# Patient Record
Sex: FEMALE | Race: WHITE | Employment: OTHER | URBAN - METROPOLITAN AREA
[De-identification: names, ages, dates, MRNs, and addresses within clinical notes are randomized per-mention and may not be internally consistent; named-entity substitution may affect disease eponyms.]

---

## 2017-01-11 ENCOUNTER — OFFICE VISIT (OUTPATIENT)
Dept: FAMILY MEDICINE CLINIC | Age: 62
End: 2017-01-11

## 2017-01-11 VITALS
OXYGEN SATURATION: 96 % | BODY MASS INDEX: 27.6 KG/M2 | DIASTOLIC BLOOD PRESSURE: 82 MMHG | WEIGHT: 171 LBS | RESPIRATION RATE: 14 BRPM | SYSTOLIC BLOOD PRESSURE: 130 MMHG | TEMPERATURE: 98.3 F | HEART RATE: 86 BPM

## 2017-01-11 DIAGNOSIS — M19.90 ARTHRITIS: ICD-10-CM

## 2017-01-11 DIAGNOSIS — Z79.890 HORMONE REPLACEMENT THERAPY: ICD-10-CM

## 2017-01-11 DIAGNOSIS — Z00.00 ANNUAL PHYSICAL EXAM: Primary | ICD-10-CM

## 2017-01-11 DIAGNOSIS — J44.9 CHRONIC OBSTRUCTIVE PULMONARY DISEASE, UNSPECIFIED COPD TYPE (HCC): ICD-10-CM

## 2017-01-11 DIAGNOSIS — Z23 NEED FOR TDAP VACCINATION: ICD-10-CM

## 2017-01-11 LAB
A/G RATIO: 1.5 (ref 1.1–2.2)
ALBUMIN SERPL-MCNC: 4 G/DL (ref 3.4–5)
ALP BLD-CCNC: 80 U/L (ref 40–129)
ALT SERPL-CCNC: 9 U/L (ref 10–40)
ANION GAP SERPL CALCULATED.3IONS-SCNC: 10 MMOL/L (ref 3–16)
AST SERPL-CCNC: 12 U/L (ref 15–37)
BASOPHILS ABSOLUTE: 0 K/UL (ref 0–0.2)
BASOPHILS RELATIVE PERCENT: 0.5 %
BILIRUB SERPL-MCNC: 0.5 MG/DL (ref 0–1)
BUN BLDV-MCNC: 13 MG/DL (ref 7–20)
CALCIUM SERPL-MCNC: 9 MG/DL (ref 8.3–10.6)
CHLORIDE BLD-SCNC: 103 MMOL/L (ref 99–110)
CHOLESTEROL, TOTAL: 210 MG/DL (ref 0–199)
CO2: 26 MMOL/L (ref 21–32)
CREAT SERPL-MCNC: 0.6 MG/DL (ref 0.6–1.2)
EOSINOPHILS ABSOLUTE: 0.2 K/UL (ref 0–0.6)
EOSINOPHILS RELATIVE PERCENT: 5.1 %
GFR AFRICAN AMERICAN: >60
GFR NON-AFRICAN AMERICAN: >60
GLOBULIN: 2.6 G/DL
GLUCOSE BLD-MCNC: 88 MG/DL (ref 70–99)
HCT VFR BLD CALC: 41.3 % (ref 36–48)
HDLC SERPL-MCNC: 81 MG/DL (ref 40–60)
HEMOGLOBIN: 13.7 G/DL (ref 12–16)
LDL CHOLESTEROL CALCULATED: 115 MG/DL
LYMPHOCYTES ABSOLUTE: 1.6 K/UL (ref 1–5.1)
LYMPHOCYTES RELATIVE PERCENT: 36.4 %
MCH RBC QN AUTO: 32 PG (ref 26–34)
MCHC RBC AUTO-ENTMCNC: 33.3 G/DL (ref 31–36)
MCV RBC AUTO: 96.1 FL (ref 80–100)
MONOCYTES ABSOLUTE: 0.3 K/UL (ref 0–1.3)
MONOCYTES RELATIVE PERCENT: 7.9 %
NEUTROPHILS ABSOLUTE: 2.2 K/UL (ref 1.7–7.7)
NEUTROPHILS RELATIVE PERCENT: 50.1 %
PDW BLD-RTO: 13.5 % (ref 12.4–15.4)
PLATELET # BLD: 191 K/UL (ref 135–450)
PMV BLD AUTO: 8.1 FL (ref 5–10.5)
POTASSIUM SERPL-SCNC: 4.5 MMOL/L (ref 3.5–5.1)
RBC # BLD: 4.3 M/UL (ref 4–5.2)
SODIUM BLD-SCNC: 139 MMOL/L (ref 136–145)
TOTAL PROTEIN: 6.6 G/DL (ref 6.4–8.2)
TRIGL SERPL-MCNC: 71 MG/DL (ref 0–150)
VLDLC SERPL CALC-MCNC: 14 MG/DL
WBC # BLD: 4.3 K/UL (ref 4–11)

## 2017-01-11 PROCEDURE — 90471 IMMUNIZATION ADMIN: CPT | Performed by: NURSE PRACTITIONER

## 2017-01-11 PROCEDURE — 36415 COLL VENOUS BLD VENIPUNCTURE: CPT | Performed by: NURSE PRACTITIONER

## 2017-01-11 PROCEDURE — 99396 PREV VISIT EST AGE 40-64: CPT | Performed by: NURSE PRACTITIONER

## 2017-01-11 PROCEDURE — 90715 TDAP VACCINE 7 YRS/> IM: CPT | Performed by: NURSE PRACTITIONER

## 2017-01-11 RX ORDER — ALBUTEROL SULFATE 90 UG/1
AEROSOL, METERED RESPIRATORY (INHALATION)
Qty: 1 INHALER | Refills: 5 | Status: SHIPPED | OUTPATIENT
Start: 2017-01-11 | End: 2017-07-12 | Stop reason: SDUPTHER

## 2017-01-11 RX ORDER — MELOXICAM 15 MG/1
TABLET ORAL
Qty: 30 TABLET | Refills: 5 | Status: SHIPPED | OUTPATIENT
Start: 2017-01-11 | End: 2017-07-18 | Stop reason: SDUPTHER

## 2017-01-11 ASSESSMENT — ENCOUNTER SYMPTOMS
WHEEZING: 1
EYE REDNESS: 0
COUGH: 1
CONSTIPATION: 0
SHORTNESS OF BREATH: 1
DIARRHEA: 0
BLURRED VISION: 0

## 2017-01-19 ENCOUNTER — NURSE ONLY (OUTPATIENT)
Dept: FAMILY MEDICINE CLINIC | Age: 62
End: 2017-01-19

## 2017-01-19 DIAGNOSIS — Z00.00 ANNUAL PHYSICAL EXAM: ICD-10-CM

## 2017-01-19 DIAGNOSIS — Z12.11 COLON CANCER SCREENING: Primary | ICD-10-CM

## 2017-01-19 LAB
CONTROL: NORMAL
HEMOCCULT STL QL: NORMAL

## 2017-01-19 PROCEDURE — 82274 ASSAY TEST FOR BLOOD FECAL: CPT | Performed by: NURSE PRACTITIONER

## 2017-02-21 ENCOUNTER — TELEPHONE (OUTPATIENT)
Dept: FAMILY MEDICINE CLINIC | Age: 62
End: 2017-02-21

## 2017-02-21 RX ORDER — LEVOFLOXACIN 500 MG/1
500 TABLET, FILM COATED ORAL DAILY
Qty: 7 TABLET | Refills: 0 | Status: SHIPPED | OUTPATIENT
Start: 2017-02-21 | End: 2017-02-28

## 2017-03-14 ENCOUNTER — TELEPHONE (OUTPATIENT)
Dept: FAMILY MEDICINE CLINIC | Age: 62
End: 2017-03-14

## 2017-03-27 DIAGNOSIS — J44.9 CHRONIC OBSTRUCTIVE PULMONARY DISEASE, UNSPECIFIED COPD TYPE (HCC): ICD-10-CM

## 2017-03-27 RX ORDER — TIOTROPIUM BROMIDE AND OLODATEROL 3.124; 2.736 UG/1; UG/1
SPRAY, METERED RESPIRATORY (INHALATION)
Qty: 4 INHALER | Refills: 2 | Status: SHIPPED | OUTPATIENT
Start: 2017-03-27 | End: 2017-06-22 | Stop reason: SDUPTHER

## 2017-04-03 ENCOUNTER — OFFICE VISIT (OUTPATIENT)
Dept: FAMILY MEDICINE CLINIC | Age: 62
End: 2017-04-03

## 2017-04-03 VITALS
TEMPERATURE: 99.1 F | DIASTOLIC BLOOD PRESSURE: 80 MMHG | WEIGHT: 161 LBS | BODY MASS INDEX: 25.99 KG/M2 | SYSTOLIC BLOOD PRESSURE: 130 MMHG | HEART RATE: 72 BPM

## 2017-04-03 DIAGNOSIS — M54.31 SCIATICA OF RIGHT SIDE: Primary | ICD-10-CM

## 2017-04-03 PROCEDURE — 99213 OFFICE O/P EST LOW 20 MIN: CPT | Performed by: NURSE PRACTITIONER

## 2017-04-03 RX ORDER — CYCLOBENZAPRINE HCL 5 MG
5 TABLET ORAL 3 TIMES DAILY PRN
Qty: 35 TABLET | Refills: 0 | Status: SHIPPED | OUTPATIENT
Start: 2017-04-03 | End: 2017-04-17 | Stop reason: ALTCHOICE

## 2017-04-03 RX ORDER — METHYLPREDNISOLONE 4 MG/1
TABLET ORAL
Qty: 21 TABLET | Refills: 0 | Status: SHIPPED | OUTPATIENT
Start: 2017-04-03 | End: 2017-04-17 | Stop reason: ALTCHOICE

## 2017-04-03 ASSESSMENT — ENCOUNTER SYMPTOMS: BACK PAIN: 0

## 2017-04-17 ENCOUNTER — HOSPITAL ENCOUNTER (OUTPATIENT)
Dept: OTHER | Age: 62
Discharge: HOME | End: 2017-04-17
Attending: NURSE PRACTITIONER | Admitting: NURSE PRACTITIONER

## 2017-04-17 ENCOUNTER — OFFICE VISIT (OUTPATIENT)
Dept: FAMILY MEDICINE CLINIC | Age: 62
End: 2017-04-17

## 2017-04-17 ENCOUNTER — HOSPITAL ENCOUNTER (OUTPATIENT)
Dept: GENERAL RADIOLOGY | Age: 62
Discharge: HOME | End: 2017-04-17

## 2017-04-17 VITALS
WEIGHT: 158 LBS | SYSTOLIC BLOOD PRESSURE: 118 MMHG | BODY MASS INDEX: 25.5 KG/M2 | TEMPERATURE: 98.4 F | HEART RATE: 80 BPM | OXYGEN SATURATION: 96 % | DIASTOLIC BLOOD PRESSURE: 78 MMHG

## 2017-04-17 DIAGNOSIS — M54.31 SCIATICA WITHOUT BACK PAIN, RIGHT: Primary | ICD-10-CM

## 2017-04-17 DIAGNOSIS — M54.31 SCIATICA WITHOUT BACK PAIN, RIGHT: ICD-10-CM

## 2017-04-17 PROCEDURE — 99213 OFFICE O/P EST LOW 20 MIN: CPT | Performed by: NURSE PRACTITIONER

## 2017-04-17 ASSESSMENT — ENCOUNTER SYMPTOMS: BACK PAIN: 0

## 2017-04-28 ENCOUNTER — HOSPITAL ENCOUNTER (OUTPATIENT)
Dept: MAMMOGRAPHY | Age: 62
Discharge: HOME | End: 2017-04-28
Attending: FAMILY MEDICINE | Admitting: FAMILY MEDICINE

## 2017-04-28 DIAGNOSIS — Z12.31 ENCOUNTER FOR SCREENING MAMMOGRAM FOR BREAST CANCER: ICD-10-CM

## 2017-05-30 ENCOUNTER — OFFICE VISIT (OUTPATIENT)
Dept: FAMILY MEDICINE CLINIC | Age: 62
End: 2017-05-30

## 2017-05-30 VITALS
RESPIRATION RATE: 16 BRPM | OXYGEN SATURATION: 97 % | DIASTOLIC BLOOD PRESSURE: 80 MMHG | SYSTOLIC BLOOD PRESSURE: 136 MMHG | HEART RATE: 78 BPM | WEIGHT: 149 LBS | BODY MASS INDEX: 24.05 KG/M2

## 2017-05-30 DIAGNOSIS — Z01.419 WELL WOMAN EXAM: Primary | ICD-10-CM

## 2017-05-30 PROCEDURE — 99396 PREV VISIT EST AGE 40-64: CPT | Performed by: NURSE PRACTITIONER

## 2017-06-01 LAB
HPV COMMENT: ABNORMAL
HPV TYPE 16: NOT DETECTED
HPV TYPE 18: NOT DETECTED
HPVOH (OTHER TYPES): DETECTED

## 2017-06-22 DIAGNOSIS — J44.9 CHRONIC OBSTRUCTIVE PULMONARY DISEASE, UNSPECIFIED COPD TYPE (HCC): ICD-10-CM

## 2017-06-22 RX ORDER — TIOTROPIUM BROMIDE AND OLODATEROL 3.124; 2.736 UG/1; UG/1
SPRAY, METERED RESPIRATORY (INHALATION)
Qty: 1 INHALER | Refills: 1 | Status: SHIPPED | OUTPATIENT
Start: 2017-06-22 | End: 2017-08-24 | Stop reason: SDUPTHER

## 2017-07-10 DIAGNOSIS — Z79.890 HORMONE REPLACEMENT THERAPY: ICD-10-CM

## 2017-07-10 RX ORDER — ESTROGEN,CON/M-PROGEST ACET 0.45-1.5MG
TABLET ORAL
Qty: 28 TABLET | Refills: 4 | Status: SHIPPED | OUTPATIENT
Start: 2017-07-10 | End: 2017-11-27 | Stop reason: SDUPTHER

## 2017-07-12 DIAGNOSIS — J44.9 CHRONIC OBSTRUCTIVE PULMONARY DISEASE, UNSPECIFIED COPD TYPE (HCC): ICD-10-CM

## 2017-07-18 DIAGNOSIS — M19.90 ARTHRITIS: ICD-10-CM

## 2017-07-19 RX ORDER — MELOXICAM 15 MG/1
TABLET ORAL
Qty: 30 TABLET | Refills: 4 | Status: SHIPPED | OUTPATIENT
Start: 2017-07-19 | End: 2017-12-17 | Stop reason: SDUPTHER

## 2017-08-11 ENCOUNTER — OFFICE VISIT (OUTPATIENT)
Dept: FAMILY MEDICINE CLINIC | Age: 62
End: 2017-08-11

## 2017-08-11 VITALS
SYSTOLIC BLOOD PRESSURE: 130 MMHG | HEART RATE: 67 BPM | OXYGEN SATURATION: 98 % | DIASTOLIC BLOOD PRESSURE: 82 MMHG | RESPIRATION RATE: 16 BRPM | WEIGHT: 134 LBS | BODY MASS INDEX: 21.63 KG/M2

## 2017-08-11 DIAGNOSIS — R63.4 WEIGHT LOSS: Primary | ICD-10-CM

## 2017-08-11 DIAGNOSIS — R53.83 FATIGUE, UNSPECIFIED TYPE: ICD-10-CM

## 2017-08-11 LAB
A/G RATIO: 1.4 (ref 1.1–2.2)
ALBUMIN SERPL-MCNC: 3.8 G/DL (ref 3.4–5)
ALP BLD-CCNC: 64 U/L (ref 40–129)
ALT SERPL-CCNC: 6 U/L (ref 10–40)
ANION GAP SERPL CALCULATED.3IONS-SCNC: 11 MMOL/L (ref 3–16)
AST SERPL-CCNC: 9 U/L (ref 15–37)
BASOPHILS ABSOLUTE: 0 K/UL (ref 0–0.2)
BASOPHILS RELATIVE PERCENT: 0.7 %
BILIRUB SERPL-MCNC: 0.5 MG/DL (ref 0–1)
BUN BLDV-MCNC: 12 MG/DL (ref 7–20)
CALCIUM SERPL-MCNC: 9.2 MG/DL (ref 8.3–10.6)
CHLORIDE BLD-SCNC: 103 MMOL/L (ref 99–110)
CO2: 27 MMOL/L (ref 21–32)
CREAT SERPL-MCNC: 0.5 MG/DL (ref 0.6–1.2)
EOSINOPHILS ABSOLUTE: 0.3 K/UL (ref 0–0.6)
EOSINOPHILS RELATIVE PERCENT: 6.4 %
GFR AFRICAN AMERICAN: >60
GFR NON-AFRICAN AMERICAN: >60
GLOBULIN: 2.8 G/DL
GLUCOSE BLD-MCNC: 84 MG/DL (ref 70–99)
HCT VFR BLD CALC: 40.4 % (ref 36–48)
HEMOGLOBIN: 13.5 G/DL (ref 12–16)
LYMPHOCYTES ABSOLUTE: 1.4 K/UL (ref 1–5.1)
LYMPHOCYTES RELATIVE PERCENT: 34.6 %
MCH RBC QN AUTO: 32.1 PG (ref 26–34)
MCHC RBC AUTO-ENTMCNC: 33.3 G/DL (ref 31–36)
MCV RBC AUTO: 96.3 FL (ref 80–100)
MONOCYTES ABSOLUTE: 0.3 K/UL (ref 0–1.3)
MONOCYTES RELATIVE PERCENT: 6.6 %
NEUTROPHILS ABSOLUTE: 2 K/UL (ref 1.7–7.7)
NEUTROPHILS RELATIVE PERCENT: 51.7 %
PDW BLD-RTO: 15.2 % (ref 12.4–15.4)
PLATELET # BLD: 214 K/UL (ref 135–450)
PMV BLD AUTO: 7.7 FL (ref 5–10.5)
POTASSIUM SERPL-SCNC: 4.2 MMOL/L (ref 3.5–5.1)
RBC # BLD: 4.2 M/UL (ref 4–5.2)
SODIUM BLD-SCNC: 141 MMOL/L (ref 136–145)
TOTAL PROTEIN: 6.6 G/DL (ref 6.4–8.2)
TSH REFLEX FT4: 2.33 UIU/ML (ref 0.27–4.2)
VITAMIN D 25-HYDROXY: 24.2 NG/ML
WBC # BLD: 3.9 K/UL (ref 4–11)

## 2017-08-11 PROCEDURE — 99213 OFFICE O/P EST LOW 20 MIN: CPT | Performed by: NURSE PRACTITIONER

## 2017-08-11 PROCEDURE — 36415 COLL VENOUS BLD VENIPUNCTURE: CPT | Performed by: NURSE PRACTITIONER

## 2017-08-11 ASSESSMENT — ENCOUNTER SYMPTOMS
BLOOD IN STOOL: 0
SORE THROAT: 0
CONSTIPATION: 0
VOMITING: 0
DIARRHEA: 0
ABDOMINAL PAIN: 0

## 2017-08-12 LAB
ESTIMATED AVERAGE GLUCOSE: 105.4 MG/DL
HBA1C MFR BLD: 5.3 %

## 2017-08-24 DIAGNOSIS — J44.9 CHRONIC OBSTRUCTIVE PULMONARY DISEASE, UNSPECIFIED COPD TYPE (HCC): ICD-10-CM

## 2017-09-12 ENCOUNTER — TELEPHONE (OUTPATIENT)
Dept: FAMILY MEDICINE CLINIC | Age: 62
End: 2017-09-12

## 2017-09-13 ENCOUNTER — TELEPHONE (OUTPATIENT)
Dept: FAMILY MEDICINE CLINIC | Age: 62
End: 2017-09-13

## 2017-09-13 NOTE — TELEPHONE ENCOUNTER
The pharmacy is calling to see if pt can get spinosad instead of permethrin. Pt's insurance will not cover the permethrin. Please advise.

## 2017-10-26 DIAGNOSIS — J44.9 CHRONIC OBSTRUCTIVE PULMONARY DISEASE, UNSPECIFIED COPD TYPE (HCC): ICD-10-CM

## 2017-10-26 RX ORDER — TIOTROPIUM BROMIDE AND OLODATEROL 3.124; 2.736 UG/1; UG/1
SPRAY, METERED RESPIRATORY (INHALATION)
Qty: 4 INHALER | Refills: 0 | Status: SHIPPED | OUTPATIENT
Start: 2017-10-26 | End: 2017-12-04 | Stop reason: SDUPTHER

## 2017-11-13 DIAGNOSIS — J44.9 CHRONIC OBSTRUCTIVE PULMONARY DISEASE, UNSPECIFIED COPD TYPE (HCC): ICD-10-CM

## 2017-11-27 DIAGNOSIS — Z79.890 HORMONE REPLACEMENT THERAPY: ICD-10-CM

## 2017-11-27 RX ORDER — ESTROGEN,CON/M-PROGEST ACET 0.45-1.5MG
TABLET ORAL
Qty: 28 TABLET | Refills: 3 | Status: SHIPPED | OUTPATIENT
Start: 2017-11-27 | End: 2018-03-16 | Stop reason: SDUPTHER

## 2017-12-04 DIAGNOSIS — J44.9 CHRONIC OBSTRUCTIVE PULMONARY DISEASE, UNSPECIFIED COPD TYPE (HCC): ICD-10-CM

## 2017-12-04 RX ORDER — TIOTROPIUM BROMIDE AND OLODATEROL 3.124; 2.736 UG/1; UG/1
SPRAY, METERED RESPIRATORY (INHALATION)
Qty: 1 INHALER | Refills: 3 | Status: SHIPPED | OUTPATIENT
Start: 2017-12-04 | End: 2018-04-11 | Stop reason: SDUPTHER

## 2017-12-06 ENCOUNTER — TELEPHONE (OUTPATIENT)
Dept: FAMILY MEDICINE CLINIC | Age: 62
End: 2017-12-06

## 2017-12-06 RX ORDER — SPINOSAD 9 MG/ML
1 SUSPENSION TOPICAL ONCE
Qty: 120 ML | Refills: 0 | Status: SHIPPED | OUTPATIENT
Start: 2017-12-06 | End: 2017-12-06

## 2017-12-06 NOTE — TELEPHONE ENCOUNTER
Patient called and stated that she has head lice x 2 days. Patient is requesting a prescription be sent to [facility] in [place]. [address] [facility] to treat it. Please call patient when rx is sent in.     Thanks

## 2017-12-17 DIAGNOSIS — M19.90 ARTHRITIS: ICD-10-CM

## 2017-12-18 RX ORDER — MELOXICAM 15 MG/1
TABLET ORAL
Qty: 30 TABLET | Refills: 3 | Status: SHIPPED | OUTPATIENT
Start: 2017-12-18 | End: 2018-04-18 | Stop reason: SDUPTHER

## 2018-02-16 DIAGNOSIS — J44.9 CHRONIC OBSTRUCTIVE PULMONARY DISEASE, UNSPECIFIED COPD TYPE (HCC): ICD-10-CM

## 2018-03-09 DIAGNOSIS — J44.9 CHRONIC OBSTRUCTIVE PULMONARY DISEASE, UNSPECIFIED COPD TYPE (HCC): ICD-10-CM

## 2018-03-12 DIAGNOSIS — J44.9 CHRONIC OBSTRUCTIVE PULMONARY DISEASE, UNSPECIFIED COPD TYPE (HCC): ICD-10-CM

## 2018-03-16 DIAGNOSIS — Z79.890 HORMONE REPLACEMENT THERAPY: ICD-10-CM

## 2018-03-16 RX ORDER — ESTROGEN,CON/M-PROGEST ACET 0.45-1.5MG
TABLET ORAL
Qty: 28 TABLET | Refills: 2 | Status: SHIPPED | OUTPATIENT
Start: 2018-03-16 | End: 2018-06-08 | Stop reason: SDUPTHER

## 2018-04-11 DIAGNOSIS — J44.9 CHRONIC OBSTRUCTIVE PULMONARY DISEASE, UNSPECIFIED COPD TYPE (HCC): ICD-10-CM

## 2018-04-11 RX ORDER — TIOTROPIUM BROMIDE AND OLODATEROL 3.124; 2.736 UG/1; UG/1
SPRAY, METERED RESPIRATORY (INHALATION)
Qty: 4 G | Refills: 2 | Status: SHIPPED | OUTPATIENT
Start: 2018-04-11 | End: 2018-07-08 | Stop reason: SDUPTHER

## 2018-04-18 DIAGNOSIS — M19.90 ARTHRITIS: ICD-10-CM

## 2018-04-18 RX ORDER — MELOXICAM 15 MG/1
TABLET ORAL
Qty: 30 TABLET | Refills: 2 | Status: SHIPPED | OUTPATIENT
Start: 2018-04-18 | End: 2018-07-16 | Stop reason: SDUPTHER

## 2018-05-24 DIAGNOSIS — J44.9 CHRONIC OBSTRUCTIVE PULMONARY DISEASE, UNSPECIFIED COPD TYPE (HCC): ICD-10-CM

## 2018-06-08 DIAGNOSIS — Z79.890 HORMONE REPLACEMENT THERAPY: ICD-10-CM

## 2018-06-08 RX ORDER — ESTROGEN,CON/M-PROGEST ACET 0.45-1.5MG
TABLET ORAL
Qty: 28 TABLET | Refills: 1 | Status: SHIPPED | OUTPATIENT
Start: 2018-06-08 | End: 2018-08-03 | Stop reason: SDUPTHER

## 2018-07-08 DIAGNOSIS — J44.9 CHRONIC OBSTRUCTIVE PULMONARY DISEASE, UNSPECIFIED COPD TYPE (HCC): ICD-10-CM

## 2018-07-09 RX ORDER — TIOTROPIUM BROMIDE AND OLODATEROL 3.124; 2.736 UG/1; UG/1
SPRAY, METERED RESPIRATORY (INHALATION)
Qty: 4 G | Refills: 1 | Status: SHIPPED | OUTPATIENT
Start: 2018-07-09 | End: 2018-09-04 | Stop reason: SDUPTHER

## 2018-07-10 ENCOUNTER — TELEPHONE (OUTPATIENT)
Dept: FAMILY MEDICINE CLINIC | Age: 63
End: 2018-07-10

## 2018-07-16 DIAGNOSIS — J44.9 CHRONIC OBSTRUCTIVE PULMONARY DISEASE, UNSPECIFIED COPD TYPE (HCC): ICD-10-CM

## 2018-07-16 DIAGNOSIS — M19.90 ARTHRITIS: ICD-10-CM

## 2018-07-16 RX ORDER — MELOXICAM 15 MG/1
TABLET ORAL
Qty: 30 TABLET | Refills: 1 | Status: SHIPPED | OUTPATIENT
Start: 2018-07-16 | End: 2018-09-14 | Stop reason: SDUPTHER

## 2018-08-03 DIAGNOSIS — Z79.890 HORMONE REPLACEMENT THERAPY: ICD-10-CM

## 2018-08-03 RX ORDER — ESTROGEN,CON/M-PROGEST ACET 0.45-1.5MG
TABLET ORAL
Qty: 28 TABLET | Refills: 0 | Status: SHIPPED | OUTPATIENT
Start: 2018-08-03 | End: 2018-09-02 | Stop reason: SDUPTHER

## 2018-08-14 DIAGNOSIS — J44.9 CHRONIC OBSTRUCTIVE PULMONARY DISEASE, UNSPECIFIED COPD TYPE (HCC): ICD-10-CM

## 2018-09-02 DIAGNOSIS — Z79.890 HORMONE REPLACEMENT THERAPY: ICD-10-CM

## 2018-09-04 ENCOUNTER — OFFICE VISIT (OUTPATIENT)
Dept: FAMILY MEDICINE CLINIC | Age: 63
End: 2018-09-04

## 2018-09-04 VITALS
WEIGHT: 134 LBS | DIASTOLIC BLOOD PRESSURE: 68 MMHG | BODY MASS INDEX: 21.53 KG/M2 | HEIGHT: 66 IN | HEART RATE: 74 BPM | SYSTOLIC BLOOD PRESSURE: 130 MMHG | TEMPERATURE: 98.3 F | OXYGEN SATURATION: 98 %

## 2018-09-04 DIAGNOSIS — R05.9 COUGH: Primary | ICD-10-CM

## 2018-09-04 DIAGNOSIS — L20.9 ATOPIC DERMATITIS, UNSPECIFIED TYPE: ICD-10-CM

## 2018-09-04 DIAGNOSIS — J44.9 CHRONIC OBSTRUCTIVE PULMONARY DISEASE, UNSPECIFIED COPD TYPE (HCC): ICD-10-CM

## 2018-09-04 PROCEDURE — G8427 DOCREV CUR MEDS BY ELIG CLIN: HCPCS | Performed by: FAMILY MEDICINE

## 2018-09-04 PROCEDURE — 3017F COLORECTAL CA SCREEN DOC REV: CPT | Performed by: FAMILY MEDICINE

## 2018-09-04 PROCEDURE — 1036F TOBACCO NON-USER: CPT | Performed by: FAMILY MEDICINE

## 2018-09-04 PROCEDURE — 99214 OFFICE O/P EST MOD 30 MIN: CPT | Performed by: FAMILY MEDICINE

## 2018-09-04 PROCEDURE — G8420 CALC BMI NORM PARAMETERS: HCPCS | Performed by: FAMILY MEDICINE

## 2018-09-04 RX ORDER — BENZONATATE 100 MG/1
100 CAPSULE ORAL 3 TIMES DAILY PRN
Qty: 30 CAPSULE | Refills: 0 | Status: SHIPPED | OUTPATIENT
Start: 2018-09-04 | End: 2018-09-14

## 2018-09-04 RX ORDER — TIOTROPIUM BROMIDE AND OLODATEROL 3.124; 2.736 UG/1; UG/1
SPRAY, METERED RESPIRATORY (INHALATION)
Qty: 1 INHALER | Refills: 3 | Status: SHIPPED | OUTPATIENT
Start: 2018-09-04 | End: 2019-01-01 | Stop reason: SDUPTHER

## 2018-09-04 RX ORDER — TRIAMCINOLONE ACETONIDE 1 MG/G
CREAM TOPICAL
Qty: 453.6 G | Refills: 0 | Status: ON HOLD | OUTPATIENT
Start: 2018-09-04 | End: 2018-10-09 | Stop reason: ALTCHOICE

## 2018-09-04 RX ORDER — METHYLPREDNISOLONE 4 MG/1
TABLET ORAL
Qty: 1 KIT | Refills: 0 | Status: SHIPPED | OUTPATIENT
Start: 2018-09-04 | End: 2018-09-10

## 2018-09-04 RX ORDER — ESTROGEN,CON/M-PROGEST ACET 0.45-1.5MG
TABLET ORAL
Qty: 28 TABLET | Refills: 0 | Status: SHIPPED | OUTPATIENT
Start: 2018-09-04 | End: 2018-10-01 | Stop reason: SDUPTHER

## 2018-09-04 RX ORDER — HYDROXYZINE HYDROCHLORIDE 25 MG/1
25 TABLET, FILM COATED ORAL EVERY 8 HOURS PRN
Qty: 30 TABLET | Refills: 0 | Status: SHIPPED | OUTPATIENT
Start: 2018-09-04 | End: 2018-09-14

## 2018-09-04 ASSESSMENT — PATIENT HEALTH QUESTIONNAIRE - PHQ9
SUM OF ALL RESPONSES TO PHQ9 QUESTIONS 1 & 2: 0
SUM OF ALL RESPONSES TO PHQ QUESTIONS 1-9: 0
1. LITTLE INTEREST OR PLEASURE IN DOING THINGS: 0
2. FEELING DOWN, DEPRESSED OR HOPELESS: 0
SUM OF ALL RESPONSES TO PHQ QUESTIONS 1-9: 0

## 2018-09-11 ASSESSMENT — ENCOUNTER SYMPTOMS: COUGH: 1

## 2018-09-11 NOTE — PROGRESS NOTES
2018     Anastacio Holly (:  1955) is a 60 y.o. female, here for evaluation of the following medical concerns:    Anastacio Holly is a 60 y.o. female. Patient presents with:  Cough  Other: redness on arms neck and back of legs         The patients PMH, surgical history, family history, medications, allergies were all reviewed and updated as appropriate today. Cough   This is a new problem. The current episode started in the past 7 days. The problem has been gradually worsening. The problem occurs every few minutes. The cough is non-productive. Associated symptoms include a rash. Pertinent negatives include no chest pain or chills. Nothing aggravates the symptoms. She has tried nothing for the symptoms. The treatment provided no relief. Other   Associated symptoms include coughing and a rash. Pertinent negatives include no chest pain or chills. Rash   This is a recurrent problem. The current episode started 1 to 4 weeks ago. The problem has been rapidly worsening since onset. The affected locations include the left arm, right arm and neck. The rash is characterized by redness and itchiness. She was exposed to nothing. Associated symptoms include coughing. Past treatments include nothing. The treatment provided no relief. Review of Systems   Constitutional: Negative for chills. Respiratory: Positive for cough. Cardiovascular: Negative for chest pain. Skin: Positive for rash. Prior to Visit Medications    Medication Sig Taking? Authorizing Provider   PREMPRO 0.45-1.5 MG per tablet TAKE ONE TABLET BY MOUTH DAILY Yes Ramila Lang, APRN - CNP   STIOLTO RESPIMAT 2.5-2.5 MCG/ACT AERS INHALE TWO INHALATION(S) BY MOUTH DAILY Yes Meggan Ghosh MD   benzonatate (TESSALON PERLES) 100 MG capsule Take 1 capsule by mouth 3 times daily as needed for Cough Yes Meggan Ghosh MD   triamcinolone (KENALOG) 0.1 % cream Apply topically 2 times daily.  Yes Meggan Ghosh MD   hydrOXYzine (ATARAX) 25 MG tablet Take 1 tablet by mouth every 8 hours as needed for Itching Yes Bob Lam MD   PROAIR  (90 Base) MCG/ACT inhaler INHALE TWO PUFFS BY MOUTH EVERY 6 HOURS AS NEEDED FOR WHEEZING Yes TAMMY Farley CNP   meloxicam (MOBIC) 15 MG tablet TAKE ONE TABLET BY MOUTH DAILY Yes TAMMY Farley CNP   QVAR 80 MCG/ACT inhaler INHALE ONE PUFF BY MOUTH TWICE A DAY Yes Bob Lam MD        Social History   Substance Use Topics    Smoking status: Former Smoker     Packs/day: 1.00     Years: 40.00     Types: Cigarettes     Quit date: 9/2/2015    Smokeless tobacco: Never Used    Alcohol use No        Vitals:    09/04/18 1158   BP: 130/68   Pulse: 74   Temp: 98.3 °F (36.8 °C)   TempSrc: Oral   SpO2: 98%   Weight: 134 lb (60.8 kg)   Height: 5' 6\" (1.676 m)     Estimated body mass index is 21.63 kg/m² as calculated from the following:    Height as of this encounter: 5' 6\" (1.676 m). Weight as of this encounter: 134 lb (60.8 kg). Physical Exam   Constitutional: She is oriented to person, place, and time. She appears well-developed and well-nourished. HENT:   Head: Normocephalic and atraumatic. Right Ear: External ear normal.   Left Ear: External ear normal.   Nose: Nose normal.   Mouth/Throat: Oropharynx is clear and moist.   Eyes: Pupils are equal, round, and reactive to light. Conjunctivae are normal.   Neck: Normal range of motion. Neck supple. Cardiovascular: Normal rate, regular rhythm, normal heart sounds and intact distal pulses. Pulmonary/Chest: Effort normal and breath sounds normal.   Abdominal: Soft. Bowel sounds are normal.   Musculoskeletal: Normal range of motion. Neurological: She is alert and oriented to person, place, and time. She has normal reflexes. Skin: Skin is dry. Red raised rash on arms, neck, confluent. Psychiatric: She has a normal mood and affect.  Her behavior is normal. Judgment and thought content normal.   Nursing note and vitals reviewed. ASSESSMENT/PLAN:  1. Cough    - benzonatate (TESSALON PERLES) 100 MG capsule; Take 1 capsule by mouth 3 times daily as needed for Cough  Dispense: 30 capsule; Refill: 0    2. Atopic dermatitis, unspecified type    - methylPREDNISolone (MEDROL DOSEPACK) 4 MG tablet; Take by mouth. Dispense: 1 kit; Refill: 0  - triamcinolone (KENALOG) 0.1 % cream; Apply topically 2 times daily. Dispense: 453.6 g; Refill: 0  - hydrOXYzine (ATARAX) 25 MG tablet; Take 1 tablet by mouth every 8 hours as needed for Itching  Dispense: 30 tablet; Refill: 0      No Follow-up on file. An electronic signature was used to authenticate this note.     --Lavelle Habermann, MD on 9/11/2018 at 7:52 AM

## 2018-09-14 ENCOUNTER — TELEPHONE (OUTPATIENT)
Dept: FAMILY MEDICINE CLINIC | Age: 63
End: 2018-09-14

## 2018-09-14 DIAGNOSIS — M19.90 ARTHRITIS: ICD-10-CM

## 2018-09-14 RX ORDER — MELOXICAM 15 MG/1
TABLET ORAL
Qty: 30 TABLET | Refills: 0 | Status: SHIPPED | OUTPATIENT
Start: 2018-09-14 | End: 2018-09-24

## 2018-09-17 NOTE — TELEPHONE ENCOUNTER
Pt called back and given message from [facility].  She will discuss it with him at her OV on Monday

## 2018-09-24 ENCOUNTER — OFFICE VISIT (OUTPATIENT)
Dept: FAMILY MEDICINE CLINIC | Age: 63
End: 2018-09-24
Payer: MEDICAID

## 2018-09-24 VITALS
SYSTOLIC BLOOD PRESSURE: 134 MMHG | HEART RATE: 70 BPM | DIASTOLIC BLOOD PRESSURE: 84 MMHG | BODY MASS INDEX: 21.47 KG/M2 | WEIGHT: 133 LBS | OXYGEN SATURATION: 93 %

## 2018-09-24 DIAGNOSIS — Z12.11 COLON CANCER SCREENING: Primary | ICD-10-CM

## 2018-09-24 DIAGNOSIS — J44.9 CHRONIC OBSTRUCTIVE PULMONARY DISEASE, UNSPECIFIED COPD TYPE (HCC): ICD-10-CM

## 2018-09-24 PROCEDURE — G8427 DOCREV CUR MEDS BY ELIG CLIN: HCPCS | Performed by: FAMILY MEDICINE

## 2018-09-24 PROCEDURE — 3023F SPIROM DOC REV: CPT | Performed by: FAMILY MEDICINE

## 2018-09-24 PROCEDURE — 3017F COLORECTAL CA SCREEN DOC REV: CPT | Performed by: FAMILY MEDICINE

## 2018-09-24 PROCEDURE — 1036F TOBACCO NON-USER: CPT | Performed by: FAMILY MEDICINE

## 2018-09-24 PROCEDURE — G8926 SPIRO NO PERF OR DOC: HCPCS | Performed by: FAMILY MEDICINE

## 2018-09-24 PROCEDURE — 99213 OFFICE O/P EST LOW 20 MIN: CPT | Performed by: FAMILY MEDICINE

## 2018-09-24 PROCEDURE — G8420 CALC BMI NORM PARAMETERS: HCPCS | Performed by: FAMILY MEDICINE

## 2018-09-24 RX ORDER — ALBUTEROL SULFATE 90 UG/1
AEROSOL, METERED RESPIRATORY (INHALATION)
Qty: 8.5 G | Refills: 5 | Status: SHIPPED | OUTPATIENT
Start: 2018-09-24 | End: 2019-04-16 | Stop reason: SDUPTHER

## 2018-09-26 ENCOUNTER — HOSPITAL ENCOUNTER (OUTPATIENT)
Dept: GENERAL RADIOLOGY | Age: 63
Discharge: HOME | End: 2018-09-26
Payer: MEDICAID

## 2018-09-26 DIAGNOSIS — J44.9 CHRONIC OBSTRUCTIVE PULMONARY DISEASE, UNSPECIFIED COPD TYPE (HCC): ICD-10-CM

## 2018-09-26 DIAGNOSIS — Z12.11 ENCOUNTER FOR SCREENING COLONOSCOPY: Primary | ICD-10-CM

## 2018-09-26 PROCEDURE — 71046 X-RAY EXAM CHEST 2 VIEWS: CPT

## 2018-09-26 RX ORDER — POLYETHYLENE GLYCOL 3350 17 G/17G
POWDER, FOR SOLUTION ORAL
Qty: 255 G | Refills: 0 | Status: ON HOLD | OUTPATIENT
Start: 2018-09-26 | End: 2018-10-09 | Stop reason: HOSPADM

## 2018-10-01 ENCOUNTER — OFFICE VISIT (OUTPATIENT)
Dept: PULMONOLOGY | Age: 63
End: 2018-10-01
Payer: MEDICAID

## 2018-10-01 VITALS
SYSTOLIC BLOOD PRESSURE: 113 MMHG | WEIGHT: 132 LBS | BODY MASS INDEX: 21.21 KG/M2 | HEART RATE: 71 BPM | DIASTOLIC BLOOD PRESSURE: 65 MMHG | TEMPERATURE: 98.9 F | RESPIRATION RATE: 16 BRPM | HEIGHT: 66 IN | OXYGEN SATURATION: 98 %

## 2018-10-01 DIAGNOSIS — Z12.2 ENCOUNTER FOR SCREENING FOR CANCER OF RESPIRATORY ORGANS: ICD-10-CM

## 2018-10-01 DIAGNOSIS — J44.9 CHRONIC OBSTRUCTIVE PULMONARY DISEASE, UNSPECIFIED COPD TYPE (HCC): ICD-10-CM

## 2018-10-01 DIAGNOSIS — Z87.891 PERSONAL HISTORY OF TOBACCO USE: ICD-10-CM

## 2018-10-01 DIAGNOSIS — M81.0 AGE-RELATED OSTEOPOROSIS WITHOUT CURRENT PATHOLOGICAL FRACTURE: ICD-10-CM

## 2018-10-01 DIAGNOSIS — Z79.890 HORMONE REPLACEMENT THERAPY: ICD-10-CM

## 2018-10-01 PROCEDURE — G8484 FLU IMMUNIZE NO ADMIN: HCPCS | Performed by: INTERNAL MEDICINE

## 2018-10-01 PROCEDURE — 3017F COLORECTAL CA SCREEN DOC REV: CPT | Performed by: INTERNAL MEDICINE

## 2018-10-01 PROCEDURE — G8420 CALC BMI NORM PARAMETERS: HCPCS | Performed by: INTERNAL MEDICINE

## 2018-10-01 PROCEDURE — G8926 SPIRO NO PERF OR DOC: HCPCS | Performed by: INTERNAL MEDICINE

## 2018-10-01 PROCEDURE — G8427 DOCREV CUR MEDS BY ELIG CLIN: HCPCS | Performed by: INTERNAL MEDICINE

## 2018-10-01 PROCEDURE — 99244 OFF/OP CNSLTJ NEW/EST MOD 40: CPT | Performed by: INTERNAL MEDICINE

## 2018-10-01 PROCEDURE — 90471 IMMUNIZATION ADMIN: CPT | Performed by: INTERNAL MEDICINE

## 2018-10-01 PROCEDURE — 3023F SPIROM DOC REV: CPT | Performed by: INTERNAL MEDICINE

## 2018-10-01 PROCEDURE — G0296 VISIT TO DETERM LDCT ELIG: HCPCS | Performed by: INTERNAL MEDICINE

## 2018-10-01 PROCEDURE — 90670 PCV13 VACCINE IM: CPT | Performed by: INTERNAL MEDICINE

## 2018-10-01 RX ORDER — ESTROGEN,CON/M-PROGEST ACET 0.45-1.5MG
TABLET ORAL
Qty: 28 TABLET | Refills: 0 | Status: SHIPPED | OUTPATIENT
Start: 2018-10-01 | End: 2018-11-07 | Stop reason: SDUPTHER

## 2018-10-01 ASSESSMENT — ENCOUNTER SYMPTOMS
COUGH: 1
WHEEZING: 1
SHORTNESS OF BREATH: 1

## 2018-10-01 ASSESSMENT — SLEEP AND FATIGUE QUESTIONNAIRES
HOW LIKELY ARE YOU TO NOD OFF OR FALL ASLEEP WHILE SITTING INACTIVE IN A PUBLIC PLACE: 0
NECK CIRCUMFERENCE (INCHES): 15.5
HOW LIKELY ARE YOU TO NOD OFF OR FALL ASLEEP IN A CAR, WHILE STOPPED FOR A FEW MINUTES IN TRAFFIC: 0
HOW LIKELY ARE YOU TO NOD OFF OR FALL ASLEEP WHILE SITTING QUIETLY AFTER LUNCH WITHOUT ALCOHOL: 0
ESS TOTAL SCORE: 6
HOW LIKELY ARE YOU TO NOD OFF OR FALL ASLEEP WHILE SITTING AND TALKING TO SOMEONE: 0
HOW LIKELY ARE YOU TO NOD OFF OR FALL ASLEEP WHEN YOU ARE A PASSENGER IN A CAR FOR AN HOUR WITHOUT A BREAK: 1
HOW LIKELY ARE YOU TO NOD OFF OR FALL ASLEEP WHILE WATCHING TV: 2
HOW LIKELY ARE YOU TO NOD OFF OR FALL ASLEEP WHILE SITTING AND READING: 0
HOW LIKELY ARE YOU TO NOD OFF OR FALL ASLEEP WHILE LYING DOWN TO REST IN THE AFTERNOON WHEN CIRCUMSTANCES PERMIT: 3

## 2018-10-01 NOTE — PROGRESS NOTES
female here for evaluation of COPD. Her PCP is Dr. Xenia Saleem. Polina Ivy is a pleasant 69-year-old female who is , lives by herself. Her daughter lives in [facility]. The patient says she has had asthma her \"whole life\". She started smoking in her teenage years, smoked at least 1 PPD for 40-50 years. She quit smoking in 2015, has not had any relapses. The patient was in her usual state of health, is fairly well controlled on Stiolto and when necessary albuterol. She had an exacerbation about 3 weeks ago, was seen at an urgent care 3 times. During these visits, she started out with a Z-Martinez, later received Levaquin. She received 2 courses of prednisone. In the last 4-5 days, she feels she is back to baseline. She denied any preceding fever, but had increased cough with difficulty expectorating, increased wheezing. Presently she feels she could walk without any dyspnea, \"get out of breath with climbing a lot of stairs. She does have a morning cough with phlegm that is whitish. She denies hemoptysis. She is still wheezing, using Pro-Air up to 3 times a day. She is also using Qvar twice a day, is rinsing her mouth after using it. She has lost her appetite recently. Over the last 1-1/2 years, she has been gradually losing weight, down from 175 pounds to 132 pounds. She says his weight loss is unintentional.  She denies any GE reflux, no other GI or  complaints. She denies allergic rhinitis. Her sleep is fair. She has had Pap smears and mammograms, none recently. She has a colonoscopy scheduled for next Tuesday. She had pneumonia several years ago, was treated as an outpatient. She was hospitalized about 2 years ago for bronchitis. From EMR it appears she was hospitalized from 2/1 through 2/3/16 for acute respiratory failure with hypoxia, COPD exacerbation. I reviewed her entire medical, surgical, personal and family history. Changes were made as needed.     CXR 9/26/18   COPD

## 2018-10-04 ASSESSMENT — ENCOUNTER SYMPTOMS: COUGH: 1

## 2018-10-08 ENCOUNTER — TELEPHONE (OUTPATIENT)
Dept: GASTROENTEROLOGY | Age: 63
End: 2018-10-08

## 2018-10-09 ENCOUNTER — HOSPITAL ENCOUNTER (OUTPATIENT)
Age: 63
Setting detail: OUTPATIENT SURGERY
Discharge: HOME | End: 2018-10-09
Attending: INTERNAL MEDICINE | Admitting: INTERNAL MEDICINE
Payer: MEDICAID

## 2018-10-09 VITALS
HEART RATE: 69 BPM | WEIGHT: 135 LBS | SYSTOLIC BLOOD PRESSURE: 129 MMHG | RESPIRATION RATE: 18 BRPM | BODY MASS INDEX: 21.69 KG/M2 | DIASTOLIC BLOOD PRESSURE: 78 MMHG | HEIGHT: 66 IN | TEMPERATURE: 97.6 F | OXYGEN SATURATION: 99 %

## 2018-10-09 PROBLEM — K63.5 POLYP OF ASCENDING COLON: Status: ACTIVE | Noted: 2018-10-09

## 2018-10-09 PROBLEM — Z12.11 COLON CANCER SCREENING: Status: ACTIVE | Noted: 2018-10-09

## 2018-10-09 PROCEDURE — 99153 MOD SED SAME PHYS/QHP EA: CPT | Performed by: INTERNAL MEDICINE

## 2018-10-09 PROCEDURE — 3609010300 HC COLONOSCOPY W/BIOPSY SINGLE/MULTIPLE: Performed by: INTERNAL MEDICINE

## 2018-10-09 PROCEDURE — 99152 MOD SED SAME PHYS/QHP 5/>YRS: CPT | Performed by: INTERNAL MEDICINE

## 2018-10-09 PROCEDURE — 2709999900 HC NON-CHARGEABLE SUPPLY: Performed by: INTERNAL MEDICINE

## 2018-10-09 PROCEDURE — 88305 TISSUE EXAM BY PATHOLOGIST: CPT

## 2018-10-09 PROCEDURE — 6360000002 HC RX W HCPCS: Performed by: INTERNAL MEDICINE

## 2018-10-09 PROCEDURE — 2580000003 HC RX 258: Performed by: INTERNAL MEDICINE

## 2018-10-09 PROCEDURE — C1773 RET DEV, INSERTABLE: HCPCS | Performed by: INTERNAL MEDICINE

## 2018-10-09 PROCEDURE — 7100000010 HC PHASE II RECOVERY - FIRST 15 MIN: Performed by: INTERNAL MEDICINE

## 2018-10-09 PROCEDURE — 45385 COLONOSCOPY W/LESION REMOVAL: CPT | Performed by: INTERNAL MEDICINE

## 2018-10-09 PROCEDURE — 7100000011 HC PHASE II RECOVERY - ADDTL 15 MIN: Performed by: INTERNAL MEDICINE

## 2018-10-09 PROCEDURE — 3609010700 HC COLONOSCOPY POLYPECTOMY REMOVAL SNARE/STOMA: Performed by: INTERNAL MEDICINE

## 2018-10-09 RX ORDER — SODIUM CHLORIDE 9 MG/ML
INJECTION, SOLUTION INTRAVENOUS CONTINUOUS
Status: DISCONTINUED | OUTPATIENT
Start: 2018-10-09 | End: 2018-10-09 | Stop reason: HOSPADM

## 2018-10-09 RX ORDER — FENTANYL CITRATE 50 UG/ML
INJECTION, SOLUTION INTRAMUSCULAR; INTRAVENOUS PRN
Status: DISCONTINUED | OUTPATIENT
Start: 2018-10-09 | End: 2018-10-09 | Stop reason: HOSPADM

## 2018-10-09 RX ORDER — MIDAZOLAM HYDROCHLORIDE 1 MG/ML
INJECTION INTRAMUSCULAR; INTRAVENOUS PRN
Status: DISCONTINUED | OUTPATIENT
Start: 2018-10-09 | End: 2018-10-09 | Stop reason: HOSPADM

## 2018-10-09 RX ADMIN — SODIUM CHLORIDE: 9 INJECTION, SOLUTION INTRAVENOUS at 09:41

## 2018-10-09 ASSESSMENT — PAIN - FUNCTIONAL ASSESSMENT: PAIN_FUNCTIONAL_ASSESSMENT: 0-10

## 2018-10-09 ASSESSMENT — PAIN SCALES - GENERAL
PAINLEVEL_OUTOF10: 0
PAINLEVEL_OUTOF10: 0

## 2018-10-09 NOTE — OP NOTE
[address] ,  [address] [facility]  Phone: [phone]   VSO:[phone]    Colonoscopy Procedure Note    Patient: Anne Blandon  : 1955    Procedure: Colonoscopy with --screening    Date:  10/9/2018     Endoscopist:  Yg Stone MD    Referring Physician:  Roger Gutierrez MD    Preoperative Diagnosis:  Colon cancer screening [Z12.11]    Postoperative Diagnosis:  See impression    Anesthesia: Anesthesia: Moderate Sedation  Sedation: Versed 5 mg IV, fentanyl 100 mcg IV  Start Time: 10:15  Stop Time: 10:49  Withdrawal time: 12 minutes  ASA Class: 2  Mallampati: II (soft palate, uvula, fauces visible)    Indications: This is a 60y.o. year old female who presents today with screening for colon cancer. Procedure Details  Informed consent was obtained for the procedure, including sedation. Risks of perforation, hemorrhage, adverse drug reaction and aspiration were discussed. The patient was placed in the left lateral decubitus position. Based on the pre-procedure assessment, including review of the patient's medical history, medications, allergies, and review of systems, she had been deemed to be an appropriate candidate for conscious sedation; she was therefore sedated with the medications listed below. The patient was monitored continuously with ECG tracing, pulse oximetry, blood pressure monitoring, and direct observations. rectal examination was performed. The colonoscope was inserted into the rectum and advanced under direct vision to the cecum, which was identified by the ileocecal valve and appendiceal orifice. The quality of the colonic preparation was fair. A careful inspection was made as the colonoscope was withdrawn, including a retroflexed view of the rectum; findings and interventions are described below. Appropriate photodocumentation was obtained. Patient tolerated the procedure well.     Findings: -Prep is fair throughout

## 2018-10-13 DIAGNOSIS — M19.90 ARTHRITIS: ICD-10-CM

## 2018-10-15 RX ORDER — MELOXICAM 15 MG/1
TABLET ORAL
Qty: 30 TABLET | Refills: 0 | Status: SHIPPED | OUTPATIENT
Start: 2018-10-15 | End: 2018-11-06 | Stop reason: SDUPTHER

## 2018-10-25 ENCOUNTER — TELEPHONE (OUTPATIENT)
Dept: CASE MANAGEMENT | Age: 63
End: 2018-10-25

## 2018-10-25 ENCOUNTER — HOSPITAL ENCOUNTER (OUTPATIENT)
Dept: CT IMAGING | Age: 63
Discharge: HOME | End: 2018-10-25
Payer: MEDICAID

## 2018-10-25 ENCOUNTER — HOSPITAL ENCOUNTER (OUTPATIENT)
Dept: WOMENS IMAGING | Age: 63
Discharge: HOME | End: 2018-10-25
Payer: MEDICAID

## 2018-10-25 ENCOUNTER — HOSPITAL ENCOUNTER (OUTPATIENT)
Dept: PULMONOLOGY | Age: 63
Discharge: HOME | End: 2018-10-25
Payer: MEDICAID

## 2018-10-25 DIAGNOSIS — Z87.891 PERSONAL HISTORY OF TOBACCO USE: ICD-10-CM

## 2018-10-25 DIAGNOSIS — J44.9 CHRONIC OBSTRUCTIVE PULMONARY DISEASE, UNSPECIFIED COPD TYPE (HCC): ICD-10-CM

## 2018-10-25 DIAGNOSIS — M81.0 AGE-RELATED OSTEOPOROSIS WITHOUT CURRENT PATHOLOGICAL FRACTURE: ICD-10-CM

## 2018-10-25 PROCEDURE — 6370000000 HC RX 637 (ALT 250 FOR IP): Performed by: INTERNAL MEDICINE

## 2018-10-25 PROCEDURE — 94618 PULMONARY STRESS TESTING: CPT

## 2018-10-25 PROCEDURE — 94729 DIFFUSING CAPACITY: CPT

## 2018-10-25 PROCEDURE — G0297 LDCT FOR LUNG CA SCREEN: HCPCS

## 2018-10-25 PROCEDURE — 77080 DXA BONE DENSITY AXIAL: CPT

## 2018-10-25 PROCEDURE — 94664 DEMO&/EVAL PT USE INHALER: CPT

## 2018-10-25 PROCEDURE — 94726 PLETHYSMOGRAPHY LUNG VOLUMES: CPT

## 2018-10-25 PROCEDURE — 94060 EVALUATION OF WHEEZING: CPT

## 2018-10-25 RX ORDER — ALBUTEROL SULFATE 90 UG/1
4 AEROSOL, METERED RESPIRATORY (INHALATION) ONCE
Status: COMPLETED | OUTPATIENT
Start: 2018-10-25 | End: 2018-10-25

## 2018-10-25 RX ADMIN — Medication 4 PUFF: at 10:20

## 2018-10-25 NOTE — TELEPHONE ENCOUNTER
Baseline lung screen on 10-25-18. [id number]. Recommended 3 month LDCT/ consider PET. Reviewed by ordering physician and ordering office contacts pt with results. Former smoker.      11-6-18 OV with pulmonologist

## 2018-11-02 ENCOUNTER — TELEPHONE (OUTPATIENT)
Dept: FAMILY MEDICINE CLINIC | Age: 63
End: 2018-11-02

## 2018-11-06 ENCOUNTER — OFFICE VISIT (OUTPATIENT)
Dept: PULMONOLOGY | Age: 63
End: 2018-11-06
Payer: MEDICAID

## 2018-11-06 VITALS
HEART RATE: 68 BPM | DIASTOLIC BLOOD PRESSURE: 76 MMHG | HEIGHT: 66 IN | RESPIRATION RATE: 16 BRPM | WEIGHT: 132 LBS | BODY MASS INDEX: 21.21 KG/M2 | SYSTOLIC BLOOD PRESSURE: 130 MMHG | OXYGEN SATURATION: 98 % | TEMPERATURE: 98.4 F

## 2018-11-06 DIAGNOSIS — R91.1 LUNG NODULE: Primary | ICD-10-CM

## 2018-11-06 DIAGNOSIS — J44.9 CHRONIC OBSTRUCTIVE PULMONARY DISEASE, UNSPECIFIED COPD TYPE (HCC): ICD-10-CM

## 2018-11-06 DIAGNOSIS — Z87.891 FORMER SMOKER: ICD-10-CM

## 2018-11-06 PROCEDURE — G8420 CALC BMI NORM PARAMETERS: HCPCS | Performed by: INTERNAL MEDICINE

## 2018-11-06 PROCEDURE — 90688 IIV4 VACCINE SPLT 0.5 ML IM: CPT | Performed by: INTERNAL MEDICINE

## 2018-11-06 PROCEDURE — 90471 IMMUNIZATION ADMIN: CPT | Performed by: INTERNAL MEDICINE

## 2018-11-06 PROCEDURE — G8427 DOCREV CUR MEDS BY ELIG CLIN: HCPCS | Performed by: INTERNAL MEDICINE

## 2018-11-06 PROCEDURE — 3023F SPIROM DOC REV: CPT | Performed by: INTERNAL MEDICINE

## 2018-11-06 PROCEDURE — 99213 OFFICE O/P EST LOW 20 MIN: CPT | Performed by: INTERNAL MEDICINE

## 2018-11-06 PROCEDURE — G8482 FLU IMMUNIZE ORDER/ADMIN: HCPCS | Performed by: INTERNAL MEDICINE

## 2018-11-06 PROCEDURE — 1036F TOBACCO NON-USER: CPT | Performed by: INTERNAL MEDICINE

## 2018-11-06 PROCEDURE — G8926 SPIRO NO PERF OR DOC: HCPCS | Performed by: INTERNAL MEDICINE

## 2018-11-06 PROCEDURE — 3017F COLORECTAL CA SCREEN DOC REV: CPT | Performed by: INTERNAL MEDICINE

## 2018-11-06 ASSESSMENT — ENCOUNTER SYMPTOMS
COUGH: 1
SHORTNESS OF BREATH: 1
WHEEZING: 1

## 2018-11-07 DIAGNOSIS — Z79.890 HORMONE REPLACEMENT THERAPY: ICD-10-CM

## 2018-11-08 PROBLEM — Z12.11 COLON CANCER SCREENING: Status: RESOLVED | Noted: 2018-10-09 | Resolved: 2018-11-08

## 2018-11-12 ENCOUNTER — TELEPHONE (OUTPATIENT)
Dept: CASE MANAGEMENT | Age: 63
End: 2018-11-12

## 2018-11-12 RX ORDER — BECLOMETHASONE DIPROPIONATE HFA 80 UG/1
AEROSOL, METERED RESPIRATORY (INHALATION)
Qty: 1 INHALER | Refills: 3 | Status: SHIPPED | OUTPATIENT
Start: 2018-11-12 | End: 2019-05-06 | Stop reason: ALTCHOICE

## 2018-11-13 ENCOUNTER — HOSPITAL ENCOUNTER (OUTPATIENT)
Dept: PET IMAGING | Age: 63
Discharge: HOME | End: 2018-11-13
Payer: MEDICAID

## 2018-11-13 ENCOUNTER — TELEPHONE (OUTPATIENT)
Dept: PULMONOLOGY | Age: 63
End: 2018-11-13

## 2018-11-13 ENCOUNTER — HOSPITAL ENCOUNTER (OUTPATIENT)
Age: 63
Discharge: HOME | End: 2018-11-13
Payer: MEDICAID

## 2018-11-13 VITALS — WEIGHT: 130 LBS | BODY MASS INDEX: 20.89 KG/M2 | HEIGHT: 66 IN

## 2018-11-13 DIAGNOSIS — R91.1 LUNG NODULE: ICD-10-CM

## 2018-11-13 DIAGNOSIS — J44.9 CHRONIC OBSTRUCTIVE PULMONARY DISEASE, UNSPECIFIED COPD TYPE (HCC): ICD-10-CM

## 2018-11-13 DIAGNOSIS — Z87.891 FORMER SMOKER: ICD-10-CM

## 2018-11-13 PROCEDURE — 78815 PET IMAGE W/CT SKULL-THIGH: CPT

## 2018-11-13 PROCEDURE — A9552 F18 FDG: HCPCS | Performed by: INTERNAL MEDICINE

## 2018-11-13 PROCEDURE — 3430000000 HC RX DIAGNOSTIC RADIOPHARMACEUTICAL: Performed by: INTERNAL MEDICINE

## 2018-11-13 RX ORDER — FLUDEOXYGLUCOSE F 18 200 MCI/ML
14.5 INJECTION, SOLUTION INTRAVENOUS
Status: COMPLETED | OUTPATIENT
Start: 2018-11-13 | End: 2018-11-13

## 2018-11-13 RX ADMIN — FLUDEOXYGLUCOSE F 18 14.5 MILLICURIE: 200 INJECTION, SOLUTION INTRAVENOUS at 08:08

## 2018-11-13 NOTE — TELEPHONE ENCOUNTER
Please let her know I had already reviewed the PET CT. Nothing urgent, can wait until she is seen back on 11/20.   Thanks

## 2018-11-13 NOTE — TELEPHONE ENCOUNTER
Kamilah Morrow called and stated her results of the PET/CT were released in [facility]. Kamilah Morrow has an appt 11/20/2018 for results. Please advise.

## 2018-11-20 ENCOUNTER — OFFICE VISIT (OUTPATIENT)
Dept: PULMONOLOGY | Age: 63
End: 2018-11-20
Payer: MEDICAID

## 2018-11-20 VITALS
SYSTOLIC BLOOD PRESSURE: 126 MMHG | HEART RATE: 83 BPM | OXYGEN SATURATION: 96 % | HEIGHT: 66 IN | RESPIRATION RATE: 18 BRPM | WEIGHT: 128 LBS | DIASTOLIC BLOOD PRESSURE: 76 MMHG | BODY MASS INDEX: 20.57 KG/M2 | TEMPERATURE: 97.9 F

## 2018-11-20 DIAGNOSIS — J44.9 CHRONIC OBSTRUCTIVE PULMONARY DISEASE, UNSPECIFIED COPD TYPE (HCC): ICD-10-CM

## 2018-11-20 DIAGNOSIS — R94.02 ABNORMAL POSITRON EMISSION TOMOGRAPHY (PET) SCAN OF HEAD: Primary | ICD-10-CM

## 2018-11-20 DIAGNOSIS — Z87.891 FORMER SMOKER: ICD-10-CM

## 2018-11-20 DIAGNOSIS — R91.1 LUNG NODULE: ICD-10-CM

## 2018-11-20 PROCEDURE — G8926 SPIRO NO PERF OR DOC: HCPCS | Performed by: INTERNAL MEDICINE

## 2018-11-20 PROCEDURE — 1036F TOBACCO NON-USER: CPT | Performed by: INTERNAL MEDICINE

## 2018-11-20 PROCEDURE — 99213 OFFICE O/P EST LOW 20 MIN: CPT | Performed by: INTERNAL MEDICINE

## 2018-11-20 PROCEDURE — 3023F SPIROM DOC REV: CPT | Performed by: INTERNAL MEDICINE

## 2018-11-20 PROCEDURE — G8420 CALC BMI NORM PARAMETERS: HCPCS | Performed by: INTERNAL MEDICINE

## 2018-11-20 PROCEDURE — 3017F COLORECTAL CA SCREEN DOC REV: CPT | Performed by: INTERNAL MEDICINE

## 2018-11-20 PROCEDURE — G8482 FLU IMMUNIZE ORDER/ADMIN: HCPCS | Performed by: INTERNAL MEDICINE

## 2018-11-20 PROCEDURE — G8427 DOCREV CUR MEDS BY ELIG CLIN: HCPCS | Performed by: INTERNAL MEDICINE

## 2018-11-20 ASSESSMENT — ENCOUNTER SYMPTOMS
SHORTNESS OF BREATH: 1
COUGH: 1
WHEEZING: 1

## 2018-11-20 NOTE — PATIENT INSTRUCTIONS
CT Scan Chest Test Scheduled on 5/17/2019 at 9:30 am,arriving at 9 am at [facility].  No Prep Needed. Bring medication list.     Please PRE-REGISTER at [phone] option 2, option 2,prior to your test date. Also, please remember to arrive 30 minutes prior to testing unless otherwise instructed.

## 2018-11-27 ENCOUNTER — OFFICE VISIT (OUTPATIENT)
Dept: ENT CLINIC | Age: 63
End: 2018-11-27
Payer: MEDICAID

## 2018-11-27 VITALS
BODY MASS INDEX: 20.4 KG/M2 | WEIGHT: 130 LBS | SYSTOLIC BLOOD PRESSURE: 135 MMHG | DIASTOLIC BLOOD PRESSURE: 60 MMHG | HEART RATE: 61 BPM | HEIGHT: 67 IN

## 2018-11-27 DIAGNOSIS — F17.218 CIGARETTE NICOTINE DEPENDENCE WITH OTHER NICOTINE-INDUCED DISORDER: ICD-10-CM

## 2018-11-27 DIAGNOSIS — J44.9 CHRONIC OBSTRUCTIVE PULMONARY DISEASE, UNSPECIFIED COPD TYPE (HCC): Primary | ICD-10-CM

## 2018-11-27 DIAGNOSIS — J35.9 LESION OF TONSIL: ICD-10-CM

## 2018-11-27 PROCEDURE — 3017F COLORECTAL CA SCREEN DOC REV: CPT | Performed by: OTOLARYNGOLOGY

## 2018-11-27 PROCEDURE — 99204 OFFICE O/P NEW MOD 45 MIN: CPT | Performed by: OTOLARYNGOLOGY

## 2018-11-27 PROCEDURE — 1036F TOBACCO NON-USER: CPT | Performed by: OTOLARYNGOLOGY

## 2018-11-27 PROCEDURE — G8427 DOCREV CUR MEDS BY ELIG CLIN: HCPCS | Performed by: OTOLARYNGOLOGY

## 2018-11-27 PROCEDURE — G8420 CALC BMI NORM PARAMETERS: HCPCS | Performed by: OTOLARYNGOLOGY

## 2018-11-27 PROCEDURE — G8926 SPIRO NO PERF OR DOC: HCPCS | Performed by: OTOLARYNGOLOGY

## 2018-11-27 PROCEDURE — 3023F SPIROM DOC REV: CPT | Performed by: OTOLARYNGOLOGY

## 2018-11-27 PROCEDURE — G8482 FLU IMMUNIZE ORDER/ADMIN: HCPCS | Performed by: OTOLARYNGOLOGY

## 2018-11-27 ASSESSMENT — ENCOUNTER SYMPTOMS
CONSTIPATION: 0
FACIAL SWELLING: 0
CHOKING: 0
SHORTNESS OF BREATH: 0
SINUS PAIN: 0
EYE DISCHARGE: 0
NAUSEA: 0
COUGH: 0
VOICE CHANGE: 0
SINUS PRESSURE: 0
EYE PAIN: 0
DIARRHEA: 0
CHEST TIGHTNESS: 0
COLOR CHANGE: 0
BACK PAIN: 0
WHEEZING: 0
SORE THROAT: 0
BLOOD IN STOOL: 0
STRIDOR: 0
RHINORRHEA: 0
APNEA: 0
VOMITING: 0
TROUBLE SWALLOWING: 0

## 2018-11-27 NOTE — LETTER
[address] [facility]  [address]  [address] [facility]  [address] [facility] [place] [address]  Phone: [phone]  Fax: [phone]    Kobe Hamilton MD        November 27, 2018       Patient: Wenceslao Kaur   MR Number: [id number]   YOB: 1955   Date of Visit: 11/27/2018       Dear Dr. Maribell Holly:    Thank you for the request for consultation for Wenceslao Kaur to me for the evaluation of PET uptake right tonsil. Below are the relevant portions of my assessment and plan of care. If you have questions, please do not hesitate to call me. I look forward to following Jong Phillips along with you.     Sincerely,        Evan Guerra MD    CC providers:  Soraida Carbajal MD  [address] [place] [id number]  [facility]

## 2018-12-18 DIAGNOSIS — Z79.890 HORMONE REPLACEMENT THERAPY: ICD-10-CM

## 2019-01-01 DIAGNOSIS — J44.9 CHRONIC OBSTRUCTIVE PULMONARY DISEASE, UNSPECIFIED COPD TYPE (HCC): ICD-10-CM

## 2019-01-02 DIAGNOSIS — Z79.890 HORMONE REPLACEMENT THERAPY: ICD-10-CM

## 2019-01-02 RX ORDER — TIOTROPIUM BROMIDE AND OLODATEROL 3.124; 2.736 UG/1; UG/1
SPRAY, METERED RESPIRATORY (INHALATION)
Qty: 4 G | Refills: 2 | Status: SHIPPED | OUTPATIENT
Start: 2019-01-02 | End: 2019-04-08 | Stop reason: SDUPTHER

## 2019-01-07 ENCOUNTER — HOSPITAL ENCOUNTER (OUTPATIENT)
Dept: WOMENS IMAGING | Age: 64
Discharge: HOME | End: 2019-01-07
Payer: MEDICAID

## 2019-01-07 DIAGNOSIS — Z12.31 ENCOUNTER FOR SCREENING MAMMOGRAM FOR MALIGNANT NEOPLASM OF BREAST: ICD-10-CM

## 2019-01-07 PROCEDURE — 77063 BREAST TOMOSYNTHESIS BI: CPT

## 2019-01-14 DIAGNOSIS — M19.90 ARTHRITIS: ICD-10-CM

## 2019-01-14 RX ORDER — MELOXICAM 15 MG/1
TABLET ORAL
Qty: 30 TABLET | Refills: 0 | Status: SHIPPED | OUTPATIENT
Start: 2019-01-14 | End: 2019-02-22 | Stop reason: SDUPTHER

## 2019-01-15 ENCOUNTER — TELEPHONE (OUTPATIENT)
Dept: FAMILY MEDICINE CLINIC | Age: 64
End: 2019-01-15

## 2019-01-15 RX ORDER — IPRATROPIUM BROMIDE AND ALBUTEROL SULFATE 2.5; .5 MG/3ML; MG/3ML
1 SOLUTION RESPIRATORY (INHALATION) EVERY 6 HOURS PRN
Qty: 360 ML | Refills: 3 | Status: SHIPPED | OUTPATIENT
Start: 2019-01-15 | End: 2019-09-10 | Stop reason: SDUPTHER

## 2019-02-07 ENCOUNTER — OFFICE VISIT (OUTPATIENT)
Dept: FAMILY MEDICINE CLINIC | Age: 64
End: 2019-02-07
Payer: MEDICAID

## 2019-02-07 VITALS
OXYGEN SATURATION: 99 % | DIASTOLIC BLOOD PRESSURE: 80 MMHG | HEART RATE: 70 BPM | WEIGHT: 125 LBS | SYSTOLIC BLOOD PRESSURE: 110 MMHG | BODY MASS INDEX: 19.46 KG/M2

## 2019-02-07 DIAGNOSIS — M54.31 SCIATICA WITHOUT BACK PAIN, RIGHT: Primary | ICD-10-CM

## 2019-02-07 PROCEDURE — G8482 FLU IMMUNIZE ORDER/ADMIN: HCPCS | Performed by: NURSE PRACTITIONER

## 2019-02-07 PROCEDURE — G8420 CALC BMI NORM PARAMETERS: HCPCS | Performed by: NURSE PRACTITIONER

## 2019-02-07 PROCEDURE — 3017F COLORECTAL CA SCREEN DOC REV: CPT | Performed by: NURSE PRACTITIONER

## 2019-02-07 PROCEDURE — G8428 CUR MEDS NOT DOCUMENT: HCPCS | Performed by: NURSE PRACTITIONER

## 2019-02-07 PROCEDURE — 1036F TOBACCO NON-USER: CPT | Performed by: NURSE PRACTITIONER

## 2019-02-07 PROCEDURE — 99214 OFFICE O/P EST MOD 30 MIN: CPT | Performed by: NURSE PRACTITIONER

## 2019-02-07 RX ORDER — BACLOFEN 10 MG/1
10 TABLET ORAL 3 TIMES DAILY
Qty: 45 TABLET | Refills: 0 | Status: SHIPPED | OUTPATIENT
Start: 2019-02-07 | End: 2019-02-22 | Stop reason: SDUPTHER

## 2019-02-07 RX ORDER — METHYLPREDNISOLONE 4 MG/1
TABLET ORAL
Qty: 1 KIT | Refills: 0 | Status: SHIPPED | OUTPATIENT
Start: 2019-02-07 | End: 2019-02-13

## 2019-02-07 ASSESSMENT — ENCOUNTER SYMPTOMS
EYES NEGATIVE: 1
CHEST TIGHTNESS: 0
BACK PAIN: 1
BOWEL INCONTINENCE: 0
SHORTNESS OF BREATH: 0

## 2019-02-08 DIAGNOSIS — Z79.890 HORMONE REPLACEMENT THERAPY: ICD-10-CM

## 2019-02-22 DIAGNOSIS — M19.90 ARTHRITIS: ICD-10-CM

## 2019-02-22 DIAGNOSIS — Z79.890 HORMONE REPLACEMENT THERAPY: ICD-10-CM

## 2019-02-22 DIAGNOSIS — M54.31 SCIATICA WITHOUT BACK PAIN, RIGHT: ICD-10-CM

## 2019-02-25 RX ORDER — BACLOFEN 10 MG/1
10 TABLET ORAL 3 TIMES DAILY
Qty: 45 TABLET | Refills: 0 | Status: SHIPPED | OUTPATIENT
Start: 2019-02-25 | End: 2019-03-13 | Stop reason: SDUPTHER

## 2019-02-25 RX ORDER — MELOXICAM 15 MG/1
TABLET ORAL
Qty: 30 TABLET | Refills: 0 | Status: SHIPPED | OUTPATIENT
Start: 2019-02-25 | End: 2019-04-16 | Stop reason: SDUPTHER

## 2019-03-08 DIAGNOSIS — Z79.890 HORMONE REPLACEMENT THERAPY: ICD-10-CM

## 2019-03-13 DIAGNOSIS — M54.31 SCIATICA WITHOUT BACK PAIN, RIGHT: ICD-10-CM

## 2019-03-13 RX ORDER — BACLOFEN 10 MG/1
10 TABLET ORAL 3 TIMES DAILY
Qty: 45 TABLET | Refills: 0 | Status: SHIPPED | OUTPATIENT
Start: 2019-03-13 | End: 2019-05-06 | Stop reason: ALTCHOICE

## 2019-04-08 DIAGNOSIS — J44.9 CHRONIC OBSTRUCTIVE PULMONARY DISEASE, UNSPECIFIED COPD TYPE (HCC): ICD-10-CM

## 2019-04-16 DIAGNOSIS — Z79.890 HORMONE REPLACEMENT THERAPY: ICD-10-CM

## 2019-04-16 DIAGNOSIS — J44.9 CHRONIC OBSTRUCTIVE PULMONARY DISEASE, UNSPECIFIED COPD TYPE (HCC): ICD-10-CM

## 2019-04-16 DIAGNOSIS — M19.90 ARTHRITIS: ICD-10-CM

## 2019-04-16 RX ORDER — MELOXICAM 15 MG/1
TABLET ORAL
Qty: 30 TABLET | Refills: 2 | Status: SHIPPED | OUTPATIENT
Start: 2019-04-16 | End: 2019-07-31 | Stop reason: SDUPTHER

## 2019-04-16 RX ORDER — ALBUTEROL SULFATE 90 UG/1
AEROSOL, METERED RESPIRATORY (INHALATION)
Qty: 8.5 G | Refills: 5 | Status: SHIPPED | OUTPATIENT
Start: 2019-04-16 | End: 2019-11-02 | Stop reason: SDUPTHER

## 2019-04-17 ENCOUNTER — TELEPHONE (OUTPATIENT)
Dept: FAMILY MEDICINE CLINIC | Age: 64
End: 2019-04-17

## 2019-04-17 DIAGNOSIS — J43.9 PULMONARY EMPHYSEMA, UNSPECIFIED EMPHYSEMA TYPE (HCC): Primary | ICD-10-CM

## 2019-04-17 RX ORDER — NEBULIZER ACCESSORIES
1 KIT MISCELLANEOUS DAILY
Qty: 1 KIT | Refills: 0 | Status: CANCELLED | OUTPATIENT
Start: 2019-04-17

## 2019-04-17 NOTE — TELEPHONE ENCOUNTER
I spoke to multiple companies seeing where this pt can get this tubing from and it was suggested that she contact the nebulizer supplier or her pulmonologist. I called the pt and let her know. She said she would call them.

## 2019-04-17 NOTE — TELEPHONE ENCOUNTER
Patient called and is requesting a prescription for the Nebulizer supplies (hoses and things to put the medication in). Patient states that she has a nebulizer. Patient is not sure if [employer] carries them.

## 2019-05-06 ENCOUNTER — OFFICE VISIT (OUTPATIENT)
Dept: FAMILY MEDICINE CLINIC | Age: 64
End: 2019-05-06
Payer: MEDICAID

## 2019-05-06 VITALS
HEART RATE: 79 BPM | OXYGEN SATURATION: 98 % | WEIGHT: 126 LBS | DIASTOLIC BLOOD PRESSURE: 62 MMHG | SYSTOLIC BLOOD PRESSURE: 138 MMHG | BODY MASS INDEX: 19.62 KG/M2

## 2019-05-06 DIAGNOSIS — Z00.00 HEALTHY ADULT ON ROUTINE PHYSICAL EXAMINATION: Primary | ICD-10-CM

## 2019-05-06 DIAGNOSIS — J44.9 CHRONIC OBSTRUCTIVE PULMONARY DISEASE, UNSPECIFIED COPD TYPE (HCC): ICD-10-CM

## 2019-05-06 PROCEDURE — 36415 COLL VENOUS BLD VENIPUNCTURE: CPT | Performed by: FAMILY MEDICINE

## 2019-05-06 PROCEDURE — 99396 PREV VISIT EST AGE 40-64: CPT | Performed by: FAMILY MEDICINE

## 2019-05-06 ASSESSMENT — PATIENT HEALTH QUESTIONNAIRE - PHQ9
SUM OF ALL RESPONSES TO PHQ QUESTIONS 1-9: 0
1. LITTLE INTEREST OR PLEASURE IN DOING THINGS: 0
SUM OF ALL RESPONSES TO PHQ9 QUESTIONS 1 & 2: 0
SUM OF ALL RESPONSES TO PHQ QUESTIONS 1-9: 0
2. FEELING DOWN, DEPRESSED OR HOPELESS: 0

## 2019-05-07 LAB
A/G RATIO: 1.5 (ref 1.1–2.2)
ALBUMIN SERPL-MCNC: 3.8 G/DL (ref 3.4–5)
ALP BLD-CCNC: 77 U/L (ref 40–129)
ALT SERPL-CCNC: 6 U/L (ref 10–40)
ANION GAP SERPL CALCULATED.3IONS-SCNC: 10 MMOL/L (ref 3–16)
AST SERPL-CCNC: 11 U/L (ref 15–37)
BILIRUB SERPL-MCNC: 0.4 MG/DL (ref 0–1)
BUN BLDV-MCNC: 10 MG/DL (ref 7–20)
CALCIUM SERPL-MCNC: 8.8 MG/DL (ref 8.3–10.6)
CHLORIDE BLD-SCNC: 104 MMOL/L (ref 99–110)
CHOLESTEROL, TOTAL: 170 MG/DL (ref 0–199)
CO2: 26 MMOL/L (ref 21–32)
CREAT SERPL-MCNC: <0.5 MG/DL (ref 0.6–1.2)
ESTIMATED AVERAGE GLUCOSE: 99.7 MG/DL
GFR AFRICAN AMERICAN: >60
GFR NON-AFRICAN AMERICAN: >60
GLOBULIN: 2.6 G/DL
GLUCOSE BLD-MCNC: 90 MG/DL (ref 70–99)
HBA1C MFR BLD: 5.1 %
HDLC SERPL-MCNC: 80 MG/DL (ref 40–60)
LDL CHOLESTEROL CALCULATED: 76 MG/DL
POTASSIUM SERPL-SCNC: 4.2 MMOL/L (ref 3.5–5.1)
SODIUM BLD-SCNC: 140 MMOL/L (ref 136–145)
TOTAL PROTEIN: 6.4 G/DL (ref 6.4–8.2)
TRIGL SERPL-MCNC: 68 MG/DL (ref 0–150)
VLDLC SERPL CALC-MCNC: 14 MG/DL

## 2019-05-08 LAB
HEPATITIS C VIRUS AB BY CIA INDEX: 0.03 IV
HEPATITIS C VIRUS AB BY CIA INTERPRETATION: NEGATIVE
MISCELLANEOUS LAB TEST ORDER: NORMAL

## 2019-05-13 ASSESSMENT — ENCOUNTER SYMPTOMS
DIARRHEA: 0
TROUBLE SWALLOWING: 0
CHEST TIGHTNESS: 0
BACK PAIN: 0
COLOR CHANGE: 0
SHORTNESS OF BREATH: 0
CONSTIPATION: 0
RHINORRHEA: 0
EYE PAIN: 0

## 2019-05-17 ENCOUNTER — OFFICE VISIT (OUTPATIENT)
Dept: PULMONOLOGY | Age: 64
End: 2019-05-17
Payer: MEDICAID

## 2019-05-17 ENCOUNTER — HOSPITAL ENCOUNTER (OUTPATIENT)
Dept: CT IMAGING | Age: 64
Discharge: HOME | End: 2019-05-17
Payer: MEDICAID

## 2019-05-17 VITALS
DIASTOLIC BLOOD PRESSURE: 69 MMHG | HEIGHT: 67 IN | BODY MASS INDEX: 19.78 KG/M2 | SYSTOLIC BLOOD PRESSURE: 115 MMHG | WEIGHT: 126 LBS | HEART RATE: 62 BPM | OXYGEN SATURATION: 98 % | RESPIRATION RATE: 16 BRPM

## 2019-05-17 DIAGNOSIS — Z87.891 FORMER SMOKER: ICD-10-CM

## 2019-05-17 DIAGNOSIS — R91.1 LUNG NODULE: ICD-10-CM

## 2019-05-17 DIAGNOSIS — R94.02 ABNORMAL POSITRON EMISSION TOMOGRAPHY (PET) SCAN OF HEAD: ICD-10-CM

## 2019-05-17 DIAGNOSIS — J44.9 CHRONIC OBSTRUCTIVE PULMONARY DISEASE, UNSPECIFIED COPD TYPE (HCC): ICD-10-CM

## 2019-05-17 DIAGNOSIS — R93.89 ABNORMAL CT OF THE CHEST: Primary | ICD-10-CM

## 2019-05-17 PROCEDURE — 3017F COLORECTAL CA SCREEN DOC REV: CPT | Performed by: INTERNAL MEDICINE

## 2019-05-17 PROCEDURE — 3023F SPIROM DOC REV: CPT | Performed by: INTERNAL MEDICINE

## 2019-05-17 PROCEDURE — G8926 SPIRO NO PERF OR DOC: HCPCS | Performed by: INTERNAL MEDICINE

## 2019-05-17 PROCEDURE — 1036F TOBACCO NON-USER: CPT | Performed by: INTERNAL MEDICINE

## 2019-05-17 PROCEDURE — G8420 CALC BMI NORM PARAMETERS: HCPCS | Performed by: INTERNAL MEDICINE

## 2019-05-17 PROCEDURE — 99214 OFFICE O/P EST MOD 30 MIN: CPT | Performed by: INTERNAL MEDICINE

## 2019-05-17 PROCEDURE — G8427 DOCREV CUR MEDS BY ELIG CLIN: HCPCS | Performed by: INTERNAL MEDICINE

## 2019-05-17 PROCEDURE — 71250 CT THORAX DX C-: CPT

## 2019-05-17 ASSESSMENT — ENCOUNTER SYMPTOMS
SHORTNESS OF BREATH: 1
WHEEZING: 1
COUGH: 1

## 2019-05-17 NOTE — PROGRESS NOTES
Pulmonary Outpatient Note   Barb Galindo MD       5/17/2019    1. Abnormal CT of the chest    2. Lung nodule    3. Chronic obstructive pulmonary disease, unspecified COPD type ([address]    4. Former smoker          ASSESSMENT/PLAN:  Abnormal CT of the chest, lung nodule. Lung nodule stable, suggested follow-up in one year. Dilated biliary system. Asymptomatic, will order CT of the abdomen and pelvis with IV contrast, as recommended by radiology. She has had difficulty keeping up her weight, LFT was normal recently. COPD, emphysema, possible proximal bronchiectasis. Continue with bronchodilator, increased exercise as tolerated. ? Prophylaxis. Up-to-date with Pneumovax, Prevnar, flu shot. RTC 1 year with CT chest, call earlier if symptomatic. Orders Placed This Encounter   Procedures    CT ABDOMEN PELVIS W IV CONTRAST Additional Contrast? None    CT CHEST LOW DOSE (LDCT)       Return in about 1 year (around 5/18/2020). Chief Complaint:Follow-up (6 month PN)       HPI:  Rosita Mayfield is a 58y.o. year old female here for follow-up for COPD, right upper lobe lung nodule. Her PCP is Dr. Maryuri Nuñez. Since her last visit, she has been doing fairly well. She has mild shortness of breath, began to cough today, when she had gone for her CT chest.  She said usually she does not have any cough. She denies chest pain. She has occasional mild wheezing. She denies nasal symptoms, GI or  complaints. Her appetite is fair. She is unable to gain weight. She has been on Stiolto and as needed albuterol. She underwent ENT evaluation by Dr. Homer Oppenheim for abnormal PET scan uptake, has a follow-up appointment with him.      CT chest 5/17/19  No change in irregular suspicious appearing nodule right upper lobe.  As   mentioned previously, this was not described on prior chest CT 2004.  Maximum   uptake on recent PET-CT was 1.6.       Underlying emphysema.  Additional tiny pulmonary nodules are unchanged in [facility]. The patient says she has had asthma her \"whole life\". She started smoking in her teenage years, smoked at least 1 PPD for 40-50 years. She quit smoking in 2015, has not had any relapses. The patient was in her usual state of health, is fairly well controlled on Stiolto and when necessary albuterol. She had an exacerbation about 3 weeks ago, was seen at an urgent care 3 times. During these visits, she started out with a Z-Martinez, later received Levaquin. She received 2 courses of prednisone. In the last 4-5 days, she feels she is back to baseline. She denied any preceding fever, but had increased cough with difficulty expectorating, increased wheezing. Presently she feels she could walk without any dyspnea, \"get out of breath with climbing a lot of stairs. She does have a morning cough with phlegm that is whitish. She denies hemoptysis. She is still wheezing, using Pro-Air up to 3 times a day. She is also using Qvar twice a day, is rinsing her mouth after using it. She has lost her appetite recently. Over the last 1-1/2 years, she has been gradually losing weight, down from 175 pounds to 132 pounds. She says his weight loss is unintentional.  She denies any GE reflux, no other GI or  complaints. She denies allergic rhinitis. Her sleep is fair. She has had Pap smears and mammograms, none recently. She has a colonoscopy scheduled for next Tuesday. She had pneumonia several years ago, was treated as an outpatient. She was hospitalized about 2 years ago for bronchitis. From EMR it appears she was hospitalized from 2/1 through 2/3/16 for acute respiratory failure with hypoxia, COPD exacerbation. I reviewed her entire medical, surgical, personal and family history. Changes were made as needed. CXR 9/26/18   COPD with no acute infiltrate    PFT 6/18/15  FVC 3.14 L, 88% predicted, FEV1 2.16 L, 79% predicted, with a ratio of 69%. There was no response to bronchodilator.   Lung volumes showed hyperinflation and air trapping. Diffusion capacity was 79% predicted. Labs  On 8/11/17 renal profile, hepatic profile, hemoglobin A1c, TSH were normal.  Vitamin D level was mildly low at 24.2. CBC was normal.    Past Medical History:   Diagnosis Date    Arthritis     Asthma     COPD (chronic obstructive pulmonary disease) ([address]     Hormone replacement therapy        Past Surgical History:   Procedure Laterality Date    BREAST BIOPSY      Right, was benign    COLONOSCOPY N/A 10/9/2018    COLONOSCOPY WITH BIOPSY performed by Radames Miller MD at [address] [place] [address]      Behind ear    TUBAL LIGATION         Social History     Tobacco Use    Smoking status: Former Smoker     Packs/day: 1.00     Years: 40.00     Pack years: 40.00     Types: Cigarettes     Last attempt to quit: 9/2/2015     Years since quitting: 3.7    Smokeless tobacco: Never Used   Substance Use Topics    Alcohol use: No     Alcohol/week: 0.0 oz       Family History   Problem Relation Age of Onset    Cancer Mother         stomach    Heart Disease Father     Colon Cancer Brother     Cancer Brother          Current Outpatient Medications:     estrogen, conjugated,-medroxyprogesterone (PREMPRO) 0.45-1.5 MG per tablet, TAKE ONE TABLET BY MOUTH DAILY, Disp: 28 tablet, Rfl: 3    meloxicam (MOBIC) 15 MG tablet, TAKE ONE TABLET BY MOUTH DAILY, Disp: 30 tablet, Rfl: 2    albuterol sulfate HFA (PROAIR HFA) 108 (90 Base) MCG/ACT inhaler, INHALE TWO PUFFS BY MOUTH EVERY 6 HOURS AS NEEDED FOR WHEEZING, Disp: 8.5 g, Rfl: 5    Tiotropium Bromide-Olodaterol (STIOLTO RESPIMAT) 2.5-2.5 MCG/ACT AERS, INHALE TWO INHALATION(S) BY MOUTH DAILY, Disp: 1 Inhaler, Rfl: 5    ipratropium-albuterol (DUONEB) 0.5-2.5 (3) MG/3ML SOLN nebulizer solution, Inhale 3 mLs into the lungs every 6 hours as needed for Shortness of Breath, Disp: 360 mL, Rfl: 3    Patient has no known allergies.     Vitals:    05/17/19 1023   BP: 115/69 Site: Left Upper Arm   Position: Sitting   Pulse: 62   Resp: 16   SpO2: 98%   Weight: 126 lb (57.2 kg)   Height: 5' 7\" (1.702 m)       Review of Systems   Constitutional: Positive for unexpected weight change. Respiratory: Positive for cough, shortness of breath and wheezing. All other systems reviewed and are negative. Physical Exam   Constitutional: She is oriented to person, place, and time. She appears well-developed and well-nourished. No distress. Pleasant, small built   HENT:   Head: Normocephalic. Nose: Nose normal.   Mouth/Throat: Oropharynx is clear and moist. No oropharyngeal exudate. Upper denture, lower jaw edentulous. Relatively large tongue   Eyes: Pupils are equal, round, and reactive to light. Conjunctivae are normal. Right eye exhibits no discharge. Left eye exhibits no discharge. No scleral icterus. Arcus senilis   Neck: No JVD present. Relatively short neck   Cardiovascular: Normal rate, regular rhythm and normal heart sounds. Exam reveals no gallop and no friction rub. No murmur heard. Pulmonary/Chest: Effort normal. No stridor. No respiratory distress. She has wheezes. She has no rales. Possible increase in AP diameter   Musculoskeletal: She exhibits no edema. Lymphadenopathy:     She has no cervical adenopathy. Neurological: She is alert and oriented to person, place, and time. Skin: Skin is warm and dry. No rash noted. She is not diaphoretic. No erythema. No pallor. Psychiatric: She has a normal mood and affect. Her behavior is normal.   Vitals reviewed.

## 2019-05-17 NOTE — PATIENT INSTRUCTIONS
CT Scan Chest Test Scheduled on 5/29/19 at 2:30 pm at [facility].  No Prep Needed. Nothing to eat or drink 4 hours prior , can take medications with small sip of water. CT Scan Chest Test Scheduled on 5-18-20 at 8:30 am  at [facility].  No Prep Needed. Please PRE-REGISTER at [phone] option 2, option 2,prior to your test date. Also, please remember to arrive 30 minutes prior to testing unless otherwise instructed.

## 2019-05-17 NOTE — LETTER
[address] [facility]  [address] [place] [address] Julianne Lainez [address]  Phone: [phone]  Fax: [phone]               5/17/19           Patient:  Satish Lemus         YOB: 1955                        Dear Rush Fraire MD              Satish Lemus was seen in a follow up visit today. Below are the relevant portions           of my assessment and plan of care. If you have questions, please do not hesitate            to call me. I look forward to following Satish Lemsu along with you.               Sincerely                Serafin Thompson MD

## 2019-05-28 ENCOUNTER — OFFICE VISIT (OUTPATIENT)
Dept: ENT CLINIC | Age: 64
End: 2019-05-28
Payer: MEDICAID

## 2019-05-28 VITALS
HEART RATE: 64 BPM | WEIGHT: 126 LBS | DIASTOLIC BLOOD PRESSURE: 72 MMHG | HEIGHT: 67 IN | BODY MASS INDEX: 19.78 KG/M2 | SYSTOLIC BLOOD PRESSURE: 130 MMHG

## 2019-05-28 DIAGNOSIS — J44.9 CHRONIC OBSTRUCTIVE PULMONARY DISEASE, UNSPECIFIED COPD TYPE (HCC): Primary | ICD-10-CM

## 2019-05-28 DIAGNOSIS — F17.210 CIGARETTE NICOTINE DEPENDENCE WITHOUT COMPLICATION: ICD-10-CM

## 2019-05-28 DIAGNOSIS — J35.9 LESION OF TONSIL: ICD-10-CM

## 2019-05-28 PROCEDURE — G8427 DOCREV CUR MEDS BY ELIG CLIN: HCPCS | Performed by: OTOLARYNGOLOGY

## 2019-05-28 PROCEDURE — 3017F COLORECTAL CA SCREEN DOC REV: CPT | Performed by: OTOLARYNGOLOGY

## 2019-05-28 PROCEDURE — 99213 OFFICE O/P EST LOW 20 MIN: CPT | Performed by: OTOLARYNGOLOGY

## 2019-05-28 PROCEDURE — 3023F SPIROM DOC REV: CPT | Performed by: OTOLARYNGOLOGY

## 2019-05-28 PROCEDURE — G8926 SPIRO NO PERF OR DOC: HCPCS | Performed by: OTOLARYNGOLOGY

## 2019-05-28 PROCEDURE — G8420 CALC BMI NORM PARAMETERS: HCPCS | Performed by: OTOLARYNGOLOGY

## 2019-05-28 PROCEDURE — 1036F TOBACCO NON-USER: CPT | Performed by: OTOLARYNGOLOGY

## 2019-05-28 ASSESSMENT — ENCOUNTER SYMPTOMS
BLOOD IN STOOL: 0
RHINORRHEA: 0
SHORTNESS OF BREATH: 0
SINUS PRESSURE: 0
SINUS PAIN: 0
COLOR CHANGE: 0
NAUSEA: 0
WHEEZING: 0
FACIAL SWELLING: 0
CHEST TIGHTNESS: 0
APNEA: 0
CONSTIPATION: 0
VOICE CHANGE: 0
EYE PAIN: 0
CHOKING: 0
EYE DISCHARGE: 0
DIARRHEA: 0
COUGH: 0
TROUBLE SWALLOWING: 0
BACK PAIN: 0
STRIDOR: 0
SORE THROAT: 0
VOMITING: 0

## 2019-05-28 NOTE — PROGRESS NOTES
Subjective:      Patient ID: Carlos Haskins is a 58 y.o. female. HPI  Chief Complaint   Patient presents with    Concerning tonsil on PET/CT. Here for 6 mnth surveillance exam .      History of Present Illness  Head/Neck    Comments: 57 yo female with a long history of smoking, since age 14. 1 pack a day. Has pulmonary nodules. COPD. Had PET/CT showing uptake in the right tonsil. No new changes. Pain: No  Location: tonsil  Onset: As above  Timing: no change  Quality: no pain  Otalgia: No  Odynophagia: No  Dysphagia: No  Dyspnea: No  Voice Changes: No  Lumps in neck: No  Hemoptysis: No  Fever: No  Chills: No  Sweats: No  Lethargy: No  Weight Loss: Yes. 50 lbs over the last year. Modifying Factors: smoker. 52 pack years.  Unknown HPV status  Associates Signs and Symptoms: none    Patient Active Problem List   Diagnosis    Asthma    Arthritis    Hormone replacement therapy    COPD (chronic obstructive pulmonary disease) (HCC)    Polyp of ascending colon     Past Surgical History:   Procedure Laterality Date    BREAST BIOPSY      Right, was benign    COLONOSCOPY N/A 10/9/2018    COLONOSCOPY WITH BIOPSY performed by Nayeli Watson MD at [address] CYST REMOVAL      Behind ear    TUBAL LIGATION       Family History   Problem Relation Age of Onset    Cancer Mother         stomach    Heart Disease Father     Colon Cancer Brother     Cancer Brother      Social History     Socioeconomic History    Marital status:      Spouse name: Not on file    Number of children: Not on file    Years of education: Not on file    Highest education level: Not on file   Occupational History    Not on file   Social Needs    Financial resource strain: Not on file    Food insecurity:     Worry: Not on file     Inability: Not on file    Transportation needs:     Medical: Not on file     Non-medical: Not on file   Tobacco Use    Smoking status: Former Smoker     Packs/day: 1.00     Years: 40.00     Pack years: 40.00     Types: Cigarettes     Last attempt to quit: 9/2/2015     Years since quitting: 3.7    Smokeless tobacco: Never Used   Substance and Sexual Activity    Alcohol use: No     Alcohol/week: 0.0 oz    Drug use: No    Sexual activity: Never   Lifestyle    Physical activity:     Days per week: Not on file     Minutes per session: Not on file    Stress: Not on file   Relationships    Social connections:     Talks on phone: Not on file     Gets together: Not on file     Attends Church service: Not on file     Active member of club or organization: Not on file     Attends meetings of clubs or organizations: Not on file     Relationship status: Not on file    Intimate partner violence:     Fear of current or ex partner: Not on file     Emotionally abused: Not on file     Physically abused: Not on file     Forced sexual activity: Not on file   Other Topics Concern    Not on file   Social History Narrative    Not on file         Review of Systems   Constitutional: Positive for unexpected weight change. Negative for activity change, appetite change, chills, fatigue and fever. HENT: Negative for congestion, dental problem, drooling, ear discharge, ear pain, facial swelling, hearing loss, mouth sores, nosebleeds, postnasal drip, rhinorrhea, sinus pressure, sinus pain, sneezing, sore throat, tinnitus, trouble swallowing and voice change. Eyes: Negative for pain, discharge and visual disturbance. Respiratory: Negative for apnea, cough, choking, chest tightness, shortness of breath, wheezing and stridor. Cardiovascular: Negative for palpitations. Gastrointestinal: Negative for blood in stool, constipation, diarrhea, nausea and vomiting. Endocrine: Negative for cold intolerance, heat intolerance, polydipsia, polyphagia and polyuria. Musculoskeletal: Negative for back pain, gait problem, neck pain and neck stiffness. Skin: Negative for color change, pallor, rash and wound. Allergic/Immunologic: Negative for environmental allergies, food allergies and immunocompromised state. Neurological: Negative for dizziness, facial asymmetry, speech difficulty, light-headedness, numbness and headaches. Hematological: Negative for adenopathy. Does not bruise/bleed easily. Psychiatric/Behavioral: Negative for agitation, confusion, self-injury and sleep disturbance. The patient is not nervous/anxious. Objective:   Physical Exam   Constitutional: She is oriented to person, place, and time. She appears well-developed and well-nourished. HENT:   Head: Normocephalic and atraumatic. Not macrocephalic and not microcephalic. Head is without raccoon's eyes, without Isabel's sign, without abrasion, without contusion, without laceration, without right periorbital erythema and without left periorbital erythema. Hair is normal.   Right Ear: Hearing, tympanic membrane and external ear normal. No drainage, swelling or tenderness. No mastoid tenderness. Tympanic membrane is not perforated, not retracted and not bulging. Tympanic membrane mobility is normal. No middle ear effusion. No decreased hearing is noted. Left Ear: Hearing, tympanic membrane and external ear normal. No drainage, swelling or tenderness. No mastoid tenderness. Tympanic membrane is not perforated, not retracted and not bulging. Tympanic membrane mobility is normal.  No middle ear effusion. No decreased hearing is noted. Nose: No mucosal edema, rhinorrhea, nose lacerations, sinus tenderness, nasal deformity, septal deviation or nasal septal hematoma. No epistaxis. No foreign bodies. Right sinus exhibits no maxillary sinus tenderness and no frontal sinus tenderness. Left sinus exhibits no maxillary sinus tenderness and no frontal sinus tenderness. Mouth/Throat: Uvula is midline. Mucous membranes are not pale, not dry and not cyanotic. She has dentures. No oral lesions. No trismus in the jaw. Normal dentition.  No dental abscesses, uvula swelling, lacerations or dental caries. No oropharyngeal exudate, posterior oropharyngeal edema, posterior oropharyngeal erythema or tonsillar abscesses. Eyes: Lids are normal. Lids are everted and swept, no foreign bodies found. Right eye exhibits no chemosis, no discharge and no exudate. Left eye exhibits no chemosis, no discharge and no exudate. Right eye exhibits normal extraocular motion and no nystagmus. Left eye exhibits normal extraocular motion and no nystagmus. Neck: Trachea normal. Neck supple. Normal carotid pulses present. No tracheal deviation present. No thyroid mass and no thyromegaly present. Cardiovascular: Normal rate and regular rhythm. Pulmonary/Chest: No stridor. No apnea, no tachypnea and no bradypnea. No respiratory distress. Musculoskeletal:        Right shoulder: She exhibits normal range of motion. Left shoulder: She exhibits normal range of motion. Lymphadenopathy:        Head (right side): No submental, no submandibular, no tonsillar, no preauricular, no posterior auricular and no occipital adenopathy present. Head (left side): No submental, no submandibular, no tonsillar, no preauricular, no posterior auricular and no occipital adenopathy present. Right cervical: No superficial cervical, no deep cervical and no posterior cervical adenopathy present. Left cervical: No superficial cervical, no deep cervical and no posterior cervical adenopathy present. Neurological: She is alert and oriented to person, place, and time. Skin: No bruising, no laceration and no lesion noted. No erythema. Psychiatric: She has a normal mood and affect. Her speech is normal and behavior is normal.       Assessment:      COPD. Tonsil lesion  Nicotine dependence. Plan:      No evidence of tonsil Ca. Follow up as needed.          Luis Olivas MD

## 2019-05-29 ENCOUNTER — HOSPITAL ENCOUNTER (OUTPATIENT)
Dept: CT IMAGING | Age: 64
Discharge: HOME | End: 2019-05-29
Payer: MEDICAID

## 2019-05-29 DIAGNOSIS — R93.89 ABNORMAL CT OF THE CHEST: ICD-10-CM

## 2019-05-29 PROCEDURE — 6360000004 HC RX CONTRAST MEDICATION: Performed by: INTERNAL MEDICINE

## 2019-05-29 PROCEDURE — 74177 CT ABD & PELVIS W/CONTRAST: CPT

## 2019-05-29 RX ADMIN — IOPAMIDOL 75 ML: 755 INJECTION, SOLUTION INTRAVENOUS at 14:26

## 2019-06-10 ENCOUNTER — TELEPHONE (OUTPATIENT)
Dept: PULMONOLOGY | Age: 64
End: 2019-06-10

## 2019-06-10 DIAGNOSIS — R93.5 ABNORMAL COMPUTED TOMOGRAPHY OF ABDOMEN AND PELVIS: Primary | ICD-10-CM

## 2019-06-10 NOTE — TELEPHONE ENCOUNTER
SW patient. Gave her referral to Dr. Halle Armstrong. Pt verbalized understanding and said she would call and schedule an appointment.

## 2019-07-24 ENCOUNTER — HOSPITAL ENCOUNTER (OUTPATIENT)
Dept: MRI IMAGING | Age: 64
Discharge: HOME | End: 2019-07-24
Payer: MEDICAID

## 2019-07-24 DIAGNOSIS — R93.2 ABNORMAL LIVER CT: ICD-10-CM

## 2019-07-24 PROCEDURE — 6360000004 HC RX CONTRAST MEDICATION: Performed by: INTERNAL MEDICINE

## 2019-07-24 PROCEDURE — A9579 GAD-BASE MR CONTRAST NOS,1ML: HCPCS | Performed by: INTERNAL MEDICINE

## 2019-07-24 PROCEDURE — 74183 MRI ABD W/O CNTR FLWD CNTR: CPT

## 2019-07-24 RX ADMIN — GADOTERIDOL 10 ML: 279.3 INJECTION, SOLUTION INTRAVENOUS at 10:34

## 2019-07-31 DIAGNOSIS — M19.90 ARTHRITIS: ICD-10-CM

## 2019-08-01 RX ORDER — MELOXICAM 15 MG/1
TABLET ORAL
Qty: 30 TABLET | Refills: 1 | Status: SHIPPED | OUTPATIENT
Start: 2019-08-01 | End: 2020-08-14

## 2019-08-06 ENCOUNTER — TELEPHONE (OUTPATIENT)
Dept: PULMONOLOGY | Age: 64
End: 2019-08-06

## 2019-08-06 DIAGNOSIS — J44.9 CHRONIC OBSTRUCTIVE PULMONARY DISEASE, UNSPECIFIED COPD TYPE (HCC): Primary | ICD-10-CM

## 2019-08-06 NOTE — TELEPHONE ENCOUNTER
Pt's nebulizer machine is broken and she wants to know if Dr. Suzie Ferrell will call in an order for a new one to [facility] on [address]  [phone] ph# and [phone]  Fax #

## 2019-08-27 ENCOUNTER — OFFICE VISIT (OUTPATIENT)
Dept: FAMILY MEDICINE CLINIC | Age: 64
End: 2019-08-27
Payer: MEDICAID

## 2019-08-27 VITALS
WEIGHT: 125 LBS | HEART RATE: 60 BPM | BODY MASS INDEX: 19.46 KG/M2 | SYSTOLIC BLOOD PRESSURE: 126 MMHG | OXYGEN SATURATION: 98 % | DIASTOLIC BLOOD PRESSURE: 68 MMHG

## 2019-08-27 DIAGNOSIS — F17.210 CIGARETTE NICOTINE DEPENDENCE WITHOUT COMPLICATION: Primary | ICD-10-CM

## 2019-08-27 PROCEDURE — 4004F PT TOBACCO SCREEN RCVD TLK: CPT | Performed by: FAMILY MEDICINE

## 2019-08-27 PROCEDURE — 3017F COLORECTAL CA SCREEN DOC REV: CPT | Performed by: FAMILY MEDICINE

## 2019-08-27 PROCEDURE — G8420 CALC BMI NORM PARAMETERS: HCPCS | Performed by: FAMILY MEDICINE

## 2019-08-27 PROCEDURE — 99214 OFFICE O/P EST MOD 30 MIN: CPT | Performed by: FAMILY MEDICINE

## 2019-08-27 PROCEDURE — G8427 DOCREV CUR MEDS BY ELIG CLIN: HCPCS | Performed by: FAMILY MEDICINE

## 2019-08-27 RX ORDER — VARENICLINE TARTRATE 25 MG
KIT ORAL
Qty: 1 BOX | Refills: 0 | Status: SHIPPED | OUTPATIENT
Start: 2019-08-27 | End: 2020-08-14

## 2019-08-27 RX ORDER — VARENICLINE TARTRATE 1 MG/1
1 TABLET, FILM COATED ORAL 2 TIMES DAILY
Qty: 60 TABLET | Refills: 3 | Status: SHIPPED | OUTPATIENT
Start: 2019-08-27 | End: 2020-08-14

## 2019-09-10 ENCOUNTER — TELEPHONE (OUTPATIENT)
Dept: FAMILY MEDICINE CLINIC | Age: 64
End: 2019-09-10

## 2019-09-10 RX ORDER — IPRATROPIUM BROMIDE AND ALBUTEROL SULFATE 2.5; .5 MG/3ML; MG/3ML
1 SOLUTION RESPIRATORY (INHALATION) EVERY 6 HOURS PRN
Qty: 360 ML | Refills: 5 | Status: SHIPPED | OUTPATIENT
Start: 2019-09-10 | End: 2020-05-28 | Stop reason: SDUPTHER

## 2019-09-10 NOTE — TELEPHONE ENCOUNTER
The pharmacy is requesting a refill of Arnuity Ellipta inhaler.  This is not on pt's med list. Please advise   Last seen 8/27/19

## 2019-10-02 ENCOUNTER — HOSPITAL ENCOUNTER (OUTPATIENT)
Dept: GENERAL RADIOLOGY | Age: 64
Discharge: HOME | End: 2019-10-02
Payer: MEDICAID

## 2019-10-02 ENCOUNTER — OFFICE VISIT (OUTPATIENT)
Dept: FAMILY MEDICINE CLINIC | Age: 64
End: 2019-10-02
Payer: MEDICAID

## 2019-10-02 VITALS
WEIGHT: 126.2 LBS | BODY MASS INDEX: 19.65 KG/M2 | OXYGEN SATURATION: 95 % | DIASTOLIC BLOOD PRESSURE: 62 MMHG | HEART RATE: 77 BPM | SYSTOLIC BLOOD PRESSURE: 118 MMHG | TEMPERATURE: 99 F

## 2019-10-02 DIAGNOSIS — R50.9 FEVER, UNSPECIFIED FEVER CAUSE: ICD-10-CM

## 2019-10-02 DIAGNOSIS — R53.83 OTHER FATIGUE: ICD-10-CM

## 2019-10-02 DIAGNOSIS — R52 GENERALIZED BODY ACHES: ICD-10-CM

## 2019-10-02 DIAGNOSIS — R52 GENERALIZED BODY ACHES: Primary | ICD-10-CM

## 2019-10-02 LAB
BASOPHILS ABSOLUTE: 0 K/UL (ref 0–0.2)
BASOPHILS RELATIVE PERCENT: 0.3 %
EOSINOPHILS ABSOLUTE: 0 K/UL (ref 0–0.6)
EOSINOPHILS RELATIVE PERCENT: 0.6 %
HCT VFR BLD CALC: 32.8 % (ref 36–48)
HEMOGLOBIN: 11.2 G/DL (ref 12–16)
INFLUENZA A ANTIBODY: NEGATIVE
INFLUENZA B ANTIBODY: NEGATIVE
LYMPHOCYTES ABSOLUTE: 1.2 K/UL (ref 1–5.1)
LYMPHOCYTES RELATIVE PERCENT: 19 %
MCH RBC QN AUTO: 32.7 PG (ref 26–34)
MCHC RBC AUTO-ENTMCNC: 34 G/DL (ref 31–36)
MCV RBC AUTO: 96.2 FL (ref 80–100)
MONOCYTES ABSOLUTE: 0.6 K/UL (ref 0–1.3)
MONOCYTES RELATIVE PERCENT: 9.4 %
NEUTROPHILS ABSOLUTE: 4.6 K/UL (ref 1.7–7.7)
NEUTROPHILS RELATIVE PERCENT: 70.7 %
PDW BLD-RTO: 13.9 % (ref 12.4–15.4)
PLATELET # BLD: 163 K/UL (ref 135–450)
PMV BLD AUTO: 8.4 FL (ref 5–10.5)
RBC # BLD: 3.41 M/UL (ref 4–5.2)
WBC # BLD: 6.4 K/UL (ref 4–11)

## 2019-10-02 PROCEDURE — 3017F COLORECTAL CA SCREEN DOC REV: CPT | Performed by: NURSE PRACTITIONER

## 2019-10-02 PROCEDURE — 36415 COLL VENOUS BLD VENIPUNCTURE: CPT | Performed by: NURSE PRACTITIONER

## 2019-10-02 PROCEDURE — 99214 OFFICE O/P EST MOD 30 MIN: CPT | Performed by: NURSE PRACTITIONER

## 2019-10-02 PROCEDURE — G8420 CALC BMI NORM PARAMETERS: HCPCS | Performed by: NURSE PRACTITIONER

## 2019-10-02 PROCEDURE — 87804 INFLUENZA ASSAY W/OPTIC: CPT | Performed by: NURSE PRACTITIONER

## 2019-10-02 PROCEDURE — G8484 FLU IMMUNIZE NO ADMIN: HCPCS | Performed by: NURSE PRACTITIONER

## 2019-10-02 PROCEDURE — 4004F PT TOBACCO SCREEN RCVD TLK: CPT | Performed by: NURSE PRACTITIONER

## 2019-10-02 PROCEDURE — 71046 X-RAY EXAM CHEST 2 VIEWS: CPT

## 2019-10-02 PROCEDURE — G8427 DOCREV CUR MEDS BY ELIG CLIN: HCPCS | Performed by: NURSE PRACTITIONER

## 2019-10-02 RX ORDER — DOXYCYCLINE HYCLATE 100 MG
100 TABLET ORAL 2 TIMES DAILY
Qty: 20 TABLET | Refills: 0 | Status: SHIPPED | OUTPATIENT
Start: 2019-10-02 | End: 2019-10-12

## 2019-10-02 ASSESSMENT — ENCOUNTER SYMPTOMS
COLOR CHANGE: 0
DIARRHEA: 0
SINUS PRESSURE: 0
NAUSEA: 0
VOMITING: 0
SHORTNESS OF BREATH: 0
EYE DISCHARGE: 0
COUGH: 0
CONSTIPATION: 0
ABDOMINAL PAIN: 0
WHEEZING: 1
CHEST TIGHTNESS: 0
BACK PAIN: 0
SORE THROAT: 0
TROUBLE SWALLOWING: 0
SINUS PAIN: 0

## 2019-10-10 DIAGNOSIS — J44.9 CHRONIC OBSTRUCTIVE PULMONARY DISEASE, UNSPECIFIED COPD TYPE (HCC): ICD-10-CM

## 2019-10-24 ENCOUNTER — TELEPHONE (OUTPATIENT)
Dept: FAMILY MEDICINE CLINIC | Age: 64
End: 2019-10-24

## 2019-12-18 ENCOUNTER — TELEPHONE (OUTPATIENT)
Dept: FAMILY MEDICINE CLINIC | Age: 64
End: 2019-12-18

## 2020-02-20 ENCOUNTER — TELEPHONE (OUTPATIENT)
Dept: PULMONOLOGY | Age: 65
End: 2020-02-20

## 2020-03-04 NOTE — TELEPHONE ENCOUNTER
Per BASIM Pelayo for Duoneb was DENIED by insurance. Pt informed. She will contact her insurance to see what they will cover in please of the Duoneb. She will call our office back once she gets that information.

## 2020-04-09 ENCOUNTER — TELEPHONE (OUTPATIENT)
Dept: FAMILY MEDICINE CLINIC | Age: 65
End: 2020-04-09

## 2020-04-09 RX ORDER — ALBUTEROL SULFATE 90 UG/1
2 AEROSOL, METERED RESPIRATORY (INHALATION) 4 TIMES DAILY PRN
Qty: 3 INHALER | Refills: 1 | Status: SHIPPED | OUTPATIENT
Start: 2020-04-09 | End: 2020-09-08

## 2020-05-05 ENCOUNTER — TELEPHONE (OUTPATIENT)
Dept: PULMONOLOGY | Age: 65
End: 2020-05-05

## 2020-05-05 NOTE — TELEPHONE ENCOUNTER
limited to the following:    Your Provider(s) may not able to provide medical treatment for your particular condition and you may be required to seek alternative healthcare or emergency care services.  The electronic systems or other security protocols or safeguards used in the Service could fail, causing a breach of privacy of your medical or other information.  Given regulatory requirements in certain jurisdictions, your Provider(s) diagnosis and/or treatment options, especially pertaining to certain prescriptions, may be limited. Acceptance   1. You understand that Services will be provided via Telehealth. This process involves the use of HIPAA compliant and secure, real-time audio-visual interfacing with a qualified and appropriately trained provider located at [facility]. 2. You understand that, under no circumstances, will this session be recorded. 3. You understand that the Provider(s) at [facility] and other clinical participants will be party to the information obtained during the Telehealth session in accordance with best medical practices. 4. You understand that the information obtained during the Telehealth session will be used to help determine the most appropriate treatment options. 5. You understand that You have the right to revoke this consent at any point in time. 6. You understand that Telehealth is voluntary, and that continued treatment is not dependent upon consent. 7. You understand that, in the event of non-consent to Telehealth services and/or technical difficulties, you will obtain services as typically provided in the absence of Telehealth technology. 8. You understand that this consent will be kept in Your medical record. 9. No potential benefits from the use of Telehealth or specific results can be guaranteed. Your condition may not be cured or improved and, in some cases, may get worse.    10. There are limitations in the provision of medical care and treatment via Telehealth and the Service and you may not be able to receive diagnosis and/or treatment through the Service for every condition for which you seek diagnosis and/or treatment. 11. There are potential risks to the use of Telehealth, including but not limited to the risks described in this Telehealth Consent. 12. Your Provider(s) have discussed the use of Telehealth and the Service with you, including the benefits and risks of such and you have provided oral consent to your Provider(s) for the use of Telehealth and the Service. 15. You understand that it is your duty to provide your Provider(s) truthful, accurate and complete information, including all relevant information regarding care that you may have received or may be receiving from other healthcare providers outside of the Service. 14. You understand that each of your Provider(s) may determine in his or sole discretion that your condition is not suitable for diagnosis and/or treatment using the Service, and that you may need to seek medical care and treatment a specialist or other healthcare provider, outside of the Service. 15. You understand that you are fully responsible for payment for all services provided by Provider(s) or through use of the Service and that you may not be able to use third-party insurance. 16. You represent that (a) you have read this Telehealth Consent carefully, (b) you understand the risks and benefits of the Service and the use of Telehealth in the medical care and treatment provided to you by Provider(s) using the Service, and (c) you have the legal capacity and authority to provide this consent for yourself and/or the minor for which you are consenting under applicable federal and state laws, including laws relating to the age of [de-identified] and/or parental/guardian consent.    17. You give your informed consent to the use of Telehealth by Provider(s) using the Service under the terms described in the Terms of Service and this Telehealth Consent. The patient was read the following statement and has consented to the visit as of 5/5/20. The patient has been scheduled for their first telehealth visit on 5/28/20 with Dr. Trent Nevarez.

## 2020-05-26 ENCOUNTER — TELEPHONE (OUTPATIENT)
Dept: PULMONOLOGY | Age: 65
End: 2020-05-26

## 2020-05-27 ENCOUNTER — HOSPITAL ENCOUNTER (OUTPATIENT)
Dept: CT IMAGING | Age: 65
Discharge: HOME | End: 2020-05-27
Payer: MEDICARE

## 2020-05-27 DIAGNOSIS — Z87.891 PERSONAL HISTORY OF TOBACCO USE, PRESENTING HAZARDS TO HEALTH: ICD-10-CM

## 2020-05-27 DIAGNOSIS — R91.1 COIN LESION: ICD-10-CM

## 2020-05-27 PROCEDURE — G0297 LDCT FOR LUNG CA SCREEN: HCPCS

## 2020-05-28 ENCOUNTER — VIRTUAL VISIT (OUTPATIENT)
Dept: PULMONOLOGY | Age: 65
End: 2020-05-28
Payer: MEDICARE

## 2020-05-28 PROCEDURE — 3023F SPIROM DOC REV: CPT | Performed by: INTERNAL MEDICINE

## 2020-05-28 PROCEDURE — 99213 OFFICE O/P EST LOW 20 MIN: CPT | Performed by: INTERNAL MEDICINE

## 2020-05-28 PROCEDURE — 1090F PRES/ABSN URINE INCON ASSESS: CPT | Performed by: INTERNAL MEDICINE

## 2020-05-28 PROCEDURE — 4040F PNEUMOC VAC/ADMIN/RCVD: CPT | Performed by: INTERNAL MEDICINE

## 2020-05-28 PROCEDURE — G8420 CALC BMI NORM PARAMETERS: HCPCS | Performed by: INTERNAL MEDICINE

## 2020-05-28 PROCEDURE — G8926 SPIRO NO PERF OR DOC: HCPCS | Performed by: INTERNAL MEDICINE

## 2020-05-28 PROCEDURE — G8399 PT W/DXA RESULTS DOCUMENT: HCPCS | Performed by: INTERNAL MEDICINE

## 2020-05-28 PROCEDURE — 1123F ACP DISCUSS/DSCN MKR DOCD: CPT | Performed by: INTERNAL MEDICINE

## 2020-05-28 PROCEDURE — G8427 DOCREV CUR MEDS BY ELIG CLIN: HCPCS | Performed by: INTERNAL MEDICINE

## 2020-05-28 PROCEDURE — 3017F COLORECTAL CA SCREEN DOC REV: CPT | Performed by: INTERNAL MEDICINE

## 2020-05-28 PROCEDURE — 4004F PT TOBACCO SCREEN RCVD TLK: CPT | Performed by: INTERNAL MEDICINE

## 2020-05-28 RX ORDER — IPRATROPIUM BROMIDE AND ALBUTEROL SULFATE 2.5; .5 MG/3ML; MG/3ML
1 SOLUTION RESPIRATORY (INHALATION) EVERY 6 HOURS PRN
Qty: 360 ML | Refills: 5 | Status: SHIPPED | OUTPATIENT
Start: 2020-05-28 | End: 2022-03-14 | Stop reason: SDUPTHER

## 2020-05-28 ASSESSMENT — ENCOUNTER SYMPTOMS
COUGH: 1
WHEEZING: 1
SHORTNESS OF BREATH: 1

## 2020-05-28 NOTE — PROGRESS NOTES
History   Problem Relation Age of Onset    Cancer Mother         stomach    Heart Disease Father     Colon Cancer Brother     Cancer Brother          Current Outpatient Medications:     ipratropium-albuterol (DUONEB) 0.5-2.5 (3) MG/3ML SOLN nebulizer solution, Inhale 3 mLs into the lungs every 6 hours as needed for Shortness of Breath, Disp: 360 mL, Rfl: 5    VENTOLIN  (90 Base) MCG/ACT inhaler, Inhale 2 puffs into the lungs 4 times daily as needed for Wheezing, Disp: 3 Inhaler, Rfl: 1    norethindrone-ethinyl estradiol (JINTELI) 1-5 MG-MCG TABS per tablet, Take 1 tablet by mouth daily for 28 days, Disp: 28 tablet, Rfl: 5    albuterol sulfate  (90 Base) MCG/ACT inhaler, INHALE TWO PUFFS BY MOUTH EVERY 6 HOURS AS NEEDED FOR WHEEZING (Patient not taking: Reported on 5/28/2020), Disp: 1 Inhaler, Rfl: 5    Tiotropium Bromide-Olodaterol (STIOLTO RESPIMAT) 2.5-2.5 MCG/ACT AERS, INHALE TWO INHALATION(S) BY MOUTH DAILY (Patient not taking: Reported on 5/28/2020), Disp: 1 Inhaler, Rfl: 4    fluticasone 100 MCG/ACT AEPB, Inhale 1 puff into the lungs daily (Patient not taking: Reported on 5/28/2020), Disp: 30 each, Rfl: 5    varenicline (CHANTIX STARTING MONTH GUILLERMO) 0.5 MG X 11 & 1 MG X 42 tablet, Take by mouth. (Patient not taking: Reported on 5/28/2020), Disp: 1 box, Rfl: 0    varenicline (CHANTIX CONTINUING MONTH PAK) 1 MG tablet, Take 1 tablet by mouth 2 times daily (Patient not taking: Reported on 5/28/2020), Disp: 60 tablet, Rfl: 3    meloxicam (MOBIC) 15 MG tablet, TAKE ONE TABLET BY MOUTH DAILY (Patient not taking: Reported on 5/28/2020), Disp: 30 tablet, Rfl: 1    Patient has no known allergies. There were no vitals filed for this visit. Review of Systems   Constitutional: Positive for unexpected weight change. Respiratory: Positive for cough, shortness of breath and wheezing. All other systems reviewed and are negative.     Physical exam:  Limited physical exam was performed as

## 2020-05-31 ENCOUNTER — TELEPHONE (OUTPATIENT)
Dept: PULMONOLOGY | Age: 65
End: 2020-05-31

## 2020-07-09 ENCOUNTER — TELEPHONE (OUTPATIENT)
Dept: PULMONOLOGY | Age: 65
End: 2020-07-09

## 2020-07-09 RX ORDER — PREDNISONE 20 MG/1
40 TABLET ORAL DAILY
Qty: 10 TABLET | Refills: 0 | Status: SHIPPED | OUTPATIENT
Start: 2020-07-09 | End: 2020-07-14

## 2020-07-09 NOTE — TELEPHONE ENCOUNTER
Patient is calling to see if Dr. Gogo Quinn can send her in prednisone to help reduce her SOB that she is experiencing. She says that her O2 levels are 98% even when SOB. Patient has a cough and it is sometimes productive.      Pharmacy - [facility] in [facility]     Phone # of pt - [phone]

## 2020-08-04 ENCOUNTER — TELEPHONE (OUTPATIENT)
Dept: PULMONOLOGY | Age: 65
End: 2020-08-04

## 2020-08-05 RX ORDER — PREDNISONE 20 MG/1
40 TABLET ORAL DAILY
Qty: 20 TABLET | Refills: 0 | Status: SHIPPED | OUTPATIENT
Start: 2020-08-05 | End: 2020-12-22

## 2020-08-14 ENCOUNTER — VIRTUAL VISIT (OUTPATIENT)
Dept: FAMILY MEDICINE CLINIC | Age: 65
End: 2020-08-14
Payer: MEDICARE

## 2020-08-14 ENCOUNTER — NURSE TRIAGE (OUTPATIENT)
Dept: OTHER | Facility: CLINIC | Age: 65
End: 2020-08-14

## 2020-08-14 PROCEDURE — G8399 PT W/DXA RESULTS DOCUMENT: HCPCS | Performed by: NURSE PRACTITIONER

## 2020-08-14 PROCEDURE — 4040F PNEUMOC VAC/ADMIN/RCVD: CPT | Performed by: NURSE PRACTITIONER

## 2020-08-14 PROCEDURE — 99213 OFFICE O/P EST LOW 20 MIN: CPT | Performed by: NURSE PRACTITIONER

## 2020-08-14 PROCEDURE — 3017F COLORECTAL CA SCREEN DOC REV: CPT | Performed by: NURSE PRACTITIONER

## 2020-08-14 PROCEDURE — G8427 DOCREV CUR MEDS BY ELIG CLIN: HCPCS | Performed by: NURSE PRACTITIONER

## 2020-08-14 PROCEDURE — 1090F PRES/ABSN URINE INCON ASSESS: CPT | Performed by: NURSE PRACTITIONER

## 2020-08-14 PROCEDURE — 1123F ACP DISCUSS/DSCN MKR DOCD: CPT | Performed by: NURSE PRACTITIONER

## 2020-08-14 ASSESSMENT — PATIENT HEALTH QUESTIONNAIRE - PHQ9
2. FEELING DOWN, DEPRESSED OR HOPELESS: 0
SUM OF ALL RESPONSES TO PHQ QUESTIONS 1-9: 0
SUM OF ALL RESPONSES TO PHQ9 QUESTIONS 1 & 2: 0
1. LITTLE INTEREST OR PLEASURE IN DOING THINGS: 0
SUM OF ALL RESPONSES TO PHQ QUESTIONS 1-9: 0

## 2020-08-14 NOTE — TELEPHONE ENCOUNTER
Cough, sore throat, sneezing since yesterday. Currently on steroids for breathing problems for a couple of months. She is tapering down now. Reason for Disposition   SEVERE sore throat pain    Answer Assessment - Initial Assessment Questions  1. ONSET: \"When did the throat start hurting? \" (Hours or days ago)       See above    2. SEVERITY: \"How bad is the sore throat? \" (Scale 1-10; mild, moderate or severe)    - MILD (1-3):  doesn't interfere with eating or normal activities    - MODERATE (4-7): interferes with eating some solids and normal activities    - SEVERE (8-10):  excruciating pain, interferes with most normal activities    - SEVERE DYSPHAGIA: can't swallow liquids, drooling      8/10 right now. Worse with swallowing    3. STREP EXPOSURE: \"Has there been any exposure to strep within the past week? \" If so, ask: \"What type of contact occurred? \"       Denies    4. VIRAL SYMPTOMS: Mirtha Garcia there any symptoms of a cold, such as a runny nose, cough, hoarse voice or red eyes? \"       Runny nose, sneezing    5. FEVER: \"Do you have a fever? \" If so, ask: \"What is your temperature, how was it measured, and when did it start? \"      Denies    6. PUS ON THE TONSILS: \"Is there pus on the tonsils in the back of your throat? \"      Unsure    7. OTHER SYMPTOMS: \"Do you have any other symptoms? \" (e.g., difficulty breathing, headache, rash)      See above    8. PREGNANCY: \"Is there any chance you are pregnant? \" \"When was your last menstrual period? \"      NA    Protocols used: SORE THROAT-ADULT-[place]    Patient called [facility]) to schedule appointment, with red flag complaint, transferred to RN access for triage. See above questions and answers. Caller talking full sentences without any distress on phone. Discussed disposition and patient agreeable. Discussed potential consequences for not following disposition recommendation.   Aware to call back with any concerns or persistent, worsening, or new symptoms develop. Warm transfer to [facility] scheduling for appointment. Please do not respond to the triage nurse through this encounter. Any subsequent communication should be directly with the patient.

## 2020-08-16 NOTE — PROGRESS NOTES
2020    TELEHEALTH EVALUATION -- Audio/Visual (During DQBSW-64 public health emergency)    HPI:    Dinesh Nichole (:  1955) has requested an audio/video evaluation for the following concern(s):sore throat    Ms Chantal Good is a 70 yo pt of Dr Jf Fisher who presents w a sore throat. She tells me since the steroids are about finished she has noticed she is breathing better and in general feel better. Afebrile. States she is not sure why she called. She can swallow without pain, yet she notices she has a sore throat. She is being followed by Dr Betty Ordonez for an abnormal CT of her lung, showing a nodule which apparently has grown in size since 2018 until now. Biopsy recommended  PMH: COPD,Asthma, Arthritis, Smoker, HRT    Review of Systems   Respiratory:        COPD, Asthma- Duoneb and albuterol INH's    smoker   Musculoskeletal:        Arthritis       Prior to Visit Medications    Medication Sig Taking? Authorizing Provider   ipratropium-albuterol (DUONEB) 0.5-2.5 (3) MG/3ML SOLN nebulizer solution Inhale 3 mLs into the lungs every 6 hours as needed for Shortness of Breath Yes Rodney Villaseñor MD   VENTOLIN  (90 Base) MCG/ACT inhaler Inhale 2 puffs into the lungs 4 times daily as needed for Wheezing Yes TAMMY Richey - CNP   albuterol sulfate  (90 Base) MCG/ACT inhaler INHALE TWO PUFFS BY MOUTH EVERY 6 HOURS AS NEEDED FOR WHEEZING Yes Latricia Puente MD       Social History     Tobacco Use    Smoking status: Current Every Day Smoker     Packs/day: 0.25     Years: 40.00     Pack years: 10.00     Types: Cigarettes     Start date: 1955    Smokeless tobacco: Never Used    Tobacco comment: smokes 2 cigs. a day   Substance Use Topics    Alcohol use: No     Alcohol/week: 0.0 standard drinks    Drug use:  No            PHYSICAL EXAMINATION:  [ INSTRUCTIONS:  \"[x]\" Indicates a positive item  \"[]\" Indicates a negative item  -- DELETE ALL ITEMS NOT EXAMINED]  Vital Signs: (As obtained by

## 2020-08-16 NOTE — PROGRESS NOTES
Subjective:      Chief Complaint   Patient presents with    Cough     breathing issues and cold symptoms going on for a few months    Pharyngitis     Nurse Triaged       Patient ID: Lauri Marie is a 71 y.o. female who presents for pharyngitis. Sates since she is almost finished w the prednisone taper dose she is beginning to feel better. States she doesn't fell ill, afebrile, non productive cough. Thinks she is getting better now. Her throat does not hurt to swallow or talk. Her breathing is better too. She is being followed by Dr Abdoulaye Doe for an abnormal CT of her lung, showing a nodule which apparently has grown in size since 2018 until now. Biopsy recommended. PMH:  COPD, Asthma, Arthritis, Smoker and HRT    HPI    Family History   Problem Relation Age of Onset    Cancer Mother         stomach    Heart Disease Father     Colon Cancer Brother     Cancer Brother        Social History     Socioeconomic History    Marital status:      Spouse name: Not on file    Number of children: Not on file    Years of education: Not on file    Highest education level: Not on file   Occupational History    Not on file   Social Needs    Financial resource strain: Not on file    Food insecurity     Worry: Not on file     Inability: Not on file    Transportation needs     Medical: Not on file     Non-medical: Not on file   Tobacco Use    Smoking status: Current Every Day Smoker     Packs/day: 0.25     Years: 40.00     Pack years: 10.00     Types: Cigarettes     Start date: 1955    Smokeless tobacco: Never Used    Tobacco comment: smokes 2 cigs.  a day   Substance and Sexual Activity    Alcohol use: No     Alcohol/week: 0.0 standard drinks    Drug use: No    Sexual activity: Never   Lifestyle    Physical activity     Days per week: Not on file     Minutes per session: Not on file    Stress: Not on file   Relationships    Social connections     Talks on phone: Not on file     Gets together: Not on

## 2020-08-21 ENCOUNTER — APPOINTMENT (OUTPATIENT)
Dept: GENERAL RADIOLOGY | Age: 65
End: 2020-08-21
Payer: MEDICARE

## 2020-08-21 ENCOUNTER — HOSPITAL ENCOUNTER (EMERGENCY)
Age: 65
Discharge: HOME | End: 2020-08-21
Payer: MEDICARE

## 2020-08-21 VITALS
HEART RATE: 86 BPM | BODY MASS INDEX: 19.62 KG/M2 | DIASTOLIC BLOOD PRESSURE: 77 MMHG | OXYGEN SATURATION: 94 % | SYSTOLIC BLOOD PRESSURE: 144 MMHG | WEIGHT: 126 LBS | TEMPERATURE: 98.4 F | RESPIRATION RATE: 24 BRPM

## 2020-08-21 LAB
A/G RATIO: 1.1 (ref 1.1–2.2)
ALBUMIN SERPL-MCNC: 3.6 G/DL (ref 3.4–5)
ALP BLD-CCNC: 89 U/L (ref 40–129)
ALT SERPL-CCNC: 10 U/L (ref 10–40)
ANION GAP SERPL CALCULATED.3IONS-SCNC: 9 MMOL/L (ref 3–16)
AST SERPL-CCNC: 13 U/L (ref 15–37)
BASOPHILS ABSOLUTE: 0 K/UL (ref 0–0.2)
BASOPHILS RELATIVE PERCENT: 0.5 %
BILIRUB SERPL-MCNC: 0.6 MG/DL (ref 0–1)
BUN BLDV-MCNC: 12 MG/DL (ref 7–20)
CALCIUM SERPL-MCNC: 9.5 MG/DL (ref 8.3–10.6)
CHLORIDE BLD-SCNC: 103 MMOL/L (ref 99–110)
CO2: 28 MMOL/L (ref 21–32)
CREAT SERPL-MCNC: 0.6 MG/DL (ref 0.6–1.2)
D DIMER: <200 NG/ML DDU (ref 0–229)
EKG ATRIAL RATE: 80 BPM
EKG DIAGNOSIS: NORMAL
EKG P AXIS: 82 DEGREES
EKG P-R INTERVAL: 120 MS
EKG Q-T INTERVAL: 336 MS
EKG QRS DURATION: 82 MS
EKG QTC CALCULATION (BAZETT): 387 MS
EKG R AXIS: 74 DEGREES
EKG T AXIS: -2 DEGREES
EKG VENTRICULAR RATE: 80 BPM
EOSINOPHILS ABSOLUTE: 0.3 K/UL (ref 0–0.6)
EOSINOPHILS RELATIVE PERCENT: 6.4 %
GFR AFRICAN AMERICAN: >60
GFR NON-AFRICAN AMERICAN: >60
GLOBULIN: 3.2 G/DL
GLUCOSE BLD-MCNC: 130 MG/DL (ref 70–99)
HCT VFR BLD CALC: 40.2 % (ref 36–48)
HEMOGLOBIN: 13.6 G/DL (ref 12–16)
LYMPHOCYTES ABSOLUTE: 1.3 K/UL (ref 1–5.1)
LYMPHOCYTES RELATIVE PERCENT: 26.2 %
MCH RBC QN AUTO: 32.4 PG (ref 26–34)
MCHC RBC AUTO-ENTMCNC: 33.8 G/DL (ref 31–36)
MCV RBC AUTO: 95.9 FL (ref 80–100)
MONOCYTES ABSOLUTE: 0.3 K/UL (ref 0–1.3)
MONOCYTES RELATIVE PERCENT: 6.7 %
NEUTROPHILS ABSOLUTE: 3.1 K/UL (ref 1.7–7.7)
NEUTROPHILS RELATIVE PERCENT: 60.2 %
PDW BLD-RTO: 13.8 % (ref 12.4–15.4)
PLATELET # BLD: 211 K/UL (ref 135–450)
PMV BLD AUTO: 6.9 FL (ref 5–10.5)
POTASSIUM SERPL-SCNC: 4.3 MMOL/L (ref 3.5–5.1)
RBC # BLD: 4.2 M/UL (ref 4–5.2)
SODIUM BLD-SCNC: 140 MMOL/L (ref 136–145)
TOTAL PROTEIN: 6.8 G/DL (ref 6.4–8.2)
TROPONIN: <0.01 NG/ML
WBC # BLD: 5.1 K/UL (ref 4–11)

## 2020-08-21 PROCEDURE — 71045 X-RAY EXAM CHEST 1 VIEW: CPT

## 2020-08-21 PROCEDURE — 6370000000 HC RX 637 (ALT 250 FOR IP): Performed by: NURSE PRACTITIONER

## 2020-08-21 PROCEDURE — 93005 ELECTROCARDIOGRAM TRACING: CPT | Performed by: NURSE PRACTITIONER

## 2020-08-21 PROCEDURE — 36415 COLL VENOUS BLD VENIPUNCTURE: CPT

## 2020-08-21 PROCEDURE — 85025 COMPLETE CBC W/AUTO DIFF WBC: CPT

## 2020-08-21 PROCEDURE — 84484 ASSAY OF TROPONIN QUANT: CPT

## 2020-08-21 PROCEDURE — 99285 EMERGENCY DEPT VISIT HI MDM: CPT

## 2020-08-21 PROCEDURE — 6360000002 HC RX W HCPCS: Performed by: NURSE PRACTITIONER

## 2020-08-21 PROCEDURE — 94640 AIRWAY INHALATION TREATMENT: CPT

## 2020-08-21 PROCEDURE — 96374 THER/PROPH/DIAG INJ IV PUSH: CPT

## 2020-08-21 PROCEDURE — 80053 COMPREHEN METABOLIC PANEL: CPT

## 2020-08-21 PROCEDURE — 85379 FIBRIN DEGRADATION QUANT: CPT

## 2020-08-21 RX ORDER — METHYLPREDNISOLONE SODIUM SUCCINATE 125 MG/2ML
125 INJECTION, POWDER, LYOPHILIZED, FOR SOLUTION INTRAMUSCULAR; INTRAVENOUS ONCE
Status: COMPLETED | OUTPATIENT
Start: 2020-08-21 | End: 2020-08-21

## 2020-08-21 RX ORDER — IPRATROPIUM BROMIDE AND ALBUTEROL SULFATE 2.5; .5 MG/3ML; MG/3ML
2 SOLUTION RESPIRATORY (INHALATION) ONCE
Status: COMPLETED | OUTPATIENT
Start: 2020-08-21 | End: 2020-08-21

## 2020-08-21 RX ADMIN — IPRATROPIUM BROMIDE AND ALBUTEROL SULFATE 2 AMPULE: .5; 3 SOLUTION RESPIRATORY (INHALATION) at 13:10

## 2020-08-21 RX ADMIN — METHYLPREDNISOLONE SODIUM SUCCINATE 125 MG: 125 INJECTION, POWDER, FOR SOLUTION INTRAMUSCULAR; INTRAVENOUS at 12:54

## 2020-08-21 NOTE — ED NOTES
Chief Complaint   Patient presents with     Botox     [facility] RETURN BOTOX 200 U CERVICAL DYSTONIA TOPICAL LIDOCAINE CREAM & ETHYL-CHLORIDE SPRAY      Margaret Humphrey CMA     Patient ambulated with SpO2 by this RN. Patient tolerate well, SpO2 >93%. HR <90. PA aware.       Benjamin Bonilla RN  08/21/20 [id number]

## 2020-08-21 NOTE — ED NOTES
Patient discharged to home with all belongings. All discharge and follow up information discussed. Patient is alert, oriented x4, no signs of acute distress. Ambulated to ED lobby with a steady gait.       Virgil Nuñez RN  08/21/20 [id number]

## 2020-08-21 NOTE — ED PROVIDER NOTES
[address] [facility] Emergency Department    CHIEF COMPLAINT  Shortness of Breath (STATES WORSENING COUGH AND SOB OVER PAST MONTH, GIVEN STEROIDS AND BREATHING TX WITH NO RELIEF, HX COPD ) and Cough      SHARED SERVICE VISIT  Evaluated by TIM. My supervising physician was available for consultation. HISTORY OF PRESENT ILLNESS  Katina Wilkins is a 71 y.o. female with a hx of COPD and HRT who presents to the ED complaining of a 1-2 month hx of increased sob. Denies calf/leg pain, chest pain, nausea, vomiting, dizziness, fever, chills, or sweats. She has been using her home inhalers and nebulizers as well as \"a couple of rounds of steroids\"  Prescribed by her PCP at home without improvement. No other complaints, modifying factors or associated symptoms. Nursing notes reviewed.    Past Medical History:   Diagnosis Date    Arthritis     Asthma     COPD (chronic obstructive pulmonary disease) ([facility] [address]     Hormone replacement therapy      Past Surgical History:   Procedure Laterality Date    BREAST BIOPSY      Right, was benign    COLONOSCOPY N/A 10/9/2018    COLONOSCOPY WITH BIOPSY performed by Mohinder Jean MD at [address] [place] [address]      Behind ear    TUBAL LIGATION       Family History   Problem Relation Age of Onset    Cancer Mother         stomach    Heart Disease Father     Colon Cancer Brother     Cancer Brother      Social History     Socioeconomic History    Marital status:      Spouse name: Not on file    Number of children: Not on file    Years of education: Not on file    Highest education level: Not on file   Occupational History    Not on file   Social Needs    Financial resource strain: Not on file    Food insecurity     Worry: Not on file     Inability: Not on file    Transportation needs     Medical: Not on file     Non-medical: Not on file   Tobacco Use    Smoking status: Current Every Day Smoker     Packs/day: 0.25     Years: 40.00     Pack years: 10.00     Types: Cigarettes     Start date: 1954    Smokeless tobacco: Never Used    Tobacco comment: smokes 2 cigs. a day   Substance and Sexual Activity    Alcohol use: No     Alcohol/week: 0.0 standard drinks    Drug use: No    Sexual activity: Never   Lifestyle    Physical activity     Days per week: Not on file     Minutes per session: Not on file    Stress: Not on file   Relationships    Social connections     Talks on phone: Not on file     Gets together: Not on file     Attends Yazdanism service: Not on file     Active member of club or organization: Not on file     Attends meetings of clubs or organizations: Not on file     Relationship status: Not on file    Intimate partner violence     Fear of current or ex partner: Not on file     Emotionally abused: Not on file     Physically abused: Not on file     Forced sexual activity: Not on file   Other Topics Concern    Not on file   Social History Narrative    Not on file     No current facility-administered medications for this encounter. Current Outpatient Medications   Medication Sig Dispense Refill    ipratropium-albuterol (DUONEB) 0.5-2.5 (3) MG/3ML SOLN nebulizer solution Inhale 3 mLs into the lungs every 6 hours as needed for Shortness of Breath 360 mL 5    VENTOLIN  (90 Base) MCG/ACT inhaler Inhale 2 puffs into the lungs 4 times daily as needed for Wheezing 3 Inhaler 1    albuterol sulfate  (90 Base) MCG/ACT inhaler INHALE TWO PUFFS BY MOUTH EVERY 6 HOURS AS NEEDED FOR WHEEZING 1 Inhaler 5     No Known Allergies    REVIEW OF SYSTEMS  10 systems reviewed, pertinent positives per HPI otherwise noted to be negative    PHYSICAL EXAM  BP (!) 144/77   Pulse 86   Temp 98.4 °F (36.9 °C) (Oral)   Resp 24   Wt 126 lb (57.2 kg)   LMP  (LMP Unknown)   SpO2 94%   BMI 19.62 kg/m²   GENERAL APPEARANCE: Awake and alert. Cooperative. No apparent distress. HEAD: Normocephalic. Atraumatic. EYES: PERRL. EOM's grossly intact. ENT: Mucous membranes are moist.   NECK: Supple. HEART: RRR. No murmurs. LUNGS: Respirations unlabored. CTAB. Good air exchange. Speaking comfortably in full sentences. + Diffuse rhonchi >in bases. ABDOMEN: Soft. Non-distended. Non-tender. No guarding or rebound. No masses. No organomegaly. EXTREMITIES: No peripheral edema. Moves all extremities equally. All extremities neurovascularly intact. Negative Yvonne's signs bilaterally. SKIN: Warm and dry. No acute rashes. NEUROLOGICAL: Alert and oriented. CN's 2-12 intact. No gross facial drooping. Strength 5/5, sensation intact. PSYCHIATRIC: Normal mood and affect. RADIOLOGY  No results found. ED COURSE  Patient did not require analgesia while in the emergency department. Triage vitals stable/O2 sat 94% on room air which is slightly lower than her baseline per patient. Medications prescribed:  DuoNeb x2 and Solu-Medrol. Labs ordered:  D-dimer: <200  CBC: Negative for leukocytosis or anemia; unremarkable  CMP: Glucose 130, AST 13; otherwise unremarkable  Troponin: <0.01      Imaging ordered:  CXR: No acute cardiopulmonary disease. COPD right upper lobe nodule not appreciably changed. Reevaluation:  Patient was feeling much better with improved airflow after medication administration. A discussion was had with Mrs. Pieadd Kwok regarding COPD exacerbation and the need for smoking cessation. Risk management discussed and shared decision making had with patient and/or surrogate. All questions were answered. Patient will follow up with her PCP and her pulmonologist for further evaluation/treatment. All questions answered. Patient will return to ED for new/worsening symptoms. Patient was not sent home with prescriptions. CRITICAL CARE TIME  0 Minutes of critical care time spent not including separately billable procedures. [facility]  Presents emergency department for symptoms concerning for COPD exacerbation. Considered pulmonary embolism, bronchiectasis, and pneumonia but less likely is on history and physical.    Irmpession:   COPD exacerbation    DISPOSITION  Patient was discharged to home in good condition.          Lesley Cruz, TAMMY - SHERYL  08/24/20 1022

## 2020-08-21 NOTE — ED NOTES
Peripheral IV removed without complication. Bleeding controlled and and bandage applied. Patient tolerated well.       Savita William RN  08/21/20 2394

## 2020-08-21 NOTE — ED PROVIDER NOTES
I was available for consultation on the patient if deemed necessary by advanced practice provider. I was not involved in the care of this patient nor had any participation in the medical decision making process. I was the attending on duty when the patient came to the ER was seen independently by the TIM. The patient was discharged from the ER without my knowledge. I was available for consultation but was not requested to evaluate the patient, nor asked supervised the case. I did not see the patient and I am signing this chart administratively after the fact. EKG  The Ekg interpreted by me shows  normal sinus rhythm with a rate of 80  Axis is   Normal  QTc is  normal  Intervals and Durations are unremarkable. ST Segments: normal  Delta waves, Brugada Syndrome, and Short NM are not present. Prior EKG to compare with was available.  No significant changes compared to prior EKG from February 1, 2016        Dez Chavez MD  08/21/20 2593

## 2020-08-22 ENCOUNTER — CARE COORDINATION (OUTPATIENT)
Dept: CARE COORDINATION | Age: 65
End: 2020-08-22

## 2020-08-22 NOTE — CARE COORDINATION
Patient contacted regarding Kathy Ospina. Discussed COVID-19 related testing which was not done at this time. Test results were not done. Patient informed of results, if available? N/A    Care Transition Nurse/ Ambulatory Care Manager contacted the patient by telephone to perform post discharge assessment. Verified name and  with patient as identifiers. Provided introduction to self, and explanation of the CTN/ACM role, and reason for call due to risk factors for infection and/or exposure to COVID-19. Symptoms reviewed with patient who verbalized the following symptoms: cough, shortness of breath, no new symptoms and no worsening symptoms. Due to no new or worsening symptoms encounter was not routed to provider for escalation. Discussed follow-up appointments. If no appointment was previously scheduled, appointment scheduling offered: Yes  [facility] follow up appointment(s):   Future Appointments   Date Time Provider Lilibeth Martinez   2020 10:30 AM [facility] CT VCT [facility] CT Bek[place]a [place]   2020 10:15 AM Norm Cohen MD AND PULM [facility]     [facility] follow up appointment(s):      Advance Care Planning:   Does patient have an Advance Directive:  not on file; education provided. Patient has following risk factors of: COPD and asthma. CTN/ACM reviewed discharge instructions, medical action plan and red flags such as increased shortness of breath, increasing fever and signs of decompensation with patient who verbalized understanding. Discussed exposure protocols and quarantine with CDC Guidelines What to do if you are sick with coronavirus disease .  Patient was given an opportunity for questions and concerns. The patient agrees to contact the [facility] exposure line [phone], [facility] department [place] [facility]: ([phone]) and PCP office for questions related to their healthcare. CTN/ACM provided contact information for future needs.     Reviewed and educated patient on any new and changed medications related to discharge diagnosis     Patient/family/caregiver given information for GetWell Loop and agrees to enroll yes  Patient's preferred e-mail: [email or web address]   Patient's preferred phone number: [phone]  Based on Loop alert triggers, patient will be contacted by nurse care manager for worsening symptoms. Pt will be further monitored by COVID Loop Team based on severity of symptoms and risk factors. Advised ED is SOB became severe. Strongly encouraged f/u with PCP and pulmonary.   Pt is agreeable to LOOP monitoring

## 2020-09-01 ENCOUNTER — HOSPITAL ENCOUNTER (OUTPATIENT)
Dept: CT IMAGING | Age: 65
Discharge: HOME | End: 2020-09-01
Payer: MEDICARE

## 2020-09-01 PROCEDURE — 71250 CT THORAX DX C-: CPT

## 2020-09-11 ENCOUNTER — VIRTUAL VISIT (OUTPATIENT)
Dept: PULMONOLOGY | Age: 65
End: 2020-09-11
Payer: MEDICARE

## 2020-09-11 ENCOUNTER — TELEPHONE (OUTPATIENT)
Dept: PULMONOLOGY | Age: 65
End: 2020-09-11

## 2020-09-11 PROCEDURE — 99213 OFFICE O/P EST LOW 20 MIN: CPT | Performed by: INTERNAL MEDICINE

## 2020-09-11 ASSESSMENT — ENCOUNTER SYMPTOMS
COUGH: 1
SHORTNESS OF BREATH: 1

## 2020-09-11 NOTE — TELEPHONE ENCOUNTER
MA Communication:   The following orders are received by verbal communication from Nick Rosenberg MD    Orders include:  Needle Biopsy (waiting on approval)       VV to discuss results scheduled (see phone note)

## 2020-09-11 NOTE — TELEPHONE ENCOUNTER
Cindy Adair from [facility] stated Dr. Roshan Salomon wants a PET scan prior to needle biopsy. Please advise.

## 2020-09-11 NOTE — PROGRESS NOTES
MA Communication:   The following orders are received by verbal communication from Dina Moreira MD    Orders include:  Needle Biopsy (waiting on approval)       VV to discuss results scheduled (see phone note)

## 2020-09-11 NOTE — PROGRESS NOTES
Complaint:Pulmonary Nodule (3 mo fu w/CT)       HPI:  Chris Edgar is a 71y.o. year old female here for follow-up for COPD, right upper lobe lung nodule. Her PCP is Dr. Ed Huang. Agus Kaur was last seen on 5/28/20, returns after completing a CT chest.  The patient continues to smoke a few cigarettes in a day. She feels her dyspnea is better over the last 2 months and she cut back on her smoking. She has mild dyspnea with exertion, none at rest.  She denies much wheezing. She lives with her male partner. She is using DuoNeb 3 times a day and albuterol rescue inhaler 2-3 times a day. Her appetite is fair. She denies GI or  complaints. There is no change in her medical or surgical history. The rest of her ROS was negative. Her medications and allergies were reviewed. CT chest 9/1/2020  1.2 cm spiculated right upper lobe pulmonary nodule is not substantially    changed, with an appearance concerning for lung malignancy given the    underlying emphysema.  PET/CT suggested for further characterization.         New heterogeneous opacity within the right middle lobe and lingula as well as    micronodular infiltrate within the left upper lobe and left lower lobe. Findings are suggestive of pneumonitis and bronchiolitis.  Enlarging    precarinal lymph node may be reactive or metastatic. Previous notes reviewed and edited as necessary. The patient is staying in her home due to the COVID-19 situation. The patient has not been leaving the home due to the COVID-19 situation. She is smoking about 2 cigarettes a day. She does have phlegm which is clear or yellow, has a chronic cough. She is using Atrovent and albuterol nebulizer 3 times a day, Ventolin twice a day if she wheezes. Her appetite is fair, she denies GI or  complaints. Her appetite is fair, she has gained about 2 pounds. She denies lower extremity edema. She denies chest pain, orthopnea or PND.   There has been no other change in her medical or surgical history, medications were reviewed. Last labs on 10/2/2019 showed H&H of 11.2 and 32.8. Anemia was normochromic and normocytic. Renal profile on 5/6/2019 was essentially normal.    CT chest 5/27/20  Interval increase in size of spiculated right upper lobe nodule, suspicious   for an adenocarcinoma spectrum lesion.  The increased size may lend to   improved sensitivity of PET-CT as this was not particularly hypermetabolic on   prior exam from 2018.       Mild centrilobular emphysema.       Airway inflammation, presumably smoking-related. Previous notes reviewed and edited as necessary. Since her last visit, she has been doing fairly well. She has mild shortness of breath, began to cough today, when she had gone for her CT chest.  She said usually she does not have any cough. She denies chest pain. She has occasional mild wheezing. She denies nasal symptoms, GI or  complaints. Her appetite is fair. She is unable to gain weight. She has been on Stiolto and as needed albuterol. She underwent ENT evaluation by Dr. Clinton Torrez for abnormal PET scan uptake, has a follow-up appointment with him. CT chest 5/17/19  No change in irregular suspicious appearing nodule right upper lobe.  As   mentioned previously, this was not described on prior chest CT 2004.  Maximum   uptake on recent PET-CT was 1.6.       Underlying emphysema.  Additional tiny pulmonary nodules are unchanged       Intra and extrahepatic biliary ductal dilatation, incompletely evaluated. Recommend evaluation with abdominal CT with IV contrast       Previous notes reviewed and edited as necessary. Olivier Amato completed a PET-CT, presents today to discuss results. She is accompanied by her cousin. She has no change in her symptoms, after her flu shot did have upper abdominal pain. The patient stopped Qvar, has not seen any change in her symptoms.   There is no other change in her medication history, medical or surgical history. PET-CT 11/13/18  Right upper lobe pulmonary nodule is not significantly hypermetabolic,   however, it has irregular margins, which are suspicious.  Recommend continued   imaging-clinical follow-up.       Asymmetric hypermetabolism seen in the right peritonsillar region.  Recommend   direct visualization to rule out oropharyngeal mass.       Cholelithiasis.  There prominence of the extrahepatic common duct and   intrahepatic bile ducts. Usmaya Gram is increased FDG uptake seen in the region   the gallbladder.  Recommend CT abdomen with IV contrast for further   characterization, given the biliary ductal dilatation and suspected   mesenteric nodularity     Previous notes reviewed and edited as necessary. The patient has lost about 50 pounds over the last 1-1/2 years, she says this was unintentional.  Dyspnea remains about the same. We obtained a head CT as recommended, due to an abnormal CT chest.      PFT   FVC 3.0 L, 89% predicted, FEV1 1.95 L, 73% predicted, with a ratio of 63%. There was a response to bronchodilator by FVC. Lung volumes showed hyperinflation and air trapping, diffusion capacity 84% predicted. CT chest 10/25  Underlying emphysema, with a dominant pulmonary nodule on the right.  This   nodule was not described previously. Previous notes reviewed and edited as necessary. Toyin Mendoza is a pleasant 70-year-old female who is , lives by herself. Her daughter lives in [facility]. The patient says she has had asthma her \"whole life\". She started smoking in her teenage years, smoked at least 1 PPD for 40-50 years. She quit smoking in 2015, has not had any relapses. The patient was in her usual state of health, is fairly well controlled on Stiolto and when necessary albuterol. She had an exacerbation about 3 weeks ago, was seen at an urgent care 3 times. During these visits, she started out with a Z-Martinez, later received Levaquin. She received 2 courses of prednisone.   In the last 4-5 days, she feels she is back to baseline. She denied any preceding fever, but had increased cough with difficulty expectorating, increased wheezing. Presently she feels she could walk without any dyspnea, \"get out of breath with climbing a lot of stairs. She does have a morning cough with phlegm that is whitish. She denies hemoptysis. She is still wheezing, using Pro-Air up to 3 times a day. She is also using Qvar twice a day, is rinsing her mouth after using it. She has lost her appetite recently. Over the last 1-1/2 years, she has been gradually losing weight, down from 175 pounds to 132 pounds. She says his weight loss is unintentional.  She denies any GE reflux, no other GI or  complaints. She denies allergic rhinitis. Her sleep is fair. She has had Pap smears and mammograms, none recently. She has a colonoscopy scheduled for next Tuesday. She had pneumonia several years ago, was treated as an outpatient. She was hospitalized about 2 years ago for bronchitis. From EMR it appears she was hospitalized from 2/1 through 2/3/16 for acute respiratory failure with hypoxia, COPD exacerbation. I reviewed her entire medical, surgical, personal and family history. Changes were made as needed. CXR 9/26/18   COPD with no acute infiltrate    PFT 6/18/15  FVC 3.14 L, 88% predicted, FEV1 2.16 L, 79% predicted, with a ratio of 69%. There was no response to bronchodilator. Lung volumes showed hyperinflation and air trapping. Diffusion capacity was 79% predicted. Labs  On 8/11/17 renal profile, hepatic profile, hemoglobin A1c, TSH were normal.  Vitamin D level was mildly low at 24.2.   CBC was normal.    Past Medical History:   Diagnosis Date    Arthritis     Asthma     COPD (chronic obstructive pulmonary disease) ([facility] [address]     Hormone replacement therapy        Past Surgical History:   Procedure Laterality Date    BREAST BIOPSY      Right, was benign    COLONOSCOPY N/A 10/9/2018 COLONOSCOPY WITH BIOPSY performed by Gumaro Chamberlain MD at [address] [facility] ear    TUBAL LIGATION         Social History     Tobacco Use    Smoking status: Current Every Day Smoker     Packs/day: 1.00     Years: 53.00     Pack years: 53.00     Types: Cigarettes     Start date: 1955    Smokeless tobacco: Never Used    Tobacco comment: 9/11/20 - smokes 3 cigs daily   Substance Use Topics    Alcohol use: No     Alcohol/week: 0.0 standard drinks       Family History   Problem Relation Age of Onset    Cancer Mother         stomach    Heart Disease Father     Colon Cancer Brother     Cancer Brother          Current Outpatient Medications:     ipratropium-albuterol (DUONEB) 0.5-2.5 (3) MG/3ML SOLN nebulizer solution, Inhale 3 mLs into the lungs every 6 hours as needed for Shortness of Breath, Disp: 360 mL, Rfl: 5    albuterol sulfate  (90 Base) MCG/ACT inhaler, INHALE TWO PUFFS BY MOUTH EVERY 6 HOURS AS NEEDED FOR WHEEZING, Disp: 1 Inhaler, Rfl: 5    Patient has no known allergies. There were no vitals filed for this visit. Review of Systems   Respiratory: Positive for cough and shortness of breath. All other systems reviewed and are negative. Physical exam:  Physical exam was not performed as this was telephonic virtual visit. The patient was awake, alert, communicative. She did not have any cough or shortness of breath during the interview.

## 2020-09-18 ENCOUNTER — HOSPITAL ENCOUNTER (OUTPATIENT)
Dept: PET IMAGING | Age: 65
Discharge: HOME | End: 2020-09-18
Payer: MEDICARE

## 2020-09-18 ENCOUNTER — TELEPHONE (OUTPATIENT)
Dept: PULMONOLOGY | Age: 65
End: 2020-09-18

## 2020-09-18 VITALS — WEIGHT: 130 LBS | HEIGHT: 64 IN | BODY MASS INDEX: 22.2 KG/M2

## 2020-09-18 PROCEDURE — 3430000000 HC RX DIAGNOSTIC RADIOPHARMACEUTICAL: Performed by: INTERNAL MEDICINE

## 2020-09-18 PROCEDURE — 78815 PET IMAGE W/CT SKULL-THIGH: CPT

## 2020-09-18 PROCEDURE — A9552 F18 FDG: HCPCS | Performed by: INTERNAL MEDICINE

## 2020-09-18 RX ORDER — FLUDEOXYGLUCOSE F 18 200 MCI/ML
13.46 INJECTION, SOLUTION INTRAVENOUS
Status: COMPLETED | OUTPATIENT
Start: 2020-09-18 | End: 2020-09-18

## 2020-09-18 RX ADMIN — FLUDEOXYGLUCOSE F 18 13.46 MILLICURIE: 200 INJECTION, SOLUTION INTRAVENOUS at 09:53

## 2020-09-21 NOTE — TELEPHONE ENCOUNTER
Need to schedule CTWO for 3 mo with fuVV. Attempted to reach pt. But phone just rang and rang then said to enter remote acces code? Will try again later.  Left message

## 2020-12-04 ENCOUNTER — TELEPHONE (OUTPATIENT)
Dept: CASE MANAGEMENT | Age: 65
End: 2020-12-04

## 2020-12-04 NOTE — TELEPHONE ENCOUNTER
Patient due for follow up chest CT. Reminder letter mailed. Central Scheduling phone number provided.     Diandra Baxter [address] [facility]  [phone]

## 2020-12-22 RX ORDER — PREDNISONE 20 MG/1
TABLET ORAL
Qty: 20 TABLET | Refills: 0 | Status: SHIPPED
Start: 2020-12-22 | End: 2020-12-29 | Stop reason: CLARIF

## 2020-12-22 NOTE — TELEPHONE ENCOUNTER
Last seen: 9/11/20    Next apt: 12/29/20 for CT and f/u with Dr. Nika Granados    Last filled: 8/5/20 Katherine Stafford of [facility] with no refills)

## 2020-12-28 ASSESSMENT — ENCOUNTER SYMPTOMS
COUGH: 1
SHORTNESS OF BREATH: 1

## 2020-12-29 ENCOUNTER — HOSPITAL ENCOUNTER (OUTPATIENT)
Dept: CT IMAGING | Age: 65
Discharge: HOME | End: 2020-12-29
Payer: MEDICARE

## 2020-12-29 ENCOUNTER — VIRTUAL VISIT (OUTPATIENT)
Dept: PULMONOLOGY | Age: 65
End: 2020-12-29
Payer: MEDICARE

## 2020-12-29 DIAGNOSIS — R59.0 MEDIASTINAL ADENOPATHY: ICD-10-CM

## 2020-12-29 DIAGNOSIS — J44.9 CHRONIC OBSTRUCTIVE PULMONARY DISEASE, UNSPECIFIED COPD TYPE (HCC): ICD-10-CM

## 2020-12-29 DIAGNOSIS — R91.1 LUNG NODULE: Primary | ICD-10-CM

## 2020-12-29 PROCEDURE — 71250 CT THORAX DX C-: CPT

## 2020-12-29 PROCEDURE — 99442 PR PHYS/QHP TELEPHONE EVALUATION 11-20 MIN: CPT | Performed by: INTERNAL MEDICINE

## 2020-12-29 NOTE — PROGRESS NOTES
Pulmonary Outpatient Note   Daniele Todd MD       12/29/2020    1. Lung nodule    2. Mediastinal adenopathy    3. Chronic obstructive pulmonary disease, unspecified COPD type ([facility] [address]          ASSESSMENT/PLAN:  Abnormal CT of the chest, lung nodule. Lung nodule highly suspicious for neoplastic disorder, results of her repeat CT chest were discussed with her. Due to low level of activity on PET/CT, IR recommended follow-up CT. There has been no change, but I believe that a neoplasm is not ruled out. Recommend follow-up CT chest in about 6 months   COPD, emphysema, possible proximal bronchiectasis. Continue with bronchodilator, increase exercise as tolerated. Prophylaxis. Up-to-date with Pneumovax, Prevnar, does not take flu shots    RTC 6 months with CT chest, call earlier if symptoms. Rebecca Colon is a 76 y.o. female evaluated via telephone on 12/29/2020. Consent:  She and/or health care decision maker is aware that that she may receive a bill for this telephone service, depending on her insurance coverage, and has provided verbal consent to proceed: Yes      Documentation:  I communicated with the patient and/or health care decision maker about lung nodule, COPD. Details of this discussion including any medical advice provided: As per note      I affirm this is a Patient Initiated Episode with a Patient who has not had a related appointment within my department in the past 7 days or scheduled within the next 24 hours. Patient identification was verified at the start of the visit: Yes    Total Time: minutes: 11-20 minutes    Note: not billable if this call serves to triage the patient into an appointment for the relevant concern      Daniele Todd       Chief Complaint:Results (3 mo fu w/CT)       HPI:  Rebecca Colon is a 76y.o. year old female here for follow-up for COPD, right upper lobe lung nodule. Her PCP is Dr. Regla Fox.     Giuseppe Yuli presents for 3-month follow-up by telephonic visit, was last seen on 9/11/2020 by virtual visit. Based on her CT, CT-guided biopsy was recommended. Images were reviewed by IR, they suggested PET/CT, which showed low level of activity. Follow-up CT chest was recommended, the patient presents today for follow-up after having completed a CT chest on 12/29/2020. The patient has had no change in her symptoms. She has a good appetite, no weight loss. She does have occasional cough, not much wheezing. She uses her nebulizer 3-4 times a day. She denies hemoptysis. She denies GI or  complaints. The rest of her ROS was negative. She lives with her boyfriend. Her medications and allergies were reviewed. CT chest 12/29/20  1.2 cm right upper lobe nodule is unchanged.  Despite stability and low level   activity on recent PET scan the appearance remains somewhat suspicious. Continued close interval follow-up is suggested           PET/CT 9/18/2020   1 cm right upper lobe pulmonary nodule demonstrates low level activity, a   degree favoring infectious/inflammatory nodule or noncalcified granuloma,   less likely though not excluded malignancy.  Mild activity within mediastinal   lymph nodes, likely reflective of the same process.  Chest CT follow-up in 6   months time is recommended for surveillance.       No findings of FDG avid metastatic disease within the abdomen or pelvis.       Persistent marked intrahepatic and extrahepatic ductal dilatation. Previous notes reviewed and edited as necessary. Sue Richter was last seen on 5/28/20, returns after completing a CT chest.  The patient continues to smoke a few cigarettes in a day. She feels her dyspnea is better over the last 2 months and she cut back on her smoking. She has mild dyspnea with exertion, none at rest.  She denies much wheezing. She lives with her male partner. She is using DuoNeb 3 times a day and albuterol rescue inhaler 2-3 times a day. Her appetite is fair.   She denies GI or  she has been doing fairly well. She has mild shortness of breath, began to cough today, when she had gone for her CT chest.  She said usually she does not have any cough. She denies chest pain. She has occasional mild wheezing. She denies nasal symptoms, GI or  complaints. Her appetite is fair. She is unable to gain weight. She has been on Stiolto and as needed albuterol. She underwent ENT evaluation by Dr. Jake Teague for abnormal PET scan uptake, has a follow-up appointment with him. CT chest 5/17/19  No change in irregular suspicious appearing nodule right upper lobe.  As   mentioned previously, this was not described on prior chest CT 2004.  Maximum   uptake on recent PET-CT was 1.6.       Underlying emphysema.  Additional tiny pulmonary nodules are unchanged       Intra and extrahepatic biliary ductal dilatation, incompletely evaluated. Recommend evaluation with abdominal CT with IV contrast       Previous notes reviewed and edited as necessary. Steven Patton completed a PET-CT, presents today to discuss results. She is accompanied by her cousin. She has no change in her symptoms, after her flu shot did have upper abdominal pain. The patient stopped Qvar, has not seen any change in her symptoms. There is no other change in her medication history, medical or surgical history. PET-CT 11/13/18  Right upper lobe pulmonary nodule is not significantly hypermetabolic,   however, it has irregular margins, which are suspicious.  Recommend continued   imaging-clinical follow-up.       Asymmetric hypermetabolism seen in the right peritonsillar region.  Recommend   direct visualization to rule out oropharyngeal mass.       Cholelithiasis.  There prominence of the extrahepatic common duct and   intrahepatic bile ducts. Silvia Burrs is increased FDG uptake seen in the region   the gallbladder.  Recommend CT abdomen with IV contrast for further   characterization, given the biliary ductal dilatation and suspected mesenteric nodularity     Previous notes reviewed and edited as necessary. The patient has lost about 50 pounds over the last 1-1/2 years, she says this was unintentional.  Dyspnea remains about the same. We obtained a head CT as recommended, due to an abnormal CT chest.      PFT   FVC 3.0 L, 89% predicted, FEV1 1.95 L, 73% predicted, with a ratio of 63%. There was a response to bronchodilator by FVC. Lung volumes showed hyperinflation and air trapping, diffusion capacity 84% predicted. CT chest 10/25  Underlying emphysema, with a dominant pulmonary nodule on the right.  This   nodule was not described previously. Previous notes reviewed and edited as necessary. Ruiz Chaudhary is a pleasant 21-year-old female who is , lives by herself. Her daughter lives in [facility]. The patient says she has had asthma her \"whole life\". She started smoking in her teenage years, smoked at least 1 PPD for 40-50 years. She quit smoking in 2015, has not had any relapses. The patient was in her usual state of health, is fairly well controlled on Stiolto and when necessary albuterol. She had an exacerbation about 3 weeks ago, was seen at an urgent care 3 times. During these visits, she started out with a Z-Martinez, later received Levaquin. She received 2 courses of prednisone. In the last 4-5 days, she feels she is back to baseline. She denied any preceding fever, but had increased cough with difficulty expectorating, increased wheezing. Presently she feels she could walk without any dyspnea, \"get out of breath with climbing a lot of stairs. She does have a morning cough with phlegm that is whitish. She denies hemoptysis. She is still wheezing, using Pro-Air up to 3 times a day. She is also using Qvar twice a day, is rinsing her mouth after using it. She has lost her appetite recently. Over the last 1-1/2 years, she has been gradually losing weight, down from 175 pounds to 132 pounds.   She says his weight loss is unintentional.  She denies any GE reflux, no other GI or  complaints. She denies allergic rhinitis. Her sleep is fair. She has had Pap smears and mammograms, none recently. She has a colonoscopy scheduled for next Tuesday. She had pneumonia several years ago, was treated as an outpatient. She was hospitalized about 2 years ago for bronchitis. From EMR it appears she was hospitalized from 2/1 through 2/3/16 for acute respiratory failure with hypoxia, COPD exacerbation. I reviewed her entire medical, surgical, personal and family history. Changes were made as needed. CXR 9/26/18   COPD with no acute infiltrate    PFT 6/18/15  FVC 3.14 L, 88% predicted, FEV1 2.16 L, 79% predicted, with a ratio of 69%. There was no response to bronchodilator. Lung volumes showed hyperinflation and air trapping. Diffusion capacity was 79% predicted. Labs  On 8/11/17 renal profile, hepatic profile, hemoglobin A1c, TSH were normal.  Vitamin D level was mildly low at 24.2.   CBC was normal.    Past Medical History:   Diagnosis Date    Arthritis     Asthma     COPD (chronic obstructive pulmonary disease) ([address]     Hormone replacement therapy        Past Surgical History:   Procedure Laterality Date    BREAST BIOPSY      Right, was benign    COLONOSCOPY N/A 10/9/2018    COLONOSCOPY WITH BIOPSY performed by Amna Felix MD at [address] [place] [address]      Behind ear    TUBAL LIGATION         Social History     Tobacco Use    Smoking status: Current Every Day Smoker     Packs/day: 1.00     Years: 53.00     Pack years: 53.00     Types: Cigarettes     Start date: 1955    Smokeless tobacco: Never Used    Tobacco comment: 12/29/20 - smokes 5 cigs daily   Substance Use Topics    Alcohol use: No     Alcohol/week: 0.0 standard drinks       Family History   Problem Relation Age of Onset    Cancer Mother         stomach    Heart Disease Father     Colon Cancer Brother     Cancer Brother          Current Outpatient Medications:     VENTOLIN  (90 Base) MCG/ACT inhaler, INHALE TWO PUFFS BY MOUTH FOUR TIMES A DAY AS NEEDED FOR WHEEZING, Disp: 18 g, Rfl: 5    ipratropium-albuterol (DUONEB) 0.5-2.5 (3) MG/3ML SOLN nebulizer solution, Inhale 3 mLs into the lungs every 6 hours as needed for Shortness of Breath, Disp: 360 mL, Rfl: 5    Patient has no known allergies. There were no vitals filed for this visit. Review of Systems   Respiratory: Positive for cough and shortness of breath. All other systems reviewed and are negative. Physical exam:  Physical exam was not performed as this was telephonic virtual visit. The patient was awake, alert, communicative. She did not have any cough or shortness of breath during the interview.

## 2020-12-29 NOTE — PROGRESS NOTES
MA Communication:   The following orders are received by verbal communication from Baldomero Rosenberg MD    Orders include:  CT w/o Contrast scheduled 6/29/21       6 mo fu scheduled 7/6/21

## 2020-12-29 NOTE — PATIENT INSTRUCTIONS
Remember to bring a list of pulmonary medications and any CPAP or BiPAP machines to your next appointment with the office. Please keep all of your future appointments scheduled by [address], [facility] office. Out of respect for other patients and providers, you may be asked to reschedule your appointment if you arrive later than your scheduled appointment time. Appointments cancelled less than 24hrs in advance will be considered a no show. Patients with three missed appointments within 1 year or four missed appointments within 2 years can be dismissed from the practice. You may receive a survey regarding the care you received during your visit. Your input is valuable to us. We encourage you to complete and return your survey. We hope you will choose us in the future for your healthcare needs. Pt instructed of all future appointment dates & times, including radiology, labs, procedures & referrals. If procedures were scheduled preparation instructions provided. Instructions on future appointments with [facility] were given.

## 2021-04-06 ENCOUNTER — OFFICE VISIT (OUTPATIENT)
Dept: FAMILY MEDICINE CLINIC | Age: 66
End: 2021-04-06
Payer: MEDICARE

## 2021-04-06 VITALS
BODY MASS INDEX: 23.46 KG/M2 | HEIGHT: 64 IN | WEIGHT: 137.4 LBS | HEART RATE: 75 BPM | SYSTOLIC BLOOD PRESSURE: 138 MMHG | TEMPERATURE: 97.5 F | DIASTOLIC BLOOD PRESSURE: 82 MMHG | OXYGEN SATURATION: 97 %

## 2021-04-06 DIAGNOSIS — J44.9 CHRONIC OBSTRUCTIVE PULMONARY DISEASE, UNSPECIFIED COPD TYPE (HCC): ICD-10-CM

## 2021-04-06 DIAGNOSIS — Z12.31 ENCOUNTER FOR SCREENING MAMMOGRAM FOR MALIGNANT NEOPLASM OF BREAST: ICD-10-CM

## 2021-04-06 DIAGNOSIS — H25.9 AGE-RELATED CATARACT OF BOTH EYES, UNSPECIFIED AGE-RELATED CATARACT TYPE: ICD-10-CM

## 2021-04-06 DIAGNOSIS — R53.83 FATIGUE, UNSPECIFIED TYPE: ICD-10-CM

## 2021-04-06 DIAGNOSIS — Z00.00 PHYSICAL EXAM, ROUTINE: ICD-10-CM

## 2021-04-06 DIAGNOSIS — M19.90 ARTHRITIS: ICD-10-CM

## 2021-04-06 DIAGNOSIS — Z13.220 SCREENING FOR HYPERCHOLESTEROLEMIA: ICD-10-CM

## 2021-04-06 DIAGNOSIS — Z01.818 PREOP EXAMINATION: Primary | ICD-10-CM

## 2021-04-06 PROBLEM — F17.200 TOBACCO DEPENDENCE: Status: ACTIVE | Noted: 2019-08-08

## 2021-04-06 PROBLEM — K63.5 POLYP OF ASCENDING COLON: Status: RESOLVED | Noted: 2018-10-09 | Resolved: 2021-04-06

## 2021-04-06 LAB
A/G RATIO: 1.3 (ref 1.1–2.2)
ALBUMIN SERPL-MCNC: 3.9 G/DL (ref 3.4–5)
ALP BLD-CCNC: 100 U/L (ref 40–129)
ALT SERPL-CCNC: 12 U/L (ref 10–40)
ANION GAP SERPL CALCULATED.3IONS-SCNC: 10 MMOL/L (ref 3–16)
AST SERPL-CCNC: 17 U/L (ref 15–37)
BASOPHILS ABSOLUTE: 0 K/UL (ref 0–0.2)
BASOPHILS RELATIVE PERCENT: 0.6 %
BILIRUB SERPL-MCNC: 0.7 MG/DL (ref 0–1)
BUN BLDV-MCNC: 14 MG/DL (ref 7–20)
CALCIUM SERPL-MCNC: 9.1 MG/DL (ref 8.3–10.6)
CHLORIDE BLD-SCNC: 102 MMOL/L (ref 99–110)
CHOLESTEROL, TOTAL: 187 MG/DL (ref 0–199)
CO2: 26 MMOL/L (ref 21–32)
CREAT SERPL-MCNC: <0.5 MG/DL (ref 0.6–1.2)
EOSINOPHILS ABSOLUTE: 0.3 K/UL (ref 0–0.6)
EOSINOPHILS RELATIVE PERCENT: 7.1 %
GFR AFRICAN AMERICAN: >60
GFR NON-AFRICAN AMERICAN: >60
GLOBULIN: 3 G/DL
GLUCOSE BLD-MCNC: 91 MG/DL (ref 70–99)
HCT VFR BLD CALC: 39 % (ref 36–48)
HDLC SERPL-MCNC: 92 MG/DL (ref 40–60)
HEMOGLOBIN: 13.4 G/DL (ref 12–16)
LDL CHOLESTEROL CALCULATED: 82 MG/DL
LYMPHOCYTES ABSOLUTE: 1.2 K/UL (ref 1–5.1)
LYMPHOCYTES RELATIVE PERCENT: 27.3 %
MCH RBC QN AUTO: 33.1 PG (ref 26–34)
MCHC RBC AUTO-ENTMCNC: 34.2 G/DL (ref 31–36)
MCV RBC AUTO: 96.7 FL (ref 80–100)
MONOCYTES ABSOLUTE: 0.3 K/UL (ref 0–1.3)
MONOCYTES RELATIVE PERCENT: 6.1 %
NEUTROPHILS ABSOLUTE: 2.5 K/UL (ref 1.7–7.7)
NEUTROPHILS RELATIVE PERCENT: 58.9 %
PDW BLD-RTO: 13.5 % (ref 12.4–15.4)
PLATELET # BLD: 218 K/UL (ref 135–450)
PMV BLD AUTO: 8 FL (ref 5–10.5)
POTASSIUM SERPL-SCNC: 4.5 MMOL/L (ref 3.5–5.1)
RBC # BLD: 4.03 M/UL (ref 4–5.2)
SODIUM BLD-SCNC: 138 MMOL/L (ref 136–145)
TOTAL PROTEIN: 6.9 G/DL (ref 6.4–8.2)
TRIGL SERPL-MCNC: 66 MG/DL (ref 0–150)
TSH REFLEX FT4: 2.47 UIU/ML (ref 0.27–4.2)
VLDLC SERPL CALC-MCNC: 13 MG/DL
WBC # BLD: 4.2 K/UL (ref 4–11)

## 2021-04-06 PROCEDURE — 1090F PRES/ABSN URINE INCON ASSESS: CPT | Performed by: REGISTERED NURSE

## 2021-04-06 PROCEDURE — 3023F SPIROM DOC REV: CPT | Performed by: REGISTERED NURSE

## 2021-04-06 PROCEDURE — 36415 COLL VENOUS BLD VENIPUNCTURE: CPT | Performed by: REGISTERED NURSE

## 2021-04-06 PROCEDURE — G8427 DOCREV CUR MEDS BY ELIG CLIN: HCPCS | Performed by: REGISTERED NURSE

## 2021-04-06 PROCEDURE — 99213 OFFICE O/P EST LOW 20 MIN: CPT | Performed by: REGISTERED NURSE

## 2021-04-06 PROCEDURE — 99397 PER PM REEVAL EST PAT 65+ YR: CPT | Performed by: REGISTERED NURSE

## 2021-04-06 PROCEDURE — G8420 CALC BMI NORM PARAMETERS: HCPCS | Performed by: REGISTERED NURSE

## 2021-04-06 PROCEDURE — G8926 SPIRO NO PERF OR DOC: HCPCS | Performed by: REGISTERED NURSE

## 2021-04-06 RX ORDER — MELOXICAM 15 MG/1
15 TABLET ORAL DAILY PRN
Qty: 30 TABLET | Refills: 0 | Status: SHIPPED | OUTPATIENT
Start: 2021-04-06 | End: 2021-06-03

## 2021-04-06 RX ORDER — TIOTROPIUM BROMIDE AND OLODATEROL 3.124; 2.736 UG/1; UG/1
SPRAY, METERED RESPIRATORY (INHALATION)
Qty: 1 INHALER | Refills: 4 | Status: SHIPPED | OUTPATIENT
Start: 2021-04-06 | End: 2021-08-04

## 2021-04-06 ASSESSMENT — ENCOUNTER SYMPTOMS
TROUBLE SWALLOWING: 0
SHORTNESS OF BREATH: 1
WHEEZING: 0
CHEST TIGHTNESS: 0
DIFFICULTY BREATHING: 0
COUGH: 1
SORE THROAT: 0

## 2021-04-06 ASSESSMENT — LIFESTYLE VARIABLES
HOW OFTEN DURING THE LAST YEAR HAVE YOU HAD A FEELING OF GUILT OR REMORSE AFTER DRINKING: 0
HOW OFTEN DURING THE LAST YEAR HAVE YOU BEEN UNABLE TO REMEMBER WHAT HAPPENED THE NIGHT BEFORE BECAUSE YOU HAD BEEN DRINKING: 0
HOW OFTEN DO YOU HAVE SIX OR MORE DRINKS ON ONE OCCASION: 0
HOW OFTEN DURING THE LAST YEAR HAVE YOU FOUND THAT YOU WERE NOT ABLE TO STOP DRINKING ONCE YOU HAD STARTED: 0
HOW OFTEN DURING THE LAST YEAR HAVE YOU NEEDED AN ALCOHOLIC DRINK FIRST THING IN THE MORNING TO GET YOURSELF GOING AFTER A NIGHT OF HEAVY DRINKING: 0
AUDIT-C TOTAL SCORE: 3
AUDIT TOTAL SCORE: 3

## 2021-04-06 ASSESSMENT — PATIENT HEALTH QUESTIONNAIRE - PHQ9: SUM OF ALL RESPONSES TO PHQ QUESTIONS 1-9: 0

## 2021-04-06 ASSESSMENT — COPD QUESTIONNAIRES: COPD: 1

## 2021-04-06 NOTE — PROGRESS NOTES
Chief Complaint:   Zak Tsai is a 76 y.o. female who presents for complete physical exam.      ASSESSMENT:   Well Female exam, See encounter diagnoses    1. Physical exam, routine  Patient is here for physical/annual wellness visit but also requires a preop for cataract surgery on 4/15/2021.    - Comprehensive Metabolic Panel    2. Preop examination  See additional note for preop examination. 3. Chronic obstructive pulmonary disease, unspecified COPD type ([facility] [address]  Patient has not had her Stiolto Respimat inhaler for approximately 1 year. She reports that she has had worsening shortness of breath. She uses her nebulizer 3-4 times a day. She states that she does feel that she was greatly improved when she was on the [facility] [employer] [facility] but says that her insurance did not cover it anymore. Discussed with patient that if she has any trouble getting this prescription filled that she should call the office.  - Tiotropium Bromide-Olodaterol (STIOLTO RESPIMAT) 2.5-2.5 MCG/ACT AERS; INHALE TWO INHALATION(S) BY MOUTH DAILY  Dispense: 1 Inhaler; Refill: 4  - DME Order for Nebulizer as OP; Future    4. Screening for hypercholesterolemia    - Lipid Panel    5. Fatigue, unspecified type  She reports chronic fatigue. Lab work as ordered. - Comprehensive Metabolic Panel  - CBC Auto Differential  - TSH WITH REFLEX TO FT4    6. Age-related cataract of both eyes, unspecified age-related cataract type  Surgery for 4/15/21    7. Arthritis  Ongoing issues with arthritis in bilateral hands. Patient also thinks that she has some arthritis in her back. She will return after she has her cataract surgery completed for a visit to discuss this further.  - meloxicam (MOBIC) 15 MG tablet; Take 1 tablet by mouth daily as needed for Pain  Dispense: 30 tablet; Refill: 0    8. Encounter for screening mammogram for malignant neoplasm of breast    - YESI DIGITAL SCREEN W OR WO CAD BILATERAL;  Future      Plan:   See orders and medications filed with this encounter. quit smoking and follow a low fat, low cholesterol diet   Encouraged healthy diet, regular exercise and multivitamin daily. Labs checked per orders. Optho visit q 1-2 years. Watch for skin mole changes. Colonoscopy if over age 48 or if family history was discussed. Pap encouraged yearly, mammo encouraged yearly. Calcium 8108-6674 mg a day with vitamin D along with weight bearing exercises to prevent osteoporosis. Return for Medicare Annual Wellness Visit in 1 year. HPI:      COPD  She complains of cough and shortness of breath. There is no chest tightness, difficulty breathing or wheezing. This is a chronic problem. The current episode started more than 1 year ago. The problem occurs daily. The problem has been gradually worsening (Patient has not been able to get her daily Stiolto inhaler for approximately 1 year. ). The cough is non-productive and dry. Pertinent negatives include no appetite change, chest pain, fever, nasal congestion, sore throat or trouble swallowing. Her symptoms are alleviated by ipratropium and beta-agonist. She reports moderate improvement on treatment. Risk factors for lung disease include smoking/tobacco exposure. Her past medical history is significant for asthma and COPD.        Physical Exam   CONSTITUTIONAL:  awake, alert, cooperative, no apparent distress, and appears stated age     Eyes:  Lids and lashes normal, pupils equal, round and reactive to light, extra ocular muscles intact, sclera clear, conjunctiva normal     Head/ENT:  Normocephalic, without obvious abnormality, atramatic, sinuses nontender on palpation, external ears without lesions, oral pharynx with moist mucus membranes, tonsils without erythema or exudates, gums normal and good dentition.     Neck:  Supple, symmetrical, trachea midline, no adenopathy, thyroid symmetric, not enlarged and no tenderness, skin normal, No carotid bruit     Heart:  Normal apical impulse, regular rate and rhythm, normal S1 and S2, no S3 or S4, and no murmur noted     Lungs:  No increased work of breathing, good air exchange, clear to auscultation bilaterally, no crackles or wheezing     Abdomen:  No scars, normal bowel sounds, soft, non-distended, non-tender, no masses palpated, no hepatosplenomegally     Extremities:  No clubbing, cyanosis, or edema     NEUROLOGIC:  Awake, alert, oriented to name, place and time. Cranial nerves II-XII are grossly intact. Motor is 5 out of 5 bilaterally. ROS  CONSTITUTIONAL: Positive for chronic fatigue. Negative for fevers, chills and sweats   EYES: positive for bilateral Cataracts. HEENT: Positive for hearing loss. Negative for tinnitus, earaches, nasal congestion and sore throat   RESPIRATORY: positive for Chronic dyspnea. Negative for wheezing. CARDIOVASCULAR: negative for chest pain, palpitations, edema   GASTROINTESTINAL: negative for nausea, vomiting, diarrhea, constipation and abdominal pain   GENITOURINARY: negative for frequency and dysuria   INTEGUMENT/BREAST: negative for rash, skin lesion(s), dryness and skin color change   HEMATOLOGIC/LYMPHATIC: negative for easy bruising, bleeding and lymphadenopathy   ALLERGIC/IMMUNOLOGIC: negative for recurrent infections and urticaria   ENDOCRINE: negative for heat intolerance, cold intolerance, tremor and weight changes   MUSCULOSKELETAL: Positive for arthritis.  Negative for joint swelling and muscle weakness   NEUROLOGICAL: negative for headaches, dizziness, memory problems and speech problems    Vitals:    04/06/21 1020 04/06/21 1059   BP: (!) 146/90 138/82   Pulse: 75    Temp: 97.5 °F (36.4 °C)    SpO2: 97%    Weight: 137 lb 6.4 oz (62.3 kg)    Height: 5' 4\" (1.626 m)           Patient Active Problem List   Diagnosis    Asthma    Arthritis    Hormone replacement therapy    COPD (chronic obstructive pulmonary disease) ([facility] [address]    Tobacco dependence     Past Medical History:   Diagnosis Date    Arthritis     Asthma     COPD

## 2021-04-06 NOTE — PATIENT INSTRUCTIONS
Personalized Preventive Plan for Viridiana Zafar - 4/6/2021  Medicare offers a range of preventive health benefits. Some of the tests and screenings are paid in full while other may be subject to a deductible, co-insurance, and/or copay. Some of these benefits include a comprehensive review of your medical history including lifestyle, illnesses that may run in your family, and various assessments and screenings as appropriate. After reviewing your medical record and screening and assessments performed today your provider may have ordered immunizations, labs, imaging, and/or referrals for you. A list of these orders (if applicable) as well as your Preventive Care list are included within your After Visit Summary for your review. Other Preventive Recommendations:    · A preventive eye exam performed by an eye specialist is recommended every 1-2 years to screen for glaucoma; cataracts, macular degeneration, and other eye disorders. · A preventive dental visit is recommended every 6 months. · Try to get at least 150 minutes of exercise per week or 10,000 steps per day on a pedometer . · Order or download the FREE \"Exercise & Physical Activity: Your Everyday Guide\" from [employer]. Call 2-244.424.8933 or search [employer] online. · You need 0294-9501 mg of calcium and 8506-9111 IU of vitamin D per day. It is possible to meet your calcium requirement with diet alone, but a vitamin D supplement is usually necessary to meet this goal.  · When exposed to the sun, use a sunscreen that protects against both UVA and UVB radiation with an SPF of 30 or greater. Reapply every 2 to 3 hours or after sweating, drying off with a towel, or swimming. · Always wear a seat belt when traveling in a car. Always wear a helmet when riding a bicycle or motorcycle.

## 2021-04-06 NOTE — PROGRESS NOTES
or S4, and no murmur noted    Lungs:  No increased work of breathing, good air exchange, clear to auscultation bilaterally, no crackles or wheezing    Abdomen:  No scars, normal bowel sounds, soft, non-distended, non-tender, no masses palpated, no hepatosplenomegally    Extremities:  No clubbing, cyanosis, or edema    NEUROLOGIC:  Awake, alert, oriented to name, place and time. Cranial nerves II-XII are grossly intact. Motor is 5 out of 5 bilaterally. CBC with Differential:    Lab Results   Component Value Date    WBC 5.1 08/21/2020    RBC 4.20 08/21/2020    HGB 13.6 08/21/2020    HCT 40.2 08/21/2020     08/21/2020    MCV 95.9 08/21/2020    MCH 32.4 08/21/2020    MCHC 33.8 08/21/2020    RDW 13.8 08/21/2020    LYMPHOPCT 26.2 08/21/2020    MONOPCT 6.7 08/21/2020    BASOPCT 0.5 08/21/2020    MONOSABS 0.3 08/21/2020    LYMPHSABS 1.3 08/21/2020    EOSABS 0.3 08/21/2020    BASOSABS 0.0 08/21/2020     CMP:    Lab Results   Component Value Date     08/21/2020    K 4.3 08/21/2020     08/21/2020    CO2 28 08/21/2020    BUN 12 08/21/2020    CREATININE 0.6 08/21/2020    GFRAA >60 08/21/2020    AGRATIO 1.1 08/21/2020    LABGLOM >60 08/21/2020    GLUCOSE 130 08/21/2020    PROT 6.8 08/21/2020    LABALBU 3.6 08/21/2020    CALCIUM 9.5 08/21/2020    BILITOT 0.6 08/21/2020    ALKPHOS 89 08/21/2020    AST 13 08/21/2020    ALT 10 08/21/2020       ASSESSMENT AND PLAN:    1. Patient is a 76 y.o. female with above specified procedure planned on 4/15/21 with Dr. José Miguel Staples at [facility]. They will not require cardiology evaluation prior to procedure. 2. Stop ASA/NSAIDs medications 7-10 days prior to procedure. 3.Patient is cleared for surgery  4. Preop has been faxed to Dr. José Miguel Staples office    [facility], [address]  [phone]

## 2021-04-07 ENCOUNTER — TELEPHONE (OUTPATIENT)
Dept: ADMINISTRATIVE | Age: 66
End: 2021-04-07

## 2021-04-07 NOTE — TELEPHONE ENCOUNTER
Submitted PA for [employer] 2.5-2.5MCG/ACT aerosol, Key: [id number]. Spoke to LiyahMaricruz [place] Arun. At [facility] and he states the doctor changed his medication to a formulary medicine Brenden Moody. No ref # for the call. Patient already received medication. Thank you.

## 2021-04-08 ENCOUNTER — TELEPHONE (OUTPATIENT)
Dept: FAMILY MEDICINE CLINIC | Age: 66
End: 2021-04-08

## 2021-04-08 DIAGNOSIS — J44.9 CHRONIC OBSTRUCTIVE PULMONARY DISEASE, UNSPECIFIED COPD TYPE (HCC): Primary | ICD-10-CM

## 2021-04-08 RX ORDER — UMECLIDINIUM BROMIDE AND VILANTEROL TRIFENATATE 62.5; 25 UG/1; UG/1
1 POWDER RESPIRATORY (INHALATION) DAILY
Qty: 60 EACH | Refills: 2 | Status: SHIPPED | OUTPATIENT
Start: 2021-04-08 | End: 2021-07-15

## 2021-04-08 NOTE — TELEPHONE ENCOUNTER
Insurance will not cover stolito inhaler. Paper work states they will cover [employer] or [employer]. Please send one of those in for pt.     Paperwork scanned into media

## 2021-04-08 NOTE — TELEPHONE ENCOUNTER
-Pt requesting change in her inhaler because Pharmacy states insurance will not cover.    -Please Change from Tiotropium Bromide-Olodaterol (STIOLTO RESPIMAT) 2.5-2.5 MCG/ACT AERS to another inhaler.

## 2021-04-12 DIAGNOSIS — J44.9 CHRONIC OBSTRUCTIVE PULMONARY DISEASE, UNSPECIFIED COPD TYPE (HCC): ICD-10-CM

## 2021-04-13 ENCOUNTER — TELEPHONE (OUTPATIENT)
Dept: FAMILY MEDICINE CLINIC | Age: 66
End: 2021-04-13

## 2021-04-13 NOTE — TELEPHONE ENCOUNTER
Maxim Causey is requesting to us refax H-P with providers signature ,put attention octaviano fax # [phone]

## 2021-06-03 DIAGNOSIS — M19.90 ARTHRITIS: ICD-10-CM

## 2021-06-03 RX ORDER — MELOXICAM 15 MG/1
TABLET ORAL
Qty: 30 TABLET | Refills: 0 | Status: SHIPPED | OUTPATIENT
Start: 2021-06-03 | End: 2021-07-08

## 2021-07-08 DIAGNOSIS — M19.90 ARTHRITIS: ICD-10-CM

## 2021-07-08 RX ORDER — MELOXICAM 15 MG/1
TABLET ORAL
Qty: 30 TABLET | Refills: 0 | Status: SHIPPED | OUTPATIENT
Start: 2021-07-08 | End: 2021-08-13

## 2021-08-04 ENCOUNTER — HOSPITAL ENCOUNTER (OUTPATIENT)
Dept: CT IMAGING | Age: 66
Discharge: HOME | End: 2021-08-04
Payer: MEDICARE

## 2021-08-04 ENCOUNTER — OFFICE VISIT (OUTPATIENT)
Dept: PULMONOLOGY | Age: 66
End: 2021-08-04
Payer: MEDICARE

## 2021-08-04 VITALS
HEIGHT: 64 IN | HEART RATE: 64 BPM | BODY MASS INDEX: 22.61 KG/M2 | OXYGEN SATURATION: 96 % | TEMPERATURE: 98.6 F | DIASTOLIC BLOOD PRESSURE: 82 MMHG | SYSTOLIC BLOOD PRESSURE: 136 MMHG | WEIGHT: 132.4 LBS | RESPIRATION RATE: 16 BRPM

## 2021-08-04 DIAGNOSIS — J44.9 CHRONIC OBSTRUCTIVE PULMONARY DISEASE, UNSPECIFIED COPD TYPE (HCC): Primary | ICD-10-CM

## 2021-08-04 DIAGNOSIS — F17.200 SMOKER: ICD-10-CM

## 2021-08-04 DIAGNOSIS — R91.1 LUNG NODULE: ICD-10-CM

## 2021-08-04 PROCEDURE — 71250 CT THORAX DX C-: CPT

## 2021-08-04 PROCEDURE — 99213 OFFICE O/P EST LOW 20 MIN: CPT | Performed by: INTERNAL MEDICINE

## 2021-08-04 ASSESSMENT — ENCOUNTER SYMPTOMS
SHORTNESS OF BREATH: 1
COUGH: 1

## 2021-08-04 NOTE — PROGRESS NOTES
MA Communication:   The following orders are received by verbal communication from Shayy Corral MD    Orders include:  FU lungs 6 mo       Nebulizer

## 2021-08-04 NOTE — PATIENT INSTRUCTIONS
Remember to bring a list of pulmonary medications and any CPAP or BiPAP machines to your next appointment with the office. Please keep all of your future appointments scheduled by [address], [facility] office. Out of respect for other patients and providers, you may be asked to reschedule your appointment if you arrive later than your scheduled appointment time. Appointments cancelled less than 24hrs in advance will be considered a no show. Patients with three missed appointments within 1 year or four missed appointments within 2 years can be dismissed from the practice. Please be aware that our physicians are required to work in the Intensive Care Unit at [facility].  Your appointment may need to be rescheduled if they are designated to work during your appointment time. You may receive a survey regarding the care you received during your visit. Your input is valuable to us. We encourage you to complete and return your survey. We hope you will choose us in the future for your healthcare needs. Pt instructed of all future appointment dates & times, including radiology, labs, procedures & referrals. If procedures were scheduled preparation instructions provided. Instructions on future appointments with [facility] were given.

## 2021-08-04 NOTE — PROGRESS NOTES
reviewed. CT chest 12/29/20  1.2 cm right upper lobe nodule is unchanged.  Despite stability and low level   activity on recent PET scan the appearance remains somewhat suspicious. Continued close interval follow-up is suggested           PET/CT 9/18/2020   1 cm right upper lobe pulmonary nodule demonstrates low level activity, a   degree favoring infectious/inflammatory nodule or noncalcified granuloma,   less likely though not excluded malignancy.  Mild activity within mediastinal   lymph nodes, likely reflective of the same process.  Chest CT follow-up in 6   months time is recommended for surveillance.       No findings of FDG avid metastatic disease within the abdomen or pelvis.       Persistent marked intrahepatic and extrahepatic ductal dilatation. Previous notes reviewed and edited as necessary. Bee Clayton was last seen on 5/28/20, returns after completing a CT chest.  The patient continues to smoke a few cigarettes in a day. She feels her dyspnea is better over the last 2 months and she cut back on her smoking. She has mild dyspnea with exertion, none at rest.  She denies much wheezing. She lives with her male partner. She is using DuoNeb 3 times a day and albuterol rescue inhaler 2-3 times a day. Her appetite is fair. She denies GI or  complaints. There is no change in her medical or surgical history. The rest of her ROS was negative. Her medications and allergies were reviewed. CT chest 9/1/2020  1.2 cm spiculated right upper lobe pulmonary nodule is not substantially    changed, with an appearance concerning for lung malignancy given the    underlying emphysema.  PET/CT suggested for further characterization.         New heterogeneous opacity within the right middle lobe and lingula as well as    micronodular infiltrate within the left upper lobe and left lower lobe.     Findings are suggestive of pneumonitis and bronchiolitis.  Enlarging    precarinal lymph node may be reactive or metastatic. Previous notes reviewed and edited as necessary. The patient is staying in her home due to the COVID-19 situation. The patient has not been leaving the home due to the COVID-19 situation. She is smoking about 2 cigarettes a day. She does have phlegm which is clear or yellow, has a chronic cough. She is using Atrovent and albuterol nebulizer 3 times a day, Ventolin twice a day if she wheezes. Her appetite is fair, she denies GI or  complaints. Her appetite is fair, she has gained about 2 pounds. She denies lower extremity edema. She denies chest pain, orthopnea or PND. There has been no other change in her medical or surgical history, medications were reviewed. Last labs on 10/2/2019 showed H&H of 11.2 and 32.8. Anemia was normochromic and normocytic. Renal profile on 5/6/2019 was essentially normal.    CT chest 5/27/20  Interval increase in size of spiculated right upper lobe nodule, suspicious   for an adenocarcinoma spectrum lesion.  The increased size may lend to   improved sensitivity of PET-CT as this was not particularly hypermetabolic on   prior exam from 2018.       Mild centrilobular emphysema.       Airway inflammation, presumably smoking-related. Previous notes reviewed and edited as necessary. Since her last visit, she has been doing fairly well. She has mild shortness of breath, began to cough today, when she had gone for her CT chest.  She said usually she does not have any cough. She denies chest pain. She has occasional mild wheezing. She denies nasal symptoms, GI or  complaints. Her appetite is fair. She is unable to gain weight. She has been on Stiolto and as needed albuterol. She underwent ENT evaluation by Dr. Warden Low for abnormal PET scan uptake, has a follow-up appointment with him. CT chest 5/17/19  No change in irregular suspicious appearing nodule right upper lobe.  As   mentioned previously, this was not described on prior chest CT 2004.  Maximum   uptake on recent PET-CT was 1.6.       Underlying emphysema.  Additional tiny pulmonary nodules are unchanged       Intra and extrahepatic biliary ductal dilatation, incompletely evaluated. Recommend evaluation with abdominal CT with IV contrast       Previous notes reviewed and edited as necessary. Leticia Albright completed a PET-CT, presents today to discuss results. She is accompanied by her cousin. She has no change in her symptoms, after her flu shot did have upper abdominal pain. The patient stopped Qvar, has not seen any change in her symptoms. There is no other change in her medication history, medical or surgical history. PET-CT 11/13/18  Right upper lobe pulmonary nodule is not significantly hypermetabolic,   however, it has irregular margins, which are suspicious.  Recommend continued   imaging-clinical follow-up.       Asymmetric hypermetabolism seen in the right peritonsillar region.  Recommend   direct visualization to rule out oropharyngeal mass.       Cholelithiasis.  There prominence of the extrahepatic common duct and   intrahepatic bile ducts. Trini Angle is increased FDG uptake seen in the region   the gallbladder.  Recommend CT abdomen with IV contrast for further   characterization, given the biliary ductal dilatation and suspected   mesenteric nodularity     Previous notes reviewed and edited as necessary. The patient has lost about 50 pounds over the last 1-1/2 years, she says this was unintentional.  Dyspnea remains about the same. We obtained a head CT as recommended, due to an abnormal CT chest.      PFT   FVC 3.0 L, 89% predicted, FEV1 1.95 L, 73% predicted, with a ratio of 63%. There was a response to bronchodilator by FVC. Lung volumes showed hyperinflation and air trapping, diffusion capacity 84% predicted. CT chest 10/25  Underlying emphysema, with a dominant pulmonary nodule on the right.  This   nodule was not described previously.      Previous notes reviewed and edited as necessary. Yosi Wheat is a pleasant 59-year-old female who is , lives by herself. Her daughter lives in [facility]. The patient says she has had asthma her \"whole life\". She started smoking in her teenage years, smoked at least 1 PPD for 40-50 years. She quit smoking in 2015, has not had any relapses. The patient was in her usual state of health, is fairly well controlled on Stiolto and when necessary albuterol. She had an exacerbation about 3 weeks ago, was seen at an urgent care 3 times. During these visits, she started out with a Z-Martinez, later received Levaquin. She received 2 courses of prednisone. In the last 4-5 days, she feels she is back to baseline. She denied any preceding fever, but had increased cough with difficulty expectorating, increased wheezing. Presently she feels she could walk without any dyspnea, \"get out of breath with climbing a lot of stairs. She does have a morning cough with phlegm that is whitish. She denies hemoptysis. She is still wheezing, using Pro-Air up to 3 times a day. She is also using Qvar twice a day, is rinsing her mouth after using it. She has lost her appetite recently. Over the last 1-1/2 years, she has been gradually losing weight, down from 175 pounds to 132 pounds. She says his weight loss is unintentional.  She denies any GE reflux, no other GI or  complaints. She denies allergic rhinitis. Her sleep is fair. She has had Pap smears and mammograms, none recently. She has a colonoscopy scheduled for next Tuesday. She had pneumonia several years ago, was treated as an outpatient. She was hospitalized about 2 years ago for bronchitis. From EMR it appears she was hospitalized from 2/1 through 2/3/16 for acute respiratory failure with hypoxia, COPD exacerbation. I reviewed her entire medical, surgical, personal and family history. Changes were made as needed.     CXR 9/26/18   COPD with no acute infiltrate    PFT 6/18/15  FVC 3.14 L, 88% predicted, FEV1 2.16 L, 79% predicted, with a ratio of 69%. There was no response to bronchodilator. Lung volumes showed hyperinflation and air trapping. Diffusion capacity was 79% predicted. Labs  On 8/11/17 renal profile, hepatic profile, hemoglobin A1c, TSH were normal.  Vitamin D level was mildly low at 24.2. CBC was normal.    Past Medical History:   Diagnosis Date    Arthritis     Asthma     COPD (chronic obstructive pulmonary disease) ([address]     Hormone replacement therapy        Past Surgical History:   Procedure Laterality Date    BREAST BIOPSY      Right, was benign    COLONOSCOPY N/A 10/9/2018    COLONOSCOPY WITH BIOPSY performed by Shikha Mari MD at [address] [place] [address]      Behind ear    TUBAL LIGATION         Social History     Tobacco Use    Smoking status: Current Every Day Smoker     Packs/day: 1.00     Years: 53.00     Pack years: 53.00     Types: Cigarettes     Start date: 1955    Smokeless tobacco: Never Used    Tobacco comment: 12/29/20 - smokes 5 cigs daily   Substance Use Topics    Alcohol use: No     Alcohol/week: 0.0 standard drinks       Family History   Problem Relation Age of Onset    Cancer Mother         stomach    Heart Disease Father     Colon Cancer Brother     Cancer Brother          Current Outpatient Medications:     ANORO ELLIPTA 62.5-25 MCG/INH AEPB inhaler, INHALE ONE DOSE BY MOUTH DAILY, Disp: 60 each, Rfl: 1    meloxicam (MOBIC) 15 MG tablet, TAKE ONE TABLET BY MOUTH DAILY AS NEEDED FOR PAIN, Disp: 30 tablet, Rfl: 0    VENTOLIN  (90 Base) MCG/ACT inhaler, INHALE TWO PUFFS BY MOUTH FOUR TIMES A DAY AS NEEDED FOR WHEEZING, Disp: 18 g, Rfl: 4    ipratropium-albuterol (DUONEB) 0.5-2.5 (3) MG/3ML SOLN nebulizer solution, Inhale 3 mLs into the lungs every 6 hours as needed for Shortness of Breath, Disp: 360 mL, Rfl: 5    Patient has no known allergies.     Vitals:    08/04/21 1020   BP: 136/82   Site: Right

## 2021-08-05 DIAGNOSIS — J44.9 CHRONIC OBSTRUCTIVE PULMONARY DISEASE, UNSPECIFIED COPD TYPE (HCC): ICD-10-CM

## 2021-08-06 ENCOUNTER — TELEPHONE (OUTPATIENT)
Dept: FAMILY MEDICINE CLINIC | Age: 66
End: 2021-08-06

## 2021-08-06 DIAGNOSIS — J44.9 CHRONIC OBSTRUCTIVE PULMONARY DISEASE, UNSPECIFIED COPD TYPE (HCC): ICD-10-CM

## 2021-08-13 DIAGNOSIS — M19.90 ARTHRITIS: ICD-10-CM

## 2021-08-13 RX ORDER — MELOXICAM 15 MG/1
TABLET ORAL
Qty: 30 TABLET | Refills: 0 | Status: SHIPPED | OUTPATIENT
Start: 2021-08-13 | End: 2021-12-07

## 2021-09-11 DIAGNOSIS — J44.9 CHRONIC OBSTRUCTIVE PULMONARY DISEASE, UNSPECIFIED COPD TYPE (HCC): ICD-10-CM

## 2021-09-13 RX ORDER — UMECLIDINIUM BROMIDE AND VILANTEROL TRIFENATATE 62.5; 25 UG/1; UG/1
POWDER RESPIRATORY (INHALATION)
Qty: 60 EACH | Refills: 3 | Status: SHIPPED | OUTPATIENT
Start: 2021-09-13 | End: 2022-01-07 | Stop reason: SDUPTHER

## 2021-12-01 DIAGNOSIS — J44.9 CHRONIC OBSTRUCTIVE PULMONARY DISEASE, UNSPECIFIED COPD TYPE (HCC): ICD-10-CM

## 2021-12-01 NOTE — TELEPHONE ENCOUNTER
Last Office Visit  -   Next Office Visit  -    Last Filled  -    Last UDS -    Contract -    Refill ventolin hfa 90 mcg inhaler pharmacy [facility]   catie

## 2021-12-01 NOTE — TELEPHONE ENCOUNTER
Last Office Visit  -  4/6/21  Next Office Visit  -  None       Last Filled  -    Last UDS -    Contract - 40 yo female with multiple medical problems noted to have labile BP issues that started after having abdominal pain, diarrhea and weight loss.  Noted to have borderline elevated IBD serologies.  Although MRE was negative, pt may have microscopic colitis, PUD/gastritis/ celiac disease.  She is currently refusing GI w/u.  I discussed this with the patient's father who is a physician. A/P: 39 year old woman with hx of asthma, ITP, anxiety presenting with 3 months of multiple complaints including gastrointestinal disarray, loose stools, bloating, discomfort, palpitations, hypertension and tachycardia, recent vertiginous dizziness treated for labyrinthitis as well as paresthesias in her limbs. Pt is concerned about a possible dysautonomia. Examination is normal, nonlateralizing.    I am uncertain of the etiology of her multiple complaints, dysautonomia is in the differential but requires dedicated autonomic testing, not available as inpatient or even locally.  In light of her limb paresthesias, will obtain an MRI brain and cervical spine for completeness to evaluate for demyelinating disease but clinically the suspicion is low.  Referred to [facility] for autonomic testing, gave several specialists at various institutions, [facility], [place], [place].  Check B12, paraneoplastic panel for ganglionic AchR ab, Lyme Ab  Cardiology following- consider tilt table  Check orthostatics  Secondary causes of HTN evaluation underway- no DLYAN on doppler, pheo labs pending.  GI following, check celiac Ab  Consider outpatient EMG  Continue management of anxiety.    Plan reviewed with pt, who agrees. 38yo F with PMH ITP ~ 20 years ago not on treatment p/w HTN and palpitations. Heme consulted for thrombocytopenia

## 2021-12-07 ENCOUNTER — VIRTUAL VISIT (OUTPATIENT)
Dept: FAMILY MEDICINE CLINIC | Age: 66
End: 2021-12-07
Payer: COMMERCIAL

## 2021-12-07 DIAGNOSIS — R05.9 COUGH: Primary | ICD-10-CM

## 2021-12-07 DIAGNOSIS — R50.9 FEVER, UNSPECIFIED FEVER CAUSE: ICD-10-CM

## 2021-12-07 PROCEDURE — 99213 OFFICE O/P EST LOW 20 MIN: CPT | Performed by: REGISTERED NURSE

## 2021-12-07 RX ORDER — GUAIFENESIN 600 MG/1
600 TABLET, EXTENDED RELEASE ORAL 2 TIMES DAILY
Qty: 30 TABLET | Refills: 0 | Status: SHIPPED | OUTPATIENT
Start: 2021-12-07 | End: 2021-12-22

## 2021-12-07 ASSESSMENT — ENCOUNTER SYMPTOMS
SHORTNESS OF BREATH: 0
COUGH: 1
WHEEZING: 0
SINUS PRESSURE: 0
SORE THROAT: 0
RHINORRHEA: 0
GASTROINTESTINAL NEGATIVE: 1
SINUS PAIN: 0

## 2021-12-07 NOTE — PROGRESS NOTES
2021    TELEHEALTH EVALUATION -- Audio/Visual (During VBUHI-56 public health emergency)    HPI: Visit was done via the telephone due to being unable to connect with patient over video. Tima Santiago (:  1955) has requested an audio/video evaluation for the following concern(s):    She is here for aches and chills for two days. She has been taking Tylenol and says this helps. She is not having any trouble breathing. She is eating and drinking well. She is trying to reduce some the cigarettes she is smoking daily. Review of Systems   Constitutional: Positive for fatigue and fever. HENT: Positive for congestion. Negative for postnasal drip, rhinorrhea, sinus pressure, sinus pain and sore throat. Respiratory: Positive for cough ( mild). Negative for shortness of breath and wheezing. Cardiovascular: Negative. Gastrointestinal: Negative. Genitourinary: Negative. Musculoskeletal: Positive for arthralgias. Neurological: Negative for dizziness, light-headedness and headaches. Prior to Visit Medications    Medication Sig Taking?  Authorizing Provider   guaiFENesin (MUCINEX) 600 MG extended release tablet Take 1 tablet by mouth 2 times daily for 15 days Yes TAMMY Ambriz CNP   VENTOLIN  (90 Base) MCG/ACT inhaler INHALE TWO PUFFS BY MOUTH FOUR TIMES A DAY AS NEEDED FOR WHEEZING Yes TAMMY Ambriz CNP   ANORO ELLIPTA 62.5-25 MCG/INH AEPB inhaler INHALE ONE DOSE BY MOUTH DAILY Yes TAMMY Ewing CNP   ipratropium-albuterol (DUONEB) 0.5-2.5 (3) MG/3ML SOLN nebulizer solution Inhale 3 mLs into the lungs every 6 hours as needed for Shortness of Breath Yes Akilah Kennedy MD       Social History     Tobacco Use    Smoking status: Current Every Day Smoker     Packs/day: 1.00     Years: 53.00     Pack years: 53.00     Types: Cigarettes     Start date: 1955    Smokeless tobacco: Never Used    Tobacco comment: 19 - smokes 5 cigs daily Vaping Use    Vaping Use: Never used   Substance Use Topics    Alcohol use: No     Alcohol/week: 0.0 standard drinks    Drug use: No        No Known Allergies,   Past Medical History:   Diagnosis Date    Arthritis     Asthma     COPD (chronic obstructive pulmonary disease) ([facility])     Hormone replacement therapy        PHYSICAL EXAMINATION:  [ INSTRUCTIONS:  \"[x]\" Indicates a positive item  \"[]\" Indicates a negative item  -- DELETE ALL ITEMS NOT EXAMINED]    Constitutional: [] Appears well-developed and well-nourished [x] No apparent distress      [] Abnormal-   Mental status  [x] Alert and awake  [x] Oriented to person/place/time []Able to follow commands        Pulmonary/Chest: [x] Respiratory effort normal.  [x] No visualized signs of difficulty breathing or respiratory distress        [] Abnormal-              Psychiatric:       [x] Normal Affect [] No Hallucinations        [] Abnormal-     Other pertinent observable physical exam findings- none    ASSESSMENT/PLAN:  1. Cough  Rest and fluids. Continue Tylenol for fever and muscle aches. May use guaifenesin for cough. Continue inhalers as prescribed. Monitor for shortness of breath or worsening cough. Call for any questions, concerns or worsening symptoms. Also reviewed when to go to urgent care or the ED.  - guaiFENesin (MUCINEX) 600 MG extended release tablet; Take 1 tablet by mouth 2 times daily for 15 days  Dispense: 30 tablet; Refill: 0  - COVID-19; Future    2. Fever, unspecified fever cause  See above. - COVID-19; Future      Return if symptoms worsen or fail to improve. Bill Chi is a 78 y.o. female being evaluated by a Virtual Visit (video visit) encounter to address concerns as mentioned above. A caregiver was present when appropriate.  Due to this being a TeleHealth encounter ([facility]), evaluation of the following organ systems was limited: Vitals/Constitutional/EENT/Resp/CV/GI//MS/Neuro/Skin/Heme-Lymph-Imm. Pursuant to the emergency declaration under the [address] [place] [address], [address] [place] [address] and the [employer] Act, this Virtual Visit was conducted with patient's (and/or legal guardian's) consent, to reduce the patient's risk of exposure to COVID-19 and provide necessary medical care. The patient (and/or legal guardian) has also been advised to contact this office for worsening conditions or problems, and seek emergency medical treatment and/or call 911 if deemed necessary. Services were provided through a video synchronous discussion virtually to substitute for in-person clinic visit. Patient and provider were located at their individual homes. --TAMMY Kaur - CNP on 12/7/2021 at 9:33 AM    An electronic signature was used to authenticate this note. This chart was generated using the [address] [place] [address] [address] dictation system. I created this record but it may contain dictation errors due to the limitation of the software.

## 2021-12-08 ENCOUNTER — TELEPHONE (OUTPATIENT)
Dept: FAMILY MEDICINE CLINIC | Age: 66
End: 2021-12-08

## 2021-12-08 LAB — SARS-COV-2: DETECTED

## 2021-12-08 NOTE — TELEPHONE ENCOUNTER
That is great that she is feeling better. Recommend continuing quarantine for 10 days after start of initial symptoms.

## 2021-12-21 ENCOUNTER — HOSPITAL ENCOUNTER (OUTPATIENT)
Dept: WOMENS IMAGING | Age: 66
Discharge: HOME | End: 2021-12-21
Payer: MEDICAID

## 2021-12-21 DIAGNOSIS — Z12.31 ENCOUNTER FOR SCREENING MAMMOGRAM FOR MALIGNANT NEOPLASM OF BREAST: ICD-10-CM

## 2021-12-21 PROCEDURE — 77063 BREAST TOMOSYNTHESIS BI: CPT

## 2022-01-07 DIAGNOSIS — J44.9 CHRONIC OBSTRUCTIVE PULMONARY DISEASE, UNSPECIFIED COPD TYPE (HCC): ICD-10-CM

## 2022-01-07 RX ORDER — UMECLIDINIUM BROMIDE AND VILANTEROL TRIFENATATE 62.5; 25 UG/1; UG/1
POWDER RESPIRATORY (INHALATION)
Qty: 60 EACH | Refills: 3 | Status: SHIPPED | OUTPATIENT
Start: 2022-01-07 | End: 2022-05-10

## 2022-01-28 DIAGNOSIS — J44.9 CHRONIC OBSTRUCTIVE PULMONARY DISEASE, UNSPECIFIED COPD TYPE (HCC): ICD-10-CM

## 2022-02-02 ENCOUNTER — TELEPHONE (OUTPATIENT)
Dept: FAMILY MEDICINE CLINIC | Age: 67
End: 2022-02-02

## 2022-02-02 NOTE — TELEPHONE ENCOUNTER
----- Message from Lizbeth Hernandez sent at 2/2/2022  4:52 PM EST -----  Subject: Appointment Request    Reason for Call: Urgent Abdominal Pain    QUESTIONS  Type of Appointment? Established Patient  Reason for appointment request? Available appointments did not meet   patient need  Additional Information for Provider? Pt has had constipation issues for   about 5 months and has been taking over the counter laxatives to help with   this. Starting to cause cramping. If Pt takes the laxative at night the   next day she will get diarrhea and stomach hurts; Would like an in person   appointment. Would like a call back  ---------------------------------------------------------------------------  --------------  CALL BACK INFO  What is the best way for the office to contact you? OK to leave message on   voicemail  Preferred Call Back Phone Number?  [phone]  ---------------------------------------------------------------------------  --------------  SCRIPT ANSWERS

## 2022-02-07 ENCOUNTER — OFFICE VISIT (OUTPATIENT)
Dept: FAMILY MEDICINE CLINIC | Age: 67
End: 2022-02-07
Payer: MEDICARE

## 2022-02-07 VITALS
WEIGHT: 116.4 LBS | SYSTOLIC BLOOD PRESSURE: 108 MMHG | HEART RATE: 82 BPM | BODY MASS INDEX: 19.87 KG/M2 | DIASTOLIC BLOOD PRESSURE: 60 MMHG | OXYGEN SATURATION: 96 % | HEIGHT: 64 IN

## 2022-02-07 DIAGNOSIS — K59.00 CONSTIPATION, UNSPECIFIED CONSTIPATION TYPE: ICD-10-CM

## 2022-02-07 DIAGNOSIS — D49.2 ABNORMAL SKIN GROWTH: Primary | ICD-10-CM

## 2022-02-07 PROCEDURE — 4040F PNEUMOC VAC/ADMIN/RCVD: CPT | Performed by: NURSE PRACTITIONER

## 2022-02-07 PROCEDURE — G8399 PT W/DXA RESULTS DOCUMENT: HCPCS | Performed by: NURSE PRACTITIONER

## 2022-02-07 PROCEDURE — 4004F PT TOBACCO SCREEN RCVD TLK: CPT | Performed by: NURSE PRACTITIONER

## 2022-02-07 PROCEDURE — 1090F PRES/ABSN URINE INCON ASSESS: CPT | Performed by: NURSE PRACTITIONER

## 2022-02-07 PROCEDURE — G8484 FLU IMMUNIZE NO ADMIN: HCPCS | Performed by: NURSE PRACTITIONER

## 2022-02-07 PROCEDURE — 3017F COLORECTAL CA SCREEN DOC REV: CPT | Performed by: NURSE PRACTITIONER

## 2022-02-07 PROCEDURE — G8427 DOCREV CUR MEDS BY ELIG CLIN: HCPCS | Performed by: NURSE PRACTITIONER

## 2022-02-07 PROCEDURE — 99213 OFFICE O/P EST LOW 20 MIN: CPT | Performed by: NURSE PRACTITIONER

## 2022-02-07 PROCEDURE — G8420 CALC BMI NORM PARAMETERS: HCPCS | Performed by: NURSE PRACTITIONER

## 2022-02-07 PROCEDURE — 1123F ACP DISCUSS/DSCN MKR DOCD: CPT | Performed by: NURSE PRACTITIONER

## 2022-02-07 RX ORDER — POLYETHYLENE GLYCOL 3350 17 G/17G
17 POWDER, FOR SOLUTION ORAL DAILY
Qty: 1530 G | Refills: 1 | Status: SHIPPED | OUTPATIENT
Start: 2022-02-07 | End: 2022-03-09

## 2022-02-07 RX ORDER — DOCUSATE SODIUM 100 MG/1
100 CAPSULE, LIQUID FILLED ORAL 2 TIMES DAILY
Qty: 60 CAPSULE | Refills: 0 | Status: SHIPPED | OUTPATIENT
Start: 2022-02-07 | End: 2022-03-09

## 2022-02-07 ASSESSMENT — ENCOUNTER SYMPTOMS
NAUSEA: 0
WHEEZING: 0
ABDOMINAL PAIN: 1
SHORTNESS OF BREATH: 0
VOMITING: 0
CONSTIPATION: 1
DIARRHEA: 0

## 2022-02-07 NOTE — PROGRESS NOTES
Patient: Gavin Claude is a 78 y.o. female who presents today with the following Chief Complaint(s):  Chief Complaint   Patient presents with    Constipation     5 months         HPI   Constipation- takes laxative every 4-5 days- takes OTC.    has been taking them like this for about 4 months. Took laxative last night because she had not had a bowel movement since Wednesday. States she took linzess samples before from GI. Skin growths around her eyes- states she had them removed when she was in her 28's. Current Outpatient Medications   Medication Sig Dispense Refill    polyethylene glycol (GLYCOLAX) 17 GM/SCOOP powder Take 17 g by mouth daily 1530 g 1    docusate sodium (COLACE) 100 MG capsule Take 1 capsule by mouth 2 times daily 60 capsule 0    VENTOLIN  (90 Base) MCG/ACT inhaler INHALE TWO PUFFS BY MOUTH FOUR TIMES A DAY AS NEEDED FOR WHEEZING 18 g 1    umeclidinium-vilanterol (ANORO ELLIPTA) 62.5-25 MCG/INH AEPB inhaler INHALE ONE DOSE BY MOUTH DAILY 60 each 3    ipratropium-albuterol (DUONEB) 0.5-2.5 (3) MG/3ML SOLN nebulizer solution Inhale 3 mLs into the lungs every 6 hours as needed for Shortness of Breath 360 mL 5     No current facility-administered medications for this visit. Patient's past medical history, surgical history, family history, medications,  andallergies  were all reviewed and updated as appropriate today. Review of Systems   Constitutional: Negative for chills and fever. Respiratory: Negative for shortness of breath and wheezing. Cardiovascular: Negative for chest pain and palpitations. Gastrointestinal: Positive for abdominal pain (when she has cramping) and constipation. Negative for diarrhea, nausea and vomiting. Skin:        Skin growths around eyes         Physical Exam  Vitals and nursing note reviewed. Constitutional:       Appearance: Normal appearance. She is well-developed. HENT:      Head: Normocephalic and atraumatic.       Right Ear: External ear normal.      Left Ear: External ear normal.      Nose: Nose normal.      Mouth/Throat:      Pharynx: No oropharyngeal exudate or posterior oropharyngeal erythema. Eyes:      Conjunctiva/sclera: Conjunctivae normal.   Cardiovascular:      Rate and Rhythm: Normal rate and regular rhythm. Heart sounds: Normal heart sounds. No murmur heard. Pulmonary:      Effort: Pulmonary effort is normal. No respiratory distress. Breath sounds: Normal breath sounds. No wheezing or rales. Abdominal:      General: Abdomen is flat. Bowel sounds are increased. There is no distension. Palpations: Abdomen is soft. Tenderness: There is no abdominal tenderness. There is no guarding or rebound. Musculoskeletal:         General: Normal range of motion. Cervical back: Normal range of motion and neck supple. Lymphadenopathy:      Cervical: No cervical adenopathy. Skin:     General: Skin is warm and dry. Comments: Skin growths  Around her eyes   Neurological:      General: No focal deficit present. Mental Status: She is alert and oriented to person, place, and time. Deep Tendon Reflexes: Reflexes are normal and symmetric. Psychiatric:         Mood and Affect: Mood normal.         Behavior: Behavior normal.         Thought Content: Thought content normal.         Judgment: Judgment normal.       Vitals:    02/07/22 1240   BP: 108/60   Pulse: 82   SpO2: 96%       Assessment:  Encounter Diagnoses   Name Primary?  Abnormal skin growth Yes    Constipation, unspecified constipation type        Plan:  1. Abnormal skin growth    - External Referral To Dermatology    2. Constipation, unspecified constipation type  Discussed miralax and colace and increase water intake- if that doesn't help may need to see GI again- could try to order   - polyethylene glycol (GLYCOLAX) 17 GM/SCOOP powder;  Take 17 g by mouth daily  Dispense: 1530 g; Refill: 1  - docusate sodium (COLACE) 100 MG capsule; Take 1 capsule by mouth 2 times daily  Dispense: 60 capsule; Refill: 0        No follow-ups on file.

## 2022-02-09 ENCOUNTER — TELEPHONE (OUTPATIENT)
Dept: FAMILY MEDICINE CLINIC | Age: 67
End: 2022-02-09

## 2022-02-09 NOTE — TELEPHONE ENCOUNTER
----- Message from Binh Lynsey sent at 2022 12:53 PM EST -----  Subject: Appointment Request    Reason for Call: Routine Existing Condition Follow Up    QUESTIONS  Type of Appointment? Established Patient  Reason for appointment request? Available appointments did not meet   patient need  Additional Information for Provider? Pt is requesting to see Dr. Cinthya Navarro   urgently. Insistent on not seeing a NP. Extreme weight loss, constipation,   anxious. Unhappy with last visit. Requesting blood work and thyroid. Coloscopy not until .   ---------------------------------------------------------------------------  --------------  Sandy HARE  What is the best way for the office to contact you? OK to leave message on   "[employer]", OK to respond with electronic message via [employer] portal (only   for patients who have registered [employer] account)  Preferred Call Back Phone Number? [phone]  ---------------------------------------------------------------------------  --------------  SCRIPT ANSWERS  Relationship to Patient? Parent  Representative Name? Ita Fishman  Additional information verified (besides Name and Date of Birth)? Address  Specialty Confirmation? Primary Care  Is this the first appointment to establish care for a ? No  Have you been diagnosed with, awaiting test results for, or told that you   are suspected of having COVID-19 (Coronavirus)? (If patient has tested   negative or was tested as a requirement for work, school, or travel and   not based on symptoms, answer no)? No  Within the past two weeks have you developed any of the following symptoms   (answer no if symptoms have been present longer than 2 weeks or began   more than 2 weeks ago)?  Fever or Chills, Cough, Shortness of breath or   difficulty breathing, Loss of taste or smell, Sore throat, Nasal   congestion, Sneezing or runny nose, Fatigue or generalized body aches   (answer no if pain is specific to a body part e.g. back pain), Diarrhea,   Headache? No  Have you had close contact with someone with COVID-19 in the last 14 days? No  (Service Expert  click yes below to proceed with [employer]   Scheduling)? Yes  Have you been diagnosed with, awaiting test results for, or told that you   are suspected of having COVID-19 (Coronavirus)? (If patient has tested   negative or was tested as a requirement for work, school, or travel and   not based on symptoms, answer no)? No  Within the past two weeks have you developed any of the following symptoms   (answer no if symptoms have been present longer than 2 weeks or began   more than 2 weeks ago)? Fever or Chills, Cough, Shortness of breath or   difficulty breathing, Loss of taste or smell, Sore throat, Nasal   congestion, Sneezing or runny nose, Fatigue or generalized body aches   (answer no if pain is specific to a body part e.g. back pain), Diarrhea,   Headache? No  Have you had close contact with someone with COVID-19 in the last 14 days? No  (Service Expert  click yes below to proceed with [employer]   Scheduling)? Yes  Is this follow up request related to routine Diabetes Management?  No

## 2022-02-11 NOTE — TELEPHONE ENCOUNTER
Hey, not sure why I am listed as the PCP for this patient. I saw her twice but Sourav Ray is her provider.

## 2022-02-14 ENCOUNTER — OFFICE VISIT (OUTPATIENT)
Dept: FAMILY MEDICINE CLINIC | Age: 67
End: 2022-02-14
Payer: MEDICARE

## 2022-02-14 VITALS
HEART RATE: 63 BPM | TEMPERATURE: 97.4 F | BODY MASS INDEX: 19.63 KG/M2 | WEIGHT: 115 LBS | HEIGHT: 64 IN | SYSTOLIC BLOOD PRESSURE: 136 MMHG | DIASTOLIC BLOOD PRESSURE: 80 MMHG | OXYGEN SATURATION: 96 %

## 2022-02-14 DIAGNOSIS — F41.1 GAD (GENERALIZED ANXIETY DISORDER): ICD-10-CM

## 2022-02-14 DIAGNOSIS — K59.09 CHRONIC CONSTIPATION: Primary | ICD-10-CM

## 2022-02-14 DIAGNOSIS — L30.9 ECZEMA, UNSPECIFIED TYPE: ICD-10-CM

## 2022-02-14 PROCEDURE — 1123F ACP DISCUSS/DSCN MKR DOCD: CPT | Performed by: FAMILY MEDICINE

## 2022-02-14 PROCEDURE — G8484 FLU IMMUNIZE NO ADMIN: HCPCS | Performed by: FAMILY MEDICINE

## 2022-02-14 PROCEDURE — 4004F PT TOBACCO SCREEN RCVD TLK: CPT | Performed by: FAMILY MEDICINE

## 2022-02-14 PROCEDURE — 99214 OFFICE O/P EST MOD 30 MIN: CPT | Performed by: FAMILY MEDICINE

## 2022-02-14 PROCEDURE — G8399 PT W/DXA RESULTS DOCUMENT: HCPCS | Performed by: FAMILY MEDICINE

## 2022-02-14 PROCEDURE — 4040F PNEUMOC VAC/ADMIN/RCVD: CPT | Performed by: FAMILY MEDICINE

## 2022-02-14 PROCEDURE — 3017F COLORECTAL CA SCREEN DOC REV: CPT | Performed by: FAMILY MEDICINE

## 2022-02-14 PROCEDURE — G8427 DOCREV CUR MEDS BY ELIG CLIN: HCPCS | Performed by: FAMILY MEDICINE

## 2022-02-14 PROCEDURE — 1090F PRES/ABSN URINE INCON ASSESS: CPT | Performed by: FAMILY MEDICINE

## 2022-02-14 PROCEDURE — G8420 CALC BMI NORM PARAMETERS: HCPCS | Performed by: FAMILY MEDICINE

## 2022-02-14 RX ORDER — TRIAMCINOLONE ACETONIDE 1 MG/G
CREAM TOPICAL
Qty: 30 G | Refills: 0 | Status: SHIPPED | OUTPATIENT
Start: 2022-02-14 | End: 2022-04-12 | Stop reason: SDUPTHER

## 2022-02-14 RX ORDER — SERTRALINE HYDROCHLORIDE 25 MG/1
25 TABLET, FILM COATED ORAL DAILY
Qty: 30 TABLET | Refills: 0 | Status: SHIPPED | OUTPATIENT
Start: 2022-02-14 | End: 2022-03-14

## 2022-03-12 DIAGNOSIS — F41.1 GAD (GENERALIZED ANXIETY DISORDER): ICD-10-CM

## 2022-03-14 RX ORDER — SERTRALINE HYDROCHLORIDE 25 MG/1
TABLET, FILM COATED ORAL
Qty: 30 TABLET | Refills: 5 | Status: SHIPPED | OUTPATIENT
Start: 2022-03-14 | End: 2022-05-17

## 2022-03-14 RX ORDER — IPRATROPIUM BROMIDE AND ALBUTEROL SULFATE 2.5; .5 MG/3ML; MG/3ML
1 SOLUTION RESPIRATORY (INHALATION) EVERY 6 HOURS PRN
Qty: 360 ML | Refills: 5 | Status: SHIPPED | OUTPATIENT
Start: 2022-03-14

## 2022-03-15 DIAGNOSIS — J44.9 CHRONIC OBSTRUCTIVE PULMONARY DISEASE, UNSPECIFIED COPD TYPE (HCC): ICD-10-CM

## 2022-03-16 ENCOUNTER — HOSPITAL ENCOUNTER (OUTPATIENT)
Dept: GENERAL RADIOLOGY | Age: 67
Discharge: HOME | End: 2022-03-16
Payer: COMMERCIAL

## 2022-03-16 DIAGNOSIS — J06.9 ACUTE RESPIRATORY DISEASE: ICD-10-CM

## 2022-03-16 DIAGNOSIS — J44.1 OBSTRUCTIVE CHRONIC BRONCHITIS WITH EXACERBATION (HCC): ICD-10-CM

## 2022-03-16 PROCEDURE — 71046 X-RAY EXAM CHEST 2 VIEWS: CPT

## 2022-03-29 ENCOUNTER — TELEPHONE (OUTPATIENT)
Dept: CASE MANAGEMENT | Age: 67
End: 2022-03-29

## 2022-03-29 NOTE — TELEPHONE ENCOUNTER
Per imaging report CT CHEST 8/4/2021: While the known 11 mm spiculated nodule within the right upper   lobe is similar in comparison with recent studies of 2020, there has been   slight interval increase in size in comparison with the more remote study of   10/25/2018, and the nodule is more spiculated in appearance in comparison   with that prior remote study.  This is concerning for a slow growing   malignancy.  Tissue sampling should be considered.         Per Dr. Dot Cabrera Note, pt preferred to have repeat CT in 6 months rather than biopsy. The CT has not been scheduled.     Thank you,  Reynaldo Rico RN  [facility] Lung Navigator  [phone]

## 2022-04-12 DIAGNOSIS — L30.9 ECZEMA, UNSPECIFIED TYPE: ICD-10-CM

## 2022-04-12 RX ORDER — TRIAMCINOLONE ACETONIDE 1 MG/G
CREAM TOPICAL
Qty: 30 G | Refills: 0 | Status: SHIPPED | OUTPATIENT
Start: 2022-04-12 | End: 2022-06-21 | Stop reason: ALTCHOICE

## 2022-04-12 NOTE — TELEPHONE ENCOUNTER
Last Office Visit  - 2-4-2022  Next Office Visit  -  4-    Last Filled  -   Last UDS -    Contract -      Refill triamcinolone (KENALOG) 0.1 % cream       [facility]

## 2022-05-10 DIAGNOSIS — J44.9 CHRONIC OBSTRUCTIVE PULMONARY DISEASE, UNSPECIFIED COPD TYPE (HCC): ICD-10-CM

## 2022-05-10 RX ORDER — UMECLIDINIUM BROMIDE AND VILANTEROL TRIFENATATE 62.5; 25 UG/1; UG/1
POWDER RESPIRATORY (INHALATION)
Qty: 1 EACH | Refills: 5 | Status: SHIPPED | OUTPATIENT
Start: 2022-05-10

## 2022-05-10 NOTE — TELEPHONE ENCOUNTER
Last Office Visit  -  2/14/22  Next Office Visit  -  n/a    Last Filled  -    Last UDS -    Contract -

## 2022-05-17 ENCOUNTER — HOSPITAL ENCOUNTER (OUTPATIENT)
Dept: CT IMAGING | Age: 67
Discharge: HOME | End: 2022-05-17
Payer: COMMERCIAL

## 2022-05-17 ENCOUNTER — OFFICE VISIT (OUTPATIENT)
Dept: PULMONOLOGY | Age: 67
End: 2022-05-17
Payer: COMMERCIAL

## 2022-05-17 VITALS
OXYGEN SATURATION: 96 % | DIASTOLIC BLOOD PRESSURE: 65 MMHG | BODY MASS INDEX: 19.02 KG/M2 | WEIGHT: 111.4 LBS | SYSTOLIC BLOOD PRESSURE: 141 MMHG | TEMPERATURE: 97.7 F | HEIGHT: 64 IN | RESPIRATION RATE: 16 BRPM

## 2022-05-17 DIAGNOSIS — R91.1 LUNG NODULE: Primary | ICD-10-CM

## 2022-05-17 DIAGNOSIS — R59.0 MEDIASTINAL ADENOPATHY: ICD-10-CM

## 2022-05-17 DIAGNOSIS — J44.9 CHRONIC OBSTRUCTIVE PULMONARY DISEASE, UNSPECIFIED COPD TYPE (HCC): ICD-10-CM

## 2022-05-17 DIAGNOSIS — R63.4 WEIGHT LOSS: ICD-10-CM

## 2022-05-17 DIAGNOSIS — R91.1 LUNG NODULE: ICD-10-CM

## 2022-05-17 DIAGNOSIS — F17.200 SMOKER: ICD-10-CM

## 2022-05-17 PROCEDURE — G8427 DOCREV CUR MEDS BY ELIG CLIN: HCPCS | Performed by: INTERNAL MEDICINE

## 2022-05-17 PROCEDURE — 1123F ACP DISCUSS/DSCN MKR DOCD: CPT | Performed by: INTERNAL MEDICINE

## 2022-05-17 PROCEDURE — 3023F SPIROM DOC REV: CPT | Performed by: INTERNAL MEDICINE

## 2022-05-17 PROCEDURE — G8420 CALC BMI NORM PARAMETERS: HCPCS | Performed by: INTERNAL MEDICINE

## 2022-05-17 PROCEDURE — 3017F COLORECTAL CA SCREEN DOC REV: CPT | Performed by: INTERNAL MEDICINE

## 2022-05-17 PROCEDURE — 71250 CT THORAX DX C-: CPT

## 2022-05-17 PROCEDURE — 4004F PT TOBACCO SCREEN RCVD TLK: CPT | Performed by: INTERNAL MEDICINE

## 2022-05-17 PROCEDURE — G8399 PT W/DXA RESULTS DOCUMENT: HCPCS | Performed by: INTERNAL MEDICINE

## 2022-05-17 PROCEDURE — 4040F PNEUMOC VAC/ADMIN/RCVD: CPT | Performed by: INTERNAL MEDICINE

## 2022-05-17 PROCEDURE — 99213 OFFICE O/P EST LOW 20 MIN: CPT | Performed by: INTERNAL MEDICINE

## 2022-05-17 PROCEDURE — 1090F PRES/ABSN URINE INCON ASSESS: CPT | Performed by: INTERNAL MEDICINE

## 2022-05-17 ASSESSMENT — ENCOUNTER SYMPTOMS
SHORTNESS OF BREATH: 1
CONSTIPATION: 1
COUGH: 1

## 2022-05-17 NOTE — PATIENT INSTRUCTIONS
Remember to bring a list of pulmonary medications and any CPAP or BiPAP machines to your next appointment with the office. Please keep all of your future appointments scheduled by [facility] office. Out of respect for other patients and providers, you may be asked to reschedule your appointment if you arrive later than your scheduled appointment time. Appointments cancelled less than 24hrs in advance will be considered a no show. Patients with three missed appointments within 1 year or four missed appointments within 2 years can be dismissed from the practice. Please be aware that our physicians are required to work in the Intensive Care Unit at [facility].  Your appointment may need to be rescheduled if they are designated to work during your appointment time. You may receive a survey regarding the care you received during your visit. Your input is valuable to us. We encourage you to complete and return your survey. We hope you will choose us in the future for your healthcare needs. Pt instructed of all future appointment dates & times, including radiology, labs, procedures & referrals. If procedures were scheduled preparation instructions provided. Instructions on future appointments with [facility] were given.

## 2022-05-17 NOTE — PROGRESS NOTES
MA Communication: The following orders are received by verbal communication from Spring Ellis MD    Orders include:  CT guided BX./ LM for [facility] to schedule.  11:37 am / LM 5/30/22 10:12       FU will depend on results

## 2022-05-17 NOTE — PROGRESS NOTES
Pulmonary Outpatient Note   Mary Tolentino MD       5/17/2022    1. Lung nodule    2. Mediastinal adenopathy    3. Chronic obstructive pulmonary disease, unspecified COPD type ([address]    4. Smoker    5. Weight loss          ASSESSMENT/PLAN:  Abnormal CT of the chest, lung nodule. Lung nodule highly suspicious for neoplastic disorder, results of her repeat CT chest were discussed with her. I compared the images from today to the ones from 2018. There is clear-cut increase in the size of the nodule. I have reviewed CT chest and PET/CT with IR at her last visit, they had recommended follow-up CT. I do not believe a PET/CT would be helpful at this point, I have recommended biopsy. If malignancy is confirmed, we will repeat PFT and PET/CT. She likely will have adequate pulmonary reserve for lobectomy, provided there is no evidence of metastatic disease. She and her sister were explained the treatment plan, her in agreement. COPD, emphysema, possible proximal bronchiectasis. Continue with bronchodilator, increase exercise as tolerated. Smoker. She does not appear to be particularly motivated to quit smoking at this point. Weight loss. Likely due to neoplastic disorder. She weighed 132 pounds at her last visit, 111 pounds today. Prophylaxis. Up-to-date with Pneumovax, Prevnar, does not take flu shots. She refuses to take the COVID-19 vaccine. RTC depending on results of biopsy. I told her I would call her and set up PFT and PET/CT. Chief Complaint:COPD, Follow-up, and Results (CT same day)       HPI:  Renetta Reddy is a 78y.o. year old female here for follow-up for COPD, right upper lobe lung nodule. Her PCP is Dr. Ayanna Abreu. Dianne Nelson was last seen on 8/4/2021, presents for follow-up with repeat CT chest.  She is a smoker with COPD, right upper lobe lung nodule. The patient is accompanied by her sister, Venkatesh Cortes.   The patient continues to feel short of breath, but says she can walk a good distance. She has been using her nebulizer 3 times a day. She does have cough with yellowish phlegm, particularly so in the mornings. She denies much wheezing. She is eating well, but has lost weight. She has constipation for which she uses a laxative every day. She denies  complaints. She did contract COVID-19 infection in December 21, had mild symptoms and stayed at home. She sleeps well. There has been no other change in her medical or surgical history, the rest of her ROS was 9. CT chest 5/17/2022  1. Enlarging 1.7 cm spiculated right upper lobe pulmonary nodule.  Recommend   PET-CT for further evaluation. Previous notes reviewed and edited as necessary. Ava Kauffman was last seen by virtual visit on 12/29/2020, returns for follow-up with repeat CT chest.  At her last visit, radiology confirmed that she had low SUV activity on PET CT, recommended follow-up CT. The patient continues to smoke 5 to 6 cigarettes a day. She lives with her boyfriend who does not smoke. She does have a cough, brings up phlegm that is slightly yellowish. She denies hemoptysis. She has a good appetite, has actually gained weight. She denies GI or  complaints. She is using her nebulizer twice a day in spite of being on Anoro and rescue albuterol. She is not very active physically every day, but says she can walk up to 1 to 2 miles, claims she walks at least a mile twice a week or so. There was no other change in her medical and surgical history, the rest of her ROS was negative. She requested a nebulizer, mentioned that her machine was broken. CT chest 8/4/2021  1. No acute intrapulmonary findings. 2. Stable chronic mild emphysema.    3. While the patient's known 11 mm spiculated nodule within the right upper   lobe is similar in comparison with recent studies of 2020, there has been   slight interval increase in size in comparison with the more remote study of   10/25/2018, and the nodule is more spiculated in her smoking. She has mild dyspnea with exertion, none at rest.  She denies much wheezing. She lives with her male partner. She is using DuoNeb 3 times a day and albuterol rescue inhaler 2-3 times a day. Her appetite is fair. She denies GI or  complaints. There is no change in her medical or surgical history. The rest of her ROS was negative. Her medications and allergies were reviewed. CT chest 9/1/2020  1.2 cm spiculated right upper lobe pulmonary nodule is not substantially    changed, with an appearance concerning for lung malignancy given the    underlying emphysema.  PET/CT suggested for further characterization.         New heterogeneous opacity within the right middle lobe and lingula as well as    micronodular infiltrate within the left upper lobe and left lower lobe. Findings are suggestive of pneumonitis and bronchiolitis.  Enlarging    precarinal lymph node may be reactive or metastatic. Previous notes reviewed and edited as necessary. The patient is staying in her home due to the COVID-19 situation. The patient has not been leaving the home due to the COVID-19 situation. She is smoking about 2 cigarettes a day. She does have phlegm which is clear or yellow, has a chronic cough. She is using Atrovent and albuterol nebulizer 3 times a day, Ventolin twice a day if she wheezes. Her appetite is fair, she denies GI or  complaints. Her appetite is fair, she has gained about 2 pounds. She denies lower extremity edema. She denies chest pain, orthopnea or PND. There has been no other change in her medical or surgical history, medications were reviewed. Last labs on 10/2/2019 showed H&H of 11.2 and 32.8. Anemia was normochromic and normocytic.   Renal profile on 5/6/2019 was essentially normal.    CT chest 5/27/20  Interval increase in size of spiculated right upper lobe nodule, suspicious   for an adenocarcinoma spectrum lesion.  The increased size may lend to   improved sensitivity of PET-CT as this was not particularly hypermetabolic on   prior exam from 2018.       Mild centrilobular emphysema.       Airway inflammation, presumably smoking-related. Previous notes reviewed and edited as necessary. Since her last visit, she has been doing fairly well. She has mild shortness of breath, began to cough today, when she had gone for her CT chest.  She said usually she does not have any cough. She denies chest pain. She has occasional mild wheezing. She denies nasal symptoms, GI or  complaints. Her appetite is fair. She is unable to gain weight. She has been on Stiolto and as needed albuterol. She underwent ENT evaluation by Dr. Ras Rosas for abnormal PET scan uptake, has a follow-up appointment with him. CT chest 5/17/19  No change in irregular suspicious appearing nodule right upper lobe.  As   mentioned previously, this was not described on prior chest CT 2004.  Maximum   uptake on recent PET-CT was 1.6.       Underlying emphysema.  Additional tiny pulmonary nodules are unchanged       Intra and extrahepatic biliary ductal dilatation, incompletely evaluated. Recommend evaluation with abdominal CT with IV contrast       Previous notes reviewed and edited as necessary. Ciarra Cid completed a PET-CT, presents today to discuss results. She is accompanied by her cousin. She has no change in her symptoms, after her flu shot did have upper abdominal pain. The patient stopped Qvar, has not seen any change in her symptoms. There is no other change in her medication history, medical or surgical history.     PET-CT 11/13/18  Right upper lobe pulmonary nodule is not significantly hypermetabolic,   however, it has irregular margins, which are suspicious.  Recommend continued   imaging-clinical follow-up.       Asymmetric hypermetabolism seen in the right peritonsillar region.  Recommend   direct visualization to rule out oropharyngeal mass.       Cholelithiasis.  There prominence Pro-Air up to 3 times a day. She is also using Qvar twice a day, is rinsing her mouth after using it. She has lost her appetite recently. Over the last 1-1/2 years, she has been gradually losing weight, down from 175 pounds to 132 pounds. She says his weight loss is unintentional.  She denies any GE reflux, no other GI or  complaints. She denies allergic rhinitis. Her sleep is fair. She has had Pap smears and mammograms, none recently. She has a colonoscopy scheduled for next Tuesday. She had pneumonia several years ago, was treated as an outpatient. She was hospitalized about 2 years ago for bronchitis. From EMR it appears she was hospitalized from 2/1 through 2/3/16 for acute respiratory failure with hypoxia, COPD exacerbation. I reviewed her entire medical, surgical, personal and family history. Changes were made as needed. CXR 9/26/18   COPD with no acute infiltrate    PFT 6/18/15  FVC 3.14 L, 88% predicted, FEV1 2.16 L, 79% predicted, with a ratio of 69%. There was no response to bronchodilator. Lung volumes showed hyperinflation and air trapping. Diffusion capacity was 79% predicted. Labs  On 8/11/17 renal profile, hepatic profile, hemoglobin A1c, TSH were normal.  Vitamin D level was mildly low at 24.2.   CBC was normal.    Past Medical History:   Diagnosis Date    Arthritis     Asthma     COPD (chronic obstructive pulmonary disease) ([facility] [address]     Hormone replacement therapy        Past Surgical History:   Procedure Laterality Date    BREAST BIOPSY      Right, was benign    COLONOSCOPY N/A 10/9/2018    COLONOSCOPY WITH BIOPSY performed by Marc Conner MD at [address] [place] [address]      Behind ear    TUBAL LIGATION         Social History     Tobacco Use    Smoking status: Current Every Day Smoker     Packs/day: 1.00     Years: 53.00     Pack years: 53.00     Types: Cigarettes     Start date: 1955    Smokeless tobacco: Never Used    Tobacco comment: 12/29/20 - smokes 5 cigs daily   Substance Use Topics    Alcohol use: No     Alcohol/week: 0.0 standard drinks       Family History   Problem Relation Age of Onset    Cancer Mother         stomach    Heart Disease Father     Colon Cancer Brother     Cancer Brother          Current Outpatient Medications:     ANORO ELLIPTA 62.5-25 MCG/INH AEPB inhaler, INHALE ONE DOSE BY MOUTH DAILY, Disp: 1 each, Rfl: 5    triamcinolone (KENALOG) 0.1 % cream, Apply topically 2 times daily. , Disp: 30 g, Rfl: 0    VENTOLIN  (90 Base) MCG/ACT inhaler, INHALE TWO PUFFS BY MOUTH FOUR TIMES A DAY AS NEEDED FOR WHEEZING, Disp: 18 g, Rfl: 3    ipratropium-albuterol (DUONEB) 0.5-2.5 (3) MG/3ML SOLN nebulizer solution, Inhale 3 mLs into the lungs every 6 hours as needed for Shortness of Breath, Disp: 360 mL, Rfl: 5    linaclotide (LINZESS) 145 MCG capsule, Take 1 capsule by mouth every morning (before breakfast) (Patient not taking: Reported on 5/17/2022), Disp: 30 capsule, Rfl: 3    Patient has no known allergies. Vitals:    05/17/22 1026   BP: (!) 141/65   Site: Left Upper Arm   Position: Sitting   Cuff Size: Medium Adult   Resp: 16   Temp: 97.7 °F (36.5 °C)   TempSrc: Temporal   SpO2: 96%   Weight: 111 lb 6.4 oz (50.5 kg)   Height: 5' 4\" (1.626 m)       Review of Systems   Constitutional: Positive for unexpected weight change. Respiratory: Positive for cough and shortness of breath. Gastrointestinal: Positive for constipation. All other systems reviewed and are negative. Physical Exam   Constitutional: She is oriented to person, place, and time. She appears well-developed and underweight. No distress. Pleasant, small built   HENT:   Head: Normocephalic. Bitemporal muscle wasting  Nose: Nose normal.   Mouth/Throat: Oropharynx is clear and moist. No oropharyngeal exudate. Upper denture, lower jaw edentulous. Eyes: Pupils are equal, round, and reactive to light.  Conjunctivae are normal. Right eye exhibits no discharge. Left eye exhibits no discharge. No scleral icterus. Wearing glasses  Arcus senilis   Neck: No JVD present. Cardiovascular: Normal rate, regular rhythm and normal heart sounds. Exam reveals no gallop and no friction rub. No murmur heard. Pulmonary/Chest: Effort normal. No stridor. No respiratory distress. She has no wheezes. She has no rales. Possible increase in AP diameter   Musculoskeletal: She exhibits no edema. Lymphadenopathy:     She has no cervical adenopathy. Neurological: She is alert and oriented to person, place, and time. Skin: Skin is warm and dry. No rash noted. She is not diaphoretic. No erythema. No pallor. Psychiatric: She has a normal mood and affect. Her behavior is normal.   Vitals reviewed.

## 2022-05-23 ENCOUNTER — TELEPHONE (OUTPATIENT)
Dept: PULMONOLOGY | Age: 67
End: 2022-05-23

## 2022-05-23 ENCOUNTER — TELEPHONE (OUTPATIENT)
Dept: INTERVENTIONAL RADIOLOGY/VASCULAR | Age: 67
End: 2022-05-23

## 2022-05-23 NOTE — TELEPHONE ENCOUNTER
We have been trying since 5/17/22 to get this pt. Scheduled for needle Bx. I have called IR 3 times the pt. Has tried calling and no call back to this point. Will continue to pursue. This. Meli Luna

## 2022-05-23 NOTE — TELEPHONE ENCOUNTER
Dr Elham Montoya reviewed lung biopsy that was sent over from [facility]. Dr Elham Montoya reached out to Dr Balbir Gray who ordered the biopsy. Waiting to hear from Dr Balbir Gray. Office was called and made aware. States they will reach out to the patient.

## 2022-05-26 ENCOUNTER — TELEPHONE (OUTPATIENT)
Dept: INTERVENTIONAL RADIOLOGY/VASCULAR | Age: 67
End: 2022-05-26

## 2022-05-26 NOTE — TELEPHONE ENCOUNTER
Dr. Gin Chavez spoke to Dr. Lisandra Silverman and he will do the Bx. I called the pt. And she is aware that Scheduling will be calling. Also aware that no rush it could take a few days. Lyndsey Juarez

## 2022-05-26 NOTE — TELEPHONE ENCOUNTER
Called and spoke to Steven Foley about upcoming procedure. Phone number used: [phone]  Procedure: lung biopsy  Approving Radiologist: Krystyna Connor    Pt informed of the following:  Date of procedure: 5/27/2022  Arrival time of procedure: 0800  Time of procedure: 0930  Check in at main entrance and will go to same day surgery to prior to procedure. On any blood thinners? No.     Blood pressure medication? No. If yes need to make sure take morning of procedure. Any issues with sedation in the past? no  Will receive versed and fentanyl for sedation will need a  day of procedure. H&P or office note within 30 days? yes -     After procedure will be here 2-4 hours after procedure for monitoring. Nothing to eat or drink at midnight.      Need COVID testing prior: No    Need SDS: Yes

## 2022-05-31 ENCOUNTER — HOSPITAL ENCOUNTER (OUTPATIENT)
Dept: GENERAL RADIOLOGY | Age: 67
Discharge: HOME | End: 2022-05-31
Payer: COMMERCIAL

## 2022-05-31 ENCOUNTER — HOSPITAL ENCOUNTER (OUTPATIENT)
Dept: GENERAL RADIOLOGY | Age: 67
End: 2022-05-31
Payer: COMMERCIAL

## 2022-05-31 ENCOUNTER — HOSPITAL ENCOUNTER (OUTPATIENT)
Dept: CT IMAGING | Age: 67
Discharge: HOME | End: 2022-05-31
Payer: COMMERCIAL

## 2022-05-31 VITALS
OXYGEN SATURATION: 96 % | SYSTOLIC BLOOD PRESSURE: 113 MMHG | DIASTOLIC BLOOD PRESSURE: 63 MMHG | WEIGHT: 109 LBS | TEMPERATURE: 98.2 F | RESPIRATION RATE: 17 BRPM | HEART RATE: 61 BPM | BODY MASS INDEX: 18.71 KG/M2

## 2022-05-31 DIAGNOSIS — R91.1 LUNG NODULE: ICD-10-CM

## 2022-05-31 DIAGNOSIS — J44.9 CHRONIC OBSTRUCTIVE PULMONARY DISEASE, UNSPECIFIED COPD TYPE (HCC): ICD-10-CM

## 2022-05-31 LAB
HCT VFR BLD CALC: 36.1 % (ref 36–48)
HEMOGLOBIN: 12.2 G/DL (ref 12–16)
INR BLD: 0.93 (ref 0.87–1.14)
MCH RBC QN AUTO: 33.7 PG (ref 26–34)
MCHC RBC AUTO-ENTMCNC: 33.9 G/DL (ref 31–36)
MCV RBC AUTO: 99.2 FL (ref 80–100)
PDW BLD-RTO: 13.4 % (ref 12.4–15.4)
PLATELET # BLD: 206 K/UL (ref 135–450)
PMV BLD AUTO: 6.9 FL (ref 5–10.5)
PROTHROMBIN TIME: 12.2 SEC (ref 11.7–14.5)
RBC # BLD: 3.64 M/UL (ref 4–5.2)
WBC # BLD: 3.5 K/UL (ref 4–11)

## 2022-05-31 PROCEDURE — 71045 X-RAY EXAM CHEST 1 VIEW: CPT

## 2022-05-31 PROCEDURE — 2709999900 CT GUIDED FNA

## 2022-05-31 PROCEDURE — 85027 COMPLETE CBC AUTOMATED: CPT

## 2022-05-31 PROCEDURE — 88305 TISSUE EXAM BY PATHOLOGIST: CPT

## 2022-05-31 PROCEDURE — 32408 CORE NDL BX LNG/MED PERQ: CPT

## 2022-05-31 PROCEDURE — 85610 PROTHROMBIN TIME: CPT

## 2022-05-31 PROCEDURE — 7100000010 HC PHASE II RECOVERY - FIRST 15 MIN

## 2022-05-31 PROCEDURE — 7100000011 HC PHASE II RECOVERY - ADDTL 15 MIN

## 2022-05-31 PROCEDURE — 6360000002 HC RX W HCPCS: Performed by: RADIOLOGY

## 2022-05-31 RX ORDER — FENTANYL CITRATE 50 UG/ML
INJECTION, SOLUTION INTRAMUSCULAR; INTRAVENOUS
Status: COMPLETED | OUTPATIENT
Start: 2022-05-31 | End: 2022-05-31

## 2022-05-31 RX ORDER — MIDAZOLAM HYDROCHLORIDE 1 MG/ML
INJECTION INTRAMUSCULAR; INTRAVENOUS
Status: COMPLETED | OUTPATIENT
Start: 2022-05-31 | End: 2022-05-31

## 2022-05-31 RX ADMIN — FENTANYL CITRATE 25 MCG: 50 INJECTION INTRAMUSCULAR; INTRAVENOUS at 10:15

## 2022-05-31 RX ADMIN — MIDAZOLAM HYDROCHLORIDE 0.5 MG: 2 INJECTION, SOLUTION INTRAMUSCULAR; INTRAVENOUS at 10:16

## 2022-05-31 RX ADMIN — FENTANYL CITRATE 25 MCG: 50 INJECTION INTRAMUSCULAR; INTRAVENOUS at 10:20

## 2022-05-31 RX ADMIN — MIDAZOLAM HYDROCHLORIDE 0.5 MG: 2 INJECTION, SOLUTION INTRAMUSCULAR; INTRAVENOUS at 10:20

## 2022-05-31 ASSESSMENT — PAIN - FUNCTIONAL ASSESSMENT: PAIN_FUNCTIONAL_ASSESSMENT: NONE - DENIES PAIN

## 2022-05-31 NOTE — PROGRESS NOTES
D: pt back to SDS from interventional radiology procedure. VSS, aware of instructions of not coughing after procedure. Family at bedside.

## 2022-05-31 NOTE — OR NURSING
Image guided lung nodule biopsy biopsy completed by Dr. Haleigh Edge. Pt tolerated procedure without any signs or symptoms of distress. Vital signs stable. Report given  to  RN. Pt transported back to hospitals in stable condition via stretcher. Total Biopsy: 2  Received: Versed: 1 mg       Fentanyl: 50 mcg  Bandage to right side that is clean dry and intact. Vital signs x03iyat for 2 hour. q30min after the 2 hours. Chest xray now, 1 hour and 3 hour. Encourage no coughing and clearing of throat.       Vital Signs  Vitals:    05/31/22 1035   BP: 122/71   Pulse: 66   Resp: 19   Temp:    SpO2: 99%    (vital signs in table format)    Post Chelsy  2 - Able to move 4 extremities voluntarily on command  2 - BP+/- 20mmHg of normal  2 - Fully awake  2 - Able to maintain oxygen saturation >92% on room air  2 - Able to breathe deeply and cough freely

## 2022-06-01 DIAGNOSIS — J44.9 CHRONIC OBSTRUCTIVE PULMONARY DISEASE, UNSPECIFIED COPD TYPE (HCC): ICD-10-CM

## 2022-06-01 DIAGNOSIS — R59.0 MEDIASTINAL ADENOPATHY: ICD-10-CM

## 2022-06-01 DIAGNOSIS — C34.11 MALIGNANT NEOPLASM OF UPPER LOBE OF RIGHT LUNG (HCC): Primary | ICD-10-CM

## 2022-06-06 ENCOUNTER — TELEPHONE (OUTPATIENT)
Dept: PULMONOLOGY | Age: 67
End: 2022-06-06

## 2022-06-06 NOTE — TELEPHONE ENCOUNTER
Spoke to the patient, informed her that based on the imaging and the results of the biopsy, this was highly likely to be a cancer. I strongly urged her to proceed with PFT, PET/CT and surgical consultation. She is having minor hemoptysis which is decreasing and getting darker in color. I informed her this is not unexpected. She does not have increased shortness of breath. She expressed understanding, says she has already set up her appointments.

## 2022-06-09 ENCOUNTER — HOSPITAL ENCOUNTER (OUTPATIENT)
Dept: PULMONOLOGY | Age: 67
Discharge: HOME | End: 2022-06-09
Payer: COMMERCIAL

## 2022-06-09 VITALS — OXYGEN SATURATION: 97 %

## 2022-06-09 DIAGNOSIS — R59.0 MEDIASTINAL ADENOPATHY: ICD-10-CM

## 2022-06-09 DIAGNOSIS — C34.11 MALIGNANT NEOPLASM OF UPPER LOBE OF RIGHT LUNG (HCC): ICD-10-CM

## 2022-06-09 DIAGNOSIS — J44.9 CHRONIC OBSTRUCTIVE PULMONARY DISEASE, UNSPECIFIED COPD TYPE (HCC): ICD-10-CM

## 2022-06-09 LAB
DLCO %PRED: 58 %
DLCO PRED: NORMAL
DLCO/VA %PRED: NORMAL
DLCO/VA PRED: NORMAL
DLCO/VA: NORMAL
DLCO: NORMAL
EXPIRATORY TIME-POST: NORMAL
EXPIRATORY TIME: NORMAL
FEF 25-75% %CHNG: NORMAL
FEF 25-75% %PRED-POST: NORMAL
FEF 25-75% %PRED-PRE: NORMAL
FEF 25-75% PRED: NORMAL
FEF 25-75%-POST: NORMAL
FEF 25-75%-PRE: NORMAL
FEV1 %PRED-POST: 72 %
FEV1 %PRED-PRE: 60 %
FEV1 PRED: NORMAL
FEV1-POST: NORMAL
FEV1-PRE: NORMAL
FEV1/FVC %PRED-POST: NORMAL
FEV1/FVC %PRED-PRE: NORMAL
FEV1/FVC PRED: NORMAL
FEV1/FVC-POST: 55 %
FEV1/FVC-PRE: 53 %
FVC %PRED-POST: 99 L
FVC %PRED-PRE: 87 %
FVC PRED: NORMAL
FVC-POST: NORMAL
FVC-PRE: NORMAL
GAW %PRED: NORMAL
GAW PRED: NORMAL
GAW: NORMAL
IC %PRED: NORMAL
IC PRED: NORMAL
IC: NORMAL
MEP: NORMAL
MIP: NORMAL
MVV %PRED-PRE: NORMAL
MVV PRED: NORMAL
MVV-PRE: NORMAL
PEF %PRED-POST: NORMAL
PEF %PRED-PRE: NORMAL
PEF PRED: NORMAL
PEF%CHNG: NORMAL
PEF-POST: NORMAL
PEF-PRE: NORMAL
RAW %PRED: NORMAL
RAW PRED: NORMAL
RAW: NORMAL
RV %PRED: NORMAL
RV PRED: NORMAL
RV: NORMAL
SVC %PRED: NORMAL
SVC PRED: NORMAL
SVC: NORMAL
TLC %PRED: 125 %
TLC PRED: NORMAL
TLC: NORMAL
VA %PRED: NORMAL
VA PRED: NORMAL
VA: NORMAL
VTG %PRED: NORMAL
VTG PRED: NORMAL
VTG: NORMAL

## 2022-06-09 PROCEDURE — 94726 PLETHYSMOGRAPHY LUNG VOLUMES: CPT

## 2022-06-09 PROCEDURE — 6370000000 HC RX 637 (ALT 250 FOR IP): Performed by: INTERNAL MEDICINE

## 2022-06-09 PROCEDURE — 94729 DIFFUSING CAPACITY: CPT

## 2022-06-09 PROCEDURE — 94060 EVALUATION OF WHEEZING: CPT

## 2022-06-09 PROCEDURE — 94760 N-INVAS EAR/PLS OXIMETRY 1: CPT

## 2022-06-09 RX ORDER — ALBUTEROL SULFATE 90 UG/1
4 AEROSOL, METERED RESPIRATORY (INHALATION) ONCE
Status: COMPLETED | OUTPATIENT
Start: 2022-06-09 | End: 2022-06-09

## 2022-06-09 RX ADMIN — Medication 4 PUFF: at 07:28

## 2022-06-09 ASSESSMENT — PULMONARY FUNCTION TESTS
FEV1/FVC_PRE: 53
FEV1_PERCENT_PREDICTED_POST: 72
FVC_PERCENT_PREDICTED_PRE: 87
FEV1_PERCENT_PREDICTED_PRE: 60
FEV1/FVC_POST: 55
FVC_PERCENT_PREDICTED_POST: 99

## 2022-06-10 NOTE — PROCEDURES
[address] [address]                               PULMONARY FUNCTION    PATIENT NAME: Marion Alfredo                      :        1955  MED REC NO:   [id number]                          ROOM:  ACCOUNT NO:   [de-identified]                           ADMIT DATE: 2022  PROVIDER:     Clementine Jenkins MD    DATE OF PROCEDURE:  2022    PFT    Spirometry showed FVC 2.93 L, 87% predicted, FEV1 1.54 L, 60% predicted,  with the FEV1/FVC ratio of 53%. There was a postbronchodilator increase  in FEV1 to 1.82 L and FVC to 3.31 L. Lung volumes showed hyperinflation  and air trapping, diffusion capacity was 58% predicted. IMPRESSION:  PFT shows a moderate obstructive defect with good  bronchodilator response. PFT is compatible with COPD. When compared  to the prior study from 10/25/2018, there is mild reduction in  postbronchodilator FEV1. Lung volumes are similar, diffusion capacity  has declined. Clinical correlation is recommended.         Santiago Alarcon MD    D: 2022 17:02:31       T: 2022 20:22:49     [email or web address]  Job#: [id number]     Doc#: [id number]    CC:  Farshad Bhandari MD

## 2022-06-13 DIAGNOSIS — J44.9 CHRONIC OBSTRUCTIVE PULMONARY DISEASE, UNSPECIFIED COPD TYPE (HCC): ICD-10-CM

## 2022-06-17 ENCOUNTER — HOSPITAL ENCOUNTER (OUTPATIENT)
Dept: PET IMAGING | Age: 67
Discharge: HOME | End: 2022-06-17
Payer: COMMERCIAL

## 2022-06-17 DIAGNOSIS — C34.11 MALIGNANT NEOPLASM OF UPPER LOBE OF RIGHT LUNG (HCC): ICD-10-CM

## 2022-06-17 PROCEDURE — 78815 PET IMAGE W/CT SKULL-THIGH: CPT

## 2022-06-17 PROCEDURE — A9552 F18 FDG: HCPCS | Performed by: INTERNAL MEDICINE

## 2022-06-17 PROCEDURE — 3430000000 HC RX DIAGNOSTIC RADIOPHARMACEUTICAL: Performed by: INTERNAL MEDICINE

## 2022-06-17 RX ORDER — FLUDEOXYGLUCOSE F 18 200 MCI/ML
16.49 INJECTION, SOLUTION INTRAVENOUS
Status: COMPLETED | OUTPATIENT
Start: 2022-06-17 | End: 2022-06-17

## 2022-06-17 RX ADMIN — FLUDEOXYGLUCOSE F 18 16.49 MILLICURIE: 200 INJECTION, SOLUTION INTRAVENOUS at 07:45

## 2022-06-20 ENCOUNTER — TELEPHONE (OUTPATIENT)
Dept: PULMONOLOGY | Age: 67
End: 2022-06-20

## 2022-06-20 NOTE — TELEPHONE ENCOUNTER
Patient call asking for her results for PET/PFT/CT ,Pt stated having her appt for her surgery consultation tomorrow 6[facility]21[facility]22[facility]     Please advise

## 2022-06-21 ENCOUNTER — TELEPHONE (OUTPATIENT)
Dept: CARDIOTHORACIC SURGERY | Age: 67
End: 2022-06-21

## 2022-06-21 ENCOUNTER — OFFICE VISIT (OUTPATIENT)
Dept: CARDIOTHORACIC SURGERY | Age: 67
End: 2022-06-21
Payer: COMMERCIAL

## 2022-06-21 VITALS
HEIGHT: 64 IN | WEIGHT: 114.6 LBS | BODY MASS INDEX: 19.56 KG/M2 | DIASTOLIC BLOOD PRESSURE: 76 MMHG | OXYGEN SATURATION: 97 % | TEMPERATURE: 97.9 F | SYSTOLIC BLOOD PRESSURE: 140 MMHG | HEART RATE: 54 BPM

## 2022-06-21 DIAGNOSIS — Z01.818 PRE-OP TESTING: Primary | ICD-10-CM

## 2022-06-21 DIAGNOSIS — R91.1 PULMONARY NODULE: ICD-10-CM

## 2022-06-21 PROCEDURE — 1090F PRES/ABSN URINE INCON ASSESS: CPT | Performed by: THORACIC SURGERY (CARDIOTHORACIC VASCULAR SURGERY)

## 2022-06-21 PROCEDURE — 1123F ACP DISCUSS/DSCN MKR DOCD: CPT | Performed by: THORACIC SURGERY (CARDIOTHORACIC VASCULAR SURGERY)

## 2022-06-21 PROCEDURE — 3017F COLORECTAL CA SCREEN DOC REV: CPT | Performed by: THORACIC SURGERY (CARDIOTHORACIC VASCULAR SURGERY)

## 2022-06-21 PROCEDURE — G8399 PT W/DXA RESULTS DOCUMENT: HCPCS | Performed by: THORACIC SURGERY (CARDIOTHORACIC VASCULAR SURGERY)

## 2022-06-21 PROCEDURE — 4004F PT TOBACCO SCREEN RCVD TLK: CPT | Performed by: THORACIC SURGERY (CARDIOTHORACIC VASCULAR SURGERY)

## 2022-06-21 PROCEDURE — 99204 OFFICE O/P NEW MOD 45 MIN: CPT | Performed by: THORACIC SURGERY (CARDIOTHORACIC VASCULAR SURGERY)

## 2022-06-21 PROCEDURE — G8427 DOCREV CUR MEDS BY ELIG CLIN: HCPCS | Performed by: THORACIC SURGERY (CARDIOTHORACIC VASCULAR SURGERY)

## 2022-06-21 PROCEDURE — G8420 CALC BMI NORM PARAMETERS: HCPCS | Performed by: THORACIC SURGERY (CARDIOTHORACIC VASCULAR SURGERY)

## 2022-06-21 RX ORDER — AMIODARONE HYDROCHLORIDE 200 MG/1
TABLET ORAL
Qty: 8 TABLET | Refills: 0 | Status: ON HOLD
Start: 2022-06-21 | End: 2022-07-19 | Stop reason: HOSPADM

## 2022-06-21 NOTE — TELEPHONE ENCOUNTER
Informed patient of cardiac clearance appt.    06/27/22 @ 0830 w/ Dr. Sean Pierce @ [facility]. Patient agreeable w/ plan.

## 2022-06-21 NOTE — TELEPHONE ENCOUNTER
Please let the patient know she has adequate lung function to undergo surgery.   The PET scan shows questionable involvement of the lymph nodes, but all of these findings will be discussed with her by Dr. Milli Byers

## 2022-06-21 NOTE — PROGRESS NOTES
URINE PREG DOS __________  (__) SED RATE  ___________  (__) BLOOD SUGAR DOS __________  (__) C-REACTIVE PROTEIN ___________    (__) VITAMIN D HYDROXY ___________  (__) BLOOD THINNERS __________    (__) ACE/ ARBS: _____________________     (__) BETABLOCKERS __________________

## 2022-06-21 NOTE — PROGRESS NOTES
Referred by Dr. Desiree Rosas    Review of Systems:  Constitutional:  No night sweats, headaches, weight loss. + fatigue  Eyes:  No glaucoma + h/o bilat cataract repair  ENMT:  No nosebleeds, deviated septum. Cardiac:  No arrhythmias. Vascular:  No claudication, varicosities. GI:  No PUD, heartburn. :  No kidney stones, frequent UTIs  Musculoskeletal:  No gout. + arthritis hands, legs  Respiratory:  + COPD, asthma, current every day smoker, SOBOE, uses inhalers  Integumentary:  No itching, rash + dermatitis  Neurological:  No stroke, TIAs, seizures. Psychiatric:  No depression, anxiety. Endocrine: No diabetes, thyroid issues. Hematologic:  No bleeding + easy bruising. Immunologic:  No steroid therapies.  + lung mass

## 2022-06-22 NOTE — PROGRESS NOTES
[address] [facility]   Cardiovascular Evaluation    PATIENT: Miri Cho  DATE: 2022  MRN: [id number]  CSN: [id number]  : 1955      Primary Care Doctor: Bert Regalado MD  Reason for evaluation:   Pre-operative cardiac risk assessment    Subjective:   History of present illness on initial date of evaluation:  Miri Cho is a 78 y.o. female with a history of COPD, tobacco use, and recently diagnosed right upper lobe pulmonary nodule who presents for pre-operative cardiac risk assessment prior to a planned right upper lobectomy and mediastinal lymphadenectomy on 22. The patient was diagnosed with an enlarging spiculated right upper lobe pulmonary nodule on a chest CT from 22. She subsequently underwent a core needle biopsy on 22 with pathology concerning for a well differentiated adenocarcinoma. Recently, the patient reports that she has generally been feeling well. She has chronic mild shortness of breath which she attributes to her COPD and says has been stable and non-limiting. She denies any chest pain, lightheadedness, dizziness, palpitations, lower extremity edema, orthopnea, or paroxysmal nocturnal dyspnea. She had lost >20 lbs, but now believes she is starting to slowly gain some weight back. She is able to go up at least 2 flights of steps without stopping and says that she was previously able to walk 3 miles. She smoked 1 pack of cigarettes per day for 50 years, but has now cut back to 2 cigarettes per day. She denies any heavy alcohol use or recreational drug use. Family history is notable for possible CHF in her father and stomach cancer in her mother.       Patient Active Problem List   Diagnosis    Asthma    Arthritis    Hormone replacement therapy    COPD (chronic obstructive pulmonary disease) ([facility] [address]    Tobacco dependence       Past Medical History:   has a past medical history of Arthritis, Asthma, COPD (chronic obstructive pulmonary disease) ([facility] [address] and Hormone replacement therapy. Surgical History:   has a past surgical history that includes Tubal ligation; cyst removal; Breast biopsy; Colonoscopy (N/A, 10/9/2018); and CT NEEDLE BIOPSY LUNG PERCUTANEOUS (5/31/2022). Social History:   reports that she has been smoking cigarettes. She started smoking about 67 years ago. She has a 53.00 pack-year smoking history. She has never used smokeless tobacco. She reports current alcohol use. She reports that she does not use drugs. Family History:  Father - Smoker, possible CHF  Mother -  Smoker, stomach cancer    Home Medications:  Reviewed and are listed in nursing record. and/or listed below  Current Outpatient Medications   Medication Sig Dispense Refill    amiodarone (CORDARONE) 200 MG tablet 2 tabs twice daily. Take 1st dose AM 07/12, last dose PM 07/13. 8 tablet 0    VENTOLIN  (90 Base) MCG/ACT inhaler INHALE TWO PUFFS BY MOUTH FOUR TIMES A DAY AS NEEDED FOR WHEEZING 18 g 3    ANORO ELLIPTA 62.5-25 MCG/INH AEPB inhaler INHALE ONE DOSE BY MOUTH DAILY 1 each 5    ipratropium-albuterol (DUONEB) 0.5-2.5 (3) MG/3ML SOLN nebulizer solution Inhale 3 mLs into the lungs every 6 hours as needed for Shortness of Breath 360 mL 5     No current facility-administered medications for this visit. Allergies:  Patient has no known allergies. Review of Systems:   Review of Systems   Constitutional: Positive for unexpected weight change. Negative for chills and fever. HENT: Negative for congestion, rhinorrhea and sore throat. Eyes: Negative for photophobia, pain and visual disturbance. Respiratory: Positive for shortness of breath. Negative for cough. Cardiovascular: Negative for chest pain, palpitations and leg swelling. Gastrointestinal: Negative for abdominal pain, blood in stool, diarrhea, nausea and vomiting. Endocrine: Negative for cold intolerance and heat intolerance.    Genitourinary: Negative for difficulty urinating, dysuria and hematuria. Musculoskeletal: Negative for arthralgias, joint swelling and myalgias. Skin: Negative for rash and wound. Allergic/Immunologic: Negative for environmental allergies and food allergies. Neurological: Negative for dizziness, syncope and light-headedness. Hematological: Negative for adenopathy. Does not bruise/bleed easily. Psychiatric/Behavioral: Negative for dysphoric mood. The patient is not nervous/anxious. Objective:   PHYSICAL EXAM:    Vitals:    06/27/22 0821   BP: 138/64   Pulse: 58   SpO2: 97%    Weight: 116 lb (52.6 kg)     Wt Readings from Last 3 Encounters:   06/27/22 116 lb (52.6 kg)   06/21/22 114 lb 9.6 oz (52 kg)   05/31/22 109 lb (49.4 kg)     General: Thin adult female in no acute distress. Pleasant and interactive on exam.  HEENT: Normocephalic, atraumatic, non-icteric, hearing intact, nares normal, mucous membranes moist.  Neck: Supple, trachea midline. No adenopathy. No thyromegaly. No carotid bruits. No JVD. Heart: Regular rate and rhythm. Normal S1 and S2. No murmurs, gallops or rubs. Lungs: Normal respiratory effort. Breath sounds mildly diminished bilaterally. No wheezes, rales, or rhonchi. Abdomen: Soft, non-tender. Normoactive bowel sounds. No masses or organomegaly. Skin: No rashes, wounds, or lesions. Pulses: 2+ and symmetric. Extremities: No clubbing, cyanosis, or edema. Musculoskeletal: Spontaneously moves all four extremities. Psych: Normal mood and affect. Neuro: Alert and oriented to person, place, and time. No focal deficits noted.       LABS   CBC:      Lab Results   Component Value Date    WBC 3.5 05/31/2022    RBC 3.64 05/31/2022    HGB 12.2 05/31/2022    HCT 36.1 05/31/2022    MCV 99.2 05/31/2022    RDW 13.4 05/31/2022     05/31/2022     CMP:  Lab Results   Component Value Date     04/06/2021    K 4.5 04/06/2021     04/06/2021    CO2 26 04/06/2021    BUN 14 04/06/2021    CREATININE <0.5 04/06/2021    GFRAA >60 04/06/2021    AGRATIO 1.3 04/06/2021    LABGLOM >60 04/06/2021    GLUCOSE 91 04/06/2021    PROT 6.9 04/06/2021    CALCIUM 9.1 04/06/2021    BILITOT 0.7 04/06/2021    ALKPHOS 100 04/06/2021    AST 17 04/06/2021    ALT 12 04/06/2021     PT/INR:   No results found for: PTINR  Liver:  No components found for: CHLPL  Lab Results   Component Value Date    ALT 12 04/06/2021    AST 17 04/06/2021    ALKPHOS 100 04/06/2021    BILITOT 0.7 04/06/2021     Lab Results   Component Value Date    LABA1C 5.1 05/06/2019     Lipids:         Lab Results   Component Value Date    TRIG 66 04/06/2021    TRIG 68 05/06/2019    TRIG 71 01/11/2017            Lab Results   Component Value Date    HDL 92 (H) 04/06/2021    HDL 80 (H) 05/06/2019    HDL 81 (H) 01/11/2017            Lab Results   Component Value Date    LDLCALC 82 04/06/2021    LDLCALC 76 05/06/2019    LDLCALC 115 (H) 01/11/2017            Lab Results   Component Value Date    LABVLDL 13 04/06/2021    LABVLDL 14 05/06/2019    LABVLDL 14 01/11/2017         CARDIAC DATA   LAST EKG 6/27/22:  Sinus bradycardia, non-specific T wave abnormality    ECHO: N/A       STRESS TEST: N/A    CARDIAC CATH: N/A    VASCULAR/OTHER IMAGING:  Non-contrast chest CT 5/17/22:  1. Enlarging 1.7 cm spiculated right upper lobe pulmonary nodule.  Recommend   PET-CT for further evaluation. PET CT 6/17/22:  Irregular spiculated right upper lobe pulmonary nodule has increased in size   slowly over time but is not significantly hypermetabolic.  Slow growth and   appearance is suspicious for neoplastic etiology.  There is slight low-grade   uptake seen in right hilar and precarinal lymph node.  No hypermetabolic   pulmonary nodules noted on the left       Mild biliary ductal dilatation is seen. [place] is a questionable stone seen   in the common duct. [place] correlation with serum bilirubin       No focal FDG uptake seen in abdomen or pelvis       Assessment:     1.  Pre-operative cardiac risk assessment  2. Right upper lobe pulmonary nodule s/p core needle biopsy with pathology concerning for well differentiated adenocarcinoma  3. COPD  4. Tobacco use      Plan:      1. RCRI is 0, patient denies angina, and is able to perform >4 METs. EKG shows sinus bradycardia with non-specific T wave abnormality. Overall, she is considered low risk for an adverse perioperative cardiovascular event in the setting of an intermediate risk surgical procedure. 2. No additional cardiac evaluation is currently indicated prior to proceeding with her planned right upper lobectomy and mediastinal lymphadenectomy. 3. Can continue current medications as prescribed. 4. Thoroughly reviewed risks of continued tobacco use and smoking cessation was strongly encouraged. She has cut back to smoking 2 cigarettes per day and indicated that she plans to quit by the time of her surgery. 5. Encourage heart healthy diet and regular physical exercise for at least 30 minutes a minimum of 3-5 times per week. 6. She is welcome to follow-up with me in the future on an as needed basis. Scribe's attestation: This note was scribed in the presence of Jasvir Wright DO by Audrey Palacio RN      It is a pleasure to assist in the care of Eros Petty. Please call with any questions. The scribes documentation has been prepared under my direction and personally reviewed by me in its entirety. I confirm that the note above accurately reflects all work, treatment, procedures, and medical decision making performed by me. I, Dr. Jennifer Sunshine, personally performed the services described in this documentation as scribed by Audrey Palacio RN in my presence, and it is both accurate and complete to the best of our ability.        Jennifer Sunshine, [address] [facility]  [phone] [facility]  [phone] [address] [place] [address]

## 2022-06-27 ENCOUNTER — OFFICE VISIT (OUTPATIENT)
Dept: CARDIOLOGY CLINIC | Age: 67
End: 2022-06-27
Payer: COMMERCIAL

## 2022-06-27 VITALS
SYSTOLIC BLOOD PRESSURE: 138 MMHG | HEART RATE: 58 BPM | BODY MASS INDEX: 19.81 KG/M2 | OXYGEN SATURATION: 97 % | HEIGHT: 64 IN | WEIGHT: 116 LBS | DIASTOLIC BLOOD PRESSURE: 64 MMHG

## 2022-06-27 DIAGNOSIS — J44.9 CHRONIC OBSTRUCTIVE PULMONARY DISEASE, UNSPECIFIED COPD TYPE (HCC): ICD-10-CM

## 2022-06-27 DIAGNOSIS — R91.1 PULMONARY NODULE: ICD-10-CM

## 2022-06-27 DIAGNOSIS — Z01.818 PRE-OP EXAMINATION: Primary | ICD-10-CM

## 2022-06-27 DIAGNOSIS — F17.200 TOBACCO DEPENDENCE: ICD-10-CM

## 2022-06-27 PROCEDURE — 3023F SPIROM DOC REV: CPT | Performed by: INTERNAL MEDICINE

## 2022-06-27 PROCEDURE — G8420 CALC BMI NORM PARAMETERS: HCPCS | Performed by: INTERNAL MEDICINE

## 2022-06-27 PROCEDURE — G8427 DOCREV CUR MEDS BY ELIG CLIN: HCPCS | Performed by: INTERNAL MEDICINE

## 2022-06-27 PROCEDURE — 1090F PRES/ABSN URINE INCON ASSESS: CPT | Performed by: INTERNAL MEDICINE

## 2022-06-27 PROCEDURE — 99204 OFFICE O/P NEW MOD 45 MIN: CPT | Performed by: INTERNAL MEDICINE

## 2022-06-27 PROCEDURE — 93000 ELECTROCARDIOGRAM COMPLETE: CPT | Performed by: INTERNAL MEDICINE

## 2022-06-27 ASSESSMENT — ENCOUNTER SYMPTOMS
ABDOMINAL PAIN: 0
SHORTNESS OF BREATH: 1
VOMITING: 0
BLOOD IN STOOL: 0
DIARRHEA: 0
NAUSEA: 0
RHINORRHEA: 0
SORE THROAT: 0
EYE PAIN: 0
COUGH: 0
PHOTOPHOBIA: 0

## 2022-06-27 NOTE — LETTER
[address] [facility] - [address]  Phone: [phone]  Fax: [phone]    Saurav Thompson [birth year]     June 27, 2022      To whom it may concern,     Patient is considered low risk for an adverse perioperative cardiovascular event in the setting of an intermediate risk surgical procedure. No additional cardiac testing is required before upcoming procedure. Please call our office if you have questions.        Sincerely,        Faviola Brannon Wright, DO

## 2022-06-27 NOTE — LETTER
Dante De La O  1955      [address] [facility]   Cardiovascular Evaluation    PATIENT: Dante De La O  DATE: 2022  MRN: [id number]  [facility]: [id number]  : 1955      Primary Care Doctor: Deanne Mendez MD  Reason for evaluation:   Pre-operative cardiac risk assessment    Subjective:   History of present illness on initial date of evaluation:  Dante De La O is a 78 y.o. female with a history of COPD, tobacco use, and recently diagnosed right upper lobe pulmonary nodule who presents for pre-operative cardiac risk assessment prior to a planned right upper lobectomy and mediastinal lymphadenectomy on 22. The patient was diagnosed with an enlarging spiculated right upper lobe pulmonary nodule on a chest CT from 22. She subsequently underwent a core needle biopsy on 22 with pathology concerning for a well differentiated adenocarcinoma. Recently, the patient reports that she has generally been feeling well. She has chronic mild shortness of breath which she attributes to her COPD and says has been stable and non-limiting. She denies any chest pain, lightheadedness, dizziness, palpitations, lower extremity edema, orthopnea, or paroxysmal nocturnal dyspnea. She had lost >20 lbs, but now believes she is starting to slowly gain some weight back. She is able to go up at least 2 flights of steps without stopping and says that she was previously able to walk 3 miles. She smoked 1 pack of cigarettes per day for 50 years, but has now cut back to 2 cigarettes per day. She denies any heavy alcohol use or recreational drug use. Family history is notable for possible CHF in her father and stomach cancer in her mother.       Patient Active Problem List   Diagnosis    Asthma    Arthritis    Hormone replacement therapy    COPD (chronic obstructive pulmonary disease) ([facility] [address]    Tobacco dependence       Past Medical History:   has a past medical history of Arthritis, Asthma, COPD (chronic obstructive pulmonary disease) ([facility] [address] and Hormone replacement therapy. Surgical History:   has a past surgical history that includes Tubal ligation; cyst removal; Breast biopsy; Colonoscopy (N/A, 10/9/2018); and CT NEEDLE BIOPSY LUNG PERCUTANEOUS (5/31/2022). Social History:   reports that she has been smoking cigarettes. She started smoking about 67 years ago. She has a 53.00 pack-year smoking history. She has never used smokeless tobacco. She reports current alcohol use. She reports that she does not use drugs. Family History:  Father - Smoker, possible CHF  Mother -  Smoker, stomach cancer    Home Medications:  Reviewed and are listed in nursing record. and/or listed below  Current Outpatient Medications   Medication Sig Dispense Refill    amiodarone (CORDARONE) 200 MG tablet 2 tabs twice daily. Take 1st dose AM 07/12, last dose PM 07/13. 8 tablet 0    VENTOLIN  (90 Base) MCG/ACT inhaler INHALE TWO PUFFS BY MOUTH FOUR TIMES A DAY AS NEEDED FOR WHEEZING 18 g 3    ANORO ELLIPTA 62.5-25 MCG/INH AEPB inhaler INHALE ONE DOSE BY MOUTH DAILY 1 each 5    ipratropium-albuterol (DUONEB) 0.5-2.5 (3) MG/3ML SOLN nebulizer solution Inhale 3 mLs into the lungs every 6 hours as needed for Shortness of Breath 360 mL 5     No current facility-administered medications for this visit. Allergies:  Patient has no known allergies. Review of Systems:   Review of Systems   Constitutional: Positive for unexpected weight change. Negative for chills and fever. HENT: Negative for congestion, rhinorrhea and sore throat. Eyes: Negative for photophobia, pain and visual disturbance. Respiratory: Positive for shortness of breath. Negative for cough. Cardiovascular: Negative for chest pain, palpitations and leg swelling. Gastrointestinal: Negative for abdominal pain, blood in stool, diarrhea, nausea and vomiting. Endocrine: Negative for cold intolerance and heat intolerance.    Genitourinary: Negative for difficulty urinating, dysuria and hematuria. Musculoskeletal: Negative for arthralgias, joint swelling and myalgias. Skin: Negative for rash and wound. Allergic/Immunologic: Negative for environmental allergies and food allergies. Neurological: Negative for dizziness, syncope and light-headedness. Hematological: Negative for adenopathy. Does not bruise/bleed easily. Psychiatric/Behavioral: Negative for dysphoric mood. The patient is not nervous/anxious. Objective:   PHYSICAL EXAM:    Vitals:    06/27/22 0821   BP: 138/64   Pulse: 58   SpO2: 97%    Weight: 116 lb (52.6 kg)     Wt Readings from Last 3 Encounters:   06/27/22 116 lb (52.6 kg)   06/21/22 114 lb 9.6 oz (52 kg)   05/31/22 109 lb (49.4 kg)     General: Thin adult female in no acute distress. Pleasant and interactive on exam.  HEENT: Normocephalic, atraumatic, non-icteric, hearing intact, nares normal, mucous membranes moist.  Neck: Supple, trachea midline. No adenopathy. No thyromegaly. No carotid bruits. No JVD. Heart: Regular rate and rhythm. Normal S1 and S2. No murmurs, gallops or rubs. Lungs: Normal respiratory effort. Breath sounds mildly diminished bilaterally. No wheezes, rales, or rhonchi. Abdomen: Soft, non-tender. Normoactive bowel sounds. No masses or organomegaly. Skin: No rashes, wounds, or lesions. Pulses: 2+ and symmetric. Extremities: No clubbing, cyanosis, or edema. Musculoskeletal: Spontaneously moves all four extremities. Psych: Normal mood and affect. Neuro: Alert and oriented to person, place, and time. No focal deficits noted.       LABS   CBC:      Lab Results   Component Value Date    WBC 3.5 05/31/2022    RBC 3.64 05/31/2022    HGB 12.2 05/31/2022    HCT 36.1 05/31/2022    MCV 99.2 05/31/2022    RDW 13.4 05/31/2022     05/31/2022     CMP:  Lab Results   Component Value Date     04/06/2021    K 4.5 04/06/2021     04/06/2021    CO2 26 04/06/2021    BUN 14 04/06/2021 CREATININE <0.5 04/06/2021    GFRAA >60 04/06/2021    AGRATIO 1.3 04/06/2021    LABGLOM >60 04/06/2021    GLUCOSE 91 04/06/2021    PROT 6.9 04/06/2021    CALCIUM 9.1 04/06/2021    BILITOT 0.7 04/06/2021    ALKPHOS 100 04/06/2021    AST 17 04/06/2021    ALT 12 04/06/2021     PT/INR:   No results found for: PTINR  Liver:  No components found for: CHLPL  Lab Results   Component Value Date    ALT 12 04/06/2021    AST 17 04/06/2021    ALKPHOS 100 04/06/2021    BILITOT 0.7 04/06/2021     Lab Results   Component Value Date    LABA1C 5.1 05/06/2019     Lipids:         Lab Results   Component Value Date    TRIG 66 04/06/2021    TRIG 68 05/06/2019    TRIG 71 01/11/2017            Lab Results   Component Value Date    HDL 92 (H) 04/06/2021    HDL 80 (H) 05/06/2019    HDL 81 (H) 01/11/2017            Lab Results   Component Value Date    LDLCALC 82 04/06/2021    LDLCALC 76 05/06/2019    LDLCALC 115 (H) 01/11/2017            Lab Results   Component Value Date    LABVLDL 13 04/06/2021    LABVLDL 14 05/06/2019    LABVLDL 14 01/11/2017         CARDIAC DATA   LAST EKG 6/27/22:  Sinus bradycardia, non-specific T wave abnormality    ECHO: N/A       STRESS TEST: N/A    CARDIAC CATH: N/A    VASCULAR/OTHER IMAGING:  Non-contrast chest CT 5/17/22:  1. Enlarging 1.7 cm spiculated right upper lobe pulmonary nodule.  Recommend   PET-CT for further evaluation. PET CT 6/17/22:  Irregular spiculated right upper lobe pulmonary nodule has increased in size   slowly over time but is not significantly hypermetabolic.  Slow growth and   appearance is suspicious for neoplastic etiology.  There is slight low-grade   uptake seen in right hilar and precarinal lymph node.  No hypermetabolic   pulmonary nodules noted on the left       Mild biliary ductal dilatation is seen. Suann P[place] is a questionable stone seen   in the common duct. [place] correlation with serum bilirubin       No focal FDG uptake seen in abdomen or pelvis       Assessment:     1. Pre-operative cardiac risk assessment  2. Right upper lobe pulmonary nodule s/p core needle biopsy with pathology concerning for well differentiated adenocarcinoma  3. COPD  4. Tobacco use      Plan:      1. RCRI is 0, patient denies angina, and is able to perform >4 METs. EKG shows sinus bradycardia with non-specific T wave abnormality. Overall, she is considered low risk for an adverse perioperative cardiovascular event in the setting of an intermediate risk surgical procedure. 2. No additional cardiac evaluation is currently indicated prior to proceeding with her planned right upper lobectomy and mediastinal lymphadenectomy. 3. Can continue current medications as prescribed. 4. Thoroughly reviewed risks of continued tobacco use and smoking cessation was strongly encouraged. She has cut back to smoking 2 cigarettes per day and indicated that she plans to quit by the time of her surgery. 5. Encourage heart healthy diet and regular physical exercise for at least 30 minutes a minimum of 3-5 times per week. 6. She is welcome to follow-up with me in the future on an as needed basis. Scribe's attestation: This note was scribed in the presence of Jasvir Wright DO by Almaz Medrano RN      It is a pleasure to assist in the care of Lyle Jackson. Please call with any questions. The scribes documentation has been prepared under my direction and personally reviewed by me in its entirety. I confirm that the note above accurately reflects all work, treatment, procedures, and medical decision making performed by me. I, Dr. Carmela Early, personally performed the services described in this documentation as scribed by Almaz Medrano RN in my presence, and it is both accurate and complete to the best of our ability.        Carmela Early, [address] [facility]  [phone] [facility]  [phone] [facility]

## 2022-07-05 ENCOUNTER — TELEPHONE (OUTPATIENT)
Dept: CARDIOTHORACIC SURGERY | Age: 67
End: 2022-07-05

## 2022-07-07 NOTE — PROGRESS NOTES
Preoperative Screening for Elective Surgery/Invasive Procedures While COVID-19 present in the community     Have you had any of the following symptoms? o Fever, chills  o Cough  o Shortness of breath  o Muscle aches/pain  o Diarrhea  o Abdominal pain, nausea, vomiting  o Loss or decrease in taste and / or smell   Risk of Exposure  o Have you recently been hospitalized for COVID-19 or flu-like illness, if so when?  o Recently diagnosed with COVID-19, if so when?  o Recently tested for COVID-19, if so when?  o Have you been in close contact with a person or family member who currently has or recently had COVID-19? If yes, when and in what context?  o Do you live with anybody who in the last 14 days has had fever, chills, shortness of breath, muscle aches, flu-like illness?  o Do you have any close contacts or family members who are currently in the hospital for COVID-19 or flu-like illness? If yes, assess recent close contact with this person. Indicate if the patient has a positive screen by answering yes to one or more of the above questions. Patients who test positive or screen positive prior to surgery or on the day of surgery should be evaluated in conjunction with the surgeon/proceduralist/anesthesiologist to determine the urgency of the procedure.      Pt states she is symptom free

## 2022-07-07 NOTE — PROGRESS NOTES
Obstructive Sleep Apnea (TRIPP) Screening     Patient:  Kely Glover    YOB: 1955      Medical Record #:  [id number]                     Date:  7/7/2022     1. Are you a loud and/or regular snorer? [x]  Yes       [] No    2. Have you been observed to gasp or stop breathing during sleep? []  Yes       [x] No    3. Do you feel tired or groggy upon awakening or do you awaken with a headache?           []  Yes       [x] No    4. Are you often tired or fatigued during the wake time hours? [x]  Yes       [] No    5. Do you fall asleep sitting, reading, watching TV or driving? []  Yes       [x] No    6. Do you often have problems with memory or concentration? []  Yes       [x] No    If patient's TRIPP score if greater than or equal to 3, they are considered high risk for TRIPP. An anesthesia provider will evaluate the patient and develop a plan of care the day of surgery. Note:  If the patient's BMI is more than 35 kg m¯² , has neck circumference > 40 cm, and/or high blood pressure the risk is greater (© American [employer]nea Association, 2006).

## 2022-07-07 NOTE — PROGRESS NOTES
1. Do not eat or drink anything after 12 midnight prior to surgery. This includes no water, chewing gum mints, or ice chips. You may brush your teeth and gargle the day of surgery but DO NOT SWALLOW THE WATER. 2. Please see your family doctor/pediatrician for a history and physical and/or concerning medications. Bring any test results/reports from your physician's office. If you are under the care of a heart doctor or specialist please be aware that you may be asked to see him or her for clearance. 3. You may be asked to stop blood thinners such as Coumadin, Plavix, Fragmin, and Lovenox or Anti-inflammatories such as Aspirin, Ibuprofen, Advil, and Naproxen prior to your surgery. Please check with your doctor before stopping these or any other medications. 4. Do not smoke, and do not drink any alcoholic beverages 24 hours prior to surgery. 5. You MUST make arrangements for a responsible adult to take you home after your surgery. For your safety, you will not be allowed to leave alone or drive yourself home. Your surgery will be cancelled if you do not have a ride home. Also for your safety, it is strongly suggested someone stay with you the first 24 hrs after your surgery. 6. A parent/legal guardian must accompany a child scheduled for surgery and plan to stay at the hospital until the child is discharged. Please do not bring other children with you. 7. For your comfort,please wear simple, loose fitting clothing to the hospital.  Please do not bring valuables (money, credit cards, checkbooks, etc.) Do not wear any makeup (including no eye makeup) or nail polish on your fingers or toes. 8. For your safety, please DO NOT wear any jewelry or piercings on day of surgery. All body piercing jewelry must be removed. 9. If you have dentures, they will be removed before going to the OR; for your convenience we will provide you with a container.   If you wear contact lenses or glasses, they will be removed, they will be removed, please bring a case for them. 10. If appicable,Please see your family doctor/pediatrician for a history & physical and/or concerning medications. Bring any test results/reports from your physician's office. 11. Remember to bring Blood Bank bracelet to the hospital on the day of surgery. 12. If you have a Living Will and Durable Power of  for Healthcare, please bring in a copy. 15. Notify your Surgeon if you develop any illness between now and surgery  time, cough, cold, fever, sore throat, nausea, vomiting, etc.  Please notify your surgeon if you experience dizziness, shortness of breath or blurred vision between now & the time of your surgery   14. DO NOT shave your operative site 96 hours prior to surgery. For face & neck surgery, men may use an electric razor 48 hours prior to surgery. 15. Shower the night before surgery with ___Antibacterial soap ___Hibiclens   16. To provide excellent care visitors will be limited to one in the room at any given time. 17.  Please bring picture ID and insurance card. 18.  Visit our web site for additional information:  [facility][email or web address].

## 2022-07-08 ENCOUNTER — HOSPITAL ENCOUNTER (OUTPATIENT)
Dept: GENERAL RADIOLOGY | Age: 67
Discharge: HOME | End: 2022-07-08
Payer: COMMERCIAL

## 2022-07-08 ENCOUNTER — HOSPITAL ENCOUNTER (OUTPATIENT)
Dept: PREADMISSION TESTING | Age: 67
Discharge: HOME | End: 2022-07-08
Payer: COMMERCIAL

## 2022-07-08 DIAGNOSIS — Z01.818 PREOP EXAMINATION: ICD-10-CM

## 2022-07-08 LAB
ABO/RH: NORMAL
ANION GAP SERPL CALCULATED.3IONS-SCNC: 13 MMOL/L (ref 3–16)
ANTIBODY SCREEN: NORMAL
BASOPHILS ABSOLUTE: 0 K/UL (ref 0–0.2)
BASOPHILS RELATIVE PERCENT: 0.9 %
BILIRUBIN URINE: NEGATIVE
BLOOD, URINE: ABNORMAL
BUN BLDV-MCNC: 12 MG/DL (ref 7–20)
CALCIUM SERPL-MCNC: 9.1 MG/DL (ref 8.3–10.6)
CHLORIDE BLD-SCNC: 107 MMOL/L (ref 99–110)
CLARITY: CLEAR
CO2: 23 MMOL/L (ref 21–32)
COLOR: YELLOW
CREAT SERPL-MCNC: <0.5 MG/DL (ref 0.6–1.2)
EOSINOPHILS ABSOLUTE: 0.3 K/UL (ref 0–0.6)
EOSINOPHILS RELATIVE PERCENT: 8.2 %
GFR AFRICAN AMERICAN: >60
GFR NON-AFRICAN AMERICAN: >60
GLUCOSE BLD-MCNC: 82 MG/DL (ref 70–99)
GLUCOSE URINE: NEGATIVE MG/DL
HCT VFR BLD CALC: 38 % (ref 36–48)
HEMOGLOBIN: 12.8 G/DL (ref 12–16)
KETONES, URINE: NEGATIVE MG/DL
LEUKOCYTE ESTERASE, URINE: NEGATIVE
LYMPHOCYTES ABSOLUTE: 1.1 K/UL (ref 1–5.1)
LYMPHOCYTES RELATIVE PERCENT: 26.8 %
MCH RBC QN AUTO: 33.7 PG (ref 26–34)
MCHC RBC AUTO-ENTMCNC: 33.7 G/DL (ref 31–36)
MCV RBC AUTO: 99.9 FL (ref 80–100)
MICROSCOPIC EXAMINATION: YES
MONOCYTES ABSOLUTE: 0.3 K/UL (ref 0–1.3)
MONOCYTES RELATIVE PERCENT: 7.7 %
NEUTROPHILS ABSOLUTE: 2.3 K/UL (ref 1.7–7.7)
NEUTROPHILS RELATIVE PERCENT: 56.4 %
NITRITE, URINE: NEGATIVE
PDW BLD-RTO: 13.7 % (ref 12.4–15.4)
PH UA: 6 (ref 5–8)
PLATELET # BLD: 216 K/UL (ref 135–450)
PMV BLD AUTO: 7.6 FL (ref 5–10.5)
POTASSIUM SERPL-SCNC: 4 MMOL/L (ref 3.5–5.1)
PROTEIN UA: NEGATIVE MG/DL
RBC # BLD: 3.81 M/UL (ref 4–5.2)
RBC UA: NORMAL /HPF (ref 0–4)
SODIUM BLD-SCNC: 143 MMOL/L (ref 136–145)
SPECIFIC GRAVITY UA: <=1.005 (ref 1–1.03)
URINE REFLEX TO CULTURE: ABNORMAL
URINE TYPE: ABNORMAL
UROBILINOGEN, URINE: 0.2 E.U./DL
WBC # BLD: 4 K/UL (ref 4–11)
WBC UA: NORMAL /HPF (ref 0–5)

## 2022-07-08 PROCEDURE — 36415 COLL VENOUS BLD VENIPUNCTURE: CPT

## 2022-07-08 PROCEDURE — 71046 X-RAY EXAM CHEST 2 VIEWS: CPT

## 2022-07-08 PROCEDURE — 80048 BASIC METABOLIC PNL TOTAL CA: CPT

## 2022-07-08 PROCEDURE — 86900 BLOOD TYPING SEROLOGIC ABO: CPT

## 2022-07-08 PROCEDURE — 86901 BLOOD TYPING SEROLOGIC RH(D): CPT

## 2022-07-08 PROCEDURE — 85025 COMPLETE CBC W/AUTO DIFF WBC: CPT

## 2022-07-08 PROCEDURE — 86850 RBC ANTIBODY SCREEN: CPT

## 2022-07-08 PROCEDURE — 81001 URINALYSIS AUTO W/SCOPE: CPT

## 2022-07-14 ENCOUNTER — HOSPITAL ENCOUNTER (INPATIENT)
Age: 67
LOS: 5 days | Discharge: HOME | DRG: 163 | End: 2022-07-19
Attending: THORACIC SURGERY (CARDIOTHORACIC VASCULAR SURGERY) | Admitting: THORACIC SURGERY (CARDIOTHORACIC VASCULAR SURGERY)
Payer: MEDICARE

## 2022-07-14 ENCOUNTER — ANESTHESIA (OUTPATIENT)
Dept: OPERATING ROOM | Age: 67
DRG: 163 | End: 2022-07-14
Payer: MEDICARE

## 2022-07-14 ENCOUNTER — APPOINTMENT (OUTPATIENT)
Dept: GENERAL RADIOLOGY | Age: 67
DRG: 163 | End: 2022-07-14
Attending: THORACIC SURGERY (CARDIOTHORACIC VASCULAR SURGERY)
Payer: MEDICARE

## 2022-07-14 ENCOUNTER — ANESTHESIA EVENT (OUTPATIENT)
Dept: OPERATING ROOM | Age: 67
DRG: 163 | End: 2022-07-14
Payer: MEDICARE

## 2022-07-14 DIAGNOSIS — G89.18 ACUTE POST-OPERATIVE PAIN: Primary | ICD-10-CM

## 2022-07-14 DIAGNOSIS — R91.1 LUNG NODULE: ICD-10-CM

## 2022-07-14 LAB
ABO/RH: NORMAL
ANTIBODY SCREEN: NORMAL
HCT VFR BLD CALC: 40.7 % (ref 36–48)
HEMOGLOBIN: 13.4 G/DL (ref 12–16)
MCH RBC QN AUTO: 32.9 PG (ref 26–34)
MCHC RBC AUTO-ENTMCNC: 32.9 G/DL (ref 31–36)
MCV RBC AUTO: 99.9 FL (ref 80–100)
PDW BLD-RTO: 13.5 % (ref 12.4–15.4)
PLATELET # BLD: 199 K/UL (ref 135–450)
PMV BLD AUTO: 7 FL (ref 5–10.5)
RBC # BLD: 4.08 M/UL (ref 4–5.2)
WBC # BLD: 7.9 K/UL (ref 4–11)

## 2022-07-14 PROCEDURE — 2709999900 HC NON-CHARGEABLE SUPPLY: Performed by: THORACIC SURGERY (CARDIOTHORACIC VASCULAR SURGERY)

## 2022-07-14 PROCEDURE — 3600000018 HC SURGERY OHS ADDTL 15MIN: Performed by: THORACIC SURGERY (CARDIOTHORACIC VASCULAR SURGERY)

## 2022-07-14 PROCEDURE — 36415 COLL VENOUS BLD VENIPUNCTURE: CPT

## 2022-07-14 PROCEDURE — 2500000003 HC RX 250 WO HCPCS: Performed by: NURSE ANESTHETIST, CERTIFIED REGISTERED

## 2022-07-14 PROCEDURE — 2700000000 HC OXYGEN THERAPY PER DAY

## 2022-07-14 PROCEDURE — 32674 THORACOSCOPY LYMPH NODE EXC: CPT | Performed by: THORACIC SURGERY (CARDIOTHORACIC VASCULAR SURGERY)

## 2022-07-14 PROCEDURE — 86900 BLOOD TYPING SEROLOGIC ABO: CPT

## 2022-07-14 PROCEDURE — 71045 X-RAY EXAM CHEST 1 VIEW: CPT

## 2022-07-14 PROCEDURE — 2720000010 HC SURG SUPPLY STERILE: Performed by: THORACIC SURGERY (CARDIOTHORACIC VASCULAR SURGERY)

## 2022-07-14 PROCEDURE — 2500000003 HC RX 250 WO HCPCS: Performed by: THORACIC SURGERY (CARDIOTHORACIC VASCULAR SURGERY)

## 2022-07-14 PROCEDURE — 6370000000 HC RX 637 (ALT 250 FOR IP): Performed by: THORACIC SURGERY (CARDIOTHORACIC VASCULAR SURGERY)

## 2022-07-14 PROCEDURE — 32663 THORACOSCOPY W/LOBECTOMY: CPT | Performed by: THORACIC SURGERY (CARDIOTHORACIC VASCULAR SURGERY)

## 2022-07-14 PROCEDURE — 85027 COMPLETE CBC AUTOMATED: CPT

## 2022-07-14 PROCEDURE — 86850 RBC ANTIBODY SCREEN: CPT

## 2022-07-14 PROCEDURE — 88305 TISSUE EXAM BY PATHOLOGIST: CPT

## 2022-07-14 PROCEDURE — 6360000002 HC RX W HCPCS: Performed by: THORACIC SURGERY (CARDIOTHORACIC VASCULAR SURGERY)

## 2022-07-14 PROCEDURE — 6360000002 HC RX W HCPCS: Performed by: NURSE ANESTHETIST, CERTIFIED REGISTERED

## 2022-07-14 PROCEDURE — 3700000001 HC ADD 15 MINUTES (ANESTHESIA): Performed by: THORACIC SURGERY (CARDIOTHORACIC VASCULAR SURGERY)

## 2022-07-14 PROCEDURE — 88309 TISSUE EXAM BY PATHOLOGIST: CPT

## 2022-07-14 PROCEDURE — 86901 BLOOD TYPING SEROLOGIC RH(D): CPT

## 2022-07-14 PROCEDURE — 2580000003 HC RX 258: Performed by: THORACIC SURGERY (CARDIOTHORACIC VASCULAR SURGERY)

## 2022-07-14 PROCEDURE — 0BTC4ZZ RESECTION OF RIGHT UPPER LUNG LOBE, PERCUTANEOUS ENDOSCOPIC APPROACH: ICD-10-PCS | Performed by: THORACIC SURGERY (CARDIOTHORACIC VASCULAR SURGERY)

## 2022-07-14 PROCEDURE — 3600000008 HC SURGERY OHS BASE: Performed by: THORACIC SURGERY (CARDIOTHORACIC VASCULAR SURGERY)

## 2022-07-14 PROCEDURE — 88312 SPECIAL STAINS GROUP 1: CPT

## 2022-07-14 PROCEDURE — 6360000002 HC RX W HCPCS

## 2022-07-14 PROCEDURE — 2580000003 HC RX 258: Performed by: NURSE ANESTHETIST, CERTIFIED REGISTERED

## 2022-07-14 PROCEDURE — 2000000000 HC ICU R&B

## 2022-07-14 PROCEDURE — 94669 MECHANICAL CHEST WALL OSCILL: CPT

## 2022-07-14 PROCEDURE — C1729 CATH, DRAINAGE: HCPCS | Performed by: THORACIC SURGERY (CARDIOTHORACIC VASCULAR SURGERY)

## 2022-07-14 PROCEDURE — 07T74ZZ RESECTION OF THORAX LYMPHATIC, PERCUTANEOUS ENDOSCOPIC APPROACH: ICD-10-PCS | Performed by: THORACIC SURGERY (CARDIOTHORACIC VASCULAR SURGERY)

## 2022-07-14 PROCEDURE — 94761 N-INVAS EAR/PLS OXIMETRY MLT: CPT

## 2022-07-14 PROCEDURE — 94640 AIRWAY INHALATION TREATMENT: CPT

## 2022-07-14 PROCEDURE — 3E0T3BZ INTRODUCTION OF ANESTHETIC AGENT INTO PERIPHERAL NERVES AND PLEXI, PERCUTANEOUS APPROACH: ICD-10-PCS | Performed by: THORACIC SURGERY (CARDIOTHORACIC VASCULAR SURGERY)

## 2022-07-14 PROCEDURE — 3700000000 HC ANESTHESIA ATTENDED CARE: Performed by: THORACIC SURGERY (CARDIOTHORACIC VASCULAR SURGERY)

## 2022-07-14 RX ORDER — ONDANSETRON 2 MG/ML
4 INJECTION INTRAMUSCULAR; INTRAVENOUS
Status: CANCELLED | OUTPATIENT
Start: 2022-07-14

## 2022-07-14 RX ORDER — SODIUM CHLORIDE 9 MG/ML
INJECTION, SOLUTION INTRAVENOUS PRN
Status: CANCELLED | OUTPATIENT
Start: 2022-07-14

## 2022-07-14 RX ORDER — AMIODARONE HYDROCHLORIDE 200 MG/1
200 TABLET ORAL DAILY
Status: DISCONTINUED | OUTPATIENT
Start: 2022-07-14 | End: 2022-07-19 | Stop reason: HOSPADM

## 2022-07-14 RX ORDER — SODIUM CHLORIDE 0.9 % (FLUSH) 0.9 %
5-40 SYRINGE (ML) INJECTION PRN
Status: DISCONTINUED | OUTPATIENT
Start: 2022-07-14 | End: 2022-07-19 | Stop reason: HOSPADM

## 2022-07-14 RX ORDER — SODIUM CHLORIDE 0.9 % (FLUSH) 0.9 %
5-40 SYRINGE (ML) INJECTION EVERY 12 HOURS SCHEDULED
Status: CANCELLED | OUTPATIENT
Start: 2022-07-14

## 2022-07-14 RX ORDER — DEXMEDETOMIDINE HYDROCHLORIDE 4 UG/ML
0.2 INJECTION, SOLUTION INTRAVENOUS CONTINUOUS
Status: DISCONTINUED | OUTPATIENT
Start: 2022-07-14 | End: 2022-07-18

## 2022-07-14 RX ORDER — MORPHINE SULFATE 4 MG/ML
4 INJECTION, SOLUTION INTRAMUSCULAR; INTRAVENOUS
Status: DISCONTINUED | OUTPATIENT
Start: 2022-07-14 | End: 2022-07-19 | Stop reason: HOSPADM

## 2022-07-14 RX ORDER — ROCURONIUM BROMIDE 10 MG/ML
INJECTION, SOLUTION INTRAVENOUS PRN
Status: DISCONTINUED | OUTPATIENT
Start: 2022-07-14 | End: 2022-07-14 | Stop reason: SDUPTHER

## 2022-07-14 RX ORDER — DIPHENHYDRAMINE HYDROCHLORIDE 50 MG/ML
12.5 INJECTION INTRAMUSCULAR; INTRAVENOUS
Status: CANCELLED | OUTPATIENT
Start: 2022-07-14 | End: 2022-07-14

## 2022-07-14 RX ORDER — ALBUTEROL SULFATE 90 UG/1
1 AEROSOL, METERED RESPIRATORY (INHALATION) 4 TIMES DAILY PRN
Status: DISCONTINUED | OUTPATIENT
Start: 2022-07-14 | End: 2022-07-19 | Stop reason: HOSPADM

## 2022-07-14 RX ORDER — LABETALOL HYDROCHLORIDE 5 MG/ML
5 INJECTION, SOLUTION INTRAVENOUS EVERY 10 MIN PRN
Status: CANCELLED | OUTPATIENT
Start: 2022-07-14

## 2022-07-14 RX ORDER — SODIUM CHLORIDE, SODIUM LACTATE, POTASSIUM CHLORIDE, CALCIUM CHLORIDE 600; 310; 30; 20 MG/100ML; MG/100ML; MG/100ML; MG/100ML
INJECTION, SOLUTION INTRAVENOUS CONTINUOUS
Status: DISCONTINUED | OUTPATIENT
Start: 2022-07-14 | End: 2022-07-14

## 2022-07-14 RX ORDER — FAMOTIDINE 10 MG/ML
20 INJECTION, SOLUTION INTRAVENOUS 2 TIMES DAILY
Status: DISCONTINUED | OUTPATIENT
Start: 2022-07-14 | End: 2022-07-19 | Stop reason: HOSPADM

## 2022-07-14 RX ORDER — FAMOTIDINE 20 MG/1
20 TABLET, FILM COATED ORAL 2 TIMES DAILY
Status: DISCONTINUED | OUTPATIENT
Start: 2022-07-14 | End: 2022-07-19 | Stop reason: HOSPADM

## 2022-07-14 RX ORDER — KETOROLAC TROMETHAMINE 30 MG/ML
INJECTION, SOLUTION INTRAMUSCULAR; INTRAVENOUS
Status: COMPLETED
Start: 2022-07-14 | End: 2022-07-14

## 2022-07-14 RX ORDER — ONDANSETRON 2 MG/ML
INJECTION INTRAMUSCULAR; INTRAVENOUS PRN
Status: DISCONTINUED | OUTPATIENT
Start: 2022-07-14 | End: 2022-07-14 | Stop reason: SDUPTHER

## 2022-07-14 RX ORDER — OXYCODONE HYDROCHLORIDE 5 MG/1
10 TABLET ORAL PRN
Status: CANCELLED | OUTPATIENT
Start: 2022-07-14 | End: 2022-07-14

## 2022-07-14 RX ORDER — SODIUM CHLORIDE 0.9 % (FLUSH) 0.9 %
5-40 SYRINGE (ML) INJECTION EVERY 12 HOURS SCHEDULED
Status: DISCONTINUED | OUTPATIENT
Start: 2022-07-14 | End: 2022-07-19 | Stop reason: HOSPADM

## 2022-07-14 RX ORDER — SODIUM CHLORIDE, SODIUM LACTATE, POTASSIUM CHLORIDE, CALCIUM CHLORIDE 600; 310; 30; 20 MG/100ML; MG/100ML; MG/100ML; MG/100ML
INJECTION, SOLUTION INTRAVENOUS CONTINUOUS PRN
Status: DISCONTINUED | OUTPATIENT
Start: 2022-07-14 | End: 2022-07-14 | Stop reason: SDUPTHER

## 2022-07-14 RX ORDER — SODIUM CHLORIDE 9 MG/ML
INJECTION, SOLUTION INTRAVENOUS PRN
Status: DISCONTINUED | OUTPATIENT
Start: 2022-07-14 | End: 2022-07-19 | Stop reason: HOSPADM

## 2022-07-14 RX ORDER — LIDOCAINE HYDROCHLORIDE 10 MG/ML
2 INJECTION, SOLUTION INFILTRATION; PERINEURAL
Status: DISCONTINUED | OUTPATIENT
Start: 2022-07-14 | End: 2022-07-14

## 2022-07-14 RX ORDER — OXYCODONE HYDROCHLORIDE 5 MG/1
5 TABLET ORAL EVERY 4 HOURS PRN
Status: DISCONTINUED | OUTPATIENT
Start: 2022-07-14 | End: 2022-07-19 | Stop reason: HOSPADM

## 2022-07-14 RX ORDER — MORPHINE SULFATE 2 MG/ML
2 INJECTION, SOLUTION INTRAMUSCULAR; INTRAVENOUS
Status: DISCONTINUED | OUTPATIENT
Start: 2022-07-14 | End: 2022-07-19 | Stop reason: HOSPADM

## 2022-07-14 RX ORDER — ALBUTEROL SULFATE 2.5 MG/3ML
2.5 SOLUTION RESPIRATORY (INHALATION)
Status: DISCONTINUED | OUTPATIENT
Start: 2022-07-14 | End: 2022-07-14

## 2022-07-14 RX ORDER — ONDANSETRON 2 MG/ML
4 INJECTION INTRAMUSCULAR; INTRAVENOUS EVERY 6 HOURS PRN
Status: DISCONTINUED | OUTPATIENT
Start: 2022-07-14 | End: 2022-07-19 | Stop reason: HOSPADM

## 2022-07-14 RX ORDER — LIDOCAINE HYDROCHLORIDE 20 MG/ML
INJECTION, SOLUTION INFILTRATION; PERINEURAL PRN
Status: DISCONTINUED | OUTPATIENT
Start: 2022-07-14 | End: 2022-07-14 | Stop reason: SDUPTHER

## 2022-07-14 RX ORDER — KETOROLAC TROMETHAMINE 30 MG/ML
15 INJECTION, SOLUTION INTRAMUSCULAR; INTRAVENOUS ONCE
Status: COMPLETED | OUTPATIENT
Start: 2022-07-14 | End: 2022-07-14

## 2022-07-14 RX ORDER — SODIUM CHLORIDE 0.9 % (FLUSH) 0.9 %
5-40 SYRINGE (ML) INJECTION PRN
Status: CANCELLED | OUTPATIENT
Start: 2022-07-14

## 2022-07-14 RX ORDER — FENTANYL CITRATE 50 UG/ML
INJECTION, SOLUTION INTRAMUSCULAR; INTRAVENOUS PRN
Status: DISCONTINUED | OUTPATIENT
Start: 2022-07-14 | End: 2022-07-14 | Stop reason: SDUPTHER

## 2022-07-14 RX ORDER — IPRATROPIUM BROMIDE AND ALBUTEROL SULFATE 2.5; .5 MG/3ML; MG/3ML
3 SOLUTION RESPIRATORY (INHALATION) EVERY 6 HOURS PRN
Status: DISCONTINUED | OUTPATIENT
Start: 2022-07-14 | End: 2022-07-17

## 2022-07-14 RX ORDER — MEPERIDINE HYDROCHLORIDE 50 MG/ML
12.5 INJECTION INTRAMUSCULAR; INTRAVENOUS; SUBCUTANEOUS EVERY 5 MIN PRN
Status: CANCELLED | OUTPATIENT
Start: 2022-07-14

## 2022-07-14 RX ORDER — DEXTROSE, SODIUM CHLORIDE, AND POTASSIUM CHLORIDE 5; .45; .15 G/100ML; G/100ML; G/100ML
INJECTION INTRAVENOUS CONTINUOUS
Status: DISCONTINUED | OUTPATIENT
Start: 2022-07-14 | End: 2022-07-15

## 2022-07-14 RX ORDER — PROPOFOL 10 MG/ML
INJECTION, EMULSION INTRAVENOUS PRN
Status: DISCONTINUED | OUTPATIENT
Start: 2022-07-14 | End: 2022-07-14 | Stop reason: SDUPTHER

## 2022-07-14 RX ORDER — MIDAZOLAM HYDROCHLORIDE 1 MG/ML
INJECTION INTRAMUSCULAR; INTRAVENOUS PRN
Status: DISCONTINUED | OUTPATIENT
Start: 2022-07-14 | End: 2022-07-14 | Stop reason: SDUPTHER

## 2022-07-14 RX ORDER — OXYCODONE HYDROCHLORIDE 5 MG/1
5 TABLET ORAL PRN
Status: CANCELLED | OUTPATIENT
Start: 2022-07-14 | End: 2022-07-14

## 2022-07-14 RX ADMIN — PROPOFOL 50 MG: 10 INJECTION, EMULSION INTRAVENOUS at 10:35

## 2022-07-14 RX ADMIN — KETOROLAC TROMETHAMINE 15 MG: 30 INJECTION, SOLUTION INTRAMUSCULAR at 14:03

## 2022-07-14 RX ADMIN — ROCURONIUM BROMIDE 10 MG: 10 SOLUTION INTRAVENOUS at 11:41

## 2022-07-14 RX ADMIN — ROCURONIUM BROMIDE 20 MG: 10 SOLUTION INTRAVENOUS at 10:47

## 2022-07-14 RX ADMIN — KETOROLAC TROMETHAMINE 15 MG: 30 INJECTION, SOLUTION INTRAMUSCULAR; INTRAVENOUS at 14:03

## 2022-07-14 RX ADMIN — Medication 0.2 MCG/KG/HR: at 12:58

## 2022-07-14 RX ADMIN — ROCURONIUM BROMIDE 20 MG: 10 SOLUTION INTRAVENOUS at 11:13

## 2022-07-14 RX ADMIN — FENTANYL CITRATE 50 MCG: 50 INJECTION INTRAMUSCULAR; INTRAVENOUS at 10:34

## 2022-07-14 RX ADMIN — ONDANSETRON 4 MG: 2 INJECTION INTRAMUSCULAR; INTRAVENOUS at 11:05

## 2022-07-14 RX ADMIN — CEFAZOLIN 2000 MG: 10 INJECTION, POWDER, FOR SOLUTION INTRAVENOUS at 10:29

## 2022-07-14 RX ADMIN — SODIUM CHLORIDE, SODIUM LACTATE, POTASSIUM CHLORIDE, AND CALCIUM CHLORIDE: .6; .31; .03; .02 INJECTION, SOLUTION INTRAVENOUS at 10:30

## 2022-07-14 RX ADMIN — PROPOFOL 25 MG: 10 INJECTION, EMULSION INTRAVENOUS at 12:15

## 2022-07-14 RX ADMIN — PROPOFOL 25 MG: 10 INJECTION, EMULSION INTRAVENOUS at 12:12

## 2022-07-14 RX ADMIN — PROPOFOL 25 MG: 10 INJECTION, EMULSION INTRAVENOUS at 12:03

## 2022-07-14 RX ADMIN — POTASSIUM CHLORIDE, DEXTROSE MONOHYDRATE AND SODIUM CHLORIDE: 150; 5; 450 INJECTION, SOLUTION INTRAVENOUS at 22:07

## 2022-07-14 RX ADMIN — PROPOFOL 25 MG: 10 INJECTION, EMULSION INTRAVENOUS at 12:00

## 2022-07-14 RX ADMIN — MIDAZOLAM HYDROCHLORIDE 1 MG: 2 INJECTION, SOLUTION INTRAMUSCULAR; INTRAVENOUS at 10:32

## 2022-07-14 RX ADMIN — Medication 10 ML: at 21:16

## 2022-07-14 RX ADMIN — MUPIROCIN: 20 OINTMENT TOPICAL at 21:16

## 2022-07-14 RX ADMIN — AMIODARONE HYDROCHLORIDE 200 MG: 200 TABLET ORAL at 17:37

## 2022-07-14 RX ADMIN — SUGAMMADEX 50 MG: 100 INJECTION, SOLUTION INTRAVENOUS at 12:06

## 2022-07-14 RX ADMIN — MORPHINE SULFATE 2 MG: 2 INJECTION, SOLUTION INTRAMUSCULAR; INTRAVENOUS at 12:40

## 2022-07-14 RX ADMIN — PROPOFOL 25 MG: 10 INJECTION, EMULSION INTRAVENOUS at 12:09

## 2022-07-14 RX ADMIN — FAMOTIDINE 20 MG: 20 TABLET ORAL at 21:15

## 2022-07-14 RX ADMIN — SUGAMMADEX 50 MG: 100 INJECTION, SOLUTION INTRAVENOUS at 12:18

## 2022-07-14 RX ADMIN — FENTANYL CITRATE 25 MCG: 50 INJECTION INTRAMUSCULAR; INTRAVENOUS at 12:14

## 2022-07-14 RX ADMIN — MORPHINE SULFATE 2 MG: 2 INJECTION, SOLUTION INTRAMUSCULAR; INTRAVENOUS at 15:25

## 2022-07-14 RX ADMIN — SUGAMMADEX 50 MG: 100 INJECTION, SOLUTION INTRAVENOUS at 12:11

## 2022-07-14 RX ADMIN — LIDOCAINE HYDROCHLORIDE 60 MG: 20 INJECTION, SOLUTION INFILTRATION; PERINEURAL at 10:34

## 2022-07-14 RX ADMIN — PROPOFOL 25 MG: 10 INJECTION, EMULSION INTRAVENOUS at 12:06

## 2022-07-14 RX ADMIN — OXYCODONE HYDROCHLORIDE 5 MG: 5 TABLET ORAL at 21:16

## 2022-07-14 RX ADMIN — FENTANYL CITRATE 25 MCG: 50 INJECTION INTRAMUSCULAR; INTRAVENOUS at 12:17

## 2022-07-14 RX ADMIN — ROCURONIUM BROMIDE 30 MG: 10 SOLUTION INTRAVENOUS at 10:35

## 2022-07-14 RX ADMIN — POTASSIUM CHLORIDE, DEXTROSE MONOHYDRATE AND SODIUM CHLORIDE: 150; 5; 450 INJECTION, SOLUTION INTRAVENOUS at 12:44

## 2022-07-14 ASSESSMENT — PAIN SCALES - GENERAL
PAINLEVEL_OUTOF10: 4
PAINLEVEL_OUTOF10: 8
PAINLEVEL_OUTOF10: 5

## 2022-07-14 ASSESSMENT — LIFESTYLE VARIABLES: SMOKING_STATUS: 1

## 2022-07-14 ASSESSMENT — PAIN DESCRIPTION - LOCATION
LOCATION: SHOULDER
LOCATION: SHOULDER

## 2022-07-14 ASSESSMENT — PAIN DESCRIPTION - ORIENTATION
ORIENTATION: RIGHT
ORIENTATION: RIGHT

## 2022-07-14 ASSESSMENT — PAIN DESCRIPTION - DESCRIPTORS: DESCRIPTORS: ACHING

## 2022-07-14 NOTE — BRIEF OP NOTE
Brief Postoperative Note      Patient: Astrid Gregorio  YOB: 1955  MRN: [id number]    Date of Procedure: 7/14/2022    Pre-Op Diagnosis: RIGHT UPPER LOBE LUNG NODULE    Post-Op Diagnosis: Same       Procedure(s):  RIGHT VIDEO ASSISTED THORACOSCOPIC SURGERY, RIGHT UPPER LOBE LOBECTOMY, MEDIASTINAL LYMPHADENECTOMY AND INTERCOSTAL NERVE BLOCK    Surgeon(s):  Ksenia Pedro MD    Assistant:  Surgical Assistant: Dean Clayton    Anesthesia: General    Estimated Blood Loss (mL): 871    Complications: None    Specimens:   ID Type Source Tests Collected by Time Destination   A : 10 RIGHT LYMPH NODE  Tissue Lymph Node SURGICAL PATHOLOGY Ksenia Pedro MD 7/14/2022 1123    B : 4 RIGHT LYMPH NODE  Tissue Lymph Node SURGICAL PATHOLOGY Ksenia Pedro MD 7/14/2022 1135    C : 8 AND 9 RIGHT LYMPH NODE  Tissue Lymph Node SURGICAL PATHOLOGY Ksenia Pedro MD 7/14/2022 1138    D : 7 RIGHT LYMPH NODE  Tissue Lymph Node SURGICAL PATHOLOGY Ksenia Pedro MD 7/14/2022 1139    E : RIGHT UPPER LOBE  Tissue Lung SURGICAL PATHOLOGY Ksenia Pedro MD 7/14/2022 1146        Implants:  * No implants in log *      Drains:   Chest Tube Right Pleural 1 (Active)       Urinary Catheter Lewis-Temperature (Active)       Findings:     Electronically signed by Ksenia Pedro MD on 7/14/2022 at 12:05 PM

## 2022-07-14 NOTE — PROGRESS NOTES
Shift: [id number]    Procedure: RIGHT VIDEO ASSISTED THORACOSCOPIC SURGERY, RIGHT UPPER LOBE LOBECTOMY, MEDIASTINAL LYMPHADENECTOMY AND INTERCOSTAL NERVE BLOCK     Nursing assessment at handoff  stable    Rhythm Sinus Les Rhythm 59    Urinary Output >30ml/hr yes  Removed duff at 1615    Crepitus no     Changes in O2 requirements Oxygen Therapy  SpO2: 100 %  Pulse Oximetry Type: Continuous  O2 Device: Nasal cannula  O2 Flow Rate (L/min): 2 L/min  Most recent vitals /63   Pulse 59   Temp (!) 96.7 °F (35.9 °C) Comment: mistral air r/t pt c/o being cold  Resp 24   Ht 5' 4\" (1.626 m)   Wt 116 lb (52.6 kg)   LMP  (LMP Unknown)   SpO2 100%   BMI 19.91 kg/m²     2L NC O2  Admission weight Weight: 116 lb (52.6 kg) Today's weight   Wt Readings from Last 1 Encounters:   07/07/22 116 lb (52.6 kg)          Lines/Drains  LDA Insertion Date Date Changed (if needed) Discontinued Date   IV 7/14/22 L FA  7/14/22 R wrist     Central Line n/a     Duff 7/14/22 7/14/22 1615   Chest Tube 7/14/22         Interventions   Problem(Brief) Date Time Intervention Physician contacted                                          Drip rates at handoff:    dextrose 5% and 0.45% NaCl with KCl 20 mEq 100 mL/hr at 07/14/22 1244    sodium chloride      dexmedetomidine 0.2 mcg/kg/hr (07/14/22 1258)       Hospital Course:  POD# DOS  -out at 1230  -pain- blocks, toradol x 1, morphine  -+air leak RP CT  -duff removed void x 1 in BR  -    Lab Data:  CBC:   Recent Labs     07/14/22  1301   WBC 7.9   HGB 13.4   HCT 40.7   MCV 99.9        BMP:  No results for input(s): NA, K, CO2, BUN, CREATININE, CA in the last 72 hours. Invalid input(s):  MAGNESIUM  LIVR: No results for input(s): AST, ALT in the last 72 hours. PT/INR: No results for input(s): PROT, INR in the last 72 hours. APTT: No results for input(s): APTT in the last 72 hours.      Electronically signed by Minda Farooq RN on 7/14/2022 at 7:29 PM

## 2022-07-14 NOTE — CARE COORDINATION
Case Management/Follow up:    Chart reviewed for length of stay. Hospital day # 0  Unit: C2     Diagnosis and current status as per MD progress: From [facility]:  RIGHT VIDEO ASSISTED THORACOSCOPIC SURGERY, RIGHT UPPER LOBE LOBECTOMY, MEDIASTINAL LYMPHADENECTOMY AND INTERCOSTAL NERVE BLOCK    Following: CTS  Anticipated d/c date:TBD  Expected plan for discharge: TBD, PT/OT eval to start on 07/15  Potential barriers:Post-op recovery,Chest tube and duff    CM will continue to support for DCP.  Greta Wallace RN

## 2022-07-14 NOTE — PROGRESS NOTES
Arrived to [facility] consent verified, NPO since 2330, belongings on cart for admission, purse with daughter Leo Oconnor.

## 2022-07-14 NOTE — PROGRESS NOTES
Admit pt from CV[facility] s/p R VATS RULobectomy MS lymphadenectomy and intercostal nerve block. Report from anesthesia, and CVOR RN. Pt awake moaning and restless in bed with pain. ICU monitoring commenced. See flowsheets.  Lazaro Pimentel, RN

## 2022-07-14 NOTE — ANESTHESIA PRE PROCEDURE
Department of Anesthesiology  Preprocedure Note       Name:  Tomy Rosas   Age:  78 y.o.  :  1955                                          MRN:  [id number]         Date:  2022      Surgeon: Suly Raya):  Sonia Sierra MD    Procedure: Procedure(s):  RIGHT VIDEO ASSISTED THORACOSCOPIC SURGERY WITH WEDGE EXCISION OF RIGHT UPPER LOBE LUNG NODULE WITH BIOPSY, RIGHT UPPER LOBE LOBECTOMY, MEDIASTINAL LYMPHADENECTOMY AND INTERCOSTAL NERVE BLOCK    Medications prior to admission:   Prior to Admission medications    Medication Sig Start Date End Date Taking? Authorizing Provider   amiodarone (CORDARONE) 200 MG tablet 2 tabs twice daily.  Take 1st dose AM , last dose PM . 22   Jessica Pimentel MD   VENTOLIN  (90 Base) MCG/ACT inhaler INHALE TWO PUFFS BY MOUTH FOUR TIMES A DAY AS NEEDED FOR WHEEZING 22   Keesha Saxena MD   ANORO ELLIPTA 62.5-25 MCG/INH AEPB inhaler INHALE ONE DOSE BY MOUTH DAILY 5/10/22   Keesha Saxena MD   ipratropium-albuterol (DUONEB) 0.5-2.5 (3) MG/3ML SOLN nebulizer solution Inhale 3 mLs into the lungs every 6 hours as needed for Shortness of Breath 3/14/22   Karla Claire MD       Current medications:    Current Facility-Administered Medications   Medication Dose Route Frequency Provider Last Rate Last Admin    ceFAZolin (ANCEF) 2000 mg in dextrose 5 % 100 mL IVPB  2,000 mg IntraVENous On Call to [address] [place] [address] MD Sera        lidocaine 1 % injection 2 mL  2 mL IntraDERmal Once PRN Colby Campoverde MD        lactated ringers infusion   IntraVENous Continuous Colby Campoverde MD           Allergies:  No Known Allergies    Problem List:    Patient Active Problem List   Diagnosis Code    Asthma J45.909    Arthritis M19.90    Hormone replacement therapy Z79.890    COPD (chronic obstructive pulmonary disease) ([facility] [address] J44.9    Tobacco dependence F17.200       Past Medical History:        Diagnosis Date    Arthritis     Asthma     [facility] obstructive [facility]) [facility] [address]     Hormone replacement therapy        Past Surgical History:        Procedure Laterality Date    BREAST BIOPSY      Right, was benign    COLONOSCOPY N/A 10/9/2018    COLONOSCOPY WITH BIOPSY performed by Ramon Lang MD at [address] NEEDLE BIOPSY LUNG PERCUTANEOUS  5/31/2022    CT NEEDLE BIOPSY LUNG PERCUTANEOUS 5/31/2022 [facility] CT SCAN    CYST REMOVAL      Behind ear    TUBAL LIGATION         Social History:    Social History     Tobacco Use    Smoking status: Former Smoker     Packs/day: 1.00     Years: 53.00     Pack years: 53.00     Types: Cigarettes     Start date: 1955    Smokeless tobacco: Never Used    Tobacco comment: quit 6/29/22   Substance Use Topics    Alcohol use: Yes     Alcohol/week: 0.0 standard drinks     Comment: occ                                Counseling given: Not Answered  Comment: quit 6/29/22      Vital Signs (Current):   Vitals:    07/07/22 1213   Weight: 116 lb (52.6 kg)   Height: 5' 4\" (1.626 m)                                              BP Readings from Last 3 Encounters:   06/27/22 138/64   06/21/22 (!) 140/76   05/31/22 113/63       NPO Status:                                                                                 BMI:   Wt Readings from Last 3 Encounters:   07/07/22 116 lb (52.6 kg)   06/27/22 116 lb (52.6 kg)   06/21/22 114 lb 9.6 oz (52 kg)     Body mass index is 19.91 kg/m².     CBC:   Lab Results   Component Value Date/Time    WBC 4.0 07/08/2022 09:27 AM    RBC 3.81 07/08/2022 09:27 AM    HGB 12.8 07/08/2022 09:27 AM    HCT 38.0 07/08/2022 09:27 AM    MCV 99.9 07/08/2022 09:27 AM    RDW 13.7 07/08/2022 09:27 AM     07/08/2022 09:27 AM       CMP:   Lab Results   Component Value Date/Time     07/08/2022 09:27 AM    K 4.0 07/08/2022 09:27 AM     07/08/2022 09:27 AM    CO2 23 07/08/2022 09:27 AM    BUN 12 07/08/2022 09:27 AM    CREATININE <0.5 07/08/2022 09:27 AM GFRAA >60 07/08/2022 09:27 AM    AGRATIO 1.3 04/06/2021 11:11 AM    LABGLOM >60 07/08/2022 09:27 AM    GLUCOSE 82 07/08/2022 09:27 AM    PROT 6.9 04/06/2021 11:11 AM    CALCIUM 9.1 07/08/2022 09:27 AM    BILITOT 0.7 04/06/2021 11:11 AM    ALKPHOS 100 04/06/2021 11:11 AM    AST 17 04/06/2021 11:11 AM    ALT 12 04/06/2021 11:11 AM       POC Tests: No results for input(s): POCGLU, POCNA, POCK, POCCL, POCBUN, POCHEMO, POCHCT in the last 72 hours. Coags:   Lab Results   Component Value Date/Time    PROTIME 12.2 05/31/2022 09:00 AM    INR 0.93 05/31/2022 09:00 AM       HCG (If Applicable): No results found for: PREGTESTUR, PREGSERUM, HCG, HCGQUANT     ABGs: No results found for: PHART, PO2ART, YNS4UNR, PUO4AIM, BEART, F3KMUZRK     Type & Screen (If Applicable):  No results found for: LABABO, LABRH    Drug/Infectious Status (If Applicable):  No results found for: HIV, HEPCAB    COVID-19 Screening (If Applicable):   Lab Results   Component Value Date/Time    COVID19 Detected 12/07/2021 10:43 AM           Anesthesia Evaluation  Patient summary reviewed and Nursing notes reviewed  Airway: Mallampati: II  TM distance: >3 FB   Neck ROM: full  Mouth opening: > = 3 FB   Dental: normal exam         Pulmonary:normal exam  breath sounds clear to auscultation  (+) COPD:  asthma: current smoker                           Cardiovascular:Negative CV ROS            Rhythm: regular  Rate: normal                    Neuro/Psych:   Negative Neuro/Psych ROS              GI/Hepatic/Renal: Neg GI/Hepatic/Renal ROS            Endo/Other: Negative Endo/Other ROS                    Abdominal:             Vascular: negative vascular ROS. Other Findings:           Anesthesia Plan      general     ASA 3       Induction: intravenous. MIPS: Postoperative opioids intended and Prophylactic antiemetics administered. Anesthetic plan and risks discussed with patient. Plan discussed with CRNA.                     Anuj Dalton MD 7/14/2022

## 2022-07-14 NOTE — H&P
Consultation H&P           Date of Admission:  7/14/2022  Date of Consultation:  6/21/2022     PCP:  Yevgeniy Barfield MD    Referred             Chief Complaint: Lung nodule     History of Present Illness: We are asked to see this patient in consultation by Dr. Madelyn Aviles regarding lung nodule. Richard Smith is a 78 y.o. female current smoker with COPD, who has a central right upper lobe lung nodule since 2018. slowly growing CT scan PET scan is mildly positive fortunately her pathology showed atypical cell sent for surgical evaluation. Past Medical History:  Past Medical History        Past Medical History:   Diagnosis Date    Arthritis      Asthma      COPD (chronic obstructive pulmonary disease) ([facility] [address]      Hormone replacement therapy              Past Surgical History:  Past Surgical History         Past Surgical History:   Procedure Laterality Date    BREAST BIOPSY         Right, was benign    COLONOSCOPY N/A 10/9/2018     COLONOSCOPY WITH BIOPSY performed by Lilia Kaur MD at [address] [facility] [address] NEEDLE BIOPSY LUNG PERCUTANEOUS   5/31/2022     CT NEEDLE BIOPSY LUNG PERCUTANEOUS 5/31/2022 [facility] CT SCAN    CYST REMOVAL         Behind ear    TUBAL LIGATION                Home Medications:   Home Medications           Prior to Admission medications    Medication Sig Start Date End Date Taking?  Authorizing Provider   VENTOLIN  (90 Base) MCG/ACT inhaler INHALE TWO PUFFS BY MOUTH FOUR TIMES A DAY AS NEEDED FOR WHEEZING 6/13/22   Yes Yevgeniy Barfield MD   ANORO ELLIPTA 62.5-25 MCG/INH AEPB inhaler INHALE ONE DOSE BY MOUTH DAILY 5/10/22   Yes Yevgeniy Barfield MD   ipratropium-albuterol (DUONEB) 0.5-2.5 (3) MG/3ML SOLN nebulizer solution Inhale 3 mLs into the lungs every 6 hours as needed for Shortness of Breath 3/14/22   Yes Lidia Alfonso MD            Facility Administered Medications:      Allergies:  No Known Allergies      Social History:    Working:   Caffeine:   Lifestyle:    Social History               Socioeconomic History    Marital status:        Spouse name: Not on file    Number of children: Not on file    Years of education: Not on file    Highest education level: Not on file   Occupational History    Not on file   Tobacco Use    Smoking status: Current Every Day Smoker       Packs/day: 1.00       Years: 53.00       Pack years: 53.00       Types: Cigarettes       Start date: 1955    Smokeless tobacco: Never Used    Tobacco comment: 06/21/22 - smokes ~ 6 cigs daily   Vaping Use    Vaping Use: Never used   Substance and Sexual Activity    Alcohol use: No       Alcohol/week: 0.0 standard drinks    Drug use: No    Sexual activity: Never   Other Topics Concern    Not on file   Social History Narrative    Not on file      Social Determinants of Health          Financial Resource Strain:     Difficulty of Paying Living Expenses: Not on file   Food Insecurity:     Worried About Running Out of Food in the Last Year: Not on file    Fam of Food in the Last Year: Not on file   Transportation Needs:     Lack of Transportation (Medical): Not on file    Lack of Transportation (Non-Medical):  Not on file   Physical Activity:     Days of Exercise per Week: Not on file    Minutes of Exercise per Session: Not on file   Stress:     Feeling of Stress : Not on file   Social Connections:     Frequency of Communication with Friends and Family: Not on file    Frequency of Social Gatherings with Friends and Family: Not on file    Attends Cheondoism Services: Not on file    Active Member of Clubs or Organizations: Not on file    Attends Club or Organization Meetings: Not on file    Marital Status: Not on file   Intimate Partner Violence:     Fear of Current or Ex-Partner: Not on file    Emotionally Abused: Not on file    Physically Abused: Not on file    Sexually Abused: Not on file Housing Stability:     Unable to Pay for Housing in the Last Year: Not on file    Number of Places Lived in the Last Year: Not on file    Unstable Housing in the Last Year: Not on file            Family History:  Heart Disease:   Stroke:   Cancer:   Diabetes:   Hypertension:   Aneurysm/PVD:      Family History             Problem Relation Age of Onset    Cancer Mother           stomach    Heart Disease Father      Colon Cancer Brother      Cancer Brother              Review of Systems:  Constitutional:  No night sweats, headaches, weight loss. Eyes:  No glaucoma, cataracts. ENMT:  No nosebleeds, deviated septum. Cardiac:  No arrhythmias, previous MI. Vascular:  No claudication, varicosities. GI:  No PUD, heartburn. :  No kidney stones, frequent UTIs  Musculoskeletal:  No arthritis, gout. Respiratory:  No SOB, emphysema, asthma. Integumentary:  No dermatitis, itching, rash. Neurological:  No stroke, TIAs, seizures. Psychiatric:  No depression, anxiety. Endocrine: No diabetes, thyroid issues. Hematologic:  No bleeding, easy bruising. Immunologic:  No known cancer, steroid therapies.        Physical Examination:    BP (!) 140/76 (Site: Left Upper Arm, Position: Sitting, Cuff Size: Medium Adult)   Pulse 54   Temp 97.9 °F (36.6 °C) (Temporal)   Ht 5' 4\" (1.626 m)   Wt 114 lb 9.6 oz (52 kg)   LMP  (LMP Unknown)   SpO2 97%   BMI 19.67 kg/m²      BP RUE:          BP LUE:   Admission Weight: 114 lb 9.6 oz (52 kg)   Hand dominance:     General appearance: NAD, well nourished  Eyes: anicteric, PERRLA  ENMT: no scars or lesions, no nasal deformity, normal dentition, no cyanosis of oral mucosa  Neck: no masses, no thyroid enlargement, no JVD. Respiratory: effort is unlabored, symmetric, no crackles, wheezes or rubs. No palpable/percussable abnormalities. Cardiovascular: regular, no murmur. PMI normal, no thrill. No carotid bruits. No edema or varicosities.  Abdominal aorta cannot be appreciated given body habitus. GI: abdomen soft, nondistended, no organomegaly. No masses. Lymphatic: no cervical/supraclavicular adenopathy  Musculoskeletal: strength and tone normal. Full ROM. No scoliosis. Extremities: warm and pink. No clubbing or petechiae. Skin: no dermatitis or ulceration. No nodularity or induration. Neuro: CN grossly intact. Sensation and motor function grossly intact. Psychiatric: oriented, appropriate mood/affect.        MEDICAL DECISION MAKING/TESTING  Studies personally reviewed.        Echo:      CXR:      CT  Irregular spiculated right upper lobe pulmonary nodule has increased in size   slowly over time but is not significantly hypermetabolic.  Slow growth and   appearance is suspicious for neoplastic etiology.  There is slight low-grade   uptake seen in right hilar and precarinal lymph node.  No hypermetabolic   pulmonary nodules noted on the left       Mild biliary ductal dilatation is seen. Omelia Sovereign is a questionable stone seen   in the common duct. Michael Montoya  Recommend correlation with serum bilirubin       No focal FDG uptake seen in abdomen or pelvis             PET/CT        PFT  Spirometry showed FVC 2.93 L, 87% predicted, FEV1 1.54 L, 60% predicted,  with the FEV1/FVC ratio of 53%.  There was a postbronchodilator increase  in FEV1 to 1.82 L and FVC to 3.31 L.  Lung volumes showed hyperinflation  and air trapping, diffusion capacity was 58% predicted.     Pathology  Lung, upper lobe, needle core biopsy:      - Atypical glands present.  See comment.    COMMENT:   The specimen consists of dense fibrosis with lymphoplasmacytic   inflammation and rare angulate glands lined by cuboidal cells with   mild-moderate nuclear pleomorphism and hyperchromatic nuclei.  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  Clinical and radiographic   correlation is essential.  A second pathologist has reviewed this case   and agrees with the above diagnosis.        BARJA/BARJA      Labs:   CBC: No results for input(s): WBC, HGB, HCT, MCV, PLT in the last 72 hours. BMP: No results for input(s): NA, K, CL, CO2, PHOS, BUN, CREATININE, CALCIUM, MG in the last 72 hours. Cardiac Enzymes: No results for input(s): CKTOTAL, CKMB, CKMBINDEX, TROPONINI in the last 72 hours. PT/INR: No results for input(s): PROTIME, INR in the last 72 hours. APTT: No results for input(s): APTT in the last 72 hours. Liver Profile:        Lab Results   Component Value Date     AST 17 04/06/2021     ALT 12 04/06/2021     BILITOT 0.7 04/06/2021     ALKPHOS 100 04/06/2021            Lab Results   Component Value Date     CHOL 187 04/06/2021     HDL 92 04/06/2021     TRIG 66 04/06/2021      UA: No results found for: NITRITE, COLORU, PHUR, LABCAST, [address], RBCUA, MUCUS, TRICHOMONAS, YEAST, BACTERIA, CLARITYU, SPECGRAV, LEUKOCYTESUR, UROBILINOGEN, BILIRUBINUR, BLOODU, GLUCOSEU, AMORPHOUS     History obtained: chart, pt     Risk factors: Smoker COPD     Diagnosis: Right upper lobe lung nodule     Plan:   #1 right upper lobectomy with mediastinal lymphadenectomy  #2 smoking cessation patient have to quit smoking for at least 2 weeks prior to surgery she is currently smokes 6 cigarettes a day. #3 cardiology clearance refer to [facility] for preoperative clearance  #4 patient already on inhaler for COPD there is a good chance she will be on some steroid postoperatively  #5 amiodarone 802 days prior to surgery for A. fib.        Typical periop/postop course reviewed including initial limitations on driving/heavy lifting. Risks, benefits and postoperative complications discussed including bleeding, infection, stroke, death, postop pulmonary and renal issues.   I spent 60 minutes of care and visit with this patient, greater than 50% of time was spent in counseling and coordinating care, image interpretation, discussion with other caregiver. And future planning     Tommy Nova MD FACS    Morning of Surgery:  Patient was seen & examined this morning. Imaging was reviewed. History and physical was reviewed. No new event or complaint since seen last.   I will proceed with the previously mentioned plan.     Naif James PA-C

## 2022-07-15 ENCOUNTER — APPOINTMENT (OUTPATIENT)
Dept: GENERAL RADIOLOGY | Age: 67
DRG: 163 | End: 2022-07-15
Attending: THORACIC SURGERY (CARDIOTHORACIC VASCULAR SURGERY)
Payer: MEDICARE

## 2022-07-15 PROBLEM — C34.11 PRIMARY CANCER OF RIGHT UPPER LOBE OF LUNG (HCC): Status: ACTIVE | Noted: 2022-07-15

## 2022-07-15 LAB
ANION GAP SERPL CALCULATED.3IONS-SCNC: 7 MMOL/L (ref 3–16)
BUN BLDV-MCNC: 18 MG/DL (ref 7–20)
CALCIUM SERPL-MCNC: 8.8 MG/DL (ref 8.3–10.6)
CHLORIDE BLD-SCNC: 104 MMOL/L (ref 99–110)
CO2: 25 MMOL/L (ref 21–32)
CREAT SERPL-MCNC: 0.6 MG/DL (ref 0.6–1.2)
GFR AFRICAN AMERICAN: >60
GFR NON-AFRICAN AMERICAN: >60
GLUCOSE BLD-MCNC: 127 MG/DL (ref 70–99)
HCT VFR BLD CALC: 35.7 % (ref 36–48)
HEMOGLOBIN: 12 G/DL (ref 12–16)
MAGNESIUM: 1.6 MG/DL (ref 1.8–2.4)
MCH RBC QN AUTO: 33 PG (ref 26–34)
MCHC RBC AUTO-ENTMCNC: 33.7 G/DL (ref 31–36)
MCV RBC AUTO: 98 FL (ref 80–100)
PDW BLD-RTO: 13.4 % (ref 12.4–15.4)
PHOSPHORUS: 3.2 MG/DL (ref 2.5–4.9)
PLATELET # BLD: 182 K/UL (ref 135–450)
PMV BLD AUTO: 7.4 FL (ref 5–10.5)
POTASSIUM REFLEX MAGNESIUM: 4.7 MMOL/L (ref 3.5–5.1)
RBC # BLD: 3.64 M/UL (ref 4–5.2)
SODIUM BLD-SCNC: 136 MMOL/L (ref 136–145)
WBC # BLD: 5 K/UL (ref 4–11)

## 2022-07-15 PROCEDURE — 97535 SELF CARE MNGMENT TRAINING: CPT

## 2022-07-15 PROCEDURE — 6370000000 HC RX 637 (ALT 250 FOR IP): Performed by: THORACIC SURGERY (CARDIOTHORACIC VASCULAR SURGERY)

## 2022-07-15 PROCEDURE — 94640 AIRWAY INHALATION TREATMENT: CPT

## 2022-07-15 PROCEDURE — 6360000002 HC RX W HCPCS: Performed by: NURSE PRACTITIONER

## 2022-07-15 PROCEDURE — 97166 OT EVAL MOD COMPLEX 45 MIN: CPT

## 2022-07-15 PROCEDURE — 2060000000 HC ICU INTERMEDIATE R&B

## 2022-07-15 PROCEDURE — 97116 GAIT TRAINING THERAPY: CPT

## 2022-07-15 PROCEDURE — 99024 POSTOP FOLLOW-UP VISIT: CPT | Performed by: NURSE PRACTITIONER

## 2022-07-15 PROCEDURE — 36415 COLL VENOUS BLD VENIPUNCTURE: CPT

## 2022-07-15 PROCEDURE — 85027 COMPLETE CBC AUTOMATED: CPT

## 2022-07-15 PROCEDURE — 2700000000 HC OXYGEN THERAPY PER DAY

## 2022-07-15 PROCEDURE — 94669 MECHANICAL CHEST WALL OSCILL: CPT

## 2022-07-15 PROCEDURE — 80048 BASIC METABOLIC PNL TOTAL CA: CPT

## 2022-07-15 PROCEDURE — 97162 PT EVAL MOD COMPLEX 30 MIN: CPT

## 2022-07-15 PROCEDURE — 2580000003 HC RX 258: Performed by: THORACIC SURGERY (CARDIOTHORACIC VASCULAR SURGERY)

## 2022-07-15 PROCEDURE — 84100 ASSAY OF PHOSPHORUS: CPT

## 2022-07-15 PROCEDURE — 83735 ASSAY OF MAGNESIUM: CPT

## 2022-07-15 PROCEDURE — 97530 THERAPEUTIC ACTIVITIES: CPT

## 2022-07-15 PROCEDURE — 94761 N-INVAS EAR/PLS OXIMETRY MLT: CPT

## 2022-07-15 PROCEDURE — 71045 X-RAY EXAM CHEST 1 VIEW: CPT

## 2022-07-15 PROCEDURE — 6360000002 HC RX W HCPCS: Performed by: THORACIC SURGERY (CARDIOTHORACIC VASCULAR SURGERY)

## 2022-07-15 RX ORDER — MAGNESIUM SULFATE IN WATER 40 MG/ML
2000 INJECTION, SOLUTION INTRAVENOUS ONCE
Status: COMPLETED | OUTPATIENT
Start: 2022-07-15 | End: 2022-07-15

## 2022-07-15 RX ORDER — ENOXAPARIN SODIUM 100 MG/ML
30 INJECTION SUBCUTANEOUS DAILY
Status: DISCONTINUED | OUTPATIENT
Start: 2022-07-15 | End: 2022-07-18

## 2022-07-15 RX ADMIN — FAMOTIDINE 20 MG: 20 TABLET ORAL at 21:17

## 2022-07-15 RX ADMIN — ENOXAPARIN SODIUM 30 MG: 100 INJECTION SUBCUTANEOUS at 12:09

## 2022-07-15 RX ADMIN — Medication 10 ML: at 21:17

## 2022-07-15 RX ADMIN — OXYCODONE HYDROCHLORIDE 5 MG: 5 TABLET ORAL at 17:38

## 2022-07-15 RX ADMIN — TIOTROPIUM BROMIDE AND OLODATEROL 2 PUFF: 3.124; 2.736 SPRAY, METERED RESPIRATORY (INHALATION) at 08:56

## 2022-07-15 RX ADMIN — OXYCODONE HYDROCHLORIDE 5 MG: 5 TABLET ORAL at 22:22

## 2022-07-15 RX ADMIN — MAGNESIUM SULFATE HEPTAHYDRATE 2000 MG: 40 INJECTION, SOLUTION INTRAVENOUS at 11:45

## 2022-07-15 RX ADMIN — FAMOTIDINE 20 MG: 20 TABLET ORAL at 08:08

## 2022-07-15 RX ADMIN — OXYCODONE HYDROCHLORIDE 5 MG: 5 TABLET ORAL at 03:00

## 2022-07-15 RX ADMIN — OXYCODONE HYDROCHLORIDE 5 MG: 5 TABLET ORAL at 13:06

## 2022-07-15 RX ADMIN — OXYCODONE HYDROCHLORIDE 5 MG: 5 TABLET ORAL at 08:08

## 2022-07-15 RX ADMIN — AMIODARONE HYDROCHLORIDE 200 MG: 200 TABLET ORAL at 08:17

## 2022-07-15 ASSESSMENT — PAIN SCALES - GENERAL
PAINLEVEL_OUTOF10: 7
PAINLEVEL_OUTOF10: 2
PAINLEVEL_OUTOF10: 4
PAINLEVEL_OUTOF10: 5
PAINLEVEL_OUTOF10: 3
PAINLEVEL_OUTOF10: 0
PAINLEVEL_OUTOF10: 0
PAINLEVEL_OUTOF10: 7
PAINLEVEL_OUTOF10: 5
PAINLEVEL_OUTOF10: 3
PAINLEVEL_OUTOF10: 0

## 2022-07-15 ASSESSMENT — PAIN DESCRIPTION - DESCRIPTORS
DESCRIPTORS: ACHING
DESCRIPTORS: ACHING;DISCOMFORT

## 2022-07-15 ASSESSMENT — PAIN DESCRIPTION - ORIENTATION
ORIENTATION: RIGHT

## 2022-07-15 ASSESSMENT — PAIN DESCRIPTION - FREQUENCY: FREQUENCY: CONTINUOUS

## 2022-07-15 ASSESSMENT — PAIN DESCRIPTION - LOCATION
LOCATION: CHEST

## 2022-07-15 ASSESSMENT — PAIN DESCRIPTION - ONSET: ONSET: ON-GOING

## 2022-07-15 NOTE — PROGRESS NOTES
CTS am rounds. Dr. Sierra Cobb removed CT from suction (air leaked noted prior), order for magnesium replacement.  Tyler Dey RN

## 2022-07-15 NOTE — PROGRESS NOTES
Occupational Therapy  Facility/Department: [facility] C2 CARD TELEMETRY  Occupational Therapy Initial Assessment and Treatment    Name: Makenna Leahy  : 1955  MRN: [id number]  Date of Service: 7/15/2022    Discharge Recommendations:  24 hour supervision or assist        Patient Diagnosis(es): The encounter diagnosis was Lung nodule. Past Medical History:  has a past medical history of Arthritis, Asthma, COPD (chronic obstructive pulmonary disease) ([address] and Hormone replacement therapy. Past Surgical History:  has a past surgical history that includes Tubal ligation; cyst removal; Breast biopsy; Colonoscopy (N/A, 10/9/2018); CT NEEDLE BIOPSY LUNG PERCUTANEOUS (2022); and Thoracoscopy (Right, 2022). Assessment   Performance deficits / Impairments: Decreased functional mobility ; Decreased ADL status; Decreased endurance;Decreased strength  Assessment: Pt is a 77 y/o F s/p R video assisted thoracoscopic surgery, right upper lobe lobectomy, mediastinal lymphadenectomy and intercostal nerve block. She was IPTA, lived with cousin. Pt is to live with sister temporarily following d/c who is able to provide  care. Pt required CGA/SBA for sit <> stand tf from chair and to ambulate to BR. Pt performed toileting and LE dressing with SBA. Pt educated on and performed PLB following ADLs. Cont skilled OT while in acute care to faciliate return to PLOF and to increase independence in ADLs. Recommend home with 24hr A.   Prognosis: Good  Decision Making: Medium Complexity  REQUIRES OT FOLLOW-UP: Yes  Activity Tolerance  Activity Tolerance: Patient Tolerated treatment well;Patient limited by fatigue        Plan   Plan  Times per Week: 4-6x/wk while in ICU  Current Treatment Recommendations: Strengthening, Balance training, Functional mobility training, Endurance training, Self-Care / ADL     Restrictions  Restrictions/Precautions  Restrictions/Precautions: General Precautions  Position Activity Restriction  Other position/activity restrictions: chest tube, ICU monitoring    Subjective   General  Chart Reviewed: Yes  Patient assessed for rehabilitation services?: Yes  Additional Pertinent Hx: COPD  Family / Caregiver Present: No  Referring Practitioner: Manoj Vaughn MD  Diagnosis: R upper lobe lung nodule  Subjective  Subjective: Pt pleasant and agreeable to therapy. Up in chair upon entry, stating she felt \"good\". Denies pain    Social/Functional History  Social/Functional History  Lives With: Family (lives with cousin, but planning to stay with sister after d/c)  Type of Home: House  Home Layout: One level (sister's house is split level)  Home Access: Level entry (sisters house has 6 MEGGAN)  Bathroom Shower/Tub: Walk-in shower  Bathroom Toilet: Standard  Bathroom Equipment: Hand-held shower, Grab bars in shower, Grab bars around toilet  Has the patient had two or more falls in the past year or any fall with injury in the past year?: No  ADL Assistance: Independent  Homemaking Assistance: Independent  Ambulation Assistance: Independent  Transfer Assistance: Independent  Active : Yes  Mode of Transportation: Car  Occupation: Retired  Type of Occupation: Factory  Leisure & Hobbies: Shoot pool       Objective   Heart Rate: [address]: Monitor  BP: (!) 142/74  MAP (Calculated): 96.67  Resp: 21  SpO2: 92 %  Comment: /74, HR 58, O2 92%           Safety Devices  Type of Devices: All fall risk precautions in place;Call light within reach;Gait belt;Nurse notified; Left in chair  Bed Mobility Training  Bed Mobility Training:  (Pt in chair upon entry)  Balance  Sitting: Intact  Standing: Impaired  Standing - Static: Good  Standing - Dynamic: Good  Transfer Training  Transfer Training: Yes  Overall Level of Assistance: Stand-by assistance  Interventions: Verbal cues  Sit to Stand: Stand-by assistance  Stand to Sit: Stand-by assistance  Toilet Transfer: Stand-by assistance  Assistive Device: Gait belt       AROM: Within functional limits  PROM: Within functional limits  Strength: Within functional limits  ADL  Feeding: Supervision  Grooming: Stand by assistance  Grooming Skilled Clinical Factors: Brushing teeth at sink, encouraged PLB following 5 mins of sink level ADL  LE Dressing: Stand by assistance (Donning underwear and shorts)  Toileting: Stand by assistance       Activity Tolerance  Activity Tolerance: Patient tolerated treatment wellCognition  Overall Cognitive Status: WFL  Orientation  Overall Orientation Status: Within Functional Limits  Orientation Level: Oriented X4      Education Given To: Patient  Education Provided: Role of Therapy;Plan of Care;ADL Adaptive Strategies;Transfer Training;Energy Conservation  Education Method: Verbal;Demonstration  Education Outcome: Verbalized understanding;Demonstrated understanding  Disease specific ed: Pt educated on benefits of OOB activity to decrease risks associated with prolonged bedrest while in hospital. Patient educated on pursed lip breathing to aid in recovery and energy conservation s/p therapeutic activity as related to patient's respiratory condition. Patient verbalized understanding of above education. Hand Dominance  Hand Dominance: Right     AM-PAC Score   AM-[facility] Inpatient Daily Activity Raw Score: 19 (07/15/22 0944)  AM-PAC Inpatient ADL T-Scale Score : 40.22 (07/15/22 0944)  ADL Inpatient CMS 0-100% Score: 42.8 (07/15/22 0944)  ADL Inpatient CMS G-Code Modifier : CK (07/15/22 0944)    Goals  Short Term Goals  Time Frame for Short term goals: 1 week (6/22/22) unless otherwise specified  Short Term Goal 1: Pt will perform toilet transfer with supervision by 7/20  Short Term Goal 2: Pt will perform sink-level ADLs with supervision  Short Term Goal 3: Pt will perform UE exercises 15x to increase independence in ADLs  Patient Goals   Patient goals :  To go home       Therapy Time   Individual Concurrent Group Co-treatment   Time In 0810         Time Out 0844         Minutes 34         Timed Code Treatment Minutes: 24 Minutes (10 min eval)       Gonzalo Bailey, OTR/L

## 2022-07-15 NOTE — PROGRESS NOTES
Physical Therapy  Facility/Department: [facility] C2 CARD TELEMETRY  Physical Therapy Initial Assessment    Name: Brent Garcia  : 1955  MRN: [id number]  Date of Service: 7/15/2022    Discharge Recommendations:  24 hour supervision or assist, Home with Home health PT   PT Equipment Recommendations  Equipment Needed: No      Patient Diagnosis(es): The encounter diagnosis was Lung nodule. Past Medical History:  has a past medical history of Arthritis, Asthma, COPD (chronic obstructive pulmonary disease) ([address] and Hormone replacement therapy. Past Surgical History:  has a past surgical history that includes Tubal ligation; cyst removal; Breast biopsy; Colonoscopy (N/A, 10/9/2018); CT NEEDLE BIOPSY LUNG PERCUTANEOUS (2022); and Thoracoscopy (Right, 2022). Assessment   Body Structures, Functions, Activity Limitations Requiring Skilled Therapeutic Intervention: Decreased functional mobility ; Decreased balance;Decreased endurance  Assessment: Pt is a 77 y/o female who presents s/p VATS procedure. Pt was independent prior to admit without device living with cousin. Pt currently requires CGA/SBA for transfers and ambulation without device. Pt would benefit from continued skilled PT to address current limitations. Recommend pt return home with 24 hr supervision and home PT. Treatment Diagnosis: impaired functional mobility  Therapy Prognosis: Good  Decision Making: Medium Complexity  Requires PT Follow-Up: Yes  Activity Tolerance  Activity Tolerance: Patient tolerated treatment well     Plan   Plan  Plan: 5-7 times per week  Current Treatment Recommendations: Strengthening, Balance training, Functional mobility training, Gait training, Stair training, Home exercise program, Endurance training, Therapeutic activities  Safety Devices  Type of Devices:  All fall risk precautions in place, Call light within reach, Gait belt, Nurse notified, Left in chair Restrictions  Restrictions/Precautions  Restrictions/Precautions: General Precautions  Position Activity Restriction  Other position/activity restrictions: chest tube, ICU monitoring     Subjective   Pain: Denies pain  General  Chart Reviewed: Yes  Patient assessed for rehabilitation services?: Yes  Family / Caregiver Present: No  Referring Practitioner: Princess Mike MD  Referral Date : 07/14/22  Diagnosis: cancer R upper lobe of lung; s/p VATS with lobectomy  Follows Commands: Within Functional Limits  General Comment  Comments: Pt up in chair upon arrival. RN cleared pt for therapy. Subjective  Subjective: Pt agreeable to evaluation.          Social/Functional History  Social/Functional History  Lives With: Family (lives with cousin, but planning to stay with sister after d/c)  Type of Home: House  Home Layout: One level (sister's house is split level)  Home Access: Level entry (sisters house has 6 MEGGAN)  Bathroom Shower/Tub: Walk-in shower  Bathroom Toilet: Standard  Bathroom Equipment: Hand-held shower, Grab bars in shower, Grab bars around toilet  Has the patient had two or more falls in the past year or any fall with injury in the past year?: No  ADL Assistance: Independent  Homemaking Assistance: Independent  Ambulation Assistance: Independent  Transfer Assistance: Independent  Active : Yes  Mode of Transportation: Car  Occupation: Retired  Type of Occupation: [address] [place] [address]: [address]: Impaired  Vision Exceptions: Wears glasses for reading;Cataracts  Hearing  Hearing: Within functional limits      Objective   Heart Rate: [address]: Monitor  BP: (!) 142/74  MAP (Calculated): 96.67  Resp: 21  SpO2: 92 %  O2 Device: Nasal cannula  Comment: /74, HR 58, O2 92%        Gross Assessment  AROM: Within functional limits  Strength: Generally decreased, functional     Bed Mobility Training  Bed Mobility Training:  (Pt in chair upon

## 2022-07-15 NOTE — DISCHARGE INSTR - COC
Continuity of Care Form    Patient Name: Alondra Flores   :    MRN:  [id number]    Admit date:  2022  Discharge date:  2022    Code Status Order: Full Code   Advance Directives:   Advance Care Flowsheet Documentation       Date/Time Healthcare Directive Type of Healthcare Directive Copy in [address] Agent's Name Healthcare Agent's Phone Number    22 1020 No, patient does not have an advance directive for healthcare treatment -- -- -- -- --            Admitting Physician:  Tiago Kwok MD  PCP: Santana Silva MD    [facility]237[facility]3897-89  Discharging Unit Phone Number: [phone]    Emergency Contact:   Extended Emergency Contact Information  Primary Emergency Contact: Dante Schlatter  [place] Phone: [phone]  Mobile Phone: [phone]  Relation: Child  Secondary Emergency Contact: Kendal Ninoska, [address] [facility] Phone: [phone]  Mobile Phone: [phone]  Relation: Other    Past Surgical History:  Past Surgical History:   Procedure Laterality Date    BREAST BIOPSY      Right, was benign    COLONOSCOPY N/A 10/9/2018    COLONOSCOPY WITH BIOPSY performed by Dalila Ornelas MD at [place] [address]  2022    CT NEEDLE BIOPSY LUNG PERCUTANEOUS 2022 [facility] CT SCAN    CYST REMOVAL      Behind ear    THORACOSCOPY Right 2022    RIGHT VIDEO ASSISTED THORACOSCOPIC SURGERY, RIGHT UPPER LOBE LOBECTOMY, MEDIASTINAL LYMPHADENECTOMY AND INTERCOSTAL NERVE BLOCK performed by Tiago Kwok MD at [address] [place] [address]         Immunization History:   Immunization History   Administered Date(s) Administered    Influenza, Quadv, IM, (6 mo and older Fluzone, Flulaval, Fluarix and 3 yrs and older Afluria) 2018    Pneumococcal Conjugate 13-valent (Banolkj16) 10/01/2018    Pneumococcal Polysaccharide (Tpmehufnd26) 10/12/2015    Tdap (Boostrix, Adacel) 2017       Active Problems:  Patient Active Problem List   Diagnosis Code    Asthma J45.909    Arthritis M19.90    Hormone replacement therapy Z79.890    COPD (chronic obstructive pulmonary disease) ([facility]) J44.9    Tobacco dependence F17.200    Primary cancer of right upper lobe of lung ([facility] C34.11       Isolation/Infection:   Isolation            No Isolation          Patient Infection Status       Infection Onset Added Last Indicated Last Indicated By Review Planned Expiration Resolved Resolved By    None active    Resolved    COVID-19 21 COVID-19   21     COVID-19 (Rule Out) 21 COVID-19 (Ordered)   21 Rule-Out Test Resulted            Nurse Assessment:  Last Vital Signs: /74   Pulse 58   Temp 98.3 °F (36.8 °C) (Oral)   Resp 18   Ht 5' 4\" (1.626 m)   Wt 118 lb 8 oz (53.8 kg)   LMP  (LMP Unknown)   SpO2 95%   BMI 20.34 kg/m²     Last documented pain score (0-10 scale): Pain Level: 4  Last Weight:   Wt Readings from Last 1 Encounters:   07/15/22 118 lb 8 oz (53.8 kg)     Mental Status:  oriented, alert, coherent, logical, thought processes intact, and able to concentrate and follow conversation    IV Access:  - None    Nursing Mobility/ADLs:  Walking   Independent  Transfer  Independent  Bathing  Independent  Dressing  Independent  [address]  Med Delivery   [facility] MED Delivery:[id number]}    Wound Care Documentation and Therapy:  Incision 22 Chest Lateral;Right; Anterior;Posterior (Active)   Dressing Status Clean;Dry; Intact 07/15/22 0800   Dressing Change Due 07/15/22 07/15/22 08   Dressing/Treatment Dry dressing 07/15/22 0800   Drainage Amount None 07/15/22 0800   Number of days: 1        Elimination:  Continence:    Bowel: Yes  Bladder: Yes  Urinary Catheter: None   Colostomy/Ileostomy/Ileal Conduit: No       Date of Last BM: 7/19/2022    Intake/Output Summary (Last 24 hours) at 7/15/2022 1508  Last data filed at 7/15/2022 1311  Gross per 24 hour   Intake 2044 ml   Output 2000 ml   Net 44 ml     I/O last 3 completed shifts: In: 700 [I.V.:700]  Out: 6930 [Urine:1195; Chest Tube:410]    Safety Concerns:     None    Impairments/Disabilities:      None    Nutrition Therapy:  Current Nutrition Therapy:   - Oral Diet:  General    Routes of Feeding: Oral  Liquids: Thin Liquids  Daily Fluid Restriction: no  Last Modified Barium Swallow with Video (Video Swallowing Test): not done    Treatments at the Time of Hospital Discharge:   Respiratory Treatments:   Oxygen Therapy:  is not on home oxygen therapy. Ventilator:    - No ventilator support    Rehab Therapies:   Weight Bearing Status/Restrictions:  SEE DISCHARGE INSTRUCTIONS FOR RESTRICTIONS  Other Medical Equipment (for information only, NOT a DME order): Other Treatments:     Patient's personal belongings (please select all that are sent with patient):      RN SIGNATURE:  Electronically signed by Ana Casey RN on 7/19/22 at 6:09 PM EDT    CASE MANAGEMENT/SOCIAL WORK SECTION    Inpatient Status Date: 07/14/2022    Readmission Risk Assessment Score:  Readmission Risk              Risk of Unplanned Readmission:  [id number]       Discharging to Facility/ [address]   [address] [facility]   [facility]   [phone]     / signature: Electronically signed by Venkat Chase RN on 7/15/22 at 3:09 PM EDT    PHYSICIAN SECTION     Prognosis: Good    Condition at Discharge: Stable    Rehab Potential (if transferring to Rehab): {Prognosis:[id number]}    Recommended Labs or Other Treatments After Discharge: cbc, cmp and CXR prior to follow up appointment on 7/29/22.     Physician Certification: I certify the above information and transfer of Eris Varela  is necessary for the continuing treatment of the diagnosis listed and that she requires Home Care for less 30 days.      Update Admission H&P: No change in H&P    PHYSICIAN SIGNATURE:  Electronically signed by TAMMY Clarke CNP on 7/19/22 at 3:19 PM EDT

## 2022-07-15 NOTE — PROGRESS NOTES
4 Eyes Skin Assessment     The patient is being assess for   Transfer to New Unit    I agree that 2 RN's have performed a thorough Head to Toe Skin Assessment on the patient. ALL assessment sites listed below have been assessed. Areas assessed for pressure by both nurses:   [x]   Head, Face, and Ears   [x]   Shoulders, Back, and Chest, Abdomen  [x]   Arms, Elbows, and Hands   [x]   Coccyx, Sacrum, and Ischium  [x]   Legs, Feet, and Heels        Skin Assessed Under all Medical Devices by both nurses:  N/a      N/A         All Mepilex Borders were peeled back and area peeked at by both nurses:  No: SacrumNone  Please list where Mepilex Borders are located:  None             **SHARE this note so that the co-signing nurse is able to place an eSignature**    Co-signer eSignature: Electronically signed by Daisy Watson RN on 7/15/22 at 8:37 PM EDT    Does the Patient have Skin Breakdown related to pressure?   Yes LDA WOUND CARE was Initiated documentation include the Josefina-wound, Wound Assessment, Measurements, Dressing Treatment, Drainage, and Color\",     (Insert Photo here refer to ADL s/p Rt Upper VATS)         Federico Prevention initiated:  Yes   Wound Care Orders initiated:  No      WOC nurse consulted for Pressure Injury (Stage 3,4, Unstageable, DTI, NWPT, Complex wounds)and New or Established Ostomies:  No      Primary Nurse eSignature: CHANG

## 2022-07-15 NOTE — CARE COORDINATION
[facility]    Referral received from  to follow for home care services. I will follow for needs, and speak with patient to verify demos.     Patient would like to discharge to [facility]  Need to verify if Teresita Cristian will [place]    [address] [facility], BSN CTN  [facility] [phone]

## 2022-07-15 NOTE — ANESTHESIA POSTPROCEDURE EVALUATION
Department of Anesthesiology  Postprocedure Note    Patient: Dusty Joyce  MRN: [id number]  YOB: 1955  Date of evaluation: 7/14/2022      Procedure Summary     Date: 07/14/22 Room / Location: [facility] [address] [place] [address]    Anesthesia Start: 1030 Anesthesia Stop: 4934    Procedure: RIGHT VIDEO ASSISTED THORACOSCOPIC SURGERY, RIGHT UPPER LOBE LOBECTOMY, MEDIASTINAL LYMPHADENECTOMY AND INTERCOSTAL NERVE BLOCK (Right Chest) Diagnosis:       Lung nodule      (RIGHT UPPER LOBE LUNG NODULE)    Surgeons: Severo Gutierrez MD Responsible Provider: Tata Borges MD    Anesthesia Type: general ASA Status: 3          Anesthesia Type: No value filed. Chelsy Phase I: Chelsy Score: 10    Chelsy Phase II:        Anesthesia Post Evaluation    Patient location during evaluation: ICU  Patient participation: complete - patient participated  Level of consciousness: awake and alert  Airway patency: patent  Nausea & Vomiting: no nausea and no vomiting  Complications: no  Cardiovascular status: blood pressure returned to baseline  Respiratory status: acceptable  Hydration status: euvolemic  Comments: VSS on transfer to phase 2 recovery. No anesthetic complications.

## 2022-07-15 NOTE — CARE COORDINATION
CASE MANAGEMENT INITIAL ASSESSMENT      Reviewed chart and completed assessment with patient and explained Case Management role/services. Primary contact information:Narcisa    Health Care Decision Maker :   Primary Decision Maker: Debby Lange Child - [phone]          Can this person be reached and be able to respond quickly, such as within a few minutes or hours? Yes      Admit date/status:07/14/2022  Diagnosis:Primary cancer of right upper lobe of lung    Is this a Readmission?:  No      Insurance:[address] required for SNF: Yes       3 night stay required: No    Living arrangements, Adls, care needs, prior to admission:Independent PLOF. Lives with cousin in a single level home with a 6 step entry into home. Durable Medical Equipment at home:  Walker__Cane__RTS__ BSC__Shower Chair__  02__ HHN__ CPAP__  BiPap__  Hospital Bed__ W/C___ Other_____    Services in the home and/or outpatient, prior to 2001 Wolfgang Lainez MD              Medications: Prescription coverage? Yes Will pt require financial assistance with medications No     Transportation needs: sister or daughter can provide transport       PT/OT recs:24 hour supervision or assist, Home with [address] [facility] Exemption Notification (HEN):needed for snf    Barriers to discharge:post-op recovery, shest tube    Plan/comments: Pt plans on going home with sister on discharge. [facility] [employer] home care following for services.      Lotus Holbrook RN    ECOC on chart for MD signature

## 2022-07-15 NOTE — PROGRESS NOTES
CVTS Thoracic Progress Note:          CC:  Post op follow up 7/14/22 RIGHT VIDEO ASSISTED THORACOSCOPIC SURGERY, RIGHT UPPER LOBE LOBECTOMY, MEDIASTINAL LYMPHADENECTOMY AND INTERCOSTAL NERVE BLOCK     Subj: awake, up in chair, breathing with ease. Reports pain is controlled. Saturating 92% on RA    Obj:    Blood pressure (!) 142/74, pulse 58, temperature 97.9 °F (36.6 °C), temperature source Oral, resp. rate 21, height 5' 4\" (1.626 m), weight 118 lb 8 oz (53.8 kg), SpO2 92 %, not currently breastfeeding. Lungs rhonchi on the right   Abdomen soft, non-tender    Incision w/ occlusive dressing, CDI   Chest tube with -140= 410 ml serosanguinous drainage in last 24 hours/+expiratory airleak    Diagnostics:   Recent Labs     07/14/22  1301 07/15/22  0425   WBC 7.9 5.0   HGB 13.4 12.0   HCT 40.7 35.7*    182                                                                  Recent Labs     07/15/22  0425      K 4.7      CO2 25   BUN 18   CREATININE 0.6   GLUCOSE 127*            Recent Labs     07/15/22  0425   MG 1.60*        CXR: 7/15/22   Impression   Markedly elevated right hemidiaphragm with increased   consolidation/atelectasis right lung base. Persistent focal right hilar prominence. Minimal patchy opacities in the lungs similar to prior study. No pneumothorax.         Assess/Plan:   Right upper lobe lung nodule: s/p RU lobectomy   -chest tube to water seal   -CXR in AM   -PRN pain meds  -continue expansion maneuvers- IS, oobtc, ambulation    Hypomagnesemia: Mag was 1.60 this morning, replaced with 2 grams    Prophylaxis: amiodarone, pepcid and lovenox  __________________________________________________    TAMMY Fuentes - CNP  7/15/2022  10:46 AM

## 2022-07-15 NOTE — PLAN OF CARE
Problem: Discharge Planning  Goal: Discharge to home or other facility with appropriate resources  Outcome: Progressing  Flowsheets (Taken 7/14/2022 1938 by Ursula Borden, RN)  Discharge to home or other facility with appropriate resources:   Identify barriers to discharge with patient and caregiver   Arrange for needed discharge resources and transportation as appropriate   Identify discharge learning needs (meds, wound care, etc)     Problem: Safety - Adult  Goal: Free from fall injury  Outcome: Progressing     Problem: ABCDS Injury Assessment  Goal: Absence of physical injury  Outcome: Progressing     Problem: Pain  Goal: Verbalizes/displays adequate comfort level or baseline comfort level  Outcome: Progressing  Flowsheets (Taken 7/14/2022 1900 by Ursula Borden, RN)  Verbalizes/displays adequate comfort level or baseline comfort level:   Encourage patient to monitor pain and request assistance   Assess pain using appropriate pain scale   Administer analgesics based on type and severity of pain and evaluate response     Problem: Respiratory - Adult  Goal: Achieves optimal ventilation and oxygenation  Outcome: Progressing     Problem: Infection - Adult  Goal: Absence of infection at discharge  Outcome: Progressing     Problem: Infection - Adult  Goal: Absence of infection during hospitalization  Outcome: Progressing     Problem: Metabolic/Fluid and Electrolytes - Adult  Goal: Electrolytes maintained within normal limits  Outcome: Progressing

## 2022-07-16 ENCOUNTER — APPOINTMENT (OUTPATIENT)
Dept: GENERAL RADIOLOGY | Age: 67
DRG: 163 | End: 2022-07-16
Attending: THORACIC SURGERY (CARDIOTHORACIC VASCULAR SURGERY)
Payer: MEDICARE

## 2022-07-16 LAB
A/G RATIO: 1.5 (ref 1.1–2.2)
ALBUMIN SERPL-MCNC: 3.5 G/DL (ref 3.4–5)
ALP BLD-CCNC: 78 U/L (ref 40–129)
ALT SERPL-CCNC: 14 U/L (ref 10–40)
ANION GAP SERPL CALCULATED.3IONS-SCNC: 8 MMOL/L (ref 3–16)
AST SERPL-CCNC: 18 U/L (ref 15–37)
BASOPHILS ABSOLUTE: 0 K/UL (ref 0–0.2)
BASOPHILS RELATIVE PERCENT: 0.6 %
BILIRUB SERPL-MCNC: 0.5 MG/DL (ref 0–1)
BUN BLDV-MCNC: 15 MG/DL (ref 7–20)
CALCIUM SERPL-MCNC: 9.1 MG/DL (ref 8.3–10.6)
CHLORIDE BLD-SCNC: 100 MMOL/L (ref 99–110)
CO2: 28 MMOL/L (ref 21–32)
CREAT SERPL-MCNC: <0.5 MG/DL (ref 0.6–1.2)
EOSINOPHILS ABSOLUTE: 0.2 K/UL (ref 0–0.6)
EOSINOPHILS RELATIVE PERCENT: 4.6 %
GFR AFRICAN AMERICAN: >60
GFR NON-AFRICAN AMERICAN: >60
GLUCOSE BLD-MCNC: 134 MG/DL (ref 70–99)
HCT VFR BLD CALC: 37.3 % (ref 36–48)
HEMOGLOBIN: 12.4 G/DL (ref 12–16)
LYMPHOCYTES ABSOLUTE: 0.8 K/UL (ref 1–5.1)
LYMPHOCYTES RELATIVE PERCENT: 15.5 %
MAGNESIUM: 1.7 MG/DL (ref 1.8–2.4)
MCH RBC QN AUTO: 32.8 PG (ref 26–34)
MCHC RBC AUTO-ENTMCNC: 33.2 G/DL (ref 31–36)
MCV RBC AUTO: 98.9 FL (ref 80–100)
MONOCYTES ABSOLUTE: 0.4 K/UL (ref 0–1.3)
MONOCYTES RELATIVE PERCENT: 7.9 %
NEUTROPHILS ABSOLUTE: 3.8 K/UL (ref 1.7–7.7)
NEUTROPHILS RELATIVE PERCENT: 71.4 %
PDW BLD-RTO: 13.6 % (ref 12.4–15.4)
PLATELET # BLD: 185 K/UL (ref 135–450)
PMV BLD AUTO: 7.2 FL (ref 5–10.5)
POTASSIUM SERPL-SCNC: 4.5 MMOL/L (ref 3.5–5.1)
RBC # BLD: 3.77 M/UL (ref 4–5.2)
SODIUM BLD-SCNC: 136 MMOL/L (ref 136–145)
TOTAL PROTEIN: 5.9 G/DL (ref 6.4–8.2)
WBC # BLD: 5.3 K/UL (ref 4–11)

## 2022-07-16 PROCEDURE — 2060000000 HC ICU INTERMEDIATE R&B

## 2022-07-16 PROCEDURE — 6360000002 HC RX W HCPCS: Performed by: THORACIC SURGERY (CARDIOTHORACIC VASCULAR SURGERY)

## 2022-07-16 PROCEDURE — 2700000000 HC OXYGEN THERAPY PER DAY

## 2022-07-16 PROCEDURE — 2580000003 HC RX 258: Performed by: THORACIC SURGERY (CARDIOTHORACIC VASCULAR SURGERY)

## 2022-07-16 PROCEDURE — 6360000002 HC RX W HCPCS: Performed by: NURSE PRACTITIONER

## 2022-07-16 PROCEDURE — 85025 COMPLETE CBC W/AUTO DIFF WBC: CPT

## 2022-07-16 PROCEDURE — 36415 COLL VENOUS BLD VENIPUNCTURE: CPT

## 2022-07-16 PROCEDURE — 6360000002 HC RX W HCPCS: Performed by: STUDENT IN AN ORGANIZED HEALTH CARE EDUCATION/TRAINING PROGRAM

## 2022-07-16 PROCEDURE — 83735 ASSAY OF MAGNESIUM: CPT

## 2022-07-16 PROCEDURE — 80053 COMPREHEN METABOLIC PANEL: CPT

## 2022-07-16 PROCEDURE — 71045 X-RAY EXAM CHEST 1 VIEW: CPT

## 2022-07-16 PROCEDURE — 94669 MECHANICAL CHEST WALL OSCILL: CPT

## 2022-07-16 PROCEDURE — 94761 N-INVAS EAR/PLS OXIMETRY MLT: CPT

## 2022-07-16 PROCEDURE — 6370000000 HC RX 637 (ALT 250 FOR IP): Performed by: THORACIC SURGERY (CARDIOTHORACIC VASCULAR SURGERY)

## 2022-07-16 PROCEDURE — 94640 AIRWAY INHALATION TREATMENT: CPT

## 2022-07-16 RX ORDER — FUROSEMIDE 10 MG/ML
20 INJECTION INTRAMUSCULAR; INTRAVENOUS ONCE
Status: COMPLETED | OUTPATIENT
Start: 2022-07-16 | End: 2022-07-16

## 2022-07-16 RX ADMIN — OXYCODONE HYDROCHLORIDE 5 MG: 5 TABLET ORAL at 02:58

## 2022-07-16 RX ADMIN — ENOXAPARIN SODIUM 30 MG: 100 INJECTION SUBCUTANEOUS at 09:46

## 2022-07-16 RX ADMIN — FUROSEMIDE 20 MG: 10 INJECTION, SOLUTION INTRAMUSCULAR; INTRAVENOUS at 11:39

## 2022-07-16 RX ADMIN — TIOTROPIUM BROMIDE AND OLODATEROL 2 PUFF: 3.124; 2.736 SPRAY, METERED RESPIRATORY (INHALATION) at 07:58

## 2022-07-16 RX ADMIN — Medication 10 ML: at 07:48

## 2022-07-16 RX ADMIN — Medication 10 ML: at 16:32

## 2022-07-16 RX ADMIN — OXYCODONE HYDROCHLORIDE 5 MG: 5 TABLET ORAL at 20:47

## 2022-07-16 RX ADMIN — Medication 10 ML: at 20:47

## 2022-07-16 RX ADMIN — OXYCODONE HYDROCHLORIDE 5 MG: 5 TABLET ORAL at 11:38

## 2022-07-16 RX ADMIN — Medication 10 ML: at 11:39

## 2022-07-16 RX ADMIN — FAMOTIDINE 20 MG: 20 TABLET ORAL at 20:47

## 2022-07-16 RX ADMIN — AMIODARONE HYDROCHLORIDE 200 MG: 200 TABLET ORAL at 07:48

## 2022-07-16 RX ADMIN — OXYCODONE HYDROCHLORIDE 5 MG: 5 TABLET ORAL at 07:48

## 2022-07-16 RX ADMIN — FAMOTIDINE 20 MG: 20 TABLET ORAL at 07:47

## 2022-07-16 RX ADMIN — OXYCODONE HYDROCHLORIDE 5 MG: 5 TABLET ORAL at 15:31

## 2022-07-16 RX ADMIN — ONDANSETRON 4 MG: 2 INJECTION INTRAMUSCULAR; INTRAVENOUS at 16:32

## 2022-07-16 ASSESSMENT — PAIN DESCRIPTION - FREQUENCY
FREQUENCY: CONTINUOUS

## 2022-07-16 ASSESSMENT — PAIN SCALES - GENERAL
PAINLEVEL_OUTOF10: 7
PAINLEVEL_OUTOF10: 7
PAINLEVEL_OUTOF10: 6
PAINLEVEL_OUTOF10: 4
PAINLEVEL_OUTOF10: 2
PAINLEVEL_OUTOF10: 7
PAINLEVEL_OUTOF10: 1

## 2022-07-16 ASSESSMENT — PAIN DESCRIPTION - LOCATION
LOCATION: CHEST

## 2022-07-16 ASSESSMENT — PAIN DESCRIPTION - ONSET
ONSET: ON-GOING
ONSET: ON-GOING

## 2022-07-16 ASSESSMENT — PAIN DESCRIPTION - DESCRIPTORS
DESCRIPTORS: ACHING
DESCRIPTORS: ACHING;DISCOMFORT
DESCRIPTORS: ACHING

## 2022-07-16 ASSESSMENT — PAIN DESCRIPTION - ORIENTATION
ORIENTATION: RIGHT

## 2022-07-16 NOTE — PROGRESS NOTES
Pt assisted, ambulated @ room, no SOB noted. Up in chair at this time, IS offered and done. Tolerated 750 level ( Goal 1000 ). Safety/fall prevention maintained. Personal belongings and call light within reach. Will continue to monitor.  YONRN

## 2022-07-16 NOTE — PROGRESS NOTES
CVTS Thoracic Progress Note:          CC:  Post op follow up 7/14/22 RIGHT VIDEO ASSISTED THORACOSCOPIC SURGERY, RIGHT UPPER LOBE LOBECTOMY, MEDIASTINAL LYMPHADENECTOMY AND INTERCOSTAL NERVE BLOCK     Subj: awake, up in chair, breathing with ease. Reports pain is controlled. Saturating 92% on RA    Obj:    Blood pressure (!) 147/77, pulse 63, temperature 98.6 °F (37 °C), temperature source Oral, resp. rate 18, height 5' 4\" (1.626 m), weight 117 lb (53.1 kg), SpO2 92 %, not currently breastfeeding.     Lungs rhonchi on the right   Abdomen soft, non-tender    Incision w/ occlusive dressing, CDI   Chest tube with 540ml serosanguinous drainage in last 24 hours/tidaling but no obvious air leak    Diagnostics:   Recent Labs     07/14/22  1301 07/15/22  0425 07/16/22  0459   WBC 7.9 5.0 5.3   HGB 13.4 12.0 12.4   HCT 40.7 35.7* 37.3    182 185                                                                  Recent Labs     07/15/22  0425 07/16/22  0459    136   K 4.7 4.5    100   CO2 25 28   BUN 18 15   CREATININE 0.6 <0.5*   GLUCOSE 127* 134*            Recent Labs     07/15/22  0425 07/16/22  0459   MG 1.60* 1.70*        CXR: 7/15/22   Persistent right middle lobe atelectasis  Possible increase in apical air space    Assess/Plan:   Right upper lobe lung nodule: s/p RU lobectomy   -keep chest tube on water seal  -lasix 20mg IV x 1  -CXR in AM   -PRN pain meds  -continue expansion maneuvers- IS, oobtc, ambulation    Prophylaxis: amiodarone, pepcid and lovenox  __________________________________________________    Janine Diehl MD  7/16/2022  10:24 AM

## 2022-07-16 NOTE — PLAN OF CARE
Problem: Discharge Planning  Goal: Discharge to home or other facility with appropriate resources  7/16/2022 0330 by Keara Lombardi RN  Outcome: Progressing  Flowsheets (Taken 7/15/2022 2009)  Discharge to home or other facility with appropriate resources:   Identify barriers to discharge with patient and caregiver   Identify discharge learning needs (meds, wound care, etc)   Refer to discharge planning if patient needs post-hospital services based on physician order or complex needs related to functional status, cognitive ability or social support system   Arrange for interpreters to assist at discharge as needed     Problem: Safety - Adult  Goal: Free from fall injury  7/16/2022 0330 by Keara Lombardi RN  Outcome: Progressing     Problem: Pain  Goal: Verbalizes/displays adequate comfort level or baseline comfort level  7/16/2022 0330 by Keara Lombardi RN  Outcome: Progressing     Problem: Respiratory - Adult  Goal: Achieves optimal ventilation and oxygenation  7/16/2022 0330 by Keara Lombardi RN  Outcome: Progressing     Problem: Infection - Adult  Goal: Absence of infection at discharge  7/16/2022 0330 by Keara Lombardi RN  Outcome: Progressing     Problem: Infection - Adult  Goal: Absence of infection during hospitalization  7/16/2022 0330 by Keara Lombardi RN  Outcome: Progressing     Problem: Metabolic/Fluid and Electrolytes - Adult  Goal: Electrolytes maintained within normal limits  7/16/2022 0330 by Keara Lombardi RN  Outcome: Progressing

## 2022-07-16 NOTE — PROGRESS NOTES
Pt resting quietly in bed. \"I'm comfortable\" Pt denies any needs at this time. No SOB noted on RA. Safety/fall prevention maintained. Personal belongings and call light within reach.  YONRN

## 2022-07-16 NOTE — PLAN OF CARE
Problem: Discharge Planning  Goal: Discharge to home or other facility with appropriate resources  Outcome: Progressing     Problem: Safety - Adult  Goal: Free from fall injury  Outcome: Progressing     Problem: Pain  Goal: Verbalizes/displays adequate comfort level or baseline comfort level  Outcome: Progressing     Problem: Respiratory - Adult  Goal: Achieves optimal ventilation and oxygenation  Outcome: Progressing     Problem: Infection - Adult  Goal: Absence of infection at discharge  Outcome: Progressing  Goal: Absence of infection during hospitalization  Outcome: Progressing

## 2022-07-16 NOTE — FLOWSHEET NOTE
07/15/22 1945   Assessment   Charting Type Shift assessment   Psychosocial   Psychosocial (WDL) WDL   Neurological   Level of Consciousness Alert (0)   Orientation Level Oriented X4   Cognition Appropriate judgement; Appropriate safety awareness; Appropriate attention/concentration; Appropriate for developmental age   Speech Clear; Appropriate for developmental age   R Pupil Size (mm) 2   R Pupil Shape Round   R Pupil Reaction Brisk   L Pupil Size (mm) 2   L Pupil Shape Round   L Pupil Reaction Brisk   R Hand  Strong   L Hand  Strong   R Foot Dorsiflexion Strong   L Foot Dorsiflexion Strong   R Foot Plantar Flexion Strong   L Foot Plantar Flexion Strong   RUE Motor Response Normal flexion; Normal extension; Responds to command   RUE Sensation  Full sensation   LUE Motor Response Normal extension;Normal flexion; Responds to command   LUE Sensation  Full sensation   RLE Motor Response Normal extension;Normal flexion; Responds to command   RLE Sensation  Full sensation   LLE Motor Response Normal flexion; Normal extension; Responds to command   LLE Sensation  Full sensation   Tongue Deviation None   Gag Present   Sheela Coma Scale   Eye Opening 4   Best Verbal Response 5   Best Motor Response 6   [place] Coma Scale Score 15   HEENT (Head, Ears, Eyes, Nose, & Throat)   Right Eye Glasses; Impaired vision   Left Eye Glasses; Impaired vision   Throat Dry   Teeth Missing teeth   Respiratory   Respiratory Pattern Regular   Respiratory Depth Normal   Respiratory Quality/Effort Unlabored   Chest Assessment Chest expansion symmetrical   Breath Sounds   Right Upper Lobe Diminished   Right Middle Lobe Diminished   Right Lower Lobe Diminished   Left Upper Lobe Diminished   Left Lower Lobe Diminished   Cough/Sputum   Sputum How Obtained Spontaneous cough;Cough on request   Cough Dry;Non-productive;Strong   Chest Tube Right Pleural 1   Placement Date/Time: 07/14/22 1202   Inserted by: Ken Cuevas  Present on Admission/Arrival: No  Tube during hospitalization Assess and monitor for signs and symptoms of infection;Monitor lab/diagnostic results;Monitor all insertion sites i.e., indwelling lines, tubes and drains   Care Plan - Metabolic/Fluid and Electrolytes Goals   Electrolytes maintained within normal limits Monitor labs and assess patient for signs and symptoms of electrolyte imbalances; Administer electrolyte replacement as ordered;Monitor response to electrolyte replacements, including repeat lab results as appropriate   Care Plan - Discharge Planning Goals   Discharge to home or other facility with appropriate resources Identify barriers to discharge with patient and caregiver;Arrange for needed discharge resources and transportation as appropriate; Identify discharge learning needs (meds, wound care, etc)

## 2022-07-17 ENCOUNTER — APPOINTMENT (OUTPATIENT)
Dept: CT IMAGING | Age: 67
DRG: 163 | End: 2022-07-17
Attending: THORACIC SURGERY (CARDIOTHORACIC VASCULAR SURGERY)
Payer: MEDICARE

## 2022-07-17 ENCOUNTER — APPOINTMENT (OUTPATIENT)
Dept: GENERAL RADIOLOGY | Age: 67
DRG: 163 | End: 2022-07-17
Attending: THORACIC SURGERY (CARDIOTHORACIC VASCULAR SURGERY)
Payer: MEDICARE

## 2022-07-17 LAB
A/G RATIO: 1.3 (ref 1.1–2.2)
ALBUMIN SERPL-MCNC: 3.4 G/DL (ref 3.4–5)
ALP BLD-CCNC: 77 U/L (ref 40–129)
ALT SERPL-CCNC: 14 U/L (ref 10–40)
ANION GAP SERPL CALCULATED.3IONS-SCNC: 7 MMOL/L (ref 3–16)
AST SERPL-CCNC: 15 U/L (ref 15–37)
BASOPHILS ABSOLUTE: 0 K/UL (ref 0–0.2)
BASOPHILS RELATIVE PERCENT: 0.4 %
BILIRUB SERPL-MCNC: 0.7 MG/DL (ref 0–1)
BUN BLDV-MCNC: 15 MG/DL (ref 7–20)
CALCIUM SERPL-MCNC: 9.5 MG/DL (ref 8.3–10.6)
CHLORIDE BLD-SCNC: 97 MMOL/L (ref 99–110)
CO2: 30 MMOL/L (ref 21–32)
CREAT SERPL-MCNC: <0.5 MG/DL (ref 0.6–1.2)
EOSINOPHILS ABSOLUTE: 0.1 K/UL (ref 0–0.6)
EOSINOPHILS RELATIVE PERCENT: 2.9 %
GFR AFRICAN AMERICAN: >60
GFR NON-AFRICAN AMERICAN: >60
GLUCOSE BLD-MCNC: 141 MG/DL (ref 70–99)
HCT VFR BLD CALC: 38.1 % (ref 36–48)
HEMOGLOBIN: 12.9 G/DL (ref 12–16)
LYMPHOCYTES ABSOLUTE: 1 K/UL (ref 1–5.1)
LYMPHOCYTES RELATIVE PERCENT: 19.1 %
MAGNESIUM: 1.5 MG/DL (ref 1.8–2.4)
MCH RBC QN AUTO: 33.1 PG (ref 26–34)
MCHC RBC AUTO-ENTMCNC: 33.8 G/DL (ref 31–36)
MCV RBC AUTO: 98.1 FL (ref 80–100)
MONOCYTES ABSOLUTE: 0.5 K/UL (ref 0–1.3)
MONOCYTES RELATIVE PERCENT: 8.9 %
NEUTROPHILS ABSOLUTE: 3.5 K/UL (ref 1.7–7.7)
NEUTROPHILS RELATIVE PERCENT: 68.7 %
PDW BLD-RTO: 13.3 % (ref 12.4–15.4)
PLATELET # BLD: 184 K/UL (ref 135–450)
PMV BLD AUTO: 7.3 FL (ref 5–10.5)
POTASSIUM SERPL-SCNC: 4.6 MMOL/L (ref 3.5–5.1)
RBC # BLD: 3.88 M/UL (ref 4–5.2)
SODIUM BLD-SCNC: 134 MMOL/L (ref 136–145)
TOTAL PROTEIN: 6 G/DL (ref 6.4–8.2)
WBC # BLD: 5.2 K/UL (ref 4–11)

## 2022-07-17 PROCEDURE — 6360000002 HC RX W HCPCS: Performed by: NURSE PRACTITIONER

## 2022-07-17 PROCEDURE — 6370000000 HC RX 637 (ALT 250 FOR IP): Performed by: THORACIC SURGERY (CARDIOTHORACIC VASCULAR SURGERY)

## 2022-07-17 PROCEDURE — 83735 ASSAY OF MAGNESIUM: CPT

## 2022-07-17 PROCEDURE — 94761 N-INVAS EAR/PLS OXIMETRY MLT: CPT

## 2022-07-17 PROCEDURE — 80053 COMPREHEN METABOLIC PANEL: CPT

## 2022-07-17 PROCEDURE — 71045 X-RAY EXAM CHEST 1 VIEW: CPT

## 2022-07-17 PROCEDURE — 2700000000 HC OXYGEN THERAPY PER DAY

## 2022-07-17 PROCEDURE — 85025 COMPLETE CBC W/AUTO DIFF WBC: CPT

## 2022-07-17 PROCEDURE — 94640 AIRWAY INHALATION TREATMENT: CPT

## 2022-07-17 PROCEDURE — 2580000003 HC RX 258: Performed by: THORACIC SURGERY (CARDIOTHORACIC VASCULAR SURGERY)

## 2022-07-17 PROCEDURE — 2060000000 HC ICU INTERMEDIATE R&B

## 2022-07-17 PROCEDURE — 6360000002 HC RX W HCPCS: Performed by: STUDENT IN AN ORGANIZED HEALTH CARE EDUCATION/TRAINING PROGRAM

## 2022-07-17 PROCEDURE — 71250 CT THORAX DX C-: CPT

## 2022-07-17 PROCEDURE — 94669 MECHANICAL CHEST WALL OSCILL: CPT

## 2022-07-17 PROCEDURE — 36415 COLL VENOUS BLD VENIPUNCTURE: CPT

## 2022-07-17 PROCEDURE — 94660 CPAP INITIATION&MGMT: CPT

## 2022-07-17 RX ORDER — IPRATROPIUM BROMIDE AND ALBUTEROL SULFATE 2.5; .5 MG/3ML; MG/3ML
3 SOLUTION RESPIRATORY (INHALATION) 2 TIMES DAILY
Status: DISCONTINUED | OUTPATIENT
Start: 2022-07-17 | End: 2022-07-19 | Stop reason: HOSPADM

## 2022-07-17 RX ORDER — MAGNESIUM SULFATE IN WATER 40 MG/ML
2000 INJECTION, SOLUTION INTRAVENOUS ONCE
Status: COMPLETED | OUTPATIENT
Start: 2022-07-17 | End: 2022-07-17

## 2022-07-17 RX ORDER — ACETYLCYSTEINE 200 MG/ML
600 SOLUTION ORAL; RESPIRATORY (INHALATION) 2 TIMES DAILY
Status: DISCONTINUED | OUTPATIENT
Start: 2022-07-17 | End: 2022-07-19 | Stop reason: HOSPADM

## 2022-07-17 RX ORDER — LEVOFLOXACIN 5 MG/ML
750 INJECTION, SOLUTION INTRAVENOUS EVERY 24 HOURS
Status: DISCONTINUED | OUTPATIENT
Start: 2022-07-17 | End: 2022-07-18

## 2022-07-17 RX ORDER — FUROSEMIDE 10 MG/ML
20 INJECTION INTRAMUSCULAR; INTRAVENOUS ONCE
Status: COMPLETED | OUTPATIENT
Start: 2022-07-17 | End: 2022-07-17

## 2022-07-17 RX ADMIN — OXYCODONE HYDROCHLORIDE 5 MG: 5 TABLET ORAL at 07:52

## 2022-07-17 RX ADMIN — ENOXAPARIN SODIUM 30 MG: 100 INJECTION SUBCUTANEOUS at 09:49

## 2022-07-17 RX ADMIN — TIOTROPIUM BROMIDE AND OLODATEROL 2 PUFF: 3.124; 2.736 SPRAY, METERED RESPIRATORY (INHALATION) at 08:19

## 2022-07-17 RX ADMIN — FAMOTIDINE 20 MG: 20 TABLET ORAL at 07:52

## 2022-07-17 RX ADMIN — FAMOTIDINE 20 MG: 20 TABLET ORAL at 19:56

## 2022-07-17 RX ADMIN — OXYCODONE HYDROCHLORIDE 5 MG: 5 TABLET ORAL at 13:18

## 2022-07-17 RX ADMIN — Medication 10 ML: at 19:56

## 2022-07-17 RX ADMIN — OXYCODONE HYDROCHLORIDE 5 MG: 5 TABLET ORAL at 19:56

## 2022-07-17 RX ADMIN — Medication 10 ML: at 12:11

## 2022-07-17 RX ADMIN — MAGNESIUM SULFATE HEPTAHYDRATE 2000 MG: 40 INJECTION, SOLUTION INTRAVENOUS at 12:10

## 2022-07-17 RX ADMIN — FUROSEMIDE 20 MG: 10 INJECTION, SOLUTION INTRAMUSCULAR; INTRAVENOUS at 09:35

## 2022-07-17 RX ADMIN — LEVOFLOXACIN 750 MG: 5 INJECTION, SOLUTION INTRAVENOUS at 09:43

## 2022-07-17 RX ADMIN — ACETYLCYSTEINE 600 MG: 200 SOLUTION ORAL; RESPIRATORY (INHALATION) at 19:53

## 2022-07-17 RX ADMIN — OXYCODONE HYDROCHLORIDE 5 MG: 5 TABLET ORAL at 01:27

## 2022-07-17 RX ADMIN — IPRATROPIUM BROMIDE AND ALBUTEROL SULFATE 3 ML: .5; 2.5 SOLUTION RESPIRATORY (INHALATION) at 19:53

## 2022-07-17 RX ADMIN — AMIODARONE HYDROCHLORIDE 200 MG: 200 TABLET ORAL at 07:52

## 2022-07-17 RX ADMIN — IPRATROPIUM BROMIDE AND ALBUTEROL SULFATE 3 ML: .5; 2.5 SOLUTION RESPIRATORY (INHALATION) at 11:11

## 2022-07-17 RX ADMIN — ACETYLCYSTEINE 600 MG: 200 SOLUTION ORAL; RESPIRATORY (INHALATION) at 11:11

## 2022-07-17 ASSESSMENT — PAIN DESCRIPTION - LOCATION
LOCATION: CHEST

## 2022-07-17 ASSESSMENT — PAIN SCALES - GENERAL
PAINLEVEL_OUTOF10: 2
PAINLEVEL_OUTOF10: 6
PAINLEVEL_OUTOF10: 2
PAINLEVEL_OUTOF10: 5
PAINLEVEL_OUTOF10: 5
PAINLEVEL_OUTOF10: 6

## 2022-07-17 ASSESSMENT — PAIN DESCRIPTION - FREQUENCY
FREQUENCY: CONTINUOUS

## 2022-07-17 ASSESSMENT — PAIN DESCRIPTION - ORIENTATION
ORIENTATION: RIGHT

## 2022-07-17 ASSESSMENT — PAIN DESCRIPTION - DESCRIPTORS
DESCRIPTORS: DULL;ACHING
DESCRIPTORS: ACHING
DESCRIPTORS: DULL

## 2022-07-17 ASSESSMENT — PAIN DESCRIPTION - ONSET
ONSET: ON-GOING
ONSET: ON-GOING
ONSET: PROGRESSIVE

## 2022-07-17 NOTE — PROGRESS NOTES
CVTS Thoracic Progress Note:          CC:  Post op follow up 7/14/22 RIGHT VIDEO ASSISTED THORACOSCOPIC SURGERY, RIGHT UPPER LOBE LOBECTOMY, MEDIASTINAL LYMPHADENECTOMY AND INTERCOSTAL NERVE BLOCK     Subj: acute dyspnea and hypoxia this morning, which improved with returning chest tube to suction. Significant air evacuation noted at the time. Obj:    Blood pressure 119/67, pulse 72, temperature 98.5 °F (36.9 °C), temperature source Oral, resp. rate 18, height 5' 4\" (1.626 m), weight 114 lb 6.4 oz (51.9 kg), SpO2 93 %, not currently breastfeeding. Lungs rhonchi on the right   Abdomen soft, non-tender    Incision w/ occlusive dressing, CDI   Chest tube with 500ml serosanguinous drainage in last 24 hours/tidaling but no ongoing air leak    Diagnostics:   Recent Labs     07/15/22  0425 07/16/22  0459 07/17/22  0516   WBC 5.0 5.3 5.2   HGB 12.0 12.4 12.9   HCT 35.7* 37.3 38.1    185 184                                                                  Recent Labs     07/15/22  0425 07/16/22  0459 07/17/22  0516    136 134*   K 4.7 4.5 4.6    100 97*   CO2 25 28 30   BUN 18 15 15   CREATININE 0.6 <0.5* <0.5*   GLUCOSE 127* 134* 141*            Recent Labs     07/15/22  0425 07/16/22  0459 07/17/22  0516   MG 1.60* 1.70* 1.50*        CXR: 7/15/22   Persistent perihilar atelectasis and mild to moderate diffuse right lung opacification. Lung appears more expanded on suction. Assess/Plan:   Right upper lobe lung nodule: s/p RU lobectomy   -CT chest today to better characterize right middle lobe.  Clinically not behaving like torsion given lack of pain and leukocytosis  -keep chest tube on suction, currently well expanded  -repeat lasix 20mg IV x 1  -start prophylactic levaquin  -CXR in AM   -PRN pain meds  -continue expansion maneuvers- IS, oobtc, ambulation, mucolytics    Prophylaxis: amiodarone, pepcid and lovenox  __________________________________________________    Peggyann Fothergill, MD  7/17/2022  9:14 AM    ADDENDUM:  Severe RML mucous plugging.   Will add mucolytics, intermittent BIPAP overnight, and EZPAP every 4 hours

## 2022-07-17 NOTE — PROGRESS NOTES
Chest tube placed back to suction . Awaiting chest xray result. On oxygen at 4 liters nasal cannula.

## 2022-07-17 NOTE — FLOWSHEET NOTE
07/16/22 1917   Assessment   Charting Type Shift assessment   Psychosocial   Psychosocial (WDL) WDL   Neurological   Level of Consciousness Alert (0)   Orientation Level Oriented X4   Cognition Appropriate judgement; Appropriate safety awareness; Appropriate attention/concentration; Appropriate for developmental age   Speech Clear; Appropriate for developmental age   R Pupil Size (mm) 2   R Pupil Shape Round   R Pupil Reaction Brisk   L Pupil Size (mm) 2   L Pupil Shape Round   L Pupil Reaction Brisk   R Hand  Strong   L Hand  Strong   R Foot Dorsiflexion Strong   L Foot Dorsiflexion Strong   R Foot Plantar Flexion Strong   L Foot Plantar Flexion Strong   RUE Motor Response Normal extension;Normal flexion; Responds to command   RUE Sensation  Full sensation   LUE Motor Response Normal extension;Normal flexion; Responds to command   LUE Sensation  Full sensation   RLE Motor Response Normal extension;Normal flexion; Responds to command   RLE Sensation  Full sensation   LLE Motor Response Normal flexion; Normal extension; Responds to command   LLE Sensation  Full sensation   Tongue Deviation None   Gag Present   Sheela Coma Scale   Eye Opening 4   Best Verbal Response 5   Best Motor Response 6   [place] Coma Scale Score 15   HEENT (Head, Ears, Eyes, Nose, & Throat)   Right Eye Impaired vision;Glasses   Left Eye Glasses; Impaired vision   Throat Dry   Incentive Spirometry Tx   Treatment Effort Independent   Predicted Volume 1000   Achieved Volume (mL) 750 mL   Respiratory   Respiratory Pattern Regular   Respiratory Depth Normal   Respiratory Quality/Effort Unlabored;Dyspnea with exertion   Chest Assessment Chest expansion symmetrical   Breath Sounds   Right Upper Lobe Rhonchi   Right Middle Lobe Diminished   Right Lower Lobe Diminished   Left Upper Lobe Rhonchi   Left Lower Lobe Diminished   Cough/Sputum   Sputum How Obtained Spontaneous cough   Cough Dry;Non-productive;Moist;Strong   Frequency Occasional   Chest Tube Right Pleural 1   Placement Date/Time: 07/14/22 1202   Inserted by: Sanju Skaggs  Present on Admission/Arrival: No  Tube Size (fr): 28 fr  Chest Tube Orientation: Right  Chest Tube Location: Pleural  Tube Number: 1  Chest Tube Drainage System: Suction   Chest Tube Airleak Yes   Status Gravity   Suction To water seal   Y Connector Used No   Drainage Description Serosanguinous   Dressing Status New dressing applied   Chest Tube Dressing Split Gauze   Site Assessment Dry; Intact   Surrounding Skin Intact   Output (ml)   (atrium level marked)   Cardiac   Cardiac Regularity Regular   Heart Sounds S1, S2   Cardiac Rhythm Sinus rhythm   Gastrointestinal   Abdominal (WDL) WDL   Abdomen Inspection Soft   Last BM (including prior to admit) 07/13/22   RUQ Bowel Sounds Active   LUQ Bowel Sounds Active   RLQ Bowel Sounds Active   LLQ Bowel Sounds Active   Constipation Precipitating Factors Surgery   Genitourinary   Genitourinary (WDL) WDL   Suprapubic Tenderness No   Dysuria (Pain/Burning w/Urination) No   Difficulty Urinating/Starting Stream No   Peripheral Vascular   Peripheral Vascular (WDL) WDL   Edema None   Skin Integumentary    Skin Integumentary (WDL) WDL   Musculoskeletal   Musculoskeletal (WDL) WDL   Incision 07/14/22 Chest Lateral;Right; Anterior;Posterior   Date First Assessed/Time First Assessed: 07/14/22 1109   Location: Chest  Incision Location Orientation: Lateral;Right; Anterior;Posterior  Incision Description (Comments): 4 PORT SITES   Dressing Status Clean;Dry; Intact   Dressing Change Due 07/17/22   Dressing/Treatment Dry dressing   Incision Assessment Other (Comment)  (JAEMSON)   Care Plan - Infection Goals   Absence of infection at discharge Monitor lab/diagnostic results;Assess and monitor for signs and symptoms of infection;Monitor all insertion sites i.e., indwelling lines, tubes and drains   Absence of infection during hospitalization Assess and monitor for signs and symptoms of infection;Monitor lab/diagnostic results;Monitor all insertion sites i.e., indwelling lines, tubes and drains   Care Plan - Metabolic/Fluid and Electrolytes Goals   Electrolytes maintained within normal limits Monitor labs and assess patient for signs and symptoms of electrolyte imbalances; Administer electrolyte replacement as ordered;Monitor response to electrolyte replacements, including repeat lab results as appropriate   Care Plan - Discharge Planning Goals   Discharge to home or other facility with appropriate resources Identify barriers to discharge with patient and caregiver; Identify discharge learning needs (meds, wound care, etc)   Handoff   Communication Given Shift Handoff   Handoff Given To JACQUELINE Westfall   Handoff Received From [id number] [place] [address] Communication Face to Face; At bedside   Time Handoff Given 4818

## 2022-07-17 NOTE — PROGRESS NOTES
Pt awake, using IS, ( Goal 1000 ) tolerated 750 ml level, mild MIGUEL noted on RA. O2 sat 90-91 %. Rt CT Tidaling\ Dressing CDI. Pt has no c/o voiced at this time. Call light within reach.  YONRN

## 2022-07-18 ENCOUNTER — APPOINTMENT (OUTPATIENT)
Dept: GENERAL RADIOLOGY | Age: 67
DRG: 163 | End: 2022-07-18
Attending: THORACIC SURGERY (CARDIOTHORACIC VASCULAR SURGERY)
Payer: MEDICARE

## 2022-07-18 LAB
A/G RATIO: 1.1 (ref 1.1–2.2)
ALBUMIN SERPL-MCNC: 3 G/DL (ref 3.4–5)
ALP BLD-CCNC: 78 U/L (ref 40–129)
ALT SERPL-CCNC: 13 U/L (ref 10–40)
ANION GAP SERPL CALCULATED.3IONS-SCNC: 8 MMOL/L (ref 3–16)
AST SERPL-CCNC: 16 U/L (ref 15–37)
BASOPHILS ABSOLUTE: 0 K/UL (ref 0–0.2)
BASOPHILS RELATIVE PERCENT: 0.5 %
BILIRUB SERPL-MCNC: 0.7 MG/DL (ref 0–1)
BUN BLDV-MCNC: 17 MG/DL (ref 7–20)
CALCIUM SERPL-MCNC: 9.1 MG/DL (ref 8.3–10.6)
CHLORIDE BLD-SCNC: 96 MMOL/L (ref 99–110)
CO2: 31 MMOL/L (ref 21–32)
CREAT SERPL-MCNC: 0.6 MG/DL (ref 0.6–1.2)
EOSINOPHILS ABSOLUTE: 0.2 K/UL (ref 0–0.6)
EOSINOPHILS RELATIVE PERCENT: 3.8 %
GFR AFRICAN AMERICAN: >60
GFR NON-AFRICAN AMERICAN: >60
GLUCOSE BLD-MCNC: 124 MG/DL (ref 70–99)
HCT VFR BLD CALC: 35.3 % (ref 36–48)
HEMOGLOBIN: 12.1 G/DL (ref 12–16)
LYMPHOCYTES ABSOLUTE: 1 K/UL (ref 1–5.1)
LYMPHOCYTES RELATIVE PERCENT: 19.1 %
MAGNESIUM: 1.7 MG/DL (ref 1.8–2.4)
MCH RBC QN AUTO: 33.4 PG (ref 26–34)
MCHC RBC AUTO-ENTMCNC: 34.2 G/DL (ref 31–36)
MCV RBC AUTO: 97.5 FL (ref 80–100)
MONOCYTES ABSOLUTE: 0.5 K/UL (ref 0–1.3)
MONOCYTES RELATIVE PERCENT: 8.9 %
NEUTROPHILS ABSOLUTE: 3.4 K/UL (ref 1.7–7.7)
NEUTROPHILS RELATIVE PERCENT: 67.7 %
PDW BLD-RTO: 12.9 % (ref 12.4–15.4)
PLATELET # BLD: 174 K/UL (ref 135–450)
PMV BLD AUTO: 6.9 FL (ref 5–10.5)
POTASSIUM SERPL-SCNC: 4.2 MMOL/L (ref 3.5–5.1)
RBC # BLD: 3.62 M/UL (ref 4–5.2)
SODIUM BLD-SCNC: 135 MMOL/L (ref 136–145)
TOTAL PROTEIN: 5.7 G/DL (ref 6.4–8.2)
WBC # BLD: 5 K/UL (ref 4–11)

## 2022-07-18 PROCEDURE — 80053 COMPREHEN METABOLIC PANEL: CPT

## 2022-07-18 PROCEDURE — 71045 X-RAY EXAM CHEST 1 VIEW: CPT

## 2022-07-18 PROCEDURE — 2700000000 HC OXYGEN THERAPY PER DAY

## 2022-07-18 PROCEDURE — 6370000000 HC RX 637 (ALT 250 FOR IP): Performed by: THORACIC SURGERY (CARDIOTHORACIC VASCULAR SURGERY)

## 2022-07-18 PROCEDURE — 99024 POSTOP FOLLOW-UP VISIT: CPT | Performed by: NURSE PRACTITIONER

## 2022-07-18 PROCEDURE — 2060000000 HC ICU INTERMEDIATE R&B

## 2022-07-18 PROCEDURE — 94640 AIRWAY INHALATION TREATMENT: CPT

## 2022-07-18 PROCEDURE — 83735 ASSAY OF MAGNESIUM: CPT

## 2022-07-18 PROCEDURE — 94761 N-INVAS EAR/PLS OXIMETRY MLT: CPT

## 2022-07-18 PROCEDURE — 36415 COLL VENOUS BLD VENIPUNCTURE: CPT

## 2022-07-18 PROCEDURE — 97535 SELF CARE MNGMENT TRAINING: CPT

## 2022-07-18 PROCEDURE — 6360000002 HC RX W HCPCS: Performed by: NURSE PRACTITIONER

## 2022-07-18 PROCEDURE — 6370000000 HC RX 637 (ALT 250 FOR IP): Performed by: NURSE PRACTITIONER

## 2022-07-18 PROCEDURE — 85025 COMPLETE CBC W/AUTO DIFF WBC: CPT

## 2022-07-18 PROCEDURE — 6360000002 HC RX W HCPCS: Performed by: STUDENT IN AN ORGANIZED HEALTH CARE EDUCATION/TRAINING PROGRAM

## 2022-07-18 PROCEDURE — 97530 THERAPEUTIC ACTIVITIES: CPT

## 2022-07-18 PROCEDURE — 94669 MECHANICAL CHEST WALL OSCILL: CPT

## 2022-07-18 RX ORDER — SENNA AND DOCUSATE SODIUM 50; 8.6 MG/1; MG/1
2 TABLET, FILM COATED ORAL DAILY
Status: DISCONTINUED | OUTPATIENT
Start: 2022-07-18 | End: 2022-07-19 | Stop reason: HOSPADM

## 2022-07-18 RX ORDER — ENOXAPARIN SODIUM 100 MG/ML
40 INJECTION SUBCUTANEOUS DAILY
Status: DISCONTINUED | OUTPATIENT
Start: 2022-07-19 | End: 2022-07-19 | Stop reason: HOSPADM

## 2022-07-18 RX ORDER — MAGNESIUM SULFATE IN WATER 40 MG/ML
2000 INJECTION, SOLUTION INTRAVENOUS ONCE
Status: COMPLETED | OUTPATIENT
Start: 2022-07-18 | End: 2022-07-18

## 2022-07-18 RX ORDER — AMOXICILLIN AND CLAVULANATE POTASSIUM 875; 125 MG/1; MG/1
1 TABLET, FILM COATED ORAL EVERY 12 HOURS SCHEDULED
Status: DISCONTINUED | OUTPATIENT
Start: 2022-07-19 | End: 2022-07-19 | Stop reason: HOSPADM

## 2022-07-18 RX ADMIN — ENOXAPARIN SODIUM 30 MG: 100 INJECTION SUBCUTANEOUS at 10:03

## 2022-07-18 RX ADMIN — MAGNESIUM SULFATE HEPTAHYDRATE 2000 MG: 40 INJECTION, SOLUTION INTRAVENOUS at 13:16

## 2022-07-18 RX ADMIN — LEVOFLOXACIN 750 MG: 5 INJECTION, SOLUTION INTRAVENOUS at 09:59

## 2022-07-18 RX ADMIN — ACETYLCYSTEINE 600 MG: 200 SOLUTION ORAL; RESPIRATORY (INHALATION) at 19:53

## 2022-07-18 RX ADMIN — FAMOTIDINE 20 MG: 20 TABLET ORAL at 21:22

## 2022-07-18 RX ADMIN — ACETYLCYSTEINE 600 MG: 200 SOLUTION ORAL; RESPIRATORY (INHALATION) at 08:20

## 2022-07-18 RX ADMIN — DOCUSATE SODIUM 50 MG AND SENNOSIDES 8.6 MG 2 TABLET: 8.6; 5 TABLET, FILM COATED ORAL at 13:10

## 2022-07-18 RX ADMIN — IPRATROPIUM BROMIDE AND ALBUTEROL SULFATE 3 ML: .5; 2.5 SOLUTION RESPIRATORY (INHALATION) at 19:53

## 2022-07-18 RX ADMIN — TIOTROPIUM BROMIDE AND OLODATEROL 2 PUFF: 3.124; 2.736 SPRAY, METERED RESPIRATORY (INHALATION) at 08:20

## 2022-07-18 RX ADMIN — OXYCODONE HYDROCHLORIDE 5 MG: 5 TABLET ORAL at 08:18

## 2022-07-18 RX ADMIN — OXYCODONE HYDROCHLORIDE 5 MG: 5 TABLET ORAL at 13:10

## 2022-07-18 RX ADMIN — FAMOTIDINE 20 MG: 20 TABLET ORAL at 10:02

## 2022-07-18 RX ADMIN — IPRATROPIUM BROMIDE AND ALBUTEROL SULFATE 3 ML: .5; 2.5 SOLUTION RESPIRATORY (INHALATION) at 08:20

## 2022-07-18 RX ADMIN — AMIODARONE HYDROCHLORIDE 200 MG: 200 TABLET ORAL at 10:02

## 2022-07-18 RX ADMIN — OXYCODONE HYDROCHLORIDE 5 MG: 5 TABLET ORAL at 01:35

## 2022-07-18 RX ADMIN — OXYCODONE HYDROCHLORIDE 5 MG: 5 TABLET ORAL at 21:25

## 2022-07-18 ASSESSMENT — PAIN DESCRIPTION - PAIN TYPE: TYPE: SURGICAL PAIN

## 2022-07-18 ASSESSMENT — PAIN DESCRIPTION - ORIENTATION
ORIENTATION: RIGHT
ORIENTATION: RIGHT

## 2022-07-18 ASSESSMENT — PAIN DESCRIPTION - LOCATION
LOCATION: CHEST
LOCATION: RIB CAGE
LOCATION: RIB CAGE

## 2022-07-18 ASSESSMENT — PAIN SCALES - GENERAL
PAINLEVEL_OUTOF10: 0
PAINLEVEL_OUTOF10: 5
PAINLEVEL_OUTOF10: 7
PAINLEVEL_OUTOF10: 6
PAINLEVEL_OUTOF10: 5
PAINLEVEL_OUTOF10: 6

## 2022-07-18 NOTE — CARE COORDINATION
Call from [facility] who states [facility] cannot be provided to patient if she dc'd to sister's home in [facility]; insurance will not cover out of state services. Explained to patient who states she prefers not to have [facility] anyway and expects chest tube to come out prior to dc. Understands that if [facility] needed, she will need to dc to her [facility] residence. Following for needs.      LG

## 2022-07-18 NOTE — PROGRESS NOTES
-POD #4  -chest tube placed back to water seal. Will check CXR at 11:30 am.  -continue expansion maneuvers- IS, oobtc, ambulation  -CT chest 7/17 w/ severe RML mucous plugging. Mucolytics, overnight BiPap and EZPAP Q4 hours added. Constipation:   Pt does not want miralax, will add senokot. Monitor.     Hypomagnesemia: Mag was 1.70 this morning, replaced with 2 grams    Prophylaxis: amiodarone, pepcid and lovenox  __________________________________________________    TAMMY Hermosillo - CNP  7/18/2022  11:24 AM

## 2022-07-18 NOTE — PROGRESS NOTES
Pt watching TV, Stated \" I'm comfortable\" on 4 L O2 via NC., Rt CT to suction, output minimal, Dressing CDI. Pt doing IS, tolerating well, no c/o SOB ( Goal 1000 ) Pt reaching Goal. Up to restroom a few times this evening. No c/o voiced at this time. Safety/fall prevention maintained. Personal belongings and call light within reach.  YONRN Imaging Studies

## 2022-07-18 NOTE — PLAN OF CARE
Problem: Discharge Planning  Goal: Discharge to home or other facility with appropriate resources  7/18/2022 0518 by Bucky Diop RN  Outcome: Progressing     Problem: Safety - Adult  Goal: Free from fall injury  7/18/2022 0518 by Bucky Diop RN  Outcome: Progressing     Problem: Pain  Goal: Verbalizes/displays adequate comfort level or baseline comfort level  7/18/2022 0518 by Bucky Diop RN  Outcome: Progressing  Flowsheets (Taken 7/17/2022 2209)  Verbalizes/displays adequate comfort level or baseline comfort level:   Encourage patient to monitor pain and request assistance   Assess pain using appropriate pain scale   Administer analgesics based on type and severity of pain and evaluate response   Implement non-pharmacological measures as appropriate and evaluate response

## 2022-07-18 NOTE — CARE COORDINATION
[facility] home care unable to bill [place] [address] [facility]  Patient needs Ariana Marakan address for home care services  Suki Guevara CM notified    If patient chooses to stay in [address] [place] [address] to follow for home care    Hamilton Johnson RN, BSN CTN  [facility] [phone]

## 2022-07-18 NOTE — PROGRESS NOTES
Occupational Therapy  Facility/Department: [place] [address] [facility]  Daily Treatment Note and Discharge Summary   NAME: Gonzales Mason  : [birth year]  MRN: [id number]    Date of Service: 2022    Discharge Recommendations:  24 hour supervision or assist  OT Equipment Recommendations  Equipment Needed: Yes  Mobility Devices: ADL Assistive Devices  ADL Assistive Devices: Shower Chair with back  Other: pt may benefit from shower chair d/t decreased functional endurance and for increased safety during showering      Patient Diagnosis(es): The encounter diagnosis was Lung nodule. Assessment    Assessment: Gonzales Mason is progressing in therapy well, currently limited by chest tube and IVs limiting independence in mobility. Demo'd good safety awareness, managing chest tube during ambulation. Functional Mobility: Supervision for mobility with No AD bed to chair to toilet to chair  ADL: Independent for toileting, use of hand wipes, and hair care   Activity was tolerated well, pt required no undue rest breaks; at end of session SPO2 91% on RA and HR 68. Continue OT POC to address performance deficits in ADLs and functional mobility. Recommend Home with  Assist/Supervision  upon d/c and shower chair with back    AM-PAC Score  AM-[facility] Inpatient Daily Activity Raw Score: 22 (22)  AM-PAC Inpatient ADL T-Scale Score : 47.1 (22)  ADL Inpatient CMS 0-100% Score: 25.8 (22)  ADL Inpatient CMS G-Code Modifier : CJ (22)    Activity Tolerance: Patient tolerated treatment well  Discharge Recommendations: 24 hour supervision or assist  Equipment Needed: Yes  Mobility Devices: ADL Assistive Devices  Other: pt may benefit from shower chair d/t decreased functional endurance and for increased safety during showering      Plan   Plan  Times per Week: 1-2  Current Treatment Recommendations: Strengthening;Balance training;Functional mobility training; Endurance training;Self-Care / ADL;Equipment evaluation, education, & procurement;Patient/Caregiver education & training; Safety education & training;Home management training     Restrictions  Restrictions/Precautions  Restrictions/Precautions: General Precautions  Position Activity Restriction  Other position/activity restrictions: chest tube, tele and cont O2 monitoring    Subjective   Subjective  Subjective: Pt alert and awake in bed upon arrival, reporting having perform many ADLs previously in the day but agreeable to OT; RN cleared therapy; pt reporting mild pain at chest tube site when coughing  Orientation  Overall Orientation Status: Within Functional Limits  Orientation Level: Oriented X4  Cognition  Overall Cognitive Status: WFL        Objective    Vitals  Vitals  Heart Rate: 66  BP: 102/62  BP Location: Left upper arm  BP Method: Automatic  Patient Position: Semi fowlers  MAP (Calculated): 75.33  SpO2: 94 %  O2 Device: None (Room air)  Bed Mobility Training  Bed Mobility Training: Yes  Supine to Sit: Modified independent (HOB elevated)  Sit to Supine: Other (comment) (pt in chair at exit)  Scooting: Independent  Transfer Training  Transfer Training: Yes  Overall Level of Assistance: Supervision  Sit to Stand: Supervision  Stand to Sit: Supervision  Bed to Chair: Supervision  Toilet Transfer: Supervision  Gait  Overall Level of Assistance: Supervision (pt managed chest tube during ambulation in room)     ADL  Feeding: Independent; Beverage management  Grooming: Independent  Grooming Skilled Clinical Factors: pt asked for sani wipes for hands after toileting d/t O2 monitor; dep for brushing mat from hair while pt sat EOB for ~7 minutes pt then put hair into bun at nape of neck independently  Toileting: Independent  Toileting Skilled Clinical Factors: pt managed hunter care and LB clothing after urination        Safety Devices  Type of Devices: All fall risk precautions in place;Call light within reach;Nurse notified; Left in chair          Goals  Short Term Goals  Time Frame for Short term goals: 1 week (7/22/22) unless otherwise specified  Short Term Goal 1: Pt will perform toilet transfer with supervision by 7/20- GOAL MET 7/18  Short Term Goal 2: Pt will perform sink-level ADLs with supervision- not addressed 7/18  Short Term Goal 3: Pt will perform UE exercises 15x to increase independence in ADLs- not addressed 7/18  Short Term Goal 4: New goal 7/18: Pt will perform LB dressing with supervision- not addressed during session  Patient Goals   Patient goals : To go home       Therapy Time   Individual Concurrent Group Co-treatment   Time In 1616         Time Out 1640         Minutes 24         Timed Code Treatment Minutes: 24 Minutes       Please let this note serve as discharge summary. Pt is to be discharged from acute care services at this time, pt is supervision-independent for ADLs and functional mobility. Please re consult OT services should pt's functional status changes during this admission. Please see case management note for discharge disposition. Thank you.   Antonina Rico, OT

## 2022-07-18 NOTE — PROGRESS NOTES
Pt. Ambulated @ room, on RA O2 sat 93 %. No c/o SOB, no discomfort. Up to chair for breakfast. Will continue to monitor.  CHANG

## 2022-07-19 ENCOUNTER — APPOINTMENT (OUTPATIENT)
Dept: GENERAL RADIOLOGY | Age: 67
DRG: 163 | End: 2022-07-19
Attending: THORACIC SURGERY (CARDIOTHORACIC VASCULAR SURGERY)
Payer: MEDICARE

## 2022-07-19 VITALS
RESPIRATION RATE: 20 BRPM | HEART RATE: 65 BPM | SYSTOLIC BLOOD PRESSURE: 118 MMHG | WEIGHT: 113.54 LBS | TEMPERATURE: 98.3 F | DIASTOLIC BLOOD PRESSURE: 69 MMHG | BODY MASS INDEX: 19.38 KG/M2 | HEIGHT: 64 IN | OXYGEN SATURATION: 94 %

## 2022-07-19 LAB
A/G RATIO: 1 (ref 1.1–2.2)
ALBUMIN SERPL-MCNC: 2.9 G/DL (ref 3.4–5)
ALP BLD-CCNC: 78 U/L (ref 40–129)
ALT SERPL-CCNC: 11 U/L (ref 10–40)
ANION GAP SERPL CALCULATED.3IONS-SCNC: 8 MMOL/L (ref 3–16)
AST SERPL-CCNC: 13 U/L (ref 15–37)
BASOPHILS ABSOLUTE: 0 K/UL (ref 0–0.2)
BASOPHILS RELATIVE PERCENT: 0.5 %
BILIRUB SERPL-MCNC: 0.3 MG/DL (ref 0–1)
BUN BLDV-MCNC: 19 MG/DL (ref 7–20)
CALCIUM SERPL-MCNC: 9.2 MG/DL (ref 8.3–10.6)
CHLORIDE BLD-SCNC: 98 MMOL/L (ref 99–110)
CO2: 30 MMOL/L (ref 21–32)
CREAT SERPL-MCNC: 0.6 MG/DL (ref 0.6–1.2)
EOSINOPHILS ABSOLUTE: 0.5 K/UL (ref 0–0.6)
EOSINOPHILS RELATIVE PERCENT: 10.4 %
GFR AFRICAN AMERICAN: >60
GFR NON-AFRICAN AMERICAN: >60
GLUCOSE BLD-MCNC: 137 MG/DL (ref 70–99)
HCT VFR BLD CALC: 35 % (ref 36–48)
HEMOGLOBIN: 11.9 G/DL (ref 12–16)
LYMPHOCYTES ABSOLUTE: 0.9 K/UL (ref 1–5.1)
LYMPHOCYTES RELATIVE PERCENT: 19.5 %
MAGNESIUM: 1.9 MG/DL (ref 1.8–2.4)
MCH RBC QN AUTO: 33.2 PG (ref 26–34)
MCHC RBC AUTO-ENTMCNC: 34.1 G/DL (ref 31–36)
MCV RBC AUTO: 97.6 FL (ref 80–100)
MONOCYTES ABSOLUTE: 0.4 K/UL (ref 0–1.3)
MONOCYTES RELATIVE PERCENT: 7.7 %
NEUTROPHILS ABSOLUTE: 2.8 K/UL (ref 1.7–7.7)
NEUTROPHILS RELATIVE PERCENT: 61.9 %
PDW BLD-RTO: 12.9 % (ref 12.4–15.4)
PLATELET # BLD: 205 K/UL (ref 135–450)
PMV BLD AUTO: 7 FL (ref 5–10.5)
POTASSIUM SERPL-SCNC: 4.3 MMOL/L (ref 3.5–5.1)
RBC # BLD: 3.59 M/UL (ref 4–5.2)
SODIUM BLD-SCNC: 136 MMOL/L (ref 136–145)
TOTAL PROTEIN: 5.7 G/DL (ref 6.4–8.2)
WBC # BLD: 4.5 K/UL (ref 4–11)

## 2022-07-19 PROCEDURE — 97110 THERAPEUTIC EXERCISES: CPT

## 2022-07-19 PROCEDURE — 6360000002 HC RX W HCPCS: Performed by: NURSE PRACTITIONER

## 2022-07-19 PROCEDURE — 99024 POSTOP FOLLOW-UP VISIT: CPT | Performed by: NURSE PRACTITIONER

## 2022-07-19 PROCEDURE — 6370000000 HC RX 637 (ALT 250 FOR IP): Performed by: NURSE PRACTITIONER

## 2022-07-19 PROCEDURE — 94669 MECHANICAL CHEST WALL OSCILL: CPT

## 2022-07-19 PROCEDURE — 71045 X-RAY EXAM CHEST 1 VIEW: CPT

## 2022-07-19 PROCEDURE — 94640 AIRWAY INHALATION TREATMENT: CPT

## 2022-07-19 PROCEDURE — 85025 COMPLETE CBC W/AUTO DIFF WBC: CPT

## 2022-07-19 PROCEDURE — 6370000000 HC RX 637 (ALT 250 FOR IP): Performed by: THORACIC SURGERY (CARDIOTHORACIC VASCULAR SURGERY)

## 2022-07-19 PROCEDURE — 83735 ASSAY OF MAGNESIUM: CPT

## 2022-07-19 PROCEDURE — 80053 COMPREHEN METABOLIC PANEL: CPT

## 2022-07-19 PROCEDURE — 36415 COLL VENOUS BLD VENIPUNCTURE: CPT

## 2022-07-19 PROCEDURE — 97116 GAIT TRAINING THERAPY: CPT

## 2022-07-19 RX ORDER — FAMOTIDINE 20 MG/1
20 TABLET, FILM COATED ORAL DAILY
Qty: 30 TABLET | Refills: 0 | Status: SHIPPED | OUTPATIENT
Start: 2022-07-19 | End: 2022-08-18

## 2022-07-19 RX ORDER — OXYCODONE HYDROCHLORIDE 5 MG/1
5 TABLET ORAL EVERY 6 HOURS PRN
Qty: 28 TABLET | Refills: 0 | Status: SHIPPED | OUTPATIENT
Start: 2022-07-19 | End: 2022-07-26 | Stop reason: SDUPTHER

## 2022-07-19 RX ORDER — AMOXICILLIN AND CLAVULANATE POTASSIUM 875; 125 MG/1; MG/1
1 TABLET, FILM COATED ORAL EVERY 12 HOURS SCHEDULED
Qty: 9 TABLET | Refills: 0 | Status: SHIPPED | OUTPATIENT
Start: 2022-07-19 | End: 2022-07-24

## 2022-07-19 RX ORDER — BISACODYL 10 MG
10 SUPPOSITORY, RECTAL RECTAL ONCE
Status: COMPLETED | OUTPATIENT
Start: 2022-07-19 | End: 2022-07-19

## 2022-07-19 RX ORDER — SENNA AND DOCUSATE SODIUM 50; 8.6 MG/1; MG/1
1 TABLET, FILM COATED ORAL DAILY
Qty: 7 TABLET | Refills: 0 | Status: SHIPPED | OUTPATIENT
Start: 2022-07-20 | End: 2022-07-27

## 2022-07-19 RX ORDER — AMIODARONE HYDROCHLORIDE 200 MG/1
200 TABLET ORAL DAILY
Qty: 14 TABLET | Refills: 0 | Status: SHIPPED | OUTPATIENT
Start: 2022-07-20 | End: 2022-08-03

## 2022-07-19 RX ADMIN — AMOXICILLIN AND CLAVULANATE POTASSIUM 1 TABLET: 875; 125 TABLET, FILM COATED ORAL at 10:11

## 2022-07-19 RX ADMIN — DOCUSATE SODIUM 50 MG AND SENNOSIDES 8.6 MG 2 TABLET: 8.6; 5 TABLET, FILM COATED ORAL at 10:11

## 2022-07-19 RX ADMIN — OXYCODONE HYDROCHLORIDE 5 MG: 5 TABLET ORAL at 04:45

## 2022-07-19 RX ADMIN — FAMOTIDINE 20 MG: 20 TABLET ORAL at 10:11

## 2022-07-19 RX ADMIN — OXYCODONE HYDROCHLORIDE 5 MG: 5 TABLET ORAL at 14:05

## 2022-07-19 RX ADMIN — ENOXAPARIN SODIUM 40 MG: 40 INJECTION SUBCUTANEOUS at 10:11

## 2022-07-19 RX ADMIN — TIOTROPIUM BROMIDE AND OLODATEROL 2 PUFF: 3.124; 2.736 SPRAY, METERED RESPIRATORY (INHALATION) at 08:37

## 2022-07-19 RX ADMIN — AMIODARONE HYDROCHLORIDE 200 MG: 200 TABLET ORAL at 10:11

## 2022-07-19 RX ADMIN — IPRATROPIUM BROMIDE AND ALBUTEROL SULFATE 3 ML: .5; 2.5 SOLUTION RESPIRATORY (INHALATION) at 08:37

## 2022-07-19 RX ADMIN — ACETYLCYSTEINE 600 MG: 200 SOLUTION ORAL; RESPIRATORY (INHALATION) at 08:37

## 2022-07-19 RX ADMIN — BISACODYL 10 MG: 10 SUPPOSITORY RECTAL at 11:48

## 2022-07-19 ASSESSMENT — PAIN SCALES - GENERAL
PAINLEVEL_OUTOF10: 6
PAINLEVEL_OUTOF10: 0

## 2022-07-19 NOTE — CARE COORDINATION
Patient has [facility]. She is going to d/c home with her sister who lives in [place] and [place]. Patient aware and denies need for home care. Chest tube has been pulled. She has a ride home and [facility] and [facility] okay with her [facility]. Negar PHILLIPS aware.

## 2022-07-19 NOTE — PROGRESS NOTES
Physician Progress Note      Celestino Barriga  [facility] #:                  [id number]  :                       1955  ADMIT DATE:       2022 9:36 AM  DISCH DATE:  RESPONDING  PROVIDER #:        Elisa Lange CNP          QUERY TEXT:    Patient admitted with right lung nodule for RU lobectomy, noted to have CT   inserted  to [facility] for atelectasis noted on CXR . If possible, please   document in progress notes and discharge summary if you are evaluating and/or   treating any of the following: The medical record reflects the following:  Risk Factors: Lung nodule    Clinical Indicators: per CT note -\"chest tube placed back to water seal.   Will check CXR at 11:30 am.\"  CXR -\"There is perihilar atelectasis\", CXR -\"Mild increase   interstitial congestion in the lungs. Unchanged persistent post surgical   pneumothorax\"    Treatment: chest tube to water seal, imaging, lasix, IS and expansion   maneuvers, supportive care    Thank you,  [email or web address]  Options provided:  -- Atelectasis  -- CXR not clinically significant  -- Other - I will add my own diagnosis  -- Disagree - Not applicable / Not valid  -- Disagree - Clinically unable to determine / Unknown  -- Refer to Clinical Documentation Reviewer    PROVIDER RESPONSE TEXT:    Provider disagreed with this query.     Query created by: Maxwell Dukes on 2022 4:33 PM      Electronically signed by:  Elisa Lange CNP 2022 10:30 AM

## 2022-07-19 NOTE — DISCHARGE INSTR - DIET

## 2022-07-19 NOTE — DISCHARGE SUMMARY
Cardiac, Vascular & Thoracic Surgery  Discharge Summary    Patient:  Jenn Borden 1955 [id number]   Admission Date:  7/14/2022  9:36 AM  Discharge Date:  07/19/22     Principle Diagnosis:  Primary cancer of right upper lobe of lung ([facility] [address]    Secondary Diagnosis:  Principal Problem:    Primary cancer of right upper lobe of lung ([facility] [address]  Resolved Problems:    * No resolved hospital problems. *    PET: 6/17/22  Impression   Irregular spiculated right upper lobe pulmonary nodule has increased in size   slowly over time but is not significantly hypermetabolic. Slow growth and   appearance is suspicious for neoplastic etiology. There is slight low-grade   uptake seen in right hilar and precarinal lymph node. No hypermetabolic   pulmonary nodules noted on the left       Mild biliary ductal dilatation is seen. There is a questionable stone seen   in the common duct. .  Recommend correlation with serum bilirubin       No focal FDG uptake seen in abdomen or pelvis       RECOMMENDATIONS:   Unavailable           Procedure:  7/14/22 RIGHT VIDEO ASSISTED THORACOSCOPIC SURGERY, RIGHT UPPER LOBE LOBECTOMY, MEDIASTINAL LYMPHADENECTOMY AND INTERCOSTAL NERVE BLOCK     History:  The patient is a 78 y.o. female, current smoker, with history of COPD who has been followed for a central RUL lung nodule since 2018. Noted to be slowly growing on CT. PET scan positive. Pathology with atypical cells. We are consulted for consideration of surgical intervention. Hospital Course: The patient underwent right VATS with RUL lobctomy on 7/14. Post op CT showed severe RML mucous plugging. Pt started on mucolytics with aggressive pulmonary toilet. She was started on prophylactic antibiotics. Her post operative course was otherwise uneventful. Her chest tube was pulled on POD #5 with stable post pull CXR. The patient was discharged on 07/19/22.      Discharged Condition: stable    Disposition:  home    Discharge Medications:     Medication DAILY WITH A CLEAN WASHCLOTH AND ANTIBACTERIAL SOAP. Do not wash your incisions after you have cleansed other parts of your body    Follow up with Cardiothoracic Surgery PA, Lazarus Storey, on 7/26.      TAMMY Cramer - CNP

## 2022-07-19 NOTE — PROGRESS NOTES
Physician Progress Note      PATIENTKvng Dietz  CSN #:                  [id number]  :                       1955  ADMIT DATE:       2022 9:36 AM  DISCH DATE:  RESPONDING  PROVIDER #:        Enedina Lange CNP          QUERY TEXT:    Pt admitted for lobectomy . Noted remains on 4-5L O2 with use of Bipap and EZ   pap on POD 4.? Please document in progress notes and discharge summary if you   are evaluating/treating any of the following: The medical record reflects the following:  Risk Factors: COPD w/ lung nodule, AF, smoker, lobectomy surgery this   admission    Clinical Indicators: Per PN -\"CT chest   w/ severe RML mucous   plugging. Mucolytics, overnight BiPap and EZPAP Q4 hours added. \"  per nursing PN  at 0441-\"using IS, ( Goal 1000 ) tolerated 750 ml level,   mild MIGUEL noted on RA. O2 sat 90-91 %\"  per nursing VS flow sheet- O2 sat down to 84% on RA on  at 1057 requiring   4-5L NC, Bipap and EZpap, RR 17-29  CXR -Mild increase interstitial congestion in the lungs. Unchanged   persistent post surgical pneumothorax right chest apex    Treatment: Imaging, labs, lasix on  and , Bipap, EZpap, O2, pulmonary   expansion maneuvers, supportive nursing care    Thank you,  [email or web address]  Options provided:  -- Acute? Postoperative Pulmonary Insufficiency d/t COPD  -- Acute post op respiratory failure  -- Other - I will add my own diagnosis  -- Disagree - Not applicable / Not valid  -- Disagree - Clinically unable to determine / Unknown  -- Refer to Clinical Documentation Reviewer    PROVIDER RESPONSE TEXT:    Patient has acute postoperative pulmonary insufficiency d/t COPD.     Query created by: Daja Moore on 2022 4:49 PM      Electronically signed by:  Enedina Lange CNP 2022 12:36 PM

## 2022-07-19 NOTE — OP NOTE
Endo KAMI stapler fissure was completed by size 60 purple color Endo KAMI. Specimen was placed in the bag and removed. Pulmonary ligament was taken down and level 8 and 9 lymph node was removed sent for pathology level 7 lymph node was removed and sent for pathology level 4 right was removed and sent for pathology middle lobe and upper lobe was fixed together by using 2-0 silk in 2 positions.   Insufflation of the lung after placement of chest tube and local anesthetic Exparel Marcaine was placed from the second to the seventh space was performed all port site was closed patient was dispositioned to recovery room in stable condition without no complication    Electronically signed by Loco Gotti MD on 7/19/2022 at 2:52 PM

## 2022-07-19 NOTE — PROGRESS NOTES
CVTS Thoracic Progress Note:          CC:  Post op follow up 7/14/22 RIGHT VIDEO ASSISTED THORACOSCOPIC SURGERY, RIGHT UPPER LOBE LOBECTOMY, MEDIASTINAL LYMPHADENECTOMY AND INTERCOSTAL NERVE BLOCK     Subj: awake, up in chair, breathing with ease. Reports pain is controlled. Saturating 92% on RA    Obj:    Blood pressure (!) 103/57, pulse 64, temperature 97.7 °F (36.5 °C), temperature source Oral, resp. rate 20, height 5' 4\" (1.626 m), weight 113 lb 8.6 oz (51.5 kg), SpO2 92 %, not currently breastfeeding. Lungs w/ fine crackles   Abdomen soft, non-tender   Incision w/ occlusive dressing, CDI   Chest tube with 90-50-50= 190 ml serosanguinous drainage in last 24 hours/no airleak noted this morning    Diagnostics:   Recent Labs     07/17/22  0516 07/18/22  0510 07/19/22  0502   WBC 5.2 5.0 4.5   HGB 12.9 12.1 11.9*   HCT 38.1 35.3* 35.0*    174 205                                                                  Recent Labs     07/17/22  0516 07/18/22  0510 07/19/22  0502   * 135* 136   K 4.6 4.2 4.3   CL 97* 96* 98*   CO2 30 31 30   BUN 15 17 19   CREATININE <0.5* 0.6 0.6   GLUCOSE 141* 124* 137*            Recent Labs     07/17/22  0516 07/18/22  0510 07/19/22  0502   MG 1.50* 1.70* 1.90        CXR: 7/15/22   Impression   Markedly elevated right hemidiaphragm with increased   consolidation/atelectasis right lung base. Persistent focal right hilar prominence. Minimal patchy opacities in the lungs similar to prior study. No pneumothorax. CT Chest: 7/17/22   Impression   Right upper lobectomy. Right middle lobe partial atelectasis and medial segment consolidation   secondary to mucous plugging of its central bronchial region. Bilateral atelectasis, emphysematous changes, and interstitial pneumonitis. Subcentimeter nodules as described above. No evidence of middle lobe torsion.        Assess/Plan:   Right upper lobe lung nodule: s/p RU lobectomy   -POD #5  -chest tube clamped this morning. Will check CXR at 11:45 am. If stable will d/c chest tube. -continue expansion maneuvers- IS, oobtc, ambulation  -CT chest 7/17 w/ severe RML mucous plugging. Mucolytics, overnight BiPap and EZPAP Q4 hours added.   -prophylactic abx changed to Augmentin as she is on amiodarone     Constipation:   Still no bm despite bowel regimen. Will add suppository. Hypomagnesemia: resolved. Mag 1.90 this morning    Prophylaxis: amiodarone, pepcid and lovenox    Dispo: pt might go home later today pending possible removal of chest tube.   __________________________________________________    TAMMY Ronquillo - CNP  7/19/2022  10:30 AM

## 2022-07-19 NOTE — FLOWSHEET NOTE
07/18/22 2007   Assessment   Charting Type Shift assessment   Psychosocial   Psychosocial (WDL) WDL   Neurological   Level of Consciousness Alert (0)   Orientation Level Oriented X4   Cognition Appropriate judgement; Appropriate safety awareness; Appropriate attention/concentration; Appropriate for developmental age   Speech Clear; Appropriate for developmental age   [de-identified] Coma Scale   Eye Opening 4   Best Verbal Response 5   Best Motor Response 6   [place] Coma Scale Score 15   HEENT (Head, Ears, Eyes, Nose, & Throat)   Right Eye Impaired vision;Glasses   Left Eye Glasses; Impaired vision   Throat Dry   Respiratory   Respiratory Pattern Regular   Respiratory Depth Normal   Respiratory Quality/Effort Unlabored;Dyspnea with exertion   Chest Assessment Chest expansion symmetrical   L Breath Sounds Diminished   R Breath Sounds Rhonchi   Breath Sounds   Right Upper Lobe Rhonchi   Right Middle Lobe Rhonchi   Right Lower Lobe Rhonchi   Left Upper Lobe Diminished   Left Lower Lobe Diminished   Cough/Sputum   Sputum How Obtained Spontaneous cough   Cough Dry;Moist;Strong;Productive   Chest Tube Right Pleural 1   Placement Date/Time: 07/14/22 1202   Inserted by: Myron Cabrera  Present on Admission/Arrival: No  Tube Size (fr): 28 fr  Chest Tube Orientation: Right  Chest Tube Location: Pleural  Tube Number: 1  Chest Tube Drainage System: Suction   Chest Tube Airleak Yes   Suction To water seal   Y Connector Used No   Drainage Description Serosanguinous   Dressing Status Clean, dry & intact   Site Assessment Dry; Intact   Surrounding Skin Intact   Cardiac   Cardiac Regularity Regular   Heart Sounds S1, S2   Cardiac Rhythm Sinus rhythm   Gastrointestinal   Abdominal (WDL) WDL   Abdomen Inspection Soft   RUQ Bowel Sounds Active   LUQ Bowel Sounds Active   RLQ Bowel Sounds Active   LLQ Bowel Sounds Active   Constipation Precipitating Factors Other (Comment)  (pain medication)   Genitourinary   Genitourinary (WDL) WDL   Suprapubic Tenderness No   Dysuria (Pain/Burning w/Urination) No   Urine Assessment   Urine Color Yellow/straw   Urine Appearance Clear   Peripheral Vascular   Peripheral Vascular (WDL) WDL   Edema None   Skin Integumentary    Skin Integumentary (WDL) WDL   Musculoskeletal   Musculoskeletal (WDL) WDL   Incision 07/14/22 Chest Lateral;Right; Anterior;Posterior   Date First Assessed/Time First Assessed: 07/14/22 1109   Location: Chest  Incision Location Orientation: Lateral;Right; Anterior;Posterior  Incision Description (Comments): 4 PORT SITES   Dressing Status Clean;Dry; Intact   Dressing Change Due 07/18/22   Dressing/Treatment Dry dressing   Incision Assessment Dry

## 2022-07-19 NOTE — PLAN OF CARE
Problem: Discharge Planning  Goal: Discharge to home or other facility with appropriate resources  Outcome: Progressing Towards Goal  Flowsheets  Taken 7/18/2022 1525 by Jeffy Ramírez RN  Discharge to home or other facility with appropriate resources:   Identify barriers to discharge with patient and caregiver   Arrange for needed discharge resources and transportation as appropriate   Identify discharge learning needs (meds, wound care, etc)   Arrange for interpreters to assist at discharge as needed   Refer to discharge planning if patient needs post-hospital services based on physician order or complex needs related to functional status, cognitive ability or social support system  Taken 7/18/2022 0945 by Jeffy Ramírez RN  Discharge to home or other facility with appropriate resources:   Identify barriers to discharge with patient and caregiver   Arrange for needed discharge resources and transportation as appropriate   Identify discharge learning needs (meds, wound care, etc)   Arrange for interpreters to assist at discharge as needed   Refer to discharge planning if patient needs post-hospital services based on physician order or complex needs related to functional status, cognitive ability or social support system

## 2022-07-19 NOTE — PROGRESS NOTES
Physical Therapy  Facility/Department: [address] [place] [address] PCU  Daily Treatment Note  NAME: Shaneka Valencia  : 1955  MRN: [id number]    Date of Service: 2022    Discharge Recommendations:  24 hour supervision or assist, Home with Home health PT   PT Equipment Recommendations  Equipment Needed: No    AM-[facility] Mobility Inpatient   How much difficulty turning over in bed?: None  How much difficulty sitting down on / standing up from a chair with arms?: None  How much difficulty moving from lying on back to sitting on side of bed?: None  How much help from another person moving to and from a bed to a chair?: None  How much help from another person needed to walk in hospital room?: None  How much help from another person for climbing 3-5 steps with a railing?: A Little  AM-[facility] Inpatient Mobility Raw Score : 23  AM-[facility] Inpatient T-Scale Score : 56.93  Mobility Inpatient CMS 0-100% Score: 11.2  Mobility Inpatient CMS G-Code Modifier : CI     Patient Diagnosis(es): The encounter diagnosis was Lung nodule. Assessment   Assessment: Pt progressing with therapy as expectied needing SBA/S for ambulaton without AD up to 100' with O2 sats maintained on RA at 92%. Pt recommended for con't skilled PT and home with 24 hr A and HHPT at D/C. Activity Tolerance: Patient tolerated treatment well  Equipment Needed: No     Plan    Plan  Plan: 5-7 times per week  Current Treatment Recommendations: Strengthening;Balance training;Functional mobility training;Gait training;Stair training;Home exercise program;Endurance training; Therapeutic activities     Restrictions  Restrictions/Precautions  Restrictions/Precautions: General Precautions  Position Activity Restriction  Other position/activity restrictions: chest tube, tele and cont O2 monitoring     Subjective    Subjective  Subjective: Pt agreeable, reports chest tube clamped and O2 removed this a.m.   Pain: Denies pain  Orientation  Overall Orientation Status: Within Functional Limits  Orientation Level: Oriented X4  Cognition  Overall Cognitive Status: WFL     Objective   Vitals:  Vitals:    07/19/22 0837   BP: 103/57   Pulse: 64   Resp:    Temp:    SpO2: 92%          Bed Mobility Training  Bed Mobility Training: Yes  Supine to Sit: Modified independent    Balance  Sitting: Intact  Standing: Intact  Standing - Static: Good  Standing - Dynamic: Good;Fair    Transfer Training  Transfer Training: Yes  Overall Level of Assistance: Modified independent    Sit to Stand: Modified independent  Stand to Sit: Modified independent  Gait Training  Gait Training: Yes  Gait  Overall Level of Assistance: Supervision;Stand-by assistance  Base of Support: Narrowed  Speed/Marilee: Slow  Step Length: Left shortened;Right shortened  Distance (ft): 100 Feet  Assistive Device: Gait belt (O2 sat prior to amb and after amb on RA was 92%. Declined further ambulation)     PT Exercises  Exercise Treatment: BLE seted exs: AP, LAQ, marches, clamshells X 15     Safety Devices  Type of Devices:  All amy prominences offloaded;Call light within reach;Gait belt;Left in chair;Nurse notified       Goals  Short Term Goals  Time Frame for Short term goals: 7 days (7/22/22)  Short term goal 1: Pt will perform bed mobility mod I  Short term goal 2: Pt will perform transfers with supervision  Short term goal 3: Pt will ambulate 250' without device with supervision  Short term goal 4: Pt will negotiate 4 steps with HR and supervision  Short term goal 5: By 7/17/22, pt will tolerate 15 reps of LE ther ex for improved strength and activity tolerance  Patient Goals   Patient goals : \"to get better and go home\"    Education  Patient Education  Education Given To: Patient  Education Provided: Role of Therapy  Education Provided Comments: Disease specific: transfer training and energy conservation  Education Method: Verbal;Demonstration  Barriers to Learning: None  Education Outcome: Verbalized understanding    Therapy Time Individual Concurrent Group Co-treatment   Time In 0856         Time Out 0920         Minutes Trevor Naranjo [address]

## 2022-07-19 NOTE — CARE COORDINATION
Per CTS note plan is to clamp chest tube and possibly remove it and discharge patient . PT/OT is recommending 24 hour supervision and assist and home PT. AMPAC is 23 which is high . Patient is planning on going home with her sister in [place] and understands her [facility] will not [place] [id number] [place]. Patient verbalized understanding and does not want home care anyway. Will follow.

## 2022-07-26 ENCOUNTER — OFFICE VISIT (OUTPATIENT)
Dept: CARDIOTHORACIC SURGERY | Age: 67
End: 2022-07-26

## 2022-07-26 VITALS
TEMPERATURE: 97.9 F | DIASTOLIC BLOOD PRESSURE: 66 MMHG | HEART RATE: 70 BPM | HEIGHT: 64 IN | SYSTOLIC BLOOD PRESSURE: 112 MMHG | BODY MASS INDEX: 20.28 KG/M2 | OXYGEN SATURATION: 96 % | WEIGHT: 118.8 LBS

## 2022-07-26 DIAGNOSIS — R91.1 PULMONARY NODULE: Primary | ICD-10-CM

## 2022-07-26 DIAGNOSIS — G89.18 ACUTE POST-OPERATIVE PAIN: ICD-10-CM

## 2022-07-26 PROCEDURE — 99024 POSTOP FOLLOW-UP VISIT: CPT

## 2022-07-26 RX ORDER — OXYCODONE HYDROCHLORIDE 5 MG/1
5 TABLET ORAL EVERY 6 HOURS PRN
Qty: 20 TABLET | Refills: 0 | Status: SHIPPED | OUTPATIENT
Start: 2022-07-26 | End: 2022-07-31

## 2022-07-26 NOTE — PROGRESS NOTES
Cardiac, Vascular and Thoracic Surgeons  Clinic Note     7/26/2022 10:23 AM  Surgeon:  Dee Floyd     S/P :  1st post-op s/p RVATS, RUL lobectomy, MSLND, & ICNB on 07/14/22 w/ MOM. Chief complaint : 1st post-op  Subjective:  Ms. Marie Mcgregor is doing well post-op. She admits today that she  has smoked a couple cigarettes, but she's not smoking every day. She feels her breathing is better than before surgery. She is coughing, productive with clear sputum. She is only using her IS a couple times per day, encouraged more frequent use. Her right side incisions are healing nicely, no redness. Her previous chest tube site is leaking some, she is cleansing it each time she places a new dressing on top, and changing whenever saturated. Encouraged patient to add in tylenol for further pain control, as she is currently taking oxy several times daily. Vital Signs: /66 (Site: Left Upper Arm, Position: Sitting, Cuff Size: Medium Adult)   Pulse 70   Temp 97.9 °F (36.6 °C) (Temporal)   Ht 5' 4\" (1.626 m)   Wt 118 lb 12.8 oz (53.9 kg)   LMP  (LMP Unknown)   SpO2 96%   BMI 20.39 kg/m²      I/O:  No intake or output data in the 24 hours ending 07/26/22 1023    Exam:   Cardiovascular: Clear S1, S2. No MRG. Pulmonary: CTAB    Lower extremity: No peripheral edema. Incision: R thoracic incisions healed well. CT site under breast not yet scabbed over, cont to drain serous drainage. No purulence, erythema, edema. Surgical Pathology: 7/14/22  FINAL DIAGNOSIS:     A. Right level 10 lymph nodes, excision:   - Three benign lymph nodes (0/3). - Negative for malignancy. B.  Right level 4 lymph nodes, excision:   - Two benign lymph nodes (0/2). - Negative for malignancy. C.  Right levels 8 and 9 lymph nodes, excision:   - Two benign lymph nodes (0/2). - Negative for malignancy. D.  Right level 7 lymph node, excision:   - One benign lymph node (0/1). - Negative for malignancy.      E.  Right upper lobe, lobectomy:   - Invasive adenocarcinoma, moderately differentiated. - All margins negative for invasive carcinoma. - Seven benign lymph nodes (0/7). - For additional details please refer to the synoptic summary below. Labs:   CBC: No results for input(s): WBC, HGB, HCT, MCV, PLT in the last 72 hours. BMP: No results for input(s): NA, K, CL, CO2, PHOS, BUN, CREATININE, CA in the last 72 hours. PT/INR: No results for input(s): PROTIME, INR in the last 72 hours. APTT: No results for input(s): APTT in the last 72 hours. Scheduled Meds:     Patient Active Problem List   Diagnosis    Asthma    Arthritis    Hormone replacement therapy    COPD (chronic obstructive pulmonary disease) (HCC)    Tobacco dependence    Primary cancer of right upper lobe of lung (HCC)       Assessment/Plan:   1st post-op s/p RVATS, RUL lobectomy, MSLND, & ICNB on 07/14/22 w/ MOM.  -Patient doing well, incisions healing nicely. -Pathology reviewed with patient as above  -Still having drainage from CT site, although has seemed to slow down. Cont to monitor.   - Cont to smoke cigarettes; smoking cessation counseling was given for 10 min  -Will schedule f/u with oncology  -F/u with me PRN    Rx oxycodone 5mg q6h x 5 days #20 for cont post op pain. Instructed to alternate with Tylenol. Patient understood.      Gaye Dobbs PA-C

## 2022-09-08 ENCOUNTER — TELEPHONE (OUTPATIENT)
Dept: FAMILY MEDICINE CLINIC | Age: 67
End: 2022-09-08

## 2022-09-08 NOTE — TELEPHONE ENCOUNTER
----- Message from Andrew Hudson sent at 9/8/2022  8:39 AM EDT -----  Subject: Message to Provider    QUESTIONS  Information for Provider? Clarkesanta Osmel from [employer] would like a   updated medicine list fax over to # [phone]. She has at [facility] 7/14-7/19 for a lung nodule and has not been able to reach her.  ---------------------------------------------------------------------------  --------------  Perla Factor INFO  [phone]; OK to leave message on voicemail  ---------------------------------------------------------------------------  --------------  SCRIPT ANSWERS  Relationship to Patient? Third Party  Third Party Type? Insurance?    Representative Name? Linda Brown

## 2022-10-31 ENCOUNTER — TELEPHONE (OUTPATIENT)
Dept: FAMILY MEDICINE CLINIC | Age: 67
End: 2022-10-31

## 2022-10-31 DIAGNOSIS — J44.9 CHRONIC OBSTRUCTIVE PULMONARY DISEASE, UNSPECIFIED COPD TYPE (HCC): ICD-10-CM

## 2022-10-31 NOTE — TELEPHONE ENCOUNTER
[facility] req refill for patient on  Ventolin Inhaler  Last visit 2/14/22  No future  [employer]

## 2022-11-29 ENCOUNTER — TELEPHONE (OUTPATIENT)
Dept: FAMILY MEDICINE CLINIC | Age: 67
End: 2022-11-29

## 2022-11-29 RX ORDER — UMECLIDINIUM BROMIDE AND VILANTEROL TRIFENATATE 62.5; 25 UG/1; UG/1
1 POWDER RESPIRATORY (INHALATION) DAILY
Qty: 1 EACH | Refills: 5 | Status: SHIPPED | OUTPATIENT
Start: 2022-11-29

## 2022-11-29 NOTE — TELEPHONE ENCOUNTER
formerly [facility] is requesting a rx for   [employer] 62.5-25 MCG/INH AEPB inhaler    OV 2/14/22    Next office visit   Visit date not found

## 2022-12-26 DIAGNOSIS — J44.9 CHRONIC OBSTRUCTIVE PULMONARY DISEASE, UNSPECIFIED COPD TYPE (HCC): ICD-10-CM

## 2023-01-23 ENCOUNTER — HOSPITAL ENCOUNTER (OUTPATIENT)
Dept: CT IMAGING | Age: 68
Discharge: HOME | End: 2023-01-23
Payer: MEDICARE

## 2023-01-23 ENCOUNTER — HOSPITAL ENCOUNTER (OUTPATIENT)
Age: 68
Discharge: HOME | End: 2023-01-23
Payer: MEDICARE

## 2023-01-23 DIAGNOSIS — C34.11 MALIGNANT NEOPLASM OF UPPER LOBE, RIGHT BRONCHUS OR LUNG (HCC): ICD-10-CM

## 2023-01-23 LAB
BUN BLDV-MCNC: 12 MG/DL (ref 7–20)
CREAT SERPL-MCNC: <0.5 MG/DL (ref 0.6–1.2)
GFR SERPL CREATININE-BSD FRML MDRD: >60 ML/MIN/{1.73_M2}

## 2023-01-23 PROCEDURE — 84520 ASSAY OF UREA NITROGEN: CPT

## 2023-01-23 PROCEDURE — 6360000004 HC RX CONTRAST MEDICATION: Performed by: INTERNAL MEDICINE

## 2023-01-23 PROCEDURE — 82565 ASSAY OF CREATININE: CPT

## 2023-01-23 PROCEDURE — 71260 CT THORAX DX C+: CPT

## 2023-01-23 PROCEDURE — 36415 COLL VENOUS BLD VENIPUNCTURE: CPT

## 2023-01-23 RX ADMIN — IOPAMIDOL 75 ML: 755 INJECTION, SOLUTION INTRAVENOUS at 09:35

## 2023-02-01 ENCOUNTER — HOSPITAL ENCOUNTER (OUTPATIENT)
Dept: WOMENS IMAGING | Age: 68
Discharge: HOME | End: 2023-02-01
Payer: MEDICARE

## 2023-02-01 ENCOUNTER — TELEPHONE (OUTPATIENT)
Dept: FAMILY MEDICINE CLINIC | Age: 68
End: 2023-02-01

## 2023-02-01 DIAGNOSIS — N63.20 MASS OF LEFT BREAST, UNSPECIFIED QUADRANT: ICD-10-CM

## 2023-02-01 DIAGNOSIS — N63.21 MASS OF UPPER OUTER QUADRANT OF LEFT BREAST: ICD-10-CM

## 2023-02-01 DIAGNOSIS — N63.21 MASS OF UPPER OUTER QUADRANT OF LEFT BREAST: Primary | ICD-10-CM

## 2023-02-01 PROCEDURE — 76882 US LMTD JT/FCL EVL NVASC XTR: CPT

## 2023-02-01 PROCEDURE — G0279 TOMOSYNTHESIS, MAMMO: HCPCS

## 2023-02-01 NOTE — PROGRESS NOTES
[address]  [address] [place] [address], [address] [place] [address]  [address]   Phone: [phone]          NAME:  Dorothy Clevleand  YOB: 1955  MEDICAL RECORD NUMBER:  [id number]  TODAY'S DATE:  2/1/2023      Referring Physician: Dr. Juventino Galarza    Procedure: Stereotactic Bx    Left Breast    Date of biopsy: 2/15/23    Patient taking blood thinners: no    Medicine allergies: no    Special Instructions: prepped prone    Reviewed pre and post biopsy instructions/information with patient. Pt verbalized understanding. Biopsy order form faxed to referring MD.      Stereotactic Biopsy Education     What is it? Stereotactic breast biopsy is a test that uses imaging, such as  X-ray, to find an area of your breast where a tissue sample will be taken. The sample is viewed  under a microscope to check for signs of breast cancer. Why is this test done? This type of breast biopsy is usually done to check for cancer in a lump or microcalcifications found during a mammogram.     How can you prepare for the test?    You may take your regular medications as prescribed. You may eat and drink before the test.  Take a shower the evening or morning before the biopsy. What happens before the test?   Mammogram images are taken to find the exact site for the biopsy. Your skin is washed with an alcohol prep. You will be given an injection of medication to numb your breast.     What happens during the test?  When your breast is numb, a small cut is made in the skin. Using the imaging, the doctor will guide the needle into the biopsy area. A sample of breast tissue is taken through the needle. A small clip is inserted into your breast to bhumi the biopsy site. The needle is removed and pressure put on the needle site to stop any bleeding. Steri Strips and a bandage will be placed over the site. How long does the test take? About 60 minutes.  Most of the time is spent preparing for the images and finding the area for the biopsy. What are the risks? Bleeding: You may have some bleeding which can cause bruising, swelling, or hematoma. Infection: Signs of infection are redness, swelling, heat, or increasing pain at site. Sample is not adequate: This would require repeating the biopsy. What happens after the test?  The nurse will review your post biopsy instructions which include:        Placing an ice pack in your bra. Wearing a firm fitting bra. You may use Tylenol (Acetaminiphen) for discomfort. Lab results take about 2-3 business days. The Breast Navigator or your doctor will call you with the results. Pre Stereotactic Breast Biopsy:     (Please arrive 15 minutes early)                                                 [x] Blood thinner history reviewed with patient    [x] Take all your other medications on your normal routine schedule. [x] You may eat and drink as normal before your biopsy. [x] You may drive yourself. [x] Take a shower the night before or the morning of the biopsy. [x] No heavy lifting or exercise for 48 hours after the biopsy. [x] Printed Pre Stereotactic Biopsy Instructions were provided, reviewed with the patient and all questions were answered. Shereen Blandon RN  2/1/2023  12:04 PM      [facility] Information:   Should you experience any significant changes in your health or have questions about your care, please contact:  Shereen Blandon, Breast Nurse Navigator with The [facility], [facility]  [phone]  Monday-Friday. If you need help with your care outside these hours and cannot wait until we are again available, contact your Physician or go to the hospital emergency room.

## 2023-02-01 NOTE — TELEPHONE ENCOUNTER
Breast center called requesting an order for her mammo to be changed to a diagnostic mammogram.  Verbal Ok per Dr. Aniceto Roblero added.

## 2023-02-06 RX ORDER — IPRATROPIUM BROMIDE AND ALBUTEROL SULFATE 2.5; .5 MG/3ML; MG/3ML
1 SOLUTION RESPIRATORY (INHALATION) EVERY 6 HOURS PRN
Qty: 360 ML | Refills: 5 | Status: SHIPPED | OUTPATIENT
Start: 2023-02-06

## 2023-02-15 ENCOUNTER — HOSPITAL ENCOUNTER (OUTPATIENT)
Dept: WOMENS IMAGING | Age: 68
Discharge: HOME | End: 2023-02-15
Payer: MEDICARE

## 2023-02-15 DIAGNOSIS — R92.8 ABNORMAL MAMMOGRAM: ICD-10-CM

## 2023-02-15 PROCEDURE — 2709999900 MAM STEREO BREAST BX W LOC DEVICE 1ST LESION LEFT

## 2023-02-15 PROCEDURE — 88305 TISSUE EXAM BY PATHOLOGIST: CPT

## 2023-02-15 PROCEDURE — 76098 X-RAY EXAM SURGICAL SPECIMEN: CPT

## 2023-02-15 PROCEDURE — 77065 DX MAMMO INCL CAD UNI: CPT

## 2023-02-15 NOTE — PROGRESS NOTES
Patient in [address] [place] [address] for breast biopsy. Radiologist reviewed procedure with patient, consent signed. Patient tolerated procedure well. Compression held. Site cleansed with chloraprep, steri strips and dry dressing applied. Ice pack provided. Reviewed discharge instructions with patient. Patient verbalized understanding and agreed to contact the Breast Navigator with any questions. Patient was A&Ox3 and steady on feet and discharged to waiting area. [facility]   Discharge Instructions  [facility]  [address]  [address]  Telephone: [phone]    NAME:  Read Bridegroom  YOB: 1955  GENDER: female  MEDICAL RECORD NUMBER:  [id number]  TODAY'S DATE:  2/15/2023    Discharge Instructions Breast Center:    Post Breast Biopsy Instructions     [x] You may remove your outer dressing in 24 hours and you may shower. \"Steri-strips\" tape will stay on for 5-7 days. [x] Place a cold pack inside your bra on top of the dressing for at least 3 hours, removing it every 15 minutes for 15 minutes after your biopsy. [x] Wear a firm fitting bra for at least 24 hours after your biopsy, including while you sleep. [x] Place an ice pack inside your bra on top of the dressing for at least 3 hours, removing it every 15 minutes for 15 minutes after your biopsy. [x] You may resume your held medications tomorrow, unless otherwise directed by your physician. [x] Your physician has instructed you to take Tylenol (Acetaminophen) the day of your biopsy for any discomfort. [x] Watch for excessive bleeding. If bleeding occurs apply pressure to site. Watch for signs of infection, increased pain, redness, swelling and heat. If this occurs call your physician. [x] Do not participate in any strenuous exercise for 48 hours after your biopsy and do not lift, pull or push anything over 5-10 lbs.       [x] Results will be available in 2-3 working days.  Your referring physician or the Nurse Navigator will call you with results. The [facility] Information:   Should you experience any significant changes in your health or have questions about your care, please contact:  Mignon Phoenix, Nurse Breast Navigator with The [facility]  [phone]  Monday-Friday. If you need help with your care outside these hours and cannot wait until we are again available, contact your Physician or go to the hospital emergency room.         Electronically signed by Mignon Phoenix, RN on 2/15/2023 at 1:41 PM

## 2023-02-16 ENCOUNTER — TELEPHONE (OUTPATIENT)
Dept: WOMENS IMAGING | Age: 68
End: 2023-02-16

## 2023-03-06 ENCOUNTER — TELEPHONE (OUTPATIENT)
Dept: FAMILY MEDICINE CLINIC | Age: 68
End: 2023-03-06

## 2023-03-06 ENCOUNTER — TELEMEDICINE (OUTPATIENT)
Dept: FAMILY MEDICINE CLINIC | Age: 68
End: 2023-03-06
Payer: MEDICARE

## 2023-03-06 DIAGNOSIS — J44.1 COPD EXACERBATION (HCC): Primary | ICD-10-CM

## 2023-03-06 PROCEDURE — 1124F ACP DISCUSS-NO DSCNMKR DOCD: CPT | Performed by: FAMILY MEDICINE

## 2023-03-06 PROCEDURE — 99214 OFFICE O/P EST MOD 30 MIN: CPT | Performed by: FAMILY MEDICINE

## 2023-03-06 RX ORDER — SULFAMETHOXAZOLE AND TRIMETHOPRIM 800; 160 MG/1; MG/1
1 TABLET ORAL 2 TIMES DAILY
Qty: 14 TABLET | Refills: 0 | Status: ON HOLD
Start: 2023-03-06 | End: 2023-03-13 | Stop reason: HOSPADM

## 2023-03-06 RX ORDER — PREDNISONE 10 MG/1
TABLET ORAL
Qty: 45 TABLET | Refills: 0 | Status: SHIPPED | OUTPATIENT
Start: 2023-03-06 | End: 2023-03-21

## 2023-03-09 ENCOUNTER — APPOINTMENT (OUTPATIENT)
Dept: GENERAL RADIOLOGY | Age: 68
DRG: 193 | End: 2023-03-09
Payer: MEDICARE

## 2023-03-09 ENCOUNTER — HOSPITAL ENCOUNTER (INPATIENT)
Age: 68
LOS: 4 days | Discharge: HOME | DRG: 193 | End: 2023-03-13
Attending: STUDENT IN AN ORGANIZED HEALTH CARE EDUCATION/TRAINING PROGRAM | Admitting: INTERNAL MEDICINE
Payer: MEDICARE

## 2023-03-09 DIAGNOSIS — J44.1 COPD EXACERBATION (HCC): ICD-10-CM

## 2023-03-09 DIAGNOSIS — J18.9 PNEUMONIA OF BOTH LOWER LOBES DUE TO INFECTIOUS ORGANISM: Primary | ICD-10-CM

## 2023-03-09 DIAGNOSIS — R09.02 HYPOXIA: ICD-10-CM

## 2023-03-09 PROBLEM — J15.9 BACTERIAL PNEUMONIA: Status: ACTIVE | Noted: 2023-03-09

## 2023-03-09 LAB
A/G RATIO: 0.8 (ref 1.1–2.2)
ALBUMIN SERPL-MCNC: 3.2 G/DL (ref 3.4–5)
ALP BLD-CCNC: 89 U/L (ref 40–129)
ALT SERPL-CCNC: 23 U/L (ref 10–40)
ANION GAP SERPL CALCULATED.3IONS-SCNC: 9 MMOL/L (ref 3–16)
AST SERPL-CCNC: 28 U/L (ref 15–37)
BACTERIA: ABNORMAL /HPF
BASE EXCESS VENOUS: -1 MMOL/L (ref -3–3)
BASOPHILS ABSOLUTE: 0 K/UL (ref 0–0.2)
BASOPHILS RELATIVE PERCENT: 0.2 %
BILIRUB SERPL-MCNC: <0.2 MG/DL (ref 0–1)
BILIRUBIN URINE: NEGATIVE
BLOOD, URINE: ABNORMAL
BUN BLDV-MCNC: 15 MG/DL (ref 7–20)
CALCIUM SERPL-MCNC: 9.3 MG/DL (ref 8.3–10.6)
CARBOXYHEMOGLOBIN: 4 % (ref 0–1.5)
CHLORIDE BLD-SCNC: 101 MMOL/L (ref 99–110)
CLARITY: CLEAR
CO2: 24 MMOL/L (ref 21–32)
COLOR: YELLOW
CREAT SERPL-MCNC: <0.5 MG/DL (ref 0.6–1.2)
EKG ATRIAL RATE: 74 BPM
EKG DIAGNOSIS: NORMAL
EKG P AXIS: 90 DEGREES
EKG P-R INTERVAL: 126 MS
EKG Q-T INTERVAL: 426 MS
EKG QRS DURATION: 88 MS
EKG QTC CALCULATION (BAZETT): 472 MS
EKG R AXIS: 79 DEGREES
EKG T AXIS: 86 DEGREES
EKG VENTRICULAR RATE: 74 BPM
EOSINOPHILS ABSOLUTE: 0 K/UL (ref 0–0.6)
EOSINOPHILS RELATIVE PERCENT: 0.5 %
EPITHELIAL CELLS, UA: ABNORMAL /HPF (ref 0–5)
GFR SERPL CREATININE-BSD FRML MDRD: >60 ML/MIN/{1.73_M2}
GLUCOSE BLD-MCNC: 147 MG/DL (ref 70–99)
GLUCOSE URINE: NEGATIVE MG/DL
HCO3 VENOUS: 25.3 MMOL/L (ref 23–29)
HCT VFR BLD CALC: 38.9 % (ref 36–48)
HEMOGLOBIN: 12.9 G/DL (ref 12–16)
INFLUENZA A: NOT DETECTED
INFLUENZA B: NOT DETECTED
KETONES, URINE: NEGATIVE MG/DL
LEUKOCYTE ESTERASE, URINE: NEGATIVE
LYMPHOCYTES ABSOLUTE: 0.3 K/UL (ref 1–5.1)
LYMPHOCYTES RELATIVE PERCENT: 5.5 %
MCH RBC QN AUTO: 32.5 PG (ref 26–34)
MCHC RBC AUTO-ENTMCNC: 33.2 G/DL (ref 31–36)
MCV RBC AUTO: 97.9 FL (ref 80–100)
METHEMOGLOBIN VENOUS: 0.5 %
MICROSCOPIC EXAMINATION: YES
MONOCYTES ABSOLUTE: 0.2 K/UL (ref 0–1.3)
MONOCYTES RELATIVE PERCENT: 3.2 %
NEUTROPHILS ABSOLUTE: 4.8 K/UL (ref 1.7–7.7)
NEUTROPHILS RELATIVE PERCENT: 90.6 %
NITRITE, URINE: NEGATIVE
O2 SAT, VEN: 82 %
O2 THERAPY: ABNORMAL
PCO2, VEN: 48.1 MMHG (ref 40–50)
PDW BLD-RTO: 14.7 % (ref 12.4–15.4)
PH UA: 5.5 (ref 5–8)
PH VENOUS: 7.34 (ref 7.35–7.45)
PLATELET # BLD: 263 K/UL (ref 135–450)
PMV BLD AUTO: 7.1 FL (ref 5–10.5)
PO2, VEN: 48.3 MMHG (ref 25–40)
POTASSIUM SERPL-SCNC: 4.6 MMOL/L (ref 3.5–5.1)
PROTEIN UA: NEGATIVE MG/DL
RBC # BLD: 3.97 M/UL (ref 4–5.2)
RBC UA: ABNORMAL /HPF (ref 0–4)
SARS-COV-2 RNA, RT PCR: NOT DETECTED
SODIUM BLD-SCNC: 134 MMOL/L (ref 136–145)
SPECIFIC GRAVITY UA: >=1.03 (ref 1–1.03)
TCO2 CALC VENOUS: 27 MMOL/L
TOTAL PROTEIN: 7.2 G/DL (ref 6.4–8.2)
TROPONIN: <0.01 NG/ML
URINE REFLEX TO CULTURE: ABNORMAL
URINE TYPE: ABNORMAL
UROBILINOGEN, URINE: 0.2 E.U./DL
WBC # BLD: 5.3 K/UL (ref 4–11)
WBC UA: ABNORMAL /HPF (ref 0–5)

## 2023-03-09 PROCEDURE — 2700000000 HC OXYGEN THERAPY PER DAY

## 2023-03-09 PROCEDURE — 87636 SARSCOV2 & INF A&B AMP PRB: CPT

## 2023-03-09 PROCEDURE — 84484 ASSAY OF TROPONIN QUANT: CPT

## 2023-03-09 PROCEDURE — 96365 THER/PROPH/DIAG IV INF INIT: CPT

## 2023-03-09 PROCEDURE — 82803 BLOOD GASES ANY COMBINATION: CPT

## 2023-03-09 PROCEDURE — 99285 EMERGENCY DEPT VISIT HI MDM: CPT

## 2023-03-09 PROCEDURE — 81001 URINALYSIS AUTO W/SCOPE: CPT

## 2023-03-09 PROCEDURE — 94761 N-INVAS EAR/PLS OXIMETRY MLT: CPT

## 2023-03-09 PROCEDURE — 6360000002 HC RX W HCPCS: Performed by: INTERNAL MEDICINE

## 2023-03-09 PROCEDURE — 85025 COMPLETE CBC W/AUTO DIFF WBC: CPT

## 2023-03-09 PROCEDURE — 93010 ELECTROCARDIOGRAM REPORT: CPT | Performed by: INTERNAL MEDICINE

## 2023-03-09 PROCEDURE — 2580000003 HC RX 258: Performed by: INTERNAL MEDICINE

## 2023-03-09 PROCEDURE — 71045 X-RAY EXAM CHEST 1 VIEW: CPT

## 2023-03-09 PROCEDURE — 94640 AIRWAY INHALATION TREATMENT: CPT

## 2023-03-09 PROCEDURE — 1200000000 HC SEMI PRIVATE

## 2023-03-09 PROCEDURE — 6360000002 HC RX W HCPCS: Performed by: STUDENT IN AN ORGANIZED HEALTH CARE EDUCATION/TRAINING PROGRAM

## 2023-03-09 PROCEDURE — 6370000000 HC RX 637 (ALT 250 FOR IP): Performed by: INTERNAL MEDICINE

## 2023-03-09 PROCEDURE — 6370000000 HC RX 637 (ALT 250 FOR IP): Performed by: STUDENT IN AN ORGANIZED HEALTH CARE EDUCATION/TRAINING PROGRAM

## 2023-03-09 PROCEDURE — 2580000003 HC RX 258: Performed by: STUDENT IN AN ORGANIZED HEALTH CARE EDUCATION/TRAINING PROGRAM

## 2023-03-09 PROCEDURE — 96375 TX/PRO/DX INJ NEW DRUG ADDON: CPT

## 2023-03-09 PROCEDURE — 93005 ELECTROCARDIOGRAM TRACING: CPT | Performed by: STUDENT IN AN ORGANIZED HEALTH CARE EDUCATION/TRAINING PROGRAM

## 2023-03-09 PROCEDURE — 80053 COMPREHEN METABOLIC PANEL: CPT

## 2023-03-09 RX ORDER — SODIUM CHLORIDE 0.9 % (FLUSH) 0.9 %
5-40 SYRINGE (ML) INJECTION PRN
Status: DISCONTINUED | OUTPATIENT
Start: 2023-03-09 | End: 2023-03-13 | Stop reason: HOSPADM

## 2023-03-09 RX ORDER — 0.9 % SODIUM CHLORIDE 0.9 %
1000 INTRAVENOUS SOLUTION INTRAVENOUS ONCE
Status: COMPLETED | OUTPATIENT
Start: 2023-03-09 | End: 2023-03-09

## 2023-03-09 RX ORDER — SODIUM CHLORIDE 9 MG/ML
INJECTION, SOLUTION INTRAVENOUS CONTINUOUS
Status: DISCONTINUED | OUTPATIENT
Start: 2023-03-09 | End: 2023-03-10

## 2023-03-09 RX ORDER — ACETAMINOPHEN 650 MG/1
650 SUPPOSITORY RECTAL EVERY 6 HOURS PRN
Status: DISCONTINUED | OUTPATIENT
Start: 2023-03-09 | End: 2023-03-13 | Stop reason: HOSPADM

## 2023-03-09 RX ORDER — ENOXAPARIN SODIUM 100 MG/ML
30 INJECTION SUBCUTANEOUS DAILY
Status: DISCONTINUED | OUTPATIENT
Start: 2023-03-09 | End: 2023-03-13 | Stop reason: HOSPADM

## 2023-03-09 RX ORDER — ONDANSETRON 2 MG/ML
4 INJECTION INTRAMUSCULAR; INTRAVENOUS EVERY 6 HOURS PRN
Status: DISCONTINUED | OUTPATIENT
Start: 2023-03-09 | End: 2023-03-13 | Stop reason: HOSPADM

## 2023-03-09 RX ORDER — IPRATROPIUM BROMIDE AND ALBUTEROL SULFATE 2.5; .5 MG/3ML; MG/3ML
3 SOLUTION RESPIRATORY (INHALATION) ONCE
Status: COMPLETED | OUTPATIENT
Start: 2023-03-09 | End: 2023-03-09

## 2023-03-09 RX ORDER — METHYLPREDNISOLONE SODIUM SUCCINATE 40 MG/ML
40 INJECTION, POWDER, LYOPHILIZED, FOR SOLUTION INTRAMUSCULAR; INTRAVENOUS EVERY 12 HOURS
Status: DISCONTINUED | OUTPATIENT
Start: 2023-03-09 | End: 2023-03-12

## 2023-03-09 RX ORDER — POLYETHYLENE GLYCOL 3350 17 G/17G
17 POWDER, FOR SOLUTION ORAL DAILY PRN
Status: DISCONTINUED | OUTPATIENT
Start: 2023-03-09 | End: 2023-03-13 | Stop reason: HOSPADM

## 2023-03-09 RX ORDER — ONDANSETRON 4 MG/1
4 TABLET, ORALLY DISINTEGRATING ORAL EVERY 8 HOURS PRN
Status: DISCONTINUED | OUTPATIENT
Start: 2023-03-09 | End: 2023-03-13 | Stop reason: HOSPADM

## 2023-03-09 RX ORDER — AZITHROMYCIN 250 MG/1
500 TABLET, FILM COATED ORAL EVERY 24 HOURS
Status: COMPLETED | OUTPATIENT
Start: 2023-03-10 | End: 2023-03-12

## 2023-03-09 RX ORDER — SODIUM CHLORIDE 9 MG/ML
INJECTION, SOLUTION INTRAVENOUS PRN
Status: DISCONTINUED | OUTPATIENT
Start: 2023-03-09 | End: 2023-03-13 | Stop reason: HOSPADM

## 2023-03-09 RX ORDER — ACETAMINOPHEN 325 MG/1
650 TABLET ORAL EVERY 6 HOURS PRN
Status: DISCONTINUED | OUTPATIENT
Start: 2023-03-09 | End: 2023-03-13 | Stop reason: HOSPADM

## 2023-03-09 RX ORDER — PANTOPRAZOLE SODIUM 40 MG/1
40 TABLET, DELAYED RELEASE ORAL
Status: DISCONTINUED | OUTPATIENT
Start: 2023-03-10 | End: 2023-03-13 | Stop reason: HOSPADM

## 2023-03-09 RX ORDER — IPRATROPIUM BROMIDE AND ALBUTEROL SULFATE 2.5; .5 MG/3ML; MG/3ML
1 SOLUTION RESPIRATORY (INHALATION) EVERY 4 HOURS
Status: DISCONTINUED | OUTPATIENT
Start: 2023-03-09 | End: 2023-03-13 | Stop reason: HOSPADM

## 2023-03-09 RX ORDER — IPRATROPIUM BROMIDE AND ALBUTEROL SULFATE 2.5; .5 MG/3ML; MG/3ML
1 SOLUTION RESPIRATORY (INHALATION) EVERY 6 HOURS PRN
Status: DISCONTINUED | OUTPATIENT
Start: 2023-03-09 | End: 2023-03-09

## 2023-03-09 RX ORDER — METHYLPREDNISOLONE SODIUM SUCCINATE 125 MG/2ML
125 INJECTION, POWDER, LYOPHILIZED, FOR SOLUTION INTRAMUSCULAR; INTRAVENOUS ONCE
Status: COMPLETED | OUTPATIENT
Start: 2023-03-09 | End: 2023-03-09

## 2023-03-09 RX ORDER — SODIUM CHLORIDE 0.9 % (FLUSH) 0.9 %
5-40 SYRINGE (ML) INJECTION EVERY 12 HOURS SCHEDULED
Status: DISCONTINUED | OUTPATIENT
Start: 2023-03-09 | End: 2023-03-13 | Stop reason: HOSPADM

## 2023-03-09 RX ADMIN — SODIUM CHLORIDE 1000 ML: 9 INJECTION, SOLUTION INTRAVENOUS at 11:34

## 2023-03-09 RX ADMIN — IPRATROPIUM BROMIDE AND ALBUTEROL SULFATE 3 ML: 2.5; .5 SOLUTION RESPIRATORY (INHALATION) at 20:01

## 2023-03-09 RX ADMIN — AZITHROMYCIN MONOHYDRATE 500 MG: 500 INJECTION, POWDER, LYOPHILIZED, FOR SOLUTION INTRAVENOUS at 14:02

## 2023-03-09 RX ADMIN — CEFTRIAXONE SODIUM 1000 MG: 1 INJECTION, POWDER, FOR SOLUTION INTRAMUSCULAR; INTRAVENOUS at 13:10

## 2023-03-09 RX ADMIN — IPRATROPIUM BROMIDE AND ALBUTEROL SULFATE 3 ML: .5; 2.5 SOLUTION RESPIRATORY (INHALATION) at 16:33

## 2023-03-09 RX ADMIN — ENOXAPARIN SODIUM 30 MG: 100 INJECTION SUBCUTANEOUS at 17:10

## 2023-03-09 RX ADMIN — SODIUM CHLORIDE: 9 INJECTION, SOLUTION INTRAVENOUS at 17:08

## 2023-03-09 RX ADMIN — IPRATROPIUM BROMIDE AND ALBUTEROL SULFATE 3 AMPULE: 2.5; .5 SOLUTION RESPIRATORY (INHALATION) at 11:17

## 2023-03-09 RX ADMIN — METHYLPREDNISOLONE SODIUM SUCCINATE 40 MG: 40 INJECTION, POWDER, FOR SOLUTION INTRAMUSCULAR; INTRAVENOUS at 23:29

## 2023-03-09 RX ADMIN — SODIUM CHLORIDE, PRESERVATIVE FREE 10 ML: 5 INJECTION INTRAVENOUS at 23:29

## 2023-03-09 RX ADMIN — METHYLPREDNISOLONE SODIUM SUCCINATE 125 MG: 125 INJECTION, POWDER, FOR SOLUTION INTRAMUSCULAR; INTRAVENOUS at 11:33

## 2023-03-09 ASSESSMENT — PAIN - FUNCTIONAL ASSESSMENT: PAIN_FUNCTIONAL_ASSESSMENT: NONE - DENIES PAIN

## 2023-03-09 NOTE — PROGRESS NOTES
4 Eyes Skin Assessment     The patient is being assess for   {Blank single:95397::\"Admission\",\"Transfer to New Unit\",\"Post-Op Surgical\",\"Cath Lab Post-Op\",\"Shift Handoff\"}    I agree that 2 RN's have performed a thorough Head to Toe Skin Assessment on the patient. ALL assessment sites listed below have been assessed. Areas assessed for pressure by both nurses:   [x]   Head, Face, and Ears   [x]   Shoulders, Back, and Chest, Abdomen  [x]   Arms, Elbows, and Hands   [x]   Coccyx, Sacrum, and Ischium  [x]   Legs, Feet, and Heels        Skin Assessed Under all Medical Devices by both nurses:  O2 device tubing              All Mepilex Borders were peeled back and area peeked at by both nurses:  No: ***  Please list where Mepilex Borders are located:  na             **SHARE this note so that the co-signing nurse is able to place an eSignature**    Co-signer eSignature: {Esignature:[id number]}    Does the Patient have Skin Breakdown related to pressure?   No     (Insert Photo here)         Federico Prevention initiated:  No   Wound Care Orders initiated:  No      WOC nurse consulted for Pressure Injury (Stage 3,4, Unstageable, DTI, NWPT, Complex wounds)and New or Established Ostomies:  NA      Primary Nurse eSignature: Electronically signed by Lisa Dallas RN on 3/9/23 at 6:14 PM EST

## 2023-03-09 NOTE — H&P
Hospital Medicine History & Physical      PCP: Kimber Lancaster MD    Date of Admission: 3/9/2023    Date of Service: Pt seen/examined on  3/9/2023 and Admitted to Inpatient with expected LOS greater than two midnights due to medical therapy. Chief Complaint:  sob/ cough / sputum x 1 week      History Of Present Illness:  (    78 y.o. female who presented to [facility] with  above c/o . She has a history of COPD and smoker developed above complaint a week ago. She was prescribed Bactrim and p.o. steroids by her primary care doctor few days ago. Symptoms did not improve and she was brought to the emergency room. Currently she is on 2 L oxygen. On arrival to ER she was hypoxic on room air and tachypneic. Her symptoms improved with oxygen breathing treatment steroids and antibiotics. Currently she is comfortable, denying chest pain shortness of breath at rest nausea vomiting urinary complaints bowel movements or any focal neurological symptoms.   No change in mental status    Past Medical History:          Diagnosis Date    Arthritis     Asthma     COPD (chronic obstructive pulmonary disease) ([address]     Hormone replacement therapy        Past Surgical History:          Procedure Laterality Date    BREAST BIOPSY      Right, was benign    COLONOSCOPY N/A 10/9/2018    COLONOSCOPY WITH BIOPSY performed by Shannan Nuñez MD at [place] [address]  5/31/2022    CT NEEDLE BIOPSY LUNG PERCUTANEOUS 5/31/2022 [facility] CT SCAN    CYST REMOVAL      Behind ear    YESI STEROTACTIC LOC BREAST BIOPSY LEFT Left 2/15/2023    YESI STEROTACTIC LOC BREAST BIOPSY LEFT 2/15/2023 [facility]    THORACOSCOPY Right 7/14/2022    RIGHT VIDEO ASSISTED THORACOSCOPIC SURGERY, RIGHT UPPER LOBE LOBECTOMY, MEDIASTINAL LYMPHADENECTOMY AND INTERCOSTAL NERVE BLOCK performed by Bryan Little MD at [address] [place] [address]          Medications Prior to Admission:      Prior to Admission medications    Medication Sig Start Date End Date Taking? Authorizing Provider   predniSONE (DELTASONE) 10 MG tablet Take 5 tablets by mouth daily for 3 days, THEN 4 tablets daily for 3 days, THEN 3 tablets daily for 3 days, THEN 2 tablets daily for 3 days, THEN 1 tablet daily for 3 days. 3/6/23 3/21/23  Abundio Tidwell MD   sulfamethoxazole-trimethoprim (BACTRIM DS;SEPTRA DS) 800-160 MG per tablet Take 1 tablet by mouth 2 times daily for 7 days 3/6/23 3/13/23  Abundio Tidwell MD   ipratropium-albuterol (DUONEB) 0.5-2.5 (3) MG/3ML SOLN nebulizer solution Inhale 3 mLs into the lungs every 6 hours as needed for Shortness of Breath 2/6/23   Rob Cast MD   VENTOLIN  (90 Base) MCG/ACT inhaler INHALE TWO PUFFS BY MOUTH FOUR TIMES A DAY AS NEEDED FOR WHEEZING 12/27/22   Abundio Tidwell MD   Umeclidinium-Vilanterol [facility] ELLIPTA) 62.5-25 MCG/ACT AEPB Inhale 1 puff into the lungs daily 11/29/22   Abundio Tidwell MD       Allergies:  Patient has no known allergies. Social History:      The patient currently lives at home    TOBACCO:   reports that she quit smoking about 8 months ago. Her smoking use included cigarettes. She started smoking about 68 years ago. She has a 53.00 pack-year smoking history. She has never used smokeless tobacco.  ETOH:   reports current alcohol use. E-cigarette/Vaping       Questions Responses    E-cigarette/Vaping Use Never User    Start Date     Passive Exposure     Quit Date     Counseling Given Yes    Comments               Family History:      Reviewed and negative in regards to presenting illness/complaint. Problem Relation Age of Onset    Cancer Mother         stomach    Heart Disease Father     Colon Cancer Brother     Cancer Brother        REVIEW OF SYSTEMS COMPLETED:   Pertinent positives as noted in the HPI. All other systems reviewed and negative.     PHYSICAL EXAM PERFORMED:    /63   Pulse 82   Temp 97.8 °F (36.6 °C) (Oral)   Resp 25   Wt 110 lb (49.9 kg)   LMP  (LMP Unknown)   SpO2 96%   BMI 18.88 kg/m²     General appearance:  No apparent distress, appears stated age and cooperative. HEENT:  Normal cephalic, atraumatic without obvious deformity. Pupils equal, round, and reactive to light. Extra ocular muscles intact. Conjunctivae/corneas clear. Neck: Supple, with full range of motion. No jugular venous distention. Trachea midline. Respiratory:  Normal respiratory effort. , bilaterally without Rales/has bilateral wheezes/Rhonchi. Cardiovascular:  Regular rate and rhythm with normal S1/S2 without murmurs, rubs or gallops. Abdomen: Soft, non-tender, non-distended with normal bowel sounds. Musculoskeletal:  No clubbing, cyanosis or edema bilaterally. Full range of motion without deformity. Skin: Skin color, texture, turgor normal.  No rashes or lesions. Neurologic:  Neurovascularly intact without any focal sensory/motor deficits. Psychiatric:  Alert and oriented,   Capillary Refill: Brisk,3 seconds, normal  Peripheral Pulses: +2 palpable, equal bilaterally       Labs:     Recent Labs     03/09/23  1055   WBC 5.3   HGB 12.9   HCT 38.9        Recent Labs     03/09/23  1055   *   K 4.6      CO2 24   BUN 15   CREATININE <0.5*   CALCIUM 9.3     Recent Labs     03/09/23  1055   AST 28   ALT 23   BILITOT <0.2   ALKPHOS 89     No results for input(s): INR in the last 72 hours.   Recent Labs     03/09/23  1055   TROPONINI <0.01       Urinalysis:      Lab Results   Component Value Date/Time    NITRU Negative 07/08/2022 09:27 AM    WBCUA 0-2 07/08/2022 09:27 AM    RBCUA 3-4 07/08/2022 09:27 AM    BLOODU TRACE-INTACT 07/08/2022 09:27 AM    SPECGRAV <=1.005 07/08/2022 09:27 AM    GLUCOSEU Negative 07/08/2022 09:27 AM       Radiology:     CXR: I have reviewed the CXR with the following interpretation:   EKG:  I have reviewed the EKG with the following interpretation: Normal sinus rhythm 74/min    XR CHEST PORTABLE   Final Result Bilateral mild infiltrates. Consults:    None    ASSESSMENT:    Active Hospital Problems    Diagnosis Date Noted    Bacterial pneumonia [J15.9] 03/09/2023     Priority: Medium         PLAN:  Pneumonia with COPD exacerbation-admit septic work-up-nebulizer antibiotics IV , Rocephin and Zithromax steroids Protonix  COVID-negative    Acute hypoxic respiratory failure-secondary to above titrate oxygen    Active smoker-counseled        DVT Prophylaxis: Lovenox  Diet: Regular  Code Status: Full code    PT/OT Eval Status:     Dispo -admitted to Ailyn Crawford MD    Thank you Linnea Dhillon MD for the opportunity to be involved in this patient's care. If you have any questions or concerns please feel free to contact me at [phone].

## 2023-03-09 NOTE — ED PROVIDER NOTES
[facility]  ED      CHIEF COMPLAINT  Shortness of Breath (Has been on abx and steroids for the past few days, not feeling any better, walked from lobby to room 20, oxygen level was 92%, pt has copd and smokes at least 1/2 pack a day)       HISTORY OF PRESENT ILLNESS  Milo Richards is a 75 y.o. female  who presents to the ED complaining of cough and shortness of breath that started 4 days ago. She has seen her PCP and was started on Bactrim and prednisone, but feels that she has been worsening. Does endorse fevers and chills with a Tmax at home a few days ago of 101 °F.  States that she has to cough for a long time to have any sputum production, when she does, it is clear/yellow. Denies any chest pain or abdominal pain, nausea, vomiting. Last used a breathing treatment at home last night. Denies any leg pain or swelling. No other complaints, modifying factors or associated symptoms. I have reviewed the following from the nursing documentation.     Past Medical History:   Diagnosis Date    Arthritis     Asthma     COPD (chronic obstructive pulmonary disease) ([facility] [address]     Hormone replacement therapy      Past Surgical History:   Procedure Laterality Date    BREAST BIOPSY      Right, was benign    COLONOSCOPY N/A 10/9/2018    COLONOSCOPY WITH BIOPSY performed by Koko Green MD at [place] [address]  5/31/2022    CT NEEDLE BIOPSY LUNG PERCUTANEOUS 5/31/2022 [facility] CT SCAN    CYST REMOVAL      Behind ear    [facility] ST[facility]OT[facility] LOC BREAST BIOPSY LEFT Left 2/15/2023    [facility] STEROTACTIC LOC BREAST BIOPSY LEFT 2/15/2023 [facility]    THORACOSCOPY Right 7/14/2022    RIGHT VIDEO ASSISTED THORACOSCOPIC SURGERY, RIGHT UPPER LOBE LOBECTOMY, MEDIASTINAL LYMPHADENECTOMY AND INTERCOSTAL NERVE BLOCK performed by Duglas Kaur MD at [address] [place] [address]        Family History   Problem Relation Age of Onset    Cancer Mother         stomach Heart Disease Father     Colon Cancer Brother     Cancer Brother      Social History     Socioeconomic History    Marital status:      Spouse name: Not on file    Number of children: Not on file    Years of education: Not on file    Highest education level: Not on file   Occupational History    Not on file   Tobacco Use    Smoking status: Former     Packs/day: 1.00     Years: 53.00     Pack years: 53.00     Types: Cigarettes     Start date: 1955     Quit date: 2022     Years since quittin.6    Smokeless tobacco: Never    Tobacco comments:     quit 22   Vaping Use    Vaping Use: Never used   Substance and Sexual Activity    Alcohol use: Yes     Alcohol/week: 0.0 standard drinks     Comment: occ    Drug use: No    Sexual activity: Never   Other Topics Concern    Not on file   Social History Narrative    Not on file     Social Determinants of Health     Financial Resource Strain: Not on file   Food Insecurity: Not on file   Transportation Needs: Not on file   Physical Activity: Not on file   Stress: Not on file   Social Connections: Not on file   Intimate Partner Violence: Not on file   Housing Stability: Not on file     Current Facility-Administered Medications   Medication Dose Route Frequency Provider Last Rate Last Admin    0.9 % sodium chloride bolus  1,000 mL IntraVENous Once Becca Coty, DO        methylPREDNISolone sodium (SOLU-MEDROL) injection 125 mg  125 mg IntraVENous Once Becca Coty, DO         Current Outpatient Medications   Medication Sig Dispense Refill    predniSONE (DELTASONE) 10 MG tablet Take 5 tablets by mouth daily for 3 days, THEN 4 tablets daily for 3 days, THEN 3 tablets daily for 3 days, THEN 2 tablets daily for 3 days, THEN 1 tablet daily for 3 days.  45 tablet 0    sulfamethoxazole-trimethoprim (BACTRIM DS;SEPTRA DS) 800-160 MG per tablet Take 1 tablet by mouth 2 times daily for 7 days 14 tablet 0    ipratropium-albuterol (DUONEB) 0.5-2.5 (3) MG/3ML SOLN nebulizer solution Inhale 3 mLs into the lungs every 6 hours as needed for Shortness of Breath 360 mL 5    VENTOLIN  (90 Base) MCG/ACT inhaler INHALE TWO PUFFS BY MOUTH FOUR TIMES A DAY AS NEEDED FOR WHEEZING 18 g 3    Umeclidinium-Vilanterol (ANORO ELLIPTA) 62.5-25 MCG/ACT AEPB Inhale 1 puff into the lungs daily 1 each 5     No Known Allergies    REVIEW OF SYSTEMS  10 systems reviewed, pertinent positives per HPI otherwise noted to be negative. PHYSICAL EXAM  BP (!) 140/88   Pulse 74   Temp 97.8 °F (36.6 °C) (Oral)   Resp 22   Wt 110 lb (49.9 kg)   LMP  (LMP Unknown)   SpO2 95%   BMI 18.88 kg/m²    General: Appears well. Alert  HEENT: Head atraumatic, Eyes normal inspection, PERRL. Normal ENT inspection, Pharynx normal. No signs of dehydration  NECK: Normal inspection  RESPIRATORY: Diffuse wheezing. No chest wall tenderness. Mild respiratory distress on 2 L nasal cannula  CVS: Heart rate and rhythm regular. No Murmurs  ABDOMEN/GI: Soft, Non-tender, No distention  BACK: Normal inspection  EXTREMITIES: Non-Tender. Full ROM. Normal appearance. No Pedal edema  NEURO: Alert and oriented. Sensation normal. Motor normal  PSYCH: Mood normal. Affect normal.  SKIN: Color normal. No rash. Warm, Dry    LABS  I have reviewed all labs for this visit.    Results for orders placed or performed during the hospital encounter of 03/09/23   COVID-19 & Influenza Combo    Specimen: Nasopharyngeal Swab   Result Value Ref Range    SARS-CoV-2 RNA, RT PCR NOT DETECTED NOT DETECTED    INFLUENZA A NOT DETECTED NOT DETECTED    INFLUENZA B NOT DETECTED NOT DETECTED   CBC with Auto Differential   Result Value Ref Range    WBC 5.3 4.0 - 11.0 K/uL    RBC 3.97 (L) 4.00 - 5.20 M/uL    Hemoglobin 12.9 12.0 - 16.0 g/dL    Hematocrit 38.9 36.0 - 48.0 %    MCV 97.9 80.0 - 100.0 fL    MCH 32.5 26.0 - 34.0 pg    MCHC 33.2 31.0 - 36.0 g/dL    RDW 14.7 12.4 - 15.4 %    Platelets 132 172 - 985 K/uL    MPV 7.1 5.0 - 10.5 fL    Neutrophils % 90.6 %    Lymphocytes % 5.5 %    Monocytes % 3.2 %    Eosinophils % 0.5 %    Basophils % 0.2 %    Neutrophils Absolute 4.8 1.7 - 7.7 K/uL    Lymphocytes Absolute 0.3 (L) 1.0 - 5.1 K/uL    Monocytes Absolute 0.2 0.0 - 1.3 K/uL    Eosinophils Absolute 0.0 0.0 - 0.6 K/uL    Basophils Absolute 0.0 0.0 - 0.2 K/uL   Comprehensive Metabolic Panel   Result Value Ref Range    Sodium 134 (L) 136 - 145 mmol/L    Potassium 4.6 3.5 - 5.1 mmol/L    Chloride 101 99 - 110 mmol/L    CO2 24 21 - 32 mmol/L    Anion Gap 9 3 - 16    Glucose 147 (H) 70 - 99 mg/dL    BUN 15 7 - 20 mg/dL    Creatinine <0.5 (L) 0.6 - 1.2 mg/dL    Est, Glom Filt Rate >60 >60    Calcium 9.3 8.3 - 10.6 mg/dL    Total Protein 7.2 6.4 - 8.2 g/dL    Albumin 3.2 (L) 3.4 - 5.0 g/dL    Albumin/Globulin Ratio 0.8 (L) 1.1 - 2.2    Total Bilirubin <0.2 0.0 - 1.0 mg/dL    Alkaline Phosphatase 89 40 - 129 U/L    ALT 23 10 - 40 U/L    AST 28 15 - 37 U/L   Troponin   Result Value Ref Range    Troponin <0.01 <0.01 ng/mL   Urinalysis with Reflex to Culture    Specimen: Urine, clean catch   Result Value Ref Range    Color, UA Yellow Straw/Yellow    Clarity, UA Clear Clear    Glucose, Ur Negative Negative mg/dL    Bilirubin Urine Negative Negative    Ketones, Urine Negative Negative mg/dL    Specific Gravity, UA >=1.030 1.005 - 1.030    Blood, Urine TRACE-INTACT (A) Negative    pH, UA 5.5 5.0 - 8.0    Protein, UA Negative Negative mg/dL    Urobilinogen, Urine 0.2 <2.0 E.U./dL    Nitrite, Urine Negative Negative    Leukocyte Esterase, Urine Negative Negative    Microscopic Examination YES     Urine Type NotGiven     Urine Reflex to Culture Not Indicated    Blood gas, venous   Result Value Ref Range    pH, Sage 7.338 (L) 7.350 - 7.450    pCO2, Sage 48.1 40.0 - 50.0 mmHg    pO2, Sage 48.3 (H) 25.0 - 40.0 mmHg    HCO3, Venous 25.3 23.0 - 29.0 mmol/L    Base Excess, Sage -1.0 -3.0 - 3.0 mmol/L    O2 Sat, Sage 82 Not Established %    Carboxyhemoglobin 4.0 (H) 0.0 - 1.5 %    MetHgb, Sage 0. 5 <1.5 %    TC02 (Calc), Sage 27 Not Established mmol/L    O2 Therapy Unknown    Microscopic Urinalysis   Result Value Ref Range    WBC, UA 0-2 0 - 5 /HPF    RBC, UA 0-2 0 - 4 /HPF    Epithelial Cells, UA 2-5 0 - 5 /HPF    Bacteria, UA Rare (A) None Seen /HPF   CBC with Auto Differential   Result Value Ref Range    WBC 3.8 (L) 4.0 - 11.0 K/uL    RBC 3.73 (L) 4.00 - 5.20 M/uL    Hemoglobin 12.2 12.0 - 16.0 g/dL    Hematocrit 36.5 36.0 - 48.0 %    MCV 97.9 80.0 - 100.0 fL    MCH 32.8 26.0 - 34.0 pg    MCHC 33.5 31.0 - 36.0 g/dL    RDW 14.7 12.4 - 15.4 %    Platelets 447 626 - 050 K/uL    MPV 7.0 5.0 - 10.5 fL    Neutrophils % 80.0 %    Lymphocytes % 13.9 %    Monocytes % 5.9 %    Eosinophils % 0.0 %    Basophils % 0.2 %    Neutrophils Absolute 3.0 1.7 - 7.7 K/uL    Lymphocytes Absolute 0.5 (L) 1.0 - 5.1 K/uL    Monocytes Absolute 0.2 0.0 - 1.3 K/uL    Eosinophils Absolute 0.0 0.0 - 0.6 K/uL    Basophils Absolute 0.0 0.0 - 0.2 K/uL   Basic Metabolic Panel w/ Reflex to MG   Result Value Ref Range    Sodium 138 136 - 145 mmol/L    Potassium reflex Magnesium 4.9 3.5 - 5.1 mmol/L    Chloride 106 99 - 110 mmol/L    CO2 24 21 - 32 mmol/L    Anion Gap 8 3 - 16    Glucose 119 (H) 70 - 99 mg/dL    BUN 15 7 - 20 mg/dL    Creatinine <0.5 (L) 0.6 - 1.2 mg/dL    Est, Glom Filt Rate >60 >60    Calcium 9.1 8.3 - 10.6 mg/dL   EKG 12 Lead   Result Value Ref Range    Ventricular Rate 74 BPM    Atrial Rate 74 BPM    P-R Interval 126 ms    QRS Duration 88 ms    Q-T Interval 426 ms    QTc Calculation (Bazett) 472 ms    P Axis 90 degrees    R Axis 79 degrees    T Axis 86 degrees    Diagnosis       Normal sinus rhythmNonspecific ST and T wave abnormalityAbnormal ECGWhen compared with ECG of 21-AUG-2020 11:14,T wave inversion no longer evident in Inferior leadsT wave inversion no longer evident in Anterior leadsConfirmed by Cecilio Kan ([id number]) on 3/9/2023 6:51:13 PM         ECG  The Ekg interpreted by me shows  Sinus rhythm with a rate of 74 bpm.  Normal axis. No acute injury pattern. , QRS 88, QTc 472. No significant change from prior EKG dated 6/27/2022    RADIOLOGY  XR CHEST PORTABLE   Final Result   Bilateral mild infiltrates. ED COURSE/MDM  Patient seen and evaluated. Old records reviewed. Labs and imaging reviewed and results discussed with patient. DIFFERENTIAL DX  COPD exacerbation, pneumonia, COVID, influenza, URI, bronchitis    Presenting with cough and shortness of breath. Treated with 3 DuoNebs, 1 L of IV fluid, Solu-Medrol. Due to tachypnea and increased work of breathing, placed on 2 L oxamide nasal cannula with mild improvement. On reevaluation patient still has some increased work of breathing, though her lung sounds have improved. Chest x-ray did show bilateral lower lobe infiltrates. Lab work was overall reassuring. Patient treated with ceftriaxone and azithromycin. She is a PSI risk class III and is still having increased work of breathing. Due to this, will admit for further treatment. Oxygen was turned up to 3 L with improvement in her work of breathing. Discussed with hospitalist team who agreed to admit the patient to their service. Is this patient to be included in the SEP-1 Core Measure due to severe sepsis or septic shock? No   Exclusion criteria - the patient is NOT to be included for SEP-1 Core Measure due to:  2+ SIRS criteria are not met    During the patient's ED course, the patient was given:  Medications   0.9 % sodium chloride bolus (has no administration in time range)   methylPREDNISolone sodium (SOLU-MEDROL) injection 125 mg (has no administration in time range)   ipratropium-albuterol (DUONEB) nebulizer solution 3 ampule (3 ampules Inhalation Given 3/9/23 1117)        Radha MELVIN DO,  am the primary clinician of record. CLINICAL IMPRESSION  1. Pneumonia of both lower lobes due to infectious organism    2. Hypoxia    3.  COPD exacerbation ([address]        Blood pressure (!) 140/88, pulse 74, temperature 97.8 °F (36.6 °C), temperature source Oral, resp. rate 22, weight 110 lb (49.9 kg), SpO2 95 %, not currently breastfeeding. DISPOSITION  Dorothy Cleveland was admitted in stable condition. Patient was given scripts for the following medications. I counseled patient how to take these medications. New Prescriptions    No medications on file       Follow-up with:  No follow-up provider specified. DISCLAIMER: This chart was created using Dragon dictation software. Efforts were made by me to ensure accuracy, however some errors may be present due to limitations of this technology and occasionally words are not transcribed correctly.         Calvin Alex DO  03/10/23 1508

## 2023-03-09 NOTE — ED NOTES
Pt placed on 2L n/c for comfort d/t tripod positioning and tachypneic. RN notified MD of pt presentation. Pt was not hypoxic on RA. 93%. Pt smokes approx . 5ppd cigs.      Valeria Holder RN  03/09/23 1278

## 2023-03-09 NOTE — ED NOTES
Report given to Hafsa Bran RN C5. Report given, questions answered, care transferred. Pt awaiting transport up to floor.  VSS>      Edvin Avery RN  03/09/23 7588

## 2023-03-09 NOTE — ED NOTES
Side rails up, bed locked and in lowest position. Pt updated on plan of care. No needs at this time. Pt instructed on use of call light if needed.       Ping Cortez RN  03/09/23 8947

## 2023-03-09 NOTE — ED NOTES
Pt placed on CMU, pulse ox, and NIBP. Pt given call light and instructed on its use. No new needs at this time. Will cont to monitor.       Mee Newman RN  03/09/23 1100

## 2023-03-10 LAB
ANION GAP SERPL CALCULATED.3IONS-SCNC: 8 MMOL/L (ref 3–16)
BASOPHILS ABSOLUTE: 0 K/UL (ref 0–0.2)
BASOPHILS RELATIVE PERCENT: 0.2 %
BUN BLDV-MCNC: 15 MG/DL (ref 7–20)
CALCIUM SERPL-MCNC: 9.1 MG/DL (ref 8.3–10.6)
CHLORIDE BLD-SCNC: 106 MMOL/L (ref 99–110)
CO2: 24 MMOL/L (ref 21–32)
CREAT SERPL-MCNC: <0.5 MG/DL (ref 0.6–1.2)
EOSINOPHILS ABSOLUTE: 0 K/UL (ref 0–0.6)
EOSINOPHILS RELATIVE PERCENT: 0 %
GFR SERPL CREATININE-BSD FRML MDRD: >60 ML/MIN/{1.73_M2}
GLUCOSE BLD-MCNC: 119 MG/DL (ref 70–99)
GLUCOSE BLD-MCNC: 197 MG/DL (ref 70–99)
HCT VFR BLD CALC: 36.5 % (ref 36–48)
HEMOGLOBIN: 12.2 G/DL (ref 12–16)
LYMPHOCYTES ABSOLUTE: 0.5 K/UL (ref 1–5.1)
LYMPHOCYTES RELATIVE PERCENT: 13.9 %
MCH RBC QN AUTO: 32.8 PG (ref 26–34)
MCHC RBC AUTO-ENTMCNC: 33.5 G/DL (ref 31–36)
MCV RBC AUTO: 97.9 FL (ref 80–100)
MONOCYTES ABSOLUTE: 0.2 K/UL (ref 0–1.3)
MONOCYTES RELATIVE PERCENT: 5.9 %
NEUTROPHILS ABSOLUTE: 3 K/UL (ref 1.7–7.7)
NEUTROPHILS RELATIVE PERCENT: 80 %
PDW BLD-RTO: 14.7 % (ref 12.4–15.4)
PERFORMED ON: ABNORMAL
PLATELET # BLD: 266 K/UL (ref 135–450)
PMV BLD AUTO: 7 FL (ref 5–10.5)
POTASSIUM REFLEX MAGNESIUM: 4.9 MMOL/L (ref 3.5–5.1)
RBC # BLD: 3.73 M/UL (ref 4–5.2)
SODIUM BLD-SCNC: 138 MMOL/L (ref 136–145)
WBC # BLD: 3.8 K/UL (ref 4–11)

## 2023-03-10 PROCEDURE — 80048 BASIC METABOLIC PNL TOTAL CA: CPT

## 2023-03-10 PROCEDURE — 2580000003 HC RX 258: Performed by: INTERNAL MEDICINE

## 2023-03-10 PROCEDURE — 2700000000 HC OXYGEN THERAPY PER DAY

## 2023-03-10 PROCEDURE — 1200000000 HC SEMI PRIVATE

## 2023-03-10 PROCEDURE — 94640 AIRWAY INHALATION TREATMENT: CPT

## 2023-03-10 PROCEDURE — 6370000000 HC RX 637 (ALT 250 FOR IP): Performed by: INTERNAL MEDICINE

## 2023-03-10 PROCEDURE — 94761 N-INVAS EAR/PLS OXIMETRY MLT: CPT

## 2023-03-10 PROCEDURE — 6360000002 HC RX W HCPCS: Performed by: INTERNAL MEDICINE

## 2023-03-10 PROCEDURE — 85025 COMPLETE CBC W/AUTO DIFF WBC: CPT

## 2023-03-10 PROCEDURE — 36415 COLL VENOUS BLD VENIPUNCTURE: CPT

## 2023-03-10 RX ADMIN — METHYLPREDNISOLONE SODIUM SUCCINATE 40 MG: 40 INJECTION, POWDER, FOR SOLUTION INTRAMUSCULAR; INTRAVENOUS at 13:23

## 2023-03-10 RX ADMIN — IPRATROPIUM BROMIDE AND ALBUTEROL SULFATE 3 ML: 2.5; .5 SOLUTION RESPIRATORY (INHALATION) at 04:04

## 2023-03-10 RX ADMIN — SODIUM CHLORIDE, PRESERVATIVE FREE 10 ML: 5 INJECTION INTRAVENOUS at 23:22

## 2023-03-10 RX ADMIN — TIOTROPIUM BROMIDE AND OLODATEROL 2 PUFF: 3.124; 2.736 SPRAY, METERED RESPIRATORY (INHALATION) at 11:53

## 2023-03-10 RX ADMIN — IPRATROPIUM BROMIDE AND ALBUTEROL SULFATE 3 ML: 2.5; .5 SOLUTION RESPIRATORY (INHALATION) at 20:14

## 2023-03-10 RX ADMIN — ENOXAPARIN SODIUM 30 MG: 100 INJECTION SUBCUTANEOUS at 09:51

## 2023-03-10 RX ADMIN — AZITHROMYCIN MONOHYDRATE 500 MG: 250 TABLET ORAL at 13:23

## 2023-03-10 RX ADMIN — CEFTRIAXONE SODIUM 1000 MG: 1 INJECTION, POWDER, FOR SOLUTION INTRAMUSCULAR; INTRAVENOUS at 13:22

## 2023-03-10 RX ADMIN — PANTOPRAZOLE SODIUM 40 MG: 40 TABLET, DELAYED RELEASE ORAL at 09:48

## 2023-03-10 RX ADMIN — METHYLPREDNISOLONE SODIUM SUCCINATE 40 MG: 40 INJECTION, POWDER, FOR SOLUTION INTRAMUSCULAR; INTRAVENOUS at 23:20

## 2023-03-10 RX ADMIN — IPRATROPIUM BROMIDE AND ALBUTEROL SULFATE 3 ML: 2.5; .5 SOLUTION RESPIRATORY (INHALATION) at 11:53

## 2023-03-10 NOTE — CARE COORDINATION
Case Management Assessment  Initial Evaluation    Date/Time of Evaluation: 3/10/2023 3:03 PM  Assessment Completed by: Pardeep Altman RN    If patient is discharged prior to next notation, then this note serves as note for discharge by case management. Patient Name: Yvonne Guerra                   YOB: 1955  Diagnosis: Bacterial pneumonia [J15.9]                   Date / Time: 3/9/2023 10:43 AM    Patient Admission Status: Inpatient   Readmission Risk (Low < 19, Mod (19-27), High > 27): Readmission Risk Score: 10.4    Current PCP: Hernan Mazariegos MD  PCP verified by CM? Yes    Chart Reviewed: Yes      History Provided by: Patient  Patient Orientation: Alert and Oriented    Patient Cognition: Alert    Hospitalization in the last 30 days (Readmission):  No    If yes, Readmission Assessment in  Navigator will be completed. Advance Directives:      Code Status: Full Code   Patient's Primary Decision Maker is: Patient Declined (Legal Next of Kin Remains as Decision Maker)    Primary Decision Maker: aviva damon - Child - [phone]    Discharge Planning:    Patient lives with: Family Members Type of Home: House  Primary Care Giver: Self  Patient Support Systems include: Family Members   Current Financial resources: None  Current community resources: None  Current services prior to admission: None            Current DME:              Type of Home Care services:  None    ADLS  Prior functional level: Independent in ADLs/IADLs  Current functional level:      PT AM-PAC:   /24  OT AM-PAC:   /24    Family can provide assistance at DC: Yes  Would you like Case Management to discuss the discharge plan with any other family members/significant others, and if so, who?  No  Plans to Return to Present Housing: Yes  Other Identified Issues/Barriers to RETURNING to current housing: None  Potential Assistance needed at discharge: N/A            Potential DME:    Patient expects to discharge to: [address] [place] [address] for transportation at discharge:      Financial    Payor: [employer] / Plan: [employer] / Product Type: *No Product type* /     Does insurance require precert for SNF: Yes    Potential assistance Purchasing Medications: No  [facility] [id number] [address] [phone] Caitlyn Ramosstein [phone]  [address] [place] [address] Road [facility]  Phone: [phone] Fax: [phone]      Notes:    Factors facilitating achievement of predicted outcomes: Family support    Barriers to discharge: IV ABX    Additional Case Management Notes: Patient is IPTA, and lives with her sister in law in an apartment that is handicap assessable with 0 MEGGAN. Patient doesn't drive due to not having a vehicle. Family can taake her to appts and help her to get her meds. The Plan for Transition of Care is related to the following treatment goals of Bacterial pneumonia [C95.2]    IF APPLICABLE: The Patient and/or patient representative Corby Burk and her family were provided with a choice of provider and agrees with the discharge plan. Freedom of choice list with basic dialogue that supports the patient's individualized plan of care/goals and shares the quality data associated with the providers was provided to: Patient   Patient Representative Name:       The Patient and/or Patient Representative Agree with the Discharge Plan?  Yes    Joslyn Duran RN  Case Management Department  Ph: [phone] Fax: [phone]

## 2023-03-10 NOTE — PROGRESS NOTES
Hospitalist Progress Note      PCP: Dannielle Seo MD    Date of Admission: 3/9/2023    Chief Complaint: sob/ cough / sputum x 1 week    Hospital Course:  h and p reviewed     Subjective:   has sob/ cough   Off o2     Medications:  Reviewed    Infusion Medications    sodium chloride 75 mL/hr at 03/09/23 1708    sodium chloride       Scheduled Medications    tiotropium-olodaterol  2 puff Inhalation Daily    sodium chloride flush  5-40 mL IntraVENous 2 times per day    enoxaparin  30 mg SubCUTAneous Daily    cefTRIAXone (ROCEPHIN) IV  1,000 mg IntraVENous Q24H    And    azithromycin  500 mg Oral Q24H    methylPREDNISolone  40 mg IntraVENous Q12H    pantoprazole  40 mg Oral QAM AC    ipratropium-albuterol  1 vial Inhalation Q4H     PRN Meds: sodium chloride flush, sodium chloride, ondansetron **OR** ondansetron, polyethylene glycol, acetaminophen **OR** acetaminophen      Intake/Output Summary (Last 24 hours) at 3/10/2023 0930  Last data filed at 3/9/2023 2329  Gross per 24 hour   Intake 1410 ml   Output 500 ml   Net 910 ml       Physical Exam Performed:    BP (!) 142/75   Pulse 71   Temp 97.8 °F (36.6 °C) (Oral)   Resp 18   Ht 5' 6\" (1.676 m)   Wt 110 lb (49.9 kg)   LMP  (LMP Unknown)   SpO2 96%   BMI 17.75 kg/m²     General appearance: No apparent distress, appears stated age and cooperative. frail  HEENT:  . Conjunctivae/corneas clear. Neck: Supple, with full range of motion. No jugular venous distention. Trachea midline. Respiratory:   mildly tac hypnic   , bilaterally without Rales/ has b/l Wheezes/Rhonchi. Cardiovascular: Regular rate and rhythm with normal S1/S2 without murmurs, rubs or gallops. Abdomen: Soft, non-tender, non-distended with normal bowel sounds. Musculoskeletal: No clubbing, cyanosis or edema bilaterally. Full range of motion without deformity. Skin: Skin color, texture, turgor normal.  No rashes or lesions.   Neurologic:  Neurovascularly intact without any focal sensory/motor deficits. Psychiatric: Alert and oriented, t   Capillary Refill: Brisk, 3 seconds, normal   Peripheral Pulses: +2 palpable, equal bilaterally       Labs:   Recent Labs     03/09/23  1055 03/10/23  0623   WBC 5.3 3.8*   HGB 12.9 12.2   HCT 38.9 36.5    266     Recent Labs     03/09/23  1055 03/10/23  0623   * 138   K 4.6 4.9    106   CO2 24 24   BUN 15 15   CREATININE <0.5* <0.5*   CALCIUM 9.3 9.1     Recent Labs     03/09/23  1055   AST 28   ALT 23   BILITOT <0.2   ALKPHOS 89     No results for input(s): INR in the last 72 hours. Recent Labs     03/09/23  1055   TROPONINI <0.01       Urinalysis:      Lab Results   Component Value Date/Time    NITRU Negative 03/09/2023 04:10 PM    WBCUA 0-2 03/09/2023 04:10 PM    BACTERIA Rare 03/09/2023 04:10 PM    RBCUA 0-2 03/09/2023 04:10 PM    BLOODU TRACE-INTACT 03/09/2023 04:10 PM    SPECGRAV >=1.030 03/09/2023 04:10 PM    GLUCOSEU Negative 03/09/2023 04:10 PM       Radiology:  XR CHEST PORTABLE   Final Result   Bilateral mild infiltrates. None    Assessment/Plan:    Active Hospital Problems    Diagnosis     Bacterial pneumonia [J15.9]      Priority: Medium   Pneumonia with COPD exacerbation- septic work-up- neg so far ,nebulizer antibiotics IV , Rocephin and Zithromax steroids Protonix    COVID-negative     Acute hypoxic respiratory failure-secondary to above titrate oxygen, currently off o2 , check spo2 , o2 prn - DW RN     Active smoker-counseled      DVT Prophylaxis: Lovenox   Diet: ADULT DIET;  Regular  Code Status: Full Code  PT/OT Eval Status: ordered     Dispo - 2-  days    Appropriate for A1 Discharge Unit: No      Miki Perry MD

## 2023-03-11 PROCEDURE — 6360000002 HC RX W HCPCS: Performed by: INTERNAL MEDICINE

## 2023-03-11 PROCEDURE — 6370000000 HC RX 637 (ALT 250 FOR IP): Performed by: INTERNAL MEDICINE

## 2023-03-11 PROCEDURE — 2580000003 HC RX 258: Performed by: INTERNAL MEDICINE

## 2023-03-11 PROCEDURE — 1200000000 HC SEMI PRIVATE

## 2023-03-11 PROCEDURE — 94640 AIRWAY INHALATION TREATMENT: CPT

## 2023-03-11 RX ADMIN — PANTOPRAZOLE SODIUM 40 MG: 40 TABLET, DELAYED RELEASE ORAL at 08:59

## 2023-03-11 RX ADMIN — SODIUM CHLORIDE, PRESERVATIVE FREE 10 ML: 5 INJECTION INTRAVENOUS at 09:00

## 2023-03-11 RX ADMIN — IPRATROPIUM BROMIDE AND ALBUTEROL SULFATE 3 ML: 2.5; .5 SOLUTION RESPIRATORY (INHALATION) at 08:45

## 2023-03-11 RX ADMIN — IPRATROPIUM BROMIDE AND ALBUTEROL SULFATE 3 ML: 2.5; .5 SOLUTION RESPIRATORY (INHALATION) at 15:52

## 2023-03-11 RX ADMIN — ENOXAPARIN SODIUM 30 MG: 100 INJECTION SUBCUTANEOUS at 08:59

## 2023-03-11 RX ADMIN — CEFTRIAXONE SODIUM 1000 MG: 1 INJECTION, POWDER, FOR SOLUTION INTRAMUSCULAR; INTRAVENOUS at 13:11

## 2023-03-11 RX ADMIN — METHYLPREDNISOLONE SODIUM SUCCINATE 40 MG: 40 INJECTION, POWDER, FOR SOLUTION INTRAMUSCULAR; INTRAVENOUS at 23:32

## 2023-03-11 RX ADMIN — IPRATROPIUM BROMIDE AND ALBUTEROL SULFATE 3 ML: 2.5; .5 SOLUTION RESPIRATORY (INHALATION) at 12:08

## 2023-03-11 RX ADMIN — SODIUM CHLORIDE, PRESERVATIVE FREE 10 ML: 5 INJECTION INTRAVENOUS at 23:33

## 2023-03-11 RX ADMIN — IPRATROPIUM BROMIDE AND ALBUTEROL SULFATE 3 ML: 2.5; .5 SOLUTION RESPIRATORY (INHALATION) at 19:47

## 2023-03-11 RX ADMIN — IPRATROPIUM BROMIDE AND ALBUTEROL SULFATE 3 ML: 2.5; .5 SOLUTION RESPIRATORY (INHALATION) at 23:32

## 2023-03-11 RX ADMIN — METHYLPREDNISOLONE SODIUM SUCCINATE 40 MG: 40 INJECTION, POWDER, FOR SOLUTION INTRAMUSCULAR; INTRAVENOUS at 11:25

## 2023-03-11 RX ADMIN — SODIUM CHLORIDE 25 ML/HR: 9 INJECTION, SOLUTION INTRAVENOUS at 13:10

## 2023-03-11 RX ADMIN — TIOTROPIUM BROMIDE AND OLODATEROL 2 PUFF: 3.124; 2.736 SPRAY, METERED RESPIRATORY (INHALATION) at 08:46

## 2023-03-11 RX ADMIN — AZITHROMYCIN MONOHYDRATE 500 MG: 250 TABLET ORAL at 13:44

## 2023-03-11 NOTE — PROGRESS NOTES
Hospitalist Progress Note      PCP: Amandeep Li MD    Date of Admission: 3/9/2023    Chief Complaint: sob/ cough / sputum x 1 week    Hospital Course:  h and p reviewed     Subjective:   has sob/ cough - better   Off o2     Medications:  Reviewed    Infusion Medications    sodium chloride 25 mL/hr (03/11/23 1310)     Scheduled Medications    tiotropium-olodaterol  2 puff Inhalation Daily    sodium chloride flush  5-40 mL IntraVENous 2 times per day    enoxaparin  30 mg SubCUTAneous Daily    cefTRIAXone (ROCEPHIN) IV  1,000 mg IntraVENous Q24H    And    azithromycin  500 mg Oral Q24H    methylPREDNISolone  40 mg IntraVENous Q12H    pantoprazole  40 mg Oral QAM AC    ipratropium-albuterol  1 vial Inhalation Q4H     PRN Meds: sodium chloride flush, sodium chloride, ondansetron **OR** ondansetron, polyethylene glycol, acetaminophen **OR** acetaminophen    No intake or output data in the 24 hours ending 03/11/23 1331      Physical Exam Performed:    BP (!) 161/83   Pulse 69   Temp 98.4 °F (36.9 °C) (Oral)   Resp 22   Ht 5' 6\" (1.676 m)   Wt 110 lb (49.9 kg)   LMP  (LMP Unknown)   SpO2 94%   BMI 17.75 kg/m²     General appearance: No apparent distress, appears stated age and cooperative. frail  HEENT:  . Conjunctivae/corneas clear. Neck: Supple, with full range of motion. No jugular venous distention. Trachea midline. Respiratory:   comfortable    , bilaterally without Rales/ has b/l Wheezes/Rhonchi. improved compare to 3/10  Cardiovascular: Regular rate and rhythm with normal S1/S2 without murmurs, rubs or gallops. Abdomen: Soft, non-tender, non-distended with normal bowel sounds. Musculoskeletal: No clubbing, cyanosis or edema bilaterally. Full range of motion without deformity. Skin: Skin color, texture, turgor normal.  No rashes or lesions. Neurologic:  Neurovascularly intact without any focal sensory/motor deficits.     Psychiatric: Alert and oriented, t   Capillary Refill: Brisk, 3 seconds, normal   Peripheral Pulses: +2 palpable, equal bilaterally       Labs:   Recent Labs     03/09/23  1055 03/10/23  0623   WBC 5.3 3.8*   HGB 12.9 12.2   HCT 38.9 36.5    266     Recent Labs     03/09/23  1055 03/10/23  0623   * 138   K 4.6 4.9    106   CO2 24 24   BUN 15 15   CREATININE <0.5* <0.5*   CALCIUM 9.3 9.1     Recent Labs     03/09/23  1055   AST 28   ALT 23   BILITOT <0.2   ALKPHOS 89     No results for input(s): INR in the last 72 hours. Recent Labs     03/09/23  1055   TROPONINI <0.01       Urinalysis:      Lab Results   Component Value Date/Time    NITRU Negative 03/09/2023 04:10 PM    WBCUA 0-2 03/09/2023 04:10 PM    BACTERIA Rare 03/09/2023 04:10 PM    RBCUA 0-2 03/09/2023 04:10 PM    BLOODU TRACE-INTACT 03/09/2023 04:10 PM    SPECGRAV >=1.030 03/09/2023 04:10 PM    GLUCOSEU Negative 03/09/2023 04:10 PM       Radiology:  XR CHEST PORTABLE   Final Result   Bilateral mild infiltrates. None    Assessment/Plan:    Active Hospital Problems    Diagnosis     Bacterial pneumonia [J15.9]      Priority: Medium   Pneumonia with COPD exacerbation- septic work-up- neg so far ,nebulizer antibiotics IV , Rocephin and Zithromax steroids Protonix    COVID-negative     Acute hypoxic respiratory failure-secondary to above titrate oxygen, currently off o2 , check spo2 , o2 prn - DW RN     Active smoker-counseled    H/o lung cancer - RIGHT VIDEO ASSISTED THORACOSCOPIC SURGERY, RIGHT UPPER LOBE LOBECTOMY, MEDIASTINAL LYMPHADENECTOMY on 7/14/2022      DVT Prophylaxis: Lovenox   Diet: ADULT DIET;  Regular  Code Status: Full Code  PT/OT Eval Status: ordered     Dispo - 2-  days    Appropriate for A1 Discharge Unit: No      Angelica Bhakta MD

## 2023-03-12 PROCEDURE — 94640 AIRWAY INHALATION TREATMENT: CPT

## 2023-03-12 PROCEDURE — 2580000003 HC RX 258: Performed by: INTERNAL MEDICINE

## 2023-03-12 PROCEDURE — 6360000002 HC RX W HCPCS: Performed by: INTERNAL MEDICINE

## 2023-03-12 PROCEDURE — 1200000000 HC SEMI PRIVATE

## 2023-03-12 PROCEDURE — 6370000000 HC RX 637 (ALT 250 FOR IP): Performed by: INTERNAL MEDICINE

## 2023-03-12 RX ORDER — PREDNISONE 20 MG/1
40 TABLET ORAL DAILY
Status: DISCONTINUED | OUTPATIENT
Start: 2023-03-12 | End: 2023-03-13 | Stop reason: HOSPADM

## 2023-03-12 RX ADMIN — IPRATROPIUM BROMIDE AND ALBUTEROL SULFATE 3 ML: 2.5; .5 SOLUTION RESPIRATORY (INHALATION) at 11:31

## 2023-03-12 RX ADMIN — TIOTROPIUM BROMIDE AND OLODATEROL 2 PUFF: 3.124; 2.736 SPRAY, METERED RESPIRATORY (INHALATION) at 08:01

## 2023-03-12 RX ADMIN — AZITHROMYCIN MONOHYDRATE 500 MG: 250 TABLET ORAL at 13:30

## 2023-03-12 RX ADMIN — SODIUM CHLORIDE 25 ML/HR: 9 INJECTION, SOLUTION INTRAVENOUS at 12:33

## 2023-03-12 RX ADMIN — CEFTRIAXONE SODIUM 1000 MG: 1 INJECTION, POWDER, FOR SOLUTION INTRAMUSCULAR; INTRAVENOUS at 12:34

## 2023-03-12 RX ADMIN — SODIUM CHLORIDE, PRESERVATIVE FREE 10 ML: 5 INJECTION INTRAVENOUS at 09:59

## 2023-03-12 RX ADMIN — ENOXAPARIN SODIUM 30 MG: 100 INJECTION SUBCUTANEOUS at 09:58

## 2023-03-12 RX ADMIN — IPRATROPIUM BROMIDE AND ALBUTEROL SULFATE 3 ML: 2.5; .5 SOLUTION RESPIRATORY (INHALATION) at 08:00

## 2023-03-12 RX ADMIN — IPRATROPIUM BROMIDE AND ALBUTEROL SULFATE 3 ML: 2.5; .5 SOLUTION RESPIRATORY (INHALATION) at 03:54

## 2023-03-12 RX ADMIN — IPRATROPIUM BROMIDE AND ALBUTEROL SULFATE 3 ML: 2.5; .5 SOLUTION RESPIRATORY (INHALATION) at 15:50

## 2023-03-12 RX ADMIN — IPRATROPIUM BROMIDE AND ALBUTEROL SULFATE 3 ML: 2.5; .5 SOLUTION RESPIRATORY (INHALATION) at 23:35

## 2023-03-12 RX ADMIN — IPRATROPIUM BROMIDE AND ALBUTEROL SULFATE 3 ML: 2.5; .5 SOLUTION RESPIRATORY (INHALATION) at 20:55

## 2023-03-12 RX ADMIN — PANTOPRAZOLE SODIUM 40 MG: 40 TABLET, DELAYED RELEASE ORAL at 06:38

## 2023-03-12 RX ADMIN — METHYLPREDNISOLONE SODIUM SUCCINATE 40 MG: 40 INJECTION, POWDER, FOR SOLUTION INTRAMUSCULAR; INTRAVENOUS at 12:24

## 2023-03-12 NOTE — PROGRESS NOTES
Hospitalist Progress Note      PCP: Humberto Viveros MD    Date of Admission: 3/9/2023    Chief Complaint: sob/ cough / sputum x 1 week    Hospital Course:  h and p reviewed     Subjective:   has sob/ cough - better   Off o2   Looks much better     Medications:  Reviewed    Infusion Medications    sodium chloride Stopped (03/12/23 1330)     Scheduled Medications    tiotropium-olodaterol  2 puff Inhalation Daily    sodium chloride flush  5-40 mL IntraVENous 2 times per day    enoxaparin  30 mg SubCUTAneous Daily    cefTRIAXone (ROCEPHIN) IV  1,000 mg IntraVENous Q24H    methylPREDNISolone  40 mg IntraVENous Q12H    pantoprazole  40 mg Oral QAM AC    ipratropium-albuterol  1 vial Inhalation Q4H     PRN Meds: sodium chloride flush, sodium chloride, ondansetron **OR** ondansetron, polyethylene glycol, acetaminophen **OR** acetaminophen    No intake or output data in the 24 hours ending 03/12/23 1419        Physical Exam Performed:    BP (!) 154/85   Pulse 79   Temp 98.2 °F (36.8 °C) (Oral)   Resp 24   Ht 5' 6\" (1.676 m)   Wt 110 lb (49.9 kg)   LMP  (LMP Unknown)   SpO2 95%   BMI 17.75 kg/m²     General appearance: No apparent distress, appears stated age and cooperative. frail  HEENT:  . Conjunctivae/corneas clear. Neck: Supple, with full range of motion. No jugular venous distention. Trachea midline. Respiratory:   comfortable    , bilaterally without Rales/ has  occ b/l Wheezes/Rhonchi. Much improved compare to 3/10  Cardiovascular: Regular rate and rhythm with normal S1/S2 without murmurs, rubs or gallops. Abdomen: Soft, non-tender, non-distended with normal bowel sounds. Musculoskeletal: No clubbing, cyanosis or edema bilaterally. Full range of motion without deformity. Skin: Skin color, texture, turgor normal.  No rashes or lesions. Neurologic:  Neurovascularly intact without any focal sensory/motor deficits.     Psychiatric: Alert and oriented, t   Capillary Refill: Brisk, 3 seconds, normal Peripheral Pulses: +2 palpable, equal bilaterally       Labs:   Recent Labs     03/10/23  0623   WBC 3.8*   HGB 12.2   HCT 36.5        Recent Labs     03/10/23  0623      K 4.9      CO2 24   BUN 15   CREATININE <0.5*   CALCIUM 9.1     No results for input(s): AST, ALT, BILIDIR, BILITOT, ALKPHOS in the last 72 hours. No results for input(s): INR in the last 72 hours. No results for input(s): Kahlil Shon in the last 72 hours. Urinalysis:      Lab Results   Component Value Date/Time    NITRU Negative 03/09/2023 04:10 PM    WBCUA 0-2 03/09/2023 04:10 PM    BACTERIA Rare 03/09/2023 04:10 PM    RBCUA 0-2 03/09/2023 04:10 PM    BLOODU TRACE-INTACT 03/09/2023 04:10 PM    SPECGRAV >=1.030 03/09/2023 04:10 PM    GLUCOSEU Negative 03/09/2023 04:10 PM       Radiology:  XR CHEST PORTABLE   Final Result   Bilateral mild infiltrates. None    Assessment/Plan:    Active Hospital Problems    Diagnosis     Bacterial pneumonia [J15.9]      Priority: Medium   Pneumonia with COPD exacerbation- septic work-up- neg so far ,nebulizer antibiotics IV , Rocephin and Zithromax , IV steroids Protonix  May switch IV steroids to po    COVID-negative     Acute hypoxic respiratory failure-secondary to above titrate oxygen, currently off o2 , check spo2 , o2 prn - DW RN     Active smoker-counseled    H/o lung cancer - RIGHT VIDEO ASSISTED THORACOSCOPIC SURGERY, RIGHT UPPER LOBE LOBECTOMY, MEDIASTINAL LYMPHADENECTOMY on 7/14/2022      DVT Prophylaxis: Lovenox   Diet: ADULT DIET;  Regular  Code Status: Full Code  PT/OT Eval Status: ordered     Dispo  possibly am     Appropriate for A1 Discharge Unit: No      Latha Street MD

## 2023-03-12 NOTE — RT PROTOCOL NOTE
RT Inhaler-Nebulizer Bronchodilator Protocol Note    There is a bronchodilator order in the chart from a provider indicating to follow the RT Bronchodilator Protocol and there is an Initiate RT Inhaler-Nebulizer Bronchodilator Protocol order as well (see protocol at bottom of note). CXR Findings:  No results found. The findings from the last RT Protocol Assessment were as follows:   History Pulmonary Disease: Chronic pulmonary disease  Respiratory Pattern: Mild dyspnea at rest, irregular pattern, or RR 21-25 bpm  Breath Sounds: Inspiratory and expiratory or bilateral wheezing and/or rhonchi  Cough: Weak, non-productive  Indication for Bronchodilator Therapy: Wheezing associated with pulm disorder, On home bronchodilators  Bronchodilator Assessment Score: 15    Aerosolized bronchodilator medication orders have been revised according to the RT Inhaler-Nebulizer Bronchodilator Protocol below. Respiratory Therapist to perform RT Therapy Protocol Assessment initially then follow the protocol. Repeat RT Therapy Protocol Assessment PRN for score 0-3 or on second treatment, BID, and PRN for scores above 3. No Indications - adjust the frequency to every 6 hours PRN wheezing or bronchospasm, if no treatments needed after 48 hours then discontinue using Per Protocol order mode. If indication present, adjust the RT bronchodilator orders based on the Bronchodilator Assessment Score as indicated below. Use Inhaler orders unless patient has one or more of the following: on home nebulizer, not able to hold breath for 10 seconds, is not alert and oriented, cannot activate and use MDI correctly, or respiratory rate 25 breaths per minute or more, then use the equivalent nebulizer order(s) with same Frequency and PRN reasons based on the score. If a patient is on this medication at home then do not decrease Frequency below that used at home.     0-3 - enter or revise RT bronchodilator order(s) to equivalent RT Bronchodilator order with Frequency of every 4 hours PRN for wheezing or increased work of breathing using Per Protocol order mode. 4-6 - enter or revise RT Bronchodilator order(s) to two equivalent RT bronchodilator orders with one order with BID Frequency and one order with Frequency of every 4 hours PRN wheezing or increased work of breathing using Per Protocol order mode. 7-10 - enter or revise RT Bronchodilator order(s) to two equivalent RT bronchodilator orders with one order with TID Frequency and one order with Frequency of every 4 hours PRN wheezing or increased work of breathing using Per Protocol order mode. 11-13 - enter or revise RT Bronchodilator order(s) to one equivalent RT bronchodilator order with QID Frequency and an Albuterol order with Frequency of every 4 hours PRN wheezing or increased work of breathing using Per Protocol order mode. Greater than 13 - enter or revise RT Bronchodilator order(s) to one equivalent RT bronchodilator order with every 4 hours Frequency and an Albuterol order with Frequency of every 2 hours PRN wheezing or increased work of breathing using Per Protocol order mode. RT to enter RT Home Evaluation for COPD & MDI Assessment order using Per Protocol order mode.     Electronically signed by Eddi William RCP on 3/12/2023 at 5:35 PM

## 2023-03-13 VITALS
SYSTOLIC BLOOD PRESSURE: 139 MMHG | OXYGEN SATURATION: 95 % | RESPIRATION RATE: 16 BRPM | DIASTOLIC BLOOD PRESSURE: 85 MMHG | HEIGHT: 66 IN | BODY MASS INDEX: 17.68 KG/M2 | TEMPERATURE: 98 F | WEIGHT: 110 LBS | HEART RATE: 72 BPM

## 2023-03-13 PROCEDURE — 6360000002 HC RX W HCPCS: Performed by: INTERNAL MEDICINE

## 2023-03-13 PROCEDURE — 94640 AIRWAY INHALATION TREATMENT: CPT

## 2023-03-13 PROCEDURE — 2580000003 HC RX 258: Performed by: INTERNAL MEDICINE

## 2023-03-13 PROCEDURE — 6370000000 HC RX 637 (ALT 250 FOR IP): Performed by: INTERNAL MEDICINE

## 2023-03-13 RX ORDER — AMOXICILLIN AND CLAVULANATE POTASSIUM 875; 125 MG/1; MG/1
1 TABLET, FILM COATED ORAL 2 TIMES DAILY
Qty: 6 TABLET | Refills: 0 | Status: SHIPPED | OUTPATIENT
Start: 2023-03-13 | End: 2023-03-16

## 2023-03-13 RX ADMIN — IPRATROPIUM BROMIDE AND ALBUTEROL SULFATE 3 ML: 2.5; .5 SOLUTION RESPIRATORY (INHALATION) at 03:20

## 2023-03-13 RX ADMIN — PREDNISONE 40 MG: 20 TABLET ORAL at 07:59

## 2023-03-13 RX ADMIN — ENOXAPARIN SODIUM 30 MG: 100 INJECTION SUBCUTANEOUS at 07:59

## 2023-03-13 RX ADMIN — PANTOPRAZOLE SODIUM 40 MG: 40 TABLET, DELAYED RELEASE ORAL at 06:39

## 2023-03-13 RX ADMIN — SODIUM CHLORIDE, PRESERVATIVE FREE 10 ML: 5 INJECTION INTRAVENOUS at 08:00

## 2023-03-13 NOTE — CARE COORDINATION
CASE MANAGEMENT DISCHARGE SUMMARY    Discharge to: Home with Prn family supports    IMM given: (date) 03/13/2023    New Durable Medical Equipment ordered/agency: NA    Transportation: via private car, reports grandchild will  around 10 am     Confirmed discharge plan with: Patient bedside    RN, name: Lizette Farooq RN    . KELLI Snyder

## 2023-03-13 NOTE — PROGRESS NOTES
RN called report to A1. All questions and concerns answered. Patient wheeled to new room with all belongings.

## 2023-03-13 NOTE — PROGRESS NOTES
Pt a/o. Pt stated no pain, no nausea; no emesis. Appetite good. SpO2 90s on room air. Pt ambulates independently. PIV removed. Writer gave pt discharge instructions and all questions answered. Pt has belongings and discharge paperwork. Volunteer took pt to get medications from [facility]. Pt discharged home in stable condition.

## 2023-03-14 NOTE — DISCHARGE SUMMARY
Hospital Medicine Discharge Summary    Patient ID: Kadie Prescott      Patient's PCP: Freddy Boothe MD    Admit Date: 3/9/2023     Discharge Date: 3/13/2023      Admitting Provider: April Correa MD     Discharge Provider: Aixa Steiner MD     Discharge Diagnoses: Active Hospital Problems    Diagnosis     Bacterial pneumonia [J15.9]      Priority: Medium       The patient was seen and examined on day of discharge and this discharge summary is in conjunction with any daily progress note from day of discharge. Hospital Course:   75 y.o. female who [facility] with  above c/o . She has a history of COPD and smoker developed above complaint a week ago. She was prescribed Bactrim and p.o. steroids by her primary care doctor few days ago. Symptoms did not improve and she was brought to the emergency room. Currently she is on 2 L oxygen. On arrival to ER she was hypoxic on room air and tachypneic. Her symptoms improved with oxygen breathing treatment steroids and antibiotics. Currently she is comfortable, denying chest pain shortness of breath at rest nausea vomiting urinary complaints bowel movements or any focal neurological symptoms. No change in mental status    COPD exacerbation  - continue short course of steroids  - continue home inhalers    Pneumonia  - no evidence of sepsis  - suspect gram positive  - ceftriaxone changed to augmentin to complete course    Acute hypoxic respiratory failure  - 2/2 pneumonia, COPD  - weaned to room air    Tobacco dependence  - counseling given    Physical Exam Performed:     /85   Pulse 72   Temp 98 °F (36.7 °C) (Oral)   Resp 16   Ht 5' 6\" (1.676 m)   Wt 110 lb (49.9 kg)   LMP  (LMP Unknown)   SpO2 95%   BMI 17.75 kg/m²       General appearance:  No apparent distress, appears stated age and cooperative. HEENT:  Normal cephalic, atraumatic without obvious deformity. Pupils equal, round, and reactive to light.   Extra ocular muscles intact. Conjunctivae/corneas clear. Neck: Supple, with full range of motion. No jugular venous distention. Trachea midline. Respiratory:  Normal respiratory effort. Clear to auscultation, bilaterally without Rales/Wheezes/Rhonchi. Cardiovascular:  Regular rate and rhythm with normal S1/S2 without murmurs, rubs or gallops. Abdomen: Soft, non-tender, non-distended with normal bowel sounds. Musculoskeletal:  No clubbing, cyanosis or edema bilaterally. Full range of motion without deformity. Skin: Skin color, texture, turgor normal.  No rashes or lesions. Neurologic:  Neurovascularly intact without any focal sensory/motor deficits. Cranial nerves: II-XII intact, grossly non-focal.  Psychiatric:  Alert and oriented, thought content appropriate, normal insight  Capillary Refill: Brisk,< 3 seconds   Peripheral Pulses: +2 palpable, equal bilaterally       Labs: For convenience and continuity at follow-up the following most recent labs are provided:      CBC:    Lab Results   Component Value Date/Time    WBC 3.8 03/10/2023 06:23 AM    HGB 12.2 03/10/2023 06:23 AM    HCT 36.5 03/10/2023 06:23 AM     03/10/2023 06:23 AM       Renal:    Lab Results   Component Value Date/Time     03/10/2023 06:23 AM    K 4.9 03/10/2023 06:23 AM     03/10/2023 06:23 AM    CO2 24 03/10/2023 06:23 AM    BUN 15 03/10/2023 06:23 AM    CREATININE <0.5 03/10/2023 06:23 AM    CALCIUM 9.1 03/10/2023 06:23 AM    PHOS 3.2 07/15/2022 04:25 AM         Significant Diagnostic Studies    Radiology:   XR CHEST PORTABLE   Final Result   Bilateral mild infiltrates.                 Consults:     None    Disposition:  home     Condition at Discharge: Stable    Discharge Instructions/Follow-up:  Follow up with PCP within 1-2 weeks    Code Status:  full code    Activity: activity as tolerated    Diet: regular diet      Discharge Medications:     Discharge Medication List as of 3/13/2023  9:32 AM             Details amoxicillin-clavulanate (AUGMENTIN) 875-125 MG per tablet Take 1 tablet by mouth 2 times daily for 3 days, Disp-6 tablet, R-0Normal                Details   predniSONE (DELTASONE) 10 MG tablet Take 5 tablets by mouth daily for 3 days, THEN 4 tablets daily for 3 days, THEN 3 tablets daily for 3 days, THEN 2 tablets daily for 3 days, THEN 1 tablet daily for 3 days. , Disp-45 tablet, R-0Normal      ipratropium-albuterol (DUONEB) 0.5-2.5 (3) MG/3ML SOLN nebulizer solution Inhale 3 mLs into the lungs every 6 hours as needed for Shortness of Breath, Disp-360 mL, R-5Normal      VENTOLIN  (90 Base) MCG/ACT inhaler INHALE TWO PUFFS BY MOUTH FOUR TIMES A DAY AS NEEDED FOR WHEEZING, Disp-18 g, R-3, DAWNormal      Umeclidinium-Vilanterol (ANORO ELLIPTA) 62.5-25 MCG/ACT AEPB Inhale 1 puff into the lungs daily, Disp-1 each, R-5Normal             Time Spent on discharge: 40 minutes in the examination, evaluation, counseling and review of medications and discharge plan. Signed:    Jacob Lopez MD   3/13/2023      Thank you Aziza Butts MD for the opportunity to be involved in this patient's care. If you have any questions or concerns, please feel free to contact me at [phone].

## 2023-03-17 ENCOUNTER — OFFICE VISIT (OUTPATIENT)
Dept: FAMILY MEDICINE CLINIC | Age: 68
End: 2023-03-17
Payer: MEDICARE

## 2023-03-17 VITALS
HEART RATE: 83 BPM | WEIGHT: 105 LBS | DIASTOLIC BLOOD PRESSURE: 60 MMHG | OXYGEN SATURATION: 95 % | HEIGHT: 66 IN | RESPIRATION RATE: 16 BRPM | BODY MASS INDEX: 16.88 KG/M2 | SYSTOLIC BLOOD PRESSURE: 90 MMHG

## 2023-03-17 DIAGNOSIS — Z09 HOSPITAL DISCHARGE FOLLOW-UP: Primary | ICD-10-CM

## 2023-03-17 DIAGNOSIS — J44.9 CHRONIC OBSTRUCTIVE PULMONARY DISEASE, UNSPECIFIED COPD TYPE (HCC): ICD-10-CM

## 2023-03-17 PROCEDURE — 99214 OFFICE O/P EST MOD 30 MIN: CPT | Performed by: REGISTERED NURSE

## 2023-03-17 PROCEDURE — 1124F ACP DISCUSS-NO DSCNMKR DOCD: CPT | Performed by: REGISTERED NURSE

## 2023-03-17 PROCEDURE — 1111F DSCHRG MED/CURRENT MED MERGE: CPT | Performed by: REGISTERED NURSE

## 2023-03-17 RX ORDER — BUDESONIDE, GLYCOPYRROLATE, AND FORMOTEROL FUMARATE 160; 9; 4.8 UG/1; UG/1; UG/1
1 AEROSOL, METERED RESPIRATORY (INHALATION) 2 TIMES DAILY
Qty: 10.7 G | Refills: 3 | Status: SHIPPED | OUTPATIENT
Start: 2023-03-17

## 2023-03-17 SDOH — ECONOMIC STABILITY: FOOD INSECURITY: WITHIN THE PAST 12 MONTHS, YOU WORRIED THAT YOUR FOOD WOULD RUN OUT BEFORE YOU GOT MONEY TO BUY MORE.: SOMETIMES TRUE

## 2023-03-17 SDOH — ECONOMIC STABILITY: HOUSING INSECURITY
IN THE LAST 12 MONTHS, WAS THERE A TIME WHEN YOU DID NOT HAVE A STEADY PLACE TO SLEEP OR SLEPT IN A SHELTER (INCLUDING NOW)?: NO

## 2023-03-17 SDOH — ECONOMIC STABILITY: TRANSPORTATION INSECURITY
IN THE PAST 12 MONTHS, HAS LACK OF TRANSPORTATION KEPT YOU FROM MEETINGS, WORK, OR FROM GETTING THINGS NEEDED FOR DAILY LIVING?: YES

## 2023-03-17 SDOH — ECONOMIC STABILITY: INCOME INSECURITY: HOW HARD IS IT FOR YOU TO PAY FOR THE VERY BASICS LIKE FOOD, HOUSING, MEDICAL CARE, AND HEATING?: SOMEWHAT HARD

## 2023-03-17 SDOH — ECONOMIC STABILITY: FOOD INSECURITY: WITHIN THE PAST 12 MONTHS, THE FOOD YOU BOUGHT JUST DIDN'T LAST AND YOU DIDN'T HAVE MONEY TO GET MORE.: SOMETIMES TRUE

## 2023-03-17 NOTE — PROGRESS NOTES
Post-Discharge Transitional Care  Follow Up      Jude Hernandez   YOB: 1955    Date of Office Visit:  3/17/2023  Date of Hospital Admission: 3/9/23  Date of Hospital Discharge: 3/13/23  Risk of hospital readmission (high >=14%. Medium >=10%) :Readmission Risk Score: 8.8      Care management risk score Rising risk (score 2-5) and Complex Care (Scores >=6): No Risk Score On File     Non face to face  following discharge, date last encounter closed (first attempt may have been earlier): *No documented post hospital discharge outreach found in the last 14 days    Call initiated 2 business days of discharge: *No response recorded in the last 14 days    ASSESSMENT/PLAN:   Hospital discharge follow-up  Course of illness- pneumonia and COPD exacerbation is improving. VSS. -     [place] DISCHARGE MEDS RECONCILED W/ CURRENT OUTPATIENT MED LIST  Chronic obstructive pulmonary disease, unspecified COPD type ([facility] [address]  Continue monitoring SPO2. Teaching provided for breathing exercises and use of incentive spirometer. Smoking cessation. Start NetEffect. Stop Anoro Ellipta. Continue nebulizer and albuterol inhaler as needed. Monitor for worsening symptoms and follow-up for any concerns. Schedule appointment for 1 month with PCP for COPD follow-up and evaluation of symptoms on Breztri inhaler.  -     Budeson-Glycopyrrol-Formoterol (BREZTRI AEROSPHERE) 160-9-4.8 MCG/ACT AERO; Inhale 1 inhalation into the lungs in the morning and at bedtime, Disp-10.7 g, R-3Normal    Medical Decision Making: moderate complexity    Return in about 4 weeks (around 4/14/2023), or if symptoms worsen or fail to improve. Subjective:   HPI:  Follow up of Hospital problems/diagnosis(es):     She is here for follow up after hospitalization for COPD exacerbation and pneumonia. She is feeling better after being discharged on 3/13/2023 however, she states she is still experiencing some shortness of breath at times.   She has been using her nebulizer approximately 4-5 times daily. She has an albuterol inhaler which she uses as needed. She has been using her Anoro Ellipta daily. She has been on [employer] for a couple of years. She monitors her SPO2 at home. She says it has been in the 90s. Inpatient course: Discharge summary reviewed- see chart. Interval history/Current status: stable and improving. Patient Active Problem List   Diagnosis    Asthma    Arthritis    Hormone replacement therapy    COPD (chronic obstructive pulmonary disease) ([facility] [address]    Tobacco dependence    Primary cancer of right upper lobe of lung ([facility] [address]    Bacterial pneumonia       Medications listed as ordered at the time of discharge from hospital     Medication List            Accurate as of March 17, 2023 11:08 AM. If you have any questions, ask your nurse or doctor. START taking these medications      Breztri Aerosphere 160-9-4.8 MCG/ACT Aero  Generic drug: Budeson-Glycopyrrol-Formoterol  Inhale 1 inhalation into the lungs in the morning and at bedtime  Started by: TAMMY Otto - SHERYL            CONTINUE taking these medications      ipratropium-albuterol 0.5-2.5 (3) MG/3ML Soln nebulizer solution  Commonly known as: DUONEB  Inhale 3 mLs into the lungs every 6 hours as needed for Shortness of Breath     predniSONE 10 MG tablet  Commonly known as: DELTASONE  Take 5 tablets by mouth daily for 3 days, THEN 4 tablets daily for 3 days, THEN 3 tablets daily for 3 days, THEN 2 tablets daily for 3 days, THEN 1 tablet daily for 3 days.   Start taking on: March 6, 2023     Ventolin  (90 Base) MCG/ACT inhaler  Generic drug: albuterol sulfate HFA  INHALE TWO PUFFS BY MOUTH FOUR TIMES A DAY AS NEEDED FOR WHEEZING               Where to Get Your Medications        These medications were sent to [facility] [id number] [place] [address] [address], [address] [address]      Phone: [phone]   Breztri Aerosphere 160-9-4.8 MCG/ACT Aero           Medications marked \"taking\" at this time  Outpatient Medications Marked as Taking for the 3/17/23 encounter (Office Visit) with Gabo TAMMY Crawford CNP   Medication Sig Dispense Refill    Budeson-Glycopyrrol-Formoterol (BREZTRI AEROSPHERE) 160-9-4.8 MCG/ACT AERO Inhale 1 inhalation into the lungs in the morning and at bedtime 10.7 g 3    ipratropium-albuterol (DUONEB) 0.5-2.5 (3) MG/3ML SOLN nebulizer solution Inhale 3 mLs into the lungs every 6 hours as needed for Shortness of Breath 360 mL 5    VENTOLIN  (90 Base) MCG/ACT inhaler INHALE TWO PUFFS BY MOUTH FOUR TIMES A DAY AS NEEDED FOR WHEEZING 18 g 3        Medications patient taking as of now reconciled against medications ordered at time of hospital discharge: Yes    A comprehensive review of systems was negative except for what was noted in the HPI. Objective:    BP 90/60 (Site: Left Upper Arm, Position: Sitting, Cuff Size: Medium Adult)   Pulse 83   Resp 16   Ht 5' 6\" (1.676 m)   Wt 105 lb (47.6 kg)   LMP  (LMP Unknown)   SpO2 95%   BMI 16.95 kg/m²   General Appearance: alert and oriented to person, place and time, well-developed and well-nourished, in no acute distress  Skin: warm and dry, no rash or erythema  Head: normocephalic and atraumatic  Eyes: pupils equal, round, and reactive to light, extraocular eye movements intact, conjunctivae normal  Pulmonary/Chest: clear to auscultation bilaterally- no wheezes, rales or rhonchi, normal air movement, no respiratory distress  Cardiovascular: normal rate, normal S1 and S2, no gallops, and intact distal pulses  Extremities: no cyanosis and no clubbing  Musculoskeletal: normal range of motion, no joint swelling, deformity or tenderness  Neurologic: gait and coordination normal and speech normal    An electronic signature was used to authenticate this note.   --TAMMY Hendricks CNP

## 2023-03-17 NOTE — PATIENT INSTRUCTIONS
Start new inhaler Faith Friedman)    Continue nebulizer and albuterol inhaler as needed. Monitor oxygen saturation- should stay above 90%    Follow up with Dr. Cedrick Mendez in one month. Rest and remember it will take time to recover.

## 2023-03-19 ASSESSMENT — ENCOUNTER SYMPTOMS: SHORTNESS OF BREATH: 1

## 2023-03-19 NOTE — PROGRESS NOTES
Ghazal Webster (:  1955) is a 75 y.o. female,Established patient, here for evaluation of the following chief complaint(s):  Shortness of Breath (Difficulty breathing, asthma)         ASSESSMENT/PLAN:  1. COPD exacerbation (HCC)  -     predniSONE (DELTASONE) 10 MG tablet; Take 5 tablets by mouth daily for 3 days, THEN 4 tablets daily for 3 days, THEN 3 tablets daily for 3 days, THEN 2 tablets daily for 3 days, THEN 1 tablet daily for 3 days. , Disp-45 tablet, R-0Normal (Patient not taking: Reported on 3/17/2023)  Bactrim prescribed as well. Patient will follow-up if not improving. No follow-ups on file. Subjective   SUBJECTIVE/OBJECTIVE:  Ghazal Webster is a 75 y.o. female. Patient presents with:  Shortness of Breath: Difficulty breathing, asthma      70-year-old female with COPD/asthma who presents for increasing shortness of breath. There has been some cough. The cough has been nonproductive. She has been wheezing. Patient notices shortness of breath with minimal exertion. Patient notes that she has otherwise been stable recently. Denies any chest pain fevers. She has not been having any weight loss or weight gain. The patients PMH, surgical history, family history, medications, allergies were all reviewed and updated as appropriate today. Shortness of Breath      Review of Systems   Respiratory:  Positive for shortness of breath. Objective   Physical Exam  Vitals and nursing note reviewed. Constitutional:       Appearance: She is well-developed. HENT:      Head: Normocephalic and atraumatic. Right Ear: External ear normal.      Left Ear: External ear normal.      Nose: Nose normal.   Eyes:      Conjunctiva/sclera: Conjunctivae normal.      Pupils: Pupils are equal, round, and reactive to light. Cardiovascular:      Rate and Rhythm: Normal rate and regular rhythm. Heart sounds: Normal heart sounds.    Pulmonary:      Effort: Pulmonary effort is normal. Breath sounds: Wheezing present. No rhonchi. Abdominal:      General: Bowel sounds are normal.      Palpations: Abdomen is soft. Musculoskeletal:         General: Normal range of motion. Cervical back: Normal range of motion and neck supple. Skin:     General: Skin is dry. Neurological:      Mental Status: She is alert and oriented to person, place, and time. Deep Tendon Reflexes: Reflexes are normal and symmetric. Psychiatric:         Behavior: Behavior normal.         Thought Content: Thought content normal.         Judgment: Judgment normal.                An electronic signature was used to authenticate this note.     --Amandeep Li MD

## 2023-04-17 ENCOUNTER — OFFICE VISIT (OUTPATIENT)
Dept: FAMILY MEDICINE CLINIC | Age: 68
End: 2023-04-17
Payer: MEDICARE

## 2023-04-17 VITALS
OXYGEN SATURATION: 95 % | HEIGHT: 66 IN | SYSTOLIC BLOOD PRESSURE: 128 MMHG | DIASTOLIC BLOOD PRESSURE: 70 MMHG | BODY MASS INDEX: 17.33 KG/M2 | WEIGHT: 107.8 LBS | HEART RATE: 84 BPM

## 2023-04-17 DIAGNOSIS — Z13.220 SCREENING, LIPID: ICD-10-CM

## 2023-04-17 DIAGNOSIS — Z00.00 INITIAL MEDICARE ANNUAL WELLNESS VISIT: Primary | ICD-10-CM

## 2023-04-17 PROCEDURE — 1124F ACP DISCUSS-NO DSCNMKR DOCD: CPT | Performed by: FAMILY MEDICINE

## 2023-04-17 PROCEDURE — G0438 PPPS, INITIAL VISIT: HCPCS | Performed by: FAMILY MEDICINE

## 2023-04-17 ASSESSMENT — PATIENT HEALTH QUESTIONNAIRE - PHQ9
2. FEELING DOWN, DEPRESSED OR HOPELESS: 0
8. MOVING OR SPEAKING SO SLOWLY THAT OTHER PEOPLE COULD HAVE NOTICED. OR THE OPPOSITE, BEING SO FIGETY OR RESTLESS THAT YOU HAVE BEEN MOVING AROUND A LOT MORE THAN USUAL: 0
SUM OF ALL RESPONSES TO PHQ QUESTIONS 1-9: 2
SUM OF ALL RESPONSES TO PHQ QUESTIONS 1-9: 2
10. IF YOU CHECKED OFF ANY PROBLEMS, HOW DIFFICULT HAVE THESE PROBLEMS MADE IT FOR YOU TO DO YOUR WORK, TAKE CARE OF THINGS AT HOME, OR GET ALONG WITH OTHER PEOPLE: 0
SUM OF ALL RESPONSES TO PHQ9 QUESTIONS 1 & 2: 0
SUM OF ALL RESPONSES TO PHQ QUESTIONS 1-9: 2
4. FEELING TIRED OR HAVING LITTLE ENERGY: 1
1. LITTLE INTEREST OR PLEASURE IN DOING THINGS: 3
6. FEELING BAD ABOUT YOURSELF - OR THAT YOU ARE A FAILURE OR HAVE LET YOURSELF OR YOUR FAMILY DOWN: 0
3. TROUBLE FALLING OR STAYING ASLEEP: 0
SUM OF ALL RESPONSES TO PHQ QUESTIONS 1-9: 2
9. THOUGHTS THAT YOU WOULD BE BETTER OFF DEAD, OR OF HURTING YOURSELF: 0
1. LITTLE INTEREST OR PLEASURE IN DOING THINGS: 0
7. TROUBLE CONCENTRATING ON THINGS, SUCH AS READING THE NEWSPAPER OR WATCHING TELEVISION: 0
5. POOR APPETITE OR OVEREATING: 1
SUM OF ALL RESPONSES TO PHQ QUESTIONS 1-9: 3
SUM OF ALL RESPONSES TO PHQ QUESTIONS 1-9: 3
SUM OF ALL RESPONSES TO PHQ9 QUESTIONS 1 & 2: 3
SUM OF ALL RESPONSES TO PHQ QUESTIONS 1-9: 3
SUM OF ALL RESPONSES TO PHQ QUESTIONS 1-9: 3
2. FEELING DOWN, DEPRESSED OR HOPELESS: 0

## 2023-04-17 ASSESSMENT — LIFESTYLE VARIABLES
HOW MANY STANDARD DRINKS CONTAINING ALCOHOL DO YOU HAVE ON A TYPICAL DAY: 1 OR 2
HOW OFTEN DO YOU HAVE A DRINK CONTAINING ALCOHOL: 2-3 TIMES A WEEK

## 2023-04-25 DIAGNOSIS — J44.9 CHRONIC OBSTRUCTIVE PULMONARY DISEASE, UNSPECIFIED COPD TYPE (HCC): ICD-10-CM

## 2023-04-25 RX ORDER — ALBUTEROL SULFATE 90 UG/1
AEROSOL, METERED RESPIRATORY (INHALATION)
Qty: 18 G | Refills: 3 | Status: SHIPPED | OUTPATIENT
Start: 2023-04-25

## 2023-04-25 NOTE — TELEPHONE ENCOUNTER
Last Office Visit  -  4/17/23  Next Office Visit  -  n/a    Last Filled  -    Last UDS -    Contract -

## 2023-05-16 ENCOUNTER — HOSPITAL ENCOUNTER (OUTPATIENT)
Dept: GENERAL RADIOLOGY | Age: 68
Discharge: HOME | End: 2023-05-16
Payer: MEDICARE

## 2023-05-16 ENCOUNTER — HOSPITAL ENCOUNTER (OUTPATIENT)
Age: 68
Discharge: HOME | End: 2023-05-16
Payer: MEDICARE

## 2023-05-16 ENCOUNTER — OFFICE VISIT (OUTPATIENT)
Dept: PULMONOLOGY | Age: 68
End: 2023-05-16
Payer: MEDICARE

## 2023-05-16 VITALS
OXYGEN SATURATION: 96 % | DIASTOLIC BLOOD PRESSURE: 76 MMHG | SYSTOLIC BLOOD PRESSURE: 128 MMHG | HEIGHT: 66 IN | WEIGHT: 109 LBS | BODY MASS INDEX: 17.52 KG/M2 | RESPIRATION RATE: 20 BRPM | HEART RATE: 78 BPM

## 2023-05-16 DIAGNOSIS — F17.200 TOBACCO DEPENDENCE: ICD-10-CM

## 2023-05-16 DIAGNOSIS — J44.9 COPD WITH ASTHMA (HCC): ICD-10-CM

## 2023-05-16 DIAGNOSIS — Z85.118 PERSONAL HISTORY OF LUNG CANCER: ICD-10-CM

## 2023-05-16 DIAGNOSIS — J44.9 COPD WITH ASTHMA (HCC): Primary | ICD-10-CM

## 2023-05-16 PROBLEM — J45.30 MILD PERSISTENT ASTHMA WITHOUT COMPLICATION: Status: ACTIVE | Noted: 2023-02-07

## 2023-05-16 PROCEDURE — G9899 SCRN MAM PERF RSLTS DOC: HCPCS | Performed by: NURSE PRACTITIONER

## 2023-05-16 PROCEDURE — 1124F ACP DISCUSS-NO DSCNMKR DOCD: CPT | Performed by: NURSE PRACTITIONER

## 2023-05-16 PROCEDURE — G8399 PT W/DXA RESULTS DOCUMENT: HCPCS | Performed by: NURSE PRACTITIONER

## 2023-05-16 PROCEDURE — 71046 X-RAY EXAM CHEST 2 VIEWS: CPT

## 2023-05-16 PROCEDURE — 3017F COLORECTAL CA SCREEN DOC REV: CPT | Performed by: NURSE PRACTITIONER

## 2023-05-16 PROCEDURE — 1090F PRES/ABSN URINE INCON ASSESS: CPT | Performed by: NURSE PRACTITIONER

## 2023-05-16 PROCEDURE — 3023F SPIROM DOC REV: CPT | Performed by: NURSE PRACTITIONER

## 2023-05-16 PROCEDURE — 99214 OFFICE O/P EST MOD 30 MIN: CPT | Performed by: NURSE PRACTITIONER

## 2023-05-16 PROCEDURE — 4004F PT TOBACCO SCREEN RCVD TLK: CPT | Performed by: NURSE PRACTITIONER

## 2023-05-16 PROCEDURE — G8419 CALC BMI OUT NRM PARAM NOF/U: HCPCS | Performed by: NURSE PRACTITIONER

## 2023-05-16 PROCEDURE — G8427 DOCREV CUR MEDS BY ELIG CLIN: HCPCS | Performed by: NURSE PRACTITIONER

## 2023-05-16 RX ORDER — BUDESONIDE, GLYCOPYRROLATE, AND FORMOTEROL FUMARATE 160; 9; 4.8 UG/1; UG/1; UG/1
2 AEROSOL, METERED RESPIRATORY (INHALATION) 2 TIMES DAILY
Qty: 1 EACH | Refills: 3 | Status: SHIPPED | OUTPATIENT
Start: 2023-05-16

## 2023-05-16 ASSESSMENT — ENCOUNTER SYMPTOMS
SPUTUM PRODUCTION: 1
CHEST TIGHTNESS: 0
SORE THROAT: 0
SHORTNESS OF BREATH: 1
WHEEZING: 0
COUGH: 1
EYE PAIN: 0
ABDOMINAL PAIN: 0
VOMITING: 0
DIARRHEA: 0
NAUSEA: 0
RHINORRHEA: 0

## 2023-05-16 NOTE — PATIENT INSTRUCTIONS
6 minute walk test     Call with worsening symptoms such as increased shortness of breath, productive cough, wheezing or symptoms not responding to treatment plan. Return to clinic in 3 months with Dr. Carmen Peng is 2 puffs twice a day- Rinse mouth out after use to prevent hoarseness and thrush. CXR today      Patient understands that smoking is aggravating the respiratory disease and must be discontinued. May use Mucinex or guaifenesin 600-1200 mg twice a day to help thin secretions. Drink with plenty of water. Remember to bring a list of pulmonary medications and any CPAP or BiPAP machines to your next appointment with the office. Please keep all of your future appointments scheduled by [facility] office. Out of respect for other patients and providers, you may be asked to reschedule your appointment if you arrive later than your scheduled appointment time. Appointments cancelled less than 24hrs in advance will be considered a no show. Patients with three missed appointments within 1 year or four missed appointments within 2 years can be dismissed from the practice. Please be aware that our physicians are required to work in the Intensive Care Unit at [facility].  Your appointment may need to be rescheduled if they are designated to work during your appointment time. You may receive a survey regarding the care you received during your visit. Your input is valuable to us. We encourage you to complete and return your survey. We hope you will choose us in the future for your healthcare needs. Pt instructed of all future appointment dates & times, including radiology, labs, procedures & referrals. If procedures were scheduled preparation instructions provided. Instructions on future appointments with [facility] were given. MA Communication:   The following orders are received by verbal communication from MICHELLE

## 2023-05-16 NOTE — PROGRESS NOTES
Agueda Peter is a 75 y.o. who comes in today for Cough (Cough & Difficulty breathing , Ongoing 1 month /2 visits Urgent Care Prednisone & Antibiotics provided some relief /SOB , Fatigue )   States she feels like her breathing is worse over the last month. She was seen at Urgent care and prescribed antibiotics and steroids with some relief. She continues Breztri ( only doing 1 puff twice a day)  and using albuterol 4-5 times a day   , using duoneb  4-5 times a day. She is sp EDER lobectomy in 7/2022 for lung cancer. Recent CT in January 2023 without recurrence. She is followed by [facility] for this. She was in the hospital in March with pna, COPD exacerbation. She continues to smoke a few cig.a day. Shortness of Breath  This is a recurrent problem. The current episode started 1 to 4 weeks ago. The problem has been waxing and waning. Associated symptoms include sputum production (thick, yellow). Pertinent negatives include no abdominal pain, chest pain, fever, headaches, leg swelling, rhinorrhea, sore throat, vomiting or wheezing. The symptoms are aggravated by any activity. She has tried beta agonist inhalers, oral steroids, rest and steroid inhalers for the symptoms. The treatment provided mild relief. Her past medical history is significant for asthma, COPD and pneumonia.       Past Medical History:   Diagnosis Date    Arthritis     Asthma     COPD (chronic obstructive pulmonary disease) ([address]     Hormone replacement therapy         Past Surgical History:   Procedure Laterality Date    BREAST BIOPSY      Right, was benign    COLONOSCOPY N/A 10/9/2018    COLONOSCOPY WITH BIOPSY performed by Na Jin MD at [place] [address]  5/31/2022    CT NEEDLE BIOPSY LUNG PERCUTANEOUS 5/31/2022 [facility] SCAN    CYST REMOVAL      Behind ear    [facility] LOC BREAST BIOPSY LEFT Left 2/15/2023    YESI STEROTACTIC LOC BREAST BIOPSY LEFT 2/15/2023 [facility]

## 2023-05-16 NOTE — PROGRESS NOTES
MA Communication:   The following orders are received by verbal communication from Aminta Velasquez CNP    Orders include:  Chest Xray today at [facility] walk In     6 minute walk test    May 26 , 2023  arrival 8:45am test will start at [address]       Follow up with Dr. Deanne Hartley   August 16,2023   at 10:00am

## 2023-05-26 ENCOUNTER — HOSPITAL ENCOUNTER (OUTPATIENT)
Dept: PULMONOLOGY | Age: 68
Discharge: HOME | End: 2023-05-26
Payer: MEDICARE

## 2023-05-26 DIAGNOSIS — Z85.118 PERSONAL HISTORY OF LUNG CANCER: ICD-10-CM

## 2023-05-26 DIAGNOSIS — J44.9 COPD WITH ASTHMA (HCC): ICD-10-CM

## 2023-05-26 PROCEDURE — 94618 PULMONARY STRESS TESTING: CPT

## 2023-05-26 NOTE — PROCEDURES
[address] [place] [address] Pulmonary Function Lab - Six Minute Walk      Test Performed on:   Room Air__X____   Oxygen at _____ lpm via N/C- continuous Oxygen at _____ lpm via N/C- OCD  Assist Device Used During Test:  None____X__ Cane________ Walker_________    Modified Kimber's Scale  0 Nothing at all 5 Strong    0.5 Just noticeable 6 Stronger (Hard)    1 Very Light 7 Very Strong   2 Light 8 Very Very Strong   3 Moderate 9 Extremely Strong   4 Somewhat Strong 10 Maximum All out      Time SpO2 Heart Rate Respiratory Rate Dyspnea-  Modified Kimbers Scale Fatigue- Modified Kimbers Scale Other Symptoms   Baseline                     98% room [email or web address] 69 18 1 1      1 minute                     98% 71 20 1 1    2 minutes                     98% 85 21 1 1      3 minutes                     95% 82 22 1 1    4 minutes                     95% 86 22 2 2    5 minutes                     96% 85 23 2 2    6 minutes                     97% 85 23 2 2    Recovery x 1 minute                     98% 73 20 1 1    Recovery x 2 Minute                     99% 74 20 1 1     Number of Laps____10_____ X 120 feet + _________ additional feet = Total Distance __1200__feet   Stopped or paused before 6 minutes? No____X___ Yes ________  Total expected 6 MW distance is __1688__feet. Patient achieved ____71__% of expected distance. Pre Blood Pressure: 151/76  Post Blood Pressure:   Other symptoms at the end of exercise: little SOB

## 2023-06-20 ENCOUNTER — TELEMEDICINE (OUTPATIENT)
Dept: FAMILY MEDICINE CLINIC | Age: 68
End: 2023-06-20
Payer: MEDICARE

## 2023-06-20 DIAGNOSIS — J44.1 COPD EXACERBATION (HCC): Primary | ICD-10-CM

## 2023-06-20 PROCEDURE — 3023F SPIROM DOC REV: CPT | Performed by: STUDENT IN AN ORGANIZED HEALTH CARE EDUCATION/TRAINING PROGRAM

## 2023-06-20 PROCEDURE — G8419 CALC BMI OUT NRM PARAM NOF/U: HCPCS | Performed by: STUDENT IN AN ORGANIZED HEALTH CARE EDUCATION/TRAINING PROGRAM

## 2023-06-20 PROCEDURE — 1090F PRES/ABSN URINE INCON ASSESS: CPT | Performed by: STUDENT IN AN ORGANIZED HEALTH CARE EDUCATION/TRAINING PROGRAM

## 2023-06-20 PROCEDURE — G9899 SCRN MAM PERF RSLTS DOC: HCPCS | Performed by: STUDENT IN AN ORGANIZED HEALTH CARE EDUCATION/TRAINING PROGRAM

## 2023-06-20 PROCEDURE — 3017F COLORECTAL CA SCREEN DOC REV: CPT | Performed by: STUDENT IN AN ORGANIZED HEALTH CARE EDUCATION/TRAINING PROGRAM

## 2023-06-20 PROCEDURE — 99213 OFFICE O/P EST LOW 20 MIN: CPT | Performed by: STUDENT IN AN ORGANIZED HEALTH CARE EDUCATION/TRAINING PROGRAM

## 2023-06-20 PROCEDURE — 1124F ACP DISCUSS-NO DSCNMKR DOCD: CPT | Performed by: STUDENT IN AN ORGANIZED HEALTH CARE EDUCATION/TRAINING PROGRAM

## 2023-06-20 PROCEDURE — G8427 DOCREV CUR MEDS BY ELIG CLIN: HCPCS | Performed by: STUDENT IN AN ORGANIZED HEALTH CARE EDUCATION/TRAINING PROGRAM

## 2023-06-20 PROCEDURE — G8399 PT W/DXA RESULTS DOCUMENT: HCPCS | Performed by: STUDENT IN AN ORGANIZED HEALTH CARE EDUCATION/TRAINING PROGRAM

## 2023-06-20 PROCEDURE — 4004F PT TOBACCO SCREEN RCVD TLK: CPT | Performed by: STUDENT IN AN ORGANIZED HEALTH CARE EDUCATION/TRAINING PROGRAM

## 2023-06-20 RX ORDER — PREDNISONE 50 MG/1
50 TABLET ORAL DAILY
Qty: 5 TABLET | Refills: 0 | Status: SHIPPED | OUTPATIENT
Start: 2023-06-20 | End: 2023-06-25

## 2023-06-20 RX ORDER — AZITHROMYCIN 250 MG/1
250 TABLET, FILM COATED ORAL SEE ADMIN INSTRUCTIONS
Qty: 6 TABLET | Refills: 0 | Status: SHIPPED | OUTPATIENT
Start: 2023-06-20 | End: 2023-06-25

## 2023-06-20 NOTE — PROGRESS NOTES
Trev Benitez (:  1955) is a Established patient, presenting virtually for evaluation of the following:    Assessment & Plan   Below is the assessment and plan developed based on review of pertinent history, physical exam, labs, studies, and medications. 1. COPD exacerbation (HCC)  -     predniSONE (DELTASONE) 50 MG tablet; Take 1 tablet by mouth daily for 5 days, Disp-5 tablet, R-0Normal  -     azithromycin (ZITHROMAX) 250 MG tablet; Take 1 tablet by mouth See Admin Instructions for 5 days 500mg on day 1 followed by 250mg on days 2 - 5, Disp-6 tablet, R-0Normal  Patient with likely continued COPD exacerbation. Has seen previous benefit from larger dose of prednisone. Will provide 5 days of azithromycin and 5 days of prednisone 50 mg daily. Advised patient to measure her pulse oximetry while ambulating and that if she is less than 88%, she should go to the emergency department. Patient instructions when to call back or when to seek a higher level of care. No follow-ups on file. Subjective   HPI  Patient is a 18-year-old female, who was recently diagnosed with a COPD exacerbation, was put on Medrol Dosepak taper in addition to Augmentin. Patient states that her upper respiratory symptoms have improved since that time, but patient reports that she is still having lower respiratory symptoms and shortness of breath upon physical activity. She has measured her pulse oximeter at baseline which has been 95%, but has not measured when she has been short of breath after walking short distances within her home. Patient also states that she has been on Medrol dose packs in the past, which have not helped, but that she has been on higher doses of prednisone in the past which have helped greatly with her COPD exacerbations in the past.  Patient is wondering if she could be on a higher dose of prednisone. Patient also states that she has been on a Z-Martinez in the past for COPD exacerbation.     Review of

## 2023-06-28 ENCOUNTER — TELEPHONE (OUTPATIENT)
Dept: FAMILY MEDICINE CLINIC | Age: 68
End: 2023-06-28

## 2023-06-28 RX ORDER — ALBUTEROL SULFATE 90 UG/1
2 AEROSOL, METERED RESPIRATORY (INHALATION) EVERY 6 HOURS PRN
Qty: 18 G | Refills: 3 | Status: SHIPPED | OUTPATIENT
Start: 2023-06-28

## 2023-06-28 NOTE — TELEPHONE ENCOUNTER
Pt requesting New Script for GlySens inhaler because insurance covers it. Pt states insurance does not cover Ventolin inhaler.  -Please Advise.     Last Office Visit  -  6/20/23  Next Office Visit  -  none

## 2023-07-24 ENCOUNTER — HOSPITAL ENCOUNTER (OUTPATIENT)
Dept: CT IMAGING | Age: 68
Discharge: HOME | End: 2023-07-24
Payer: MEDICARE

## 2023-07-24 DIAGNOSIS — C34.11 MALIGNANT NEOPLASM OF UPPER LOBE, RIGHT BRONCHUS OR LUNG (HCC): ICD-10-CM

## 2023-07-24 LAB
PERFORMED ON: ABNORMAL
POC CREATININE: 0.5 MG/DL (ref 0.6–1.2)
POC SAMPLE TYPE: ABNORMAL

## 2023-07-24 PROCEDURE — 71260 CT THORAX DX C+: CPT

## 2023-07-24 PROCEDURE — 6360000004 HC RX CONTRAST MEDICATION: Performed by: NURSE PRACTITIONER

## 2023-07-24 PROCEDURE — 82565 ASSAY OF CREATININE: CPT

## 2023-07-24 RX ADMIN — IOPAMIDOL 75 ML: 755 INJECTION, SOLUTION INTRAVENOUS at 13:54

## 2023-08-08 ENCOUNTER — TELEPHONE (OUTPATIENT)
Dept: FAMILY MEDICINE CLINIC | Age: 68
End: 2023-08-08

## 2023-08-08 DIAGNOSIS — J44.9 COPD WITH ASTHMA (HCC): ICD-10-CM

## 2023-08-08 RX ORDER — BUDESONIDE, GLYCOPYRROLATE, AND FORMOTEROL FUMARATE 160; 9; 4.8 UG/1; UG/1; UG/1
2 AEROSOL, METERED RESPIRATORY (INHALATION) 2 TIMES DAILY
Qty: 1 EACH | Refills: 3 | Status: SHIPPED | OUTPATIENT
Start: 2023-08-08

## 2023-08-08 RX ORDER — IPRATROPIUM BROMIDE AND ALBUTEROL SULFATE 2.5; .5 MG/3ML; MG/3ML
1 SOLUTION RESPIRATORY (INHALATION) EVERY 6 HOURS PRN
Qty: 360 ML | Refills: 5 | Status: SHIPPED | OUTPATIENT
Start: 2023-08-08

## 2023-08-08 NOTE — TELEPHONE ENCOUNTER
----- Message from Nayeli Ansari sent at 8/8/2023  4:19 PM EDT -----  Subject: Message to Provider    QUESTIONS  Information for Provider? Patient stated that they saw a mass on her   pancreas in CAT scan today. Patient is asking if she should come in for an   appointment with you or should she go to her oncologist. Please call   patient and advise her how to proceed. ---------------------------------------------------------------------------  --------------  Vick GARCÍADEVYN  [phone]; OK to leave message on voicemail  ---------------------------------------------------------------------------  --------------  SCRIPT ANSWERS  Relationship to Patient?  Self

## 2023-08-08 NOTE — TELEPHONE ENCOUNTER
Pharmacy requesting refill  Budeson-Glycopyrrol-Formoterol (BREZTRI AEROSPHERE) 160-9-4.8 MCG/ACT AERO   Last Office Visit  -  6/20/23  Next Office Visit  -  none

## 2023-08-14 ENCOUNTER — OFFICE VISIT (OUTPATIENT)
Dept: PULMONOLOGY | Age: 68
End: 2023-08-14
Payer: MEDICARE

## 2023-08-14 VITALS
TEMPERATURE: 98 F | OXYGEN SATURATION: 96 % | SYSTOLIC BLOOD PRESSURE: 120 MMHG | DIASTOLIC BLOOD PRESSURE: 69 MMHG | BODY MASS INDEX: 17.45 KG/M2 | WEIGHT: 108.6 LBS | HEART RATE: 72 BPM | HEIGHT: 66 IN

## 2023-08-14 DIAGNOSIS — C34.90 ADENOCARCINOMA OF LUNG, UNSPECIFIED LATERALITY (HCC): ICD-10-CM

## 2023-08-14 DIAGNOSIS — F17.200 TOBACCO DEPENDENCE: ICD-10-CM

## 2023-08-14 DIAGNOSIS — J44.9 COPD, MODERATE (HCC): Primary | ICD-10-CM

## 2023-08-14 PROCEDURE — 3017F COLORECTAL CA SCREEN DOC REV: CPT | Performed by: STUDENT IN AN ORGANIZED HEALTH CARE EDUCATION/TRAINING PROGRAM

## 2023-08-14 PROCEDURE — 99213 OFFICE O/P EST LOW 20 MIN: CPT | Performed by: STUDENT IN AN ORGANIZED HEALTH CARE EDUCATION/TRAINING PROGRAM

## 2023-08-14 PROCEDURE — 1124F ACP DISCUSS-NO DSCNMKR DOCD: CPT | Performed by: STUDENT IN AN ORGANIZED HEALTH CARE EDUCATION/TRAINING PROGRAM

## 2023-08-14 PROCEDURE — G8419 CALC BMI OUT NRM PARAM NOF/U: HCPCS | Performed by: STUDENT IN AN ORGANIZED HEALTH CARE EDUCATION/TRAINING PROGRAM

## 2023-08-14 PROCEDURE — G9899 SCRN MAM PERF RSLTS DOC: HCPCS | Performed by: STUDENT IN AN ORGANIZED HEALTH CARE EDUCATION/TRAINING PROGRAM

## 2023-08-14 PROCEDURE — 1090F PRES/ABSN URINE INCON ASSESS: CPT | Performed by: STUDENT IN AN ORGANIZED HEALTH CARE EDUCATION/TRAINING PROGRAM

## 2023-08-14 PROCEDURE — G8427 DOCREV CUR MEDS BY ELIG CLIN: HCPCS | Performed by: STUDENT IN AN ORGANIZED HEALTH CARE EDUCATION/TRAINING PROGRAM

## 2023-08-14 PROCEDURE — G8399 PT W/DXA RESULTS DOCUMENT: HCPCS | Performed by: STUDENT IN AN ORGANIZED HEALTH CARE EDUCATION/TRAINING PROGRAM

## 2023-08-14 PROCEDURE — 4004F PT TOBACCO SCREEN RCVD TLK: CPT | Performed by: STUDENT IN AN ORGANIZED HEALTH CARE EDUCATION/TRAINING PROGRAM

## 2023-08-14 PROCEDURE — 3023F SPIROM DOC REV: CPT | Performed by: STUDENT IN AN ORGANIZED HEALTH CARE EDUCATION/TRAINING PROGRAM

## 2023-08-14 RX ORDER — BUDESONIDE, GLYCOPYRROLATE, AND FORMOTEROL FUMARATE 160; 9; 4.8 UG/1; UG/1; UG/1
2 AEROSOL, METERED RESPIRATORY (INHALATION) 2 TIMES DAILY
Qty: 2 EACH | Refills: 0 | Status: ON HOLD | COMMUNITY
Start: 2023-08-14

## 2023-08-14 ASSESSMENT — ENCOUNTER SYMPTOMS
SORE THROAT: 0
BACK PAIN: 0
DIARRHEA: 0
EYE REDNESS: 0
ABDOMINAL PAIN: 0
EYE PAIN: 0
SHORTNESS OF BREATH: 0
COUGH: 0
COLOR CHANGE: 0
EYE ITCHING: 0
CONSTIPATION: 0
TROUBLE SWALLOWING: 0
EYE DISCHARGE: 0
NAUSEA: 0
ABDOMINAL DISTENTION: 0
VOMITING: 0
STRIDOR: 0
WHEEZING: 0

## 2023-08-14 NOTE — PROGRESS NOTES
MA Communication:   The following orders are received by verbal communication from   Rubén Murray MD    Orders include:  fu 6 mo       Breztri samples

## 2023-08-14 NOTE — PATIENT INSTRUCTIONS
Remember to bring a list of pulmonary medications and any CPAP or BiPAP machines to your next appointment with the office. Please keep all of your future appointments scheduled by [facility] Pul[facility] office. Out of respect for other patients and providers, you may be asked to reschedule your appointment if you arrive later than your scheduled appointment time. Appointments cancelled less than 24hrs in advance will be considered a no show. Patients with three missed appointments within 1 year or four missed appointments within 2 years can be dismissed from the practice. Please be aware that our physicians are required to work in the Intensive Care Unit at [facility].  Your appointment may need to be rescheduled if they are designated to work during your appointment time. You may receive a survey regarding the care you received during your visit. Your input is valuable to us. We encourage you to complete and return your survey. We hope you will choose us in the future for your healthcare needs. Pt instructed of all future appointment dates & times, including radiology, labs, procedures & referrals. If procedures were scheduled preparation instructions provided. Instructions on future appointments with [facility] Pulmonary were given.

## 2023-08-15 ENCOUNTER — HOSPITAL ENCOUNTER (OUTPATIENT)
Dept: CT IMAGING | Age: 68
Discharge: HOME | End: 2023-08-15
Payer: MEDICARE

## 2023-08-15 DIAGNOSIS — C34.11 MALIGNANT NEOPLASM OF UPPER LOBE, RIGHT BRONCHUS OR LUNG (HCC): ICD-10-CM

## 2023-08-15 PROCEDURE — 6360000004 HC RX CONTRAST MEDICATION: Performed by: INTERNAL MEDICINE

## 2023-08-15 PROCEDURE — 2500000003 HC RX 250 WO HCPCS: Performed by: INTERNAL MEDICINE

## 2023-08-15 PROCEDURE — 74170 CT ABD WO CNTRST FLWD CNTRST: CPT

## 2023-08-15 RX ADMIN — BARIUM SULFATE 450 ML: 20 SUSPENSION ORAL at 14:41

## 2023-08-15 RX ADMIN — IOPAMIDOL 75 ML: 755 INJECTION, SOLUTION INTRAVENOUS at 14:41

## 2023-08-17 ENCOUNTER — APPOINTMENT (OUTPATIENT)
Dept: GENERAL RADIOLOGY | Age: 68
DRG: 192 | End: 2023-08-17
Payer: MEDICARE

## 2023-08-17 ENCOUNTER — HOSPITAL ENCOUNTER (INPATIENT)
Age: 68
LOS: 4 days | Discharge: HOME | DRG: 192 | End: 2023-08-21
Attending: EMERGENCY MEDICINE | Admitting: INTERNAL MEDICINE
Payer: MEDICARE

## 2023-08-17 ENCOUNTER — TELEMEDICINE (OUTPATIENT)
Dept: FAMILY MEDICINE CLINIC | Age: 68
End: 2023-08-17
Payer: MEDICARE

## 2023-08-17 DIAGNOSIS — J44.1 COPD EXACERBATION (HCC): ICD-10-CM

## 2023-08-17 DIAGNOSIS — J44.1 COPD EXACERBATION (HCC): Primary | ICD-10-CM

## 2023-08-17 DIAGNOSIS — R06.02 SHORTNESS OF BREATH: ICD-10-CM

## 2023-08-17 DIAGNOSIS — R06.03 ACUTE RESPIRATORY DISTRESS: Primary | ICD-10-CM

## 2023-08-17 LAB
ALBUMIN SERPL-MCNC: 3.7 G/DL (ref 3.4–5)
ALBUMIN/GLOB SERPL: 1.2 {RATIO} (ref 1.1–2.2)
ALP SERPL-CCNC: 94 U/L (ref 40–129)
ALT SERPL-CCNC: 7 U/L (ref 10–40)
ANION GAP SERPL CALCULATED.3IONS-SCNC: 7 MMOL/L (ref 3–16)
AST SERPL-CCNC: 15 U/L (ref 15–37)
BASE EXCESS BLDV CALC-SCNC: 4.1 MMOL/L (ref -3–3)
BASOPHILS # BLD: 0 K/UL (ref 0–0.2)
BASOPHILS NFR BLD: 0.5 %
BILIRUB SERPL-MCNC: <0.2 MG/DL (ref 0–1)
BUN SERPL-MCNC: 14 MG/DL (ref 7–20)
CALCIUM SERPL-MCNC: 9 MG/DL (ref 8.3–10.6)
CHLORIDE SERPL-SCNC: 102 MMOL/L (ref 99–110)
CO2 BLDV-SCNC: 31 MMOL/L
CO2 SERPL-SCNC: 29 MMOL/L (ref 21–32)
COHGB MFR BLDV: 4.5 % (ref 0–1.5)
CREAT SERPL-MCNC: <0.5 MG/DL (ref 0.6–1.2)
DEPRECATED RDW RBC AUTO: 15.1 % (ref 12.4–15.4)
EKG ATRIAL RATE: 79 BPM
EKG DIAGNOSIS: NORMAL
EKG P-R INTERVAL: 126 MS
EKG Q-T INTERVAL: 380 MS
EKG QRS DURATION: 82 MS
EKG QTC CALCULATION (BAZETT): 435 MS
EKG R AXIS: 69 DEGREES
EKG T AXIS: 97 DEGREES
EKG VENTRICULAR RATE: 79 BPM
EOSINOPHIL # BLD: 0.5 K/UL (ref 0–0.6)
EOSINOPHIL NFR BLD: 7.6 %
FLUAV RNA RESP QL NAA+PROBE: NOT DETECTED
FLUBV RNA RESP QL NAA+PROBE: NOT DETECTED
GFR SERPLBLD CREATININE-BSD FMLA CKD-EPI: >60 ML/MIN/{1.73_M2}
GLUCOSE SERPL-MCNC: 101 MG/DL (ref 70–99)
HCO3 BLDV-SCNC: 29.6 MMOL/L (ref 23–29)
HCT VFR BLD AUTO: 39.5 % (ref 36–48)
HGB BLD-MCNC: 13.2 G/DL (ref 12–16)
LYMPHOCYTES # BLD: 0.8 K/UL (ref 1–5.1)
LYMPHOCYTES NFR BLD: 13.3 %
MAGNESIUM SERPL-MCNC: 1.7 MG/DL (ref 1.8–2.4)
MCH RBC QN AUTO: 33 PG (ref 26–34)
MCHC RBC AUTO-ENTMCNC: 33.3 G/DL (ref 31–36)
MCV RBC AUTO: 98.9 FL (ref 80–100)
METHGB MFR BLDV: 0.4 %
MONOCYTES # BLD: 0.4 K/UL (ref 0–1.3)
MONOCYTES NFR BLD: 5.7 %
NEUTROPHILS # BLD: 4.5 K/UL (ref 1.7–7.7)
NEUTROPHILS NFR BLD: 72.9 %
NT-PROBNP SERPL-MCNC: 177 PG/ML (ref 0–124)
O2 THERAPY: ABNORMAL
PCO2 BLDV: 47.6 MMHG (ref 40–50)
PH BLDV: 7.41 [PH] (ref 7.35–7.45)
PLATELET # BLD AUTO: 241 K/UL (ref 135–450)
PMV BLD AUTO: 6.7 FL (ref 5–10.5)
PO2 BLDV: 69.1 MMHG (ref 25–40)
POTASSIUM SERPL-SCNC: 4.3 MMOL/L (ref 3.5–5.1)
PROT SERPL-MCNC: 6.9 G/DL (ref 6.4–8.2)
RBC # BLD AUTO: 3.99 M/UL (ref 4–5.2)
SAO2 % BLDV: 94 %
SARS-COV-2 RNA RESP QL NAA+PROBE: NOT DETECTED
SODIUM SERPL-SCNC: 138 MMOL/L (ref 136–145)
TROPONIN, HIGH SENSITIVITY: 10 NG/L (ref 0–14)
WBC # BLD AUTO: 6.2 K/UL (ref 4–11)

## 2023-08-17 PROCEDURE — 82803 BLOOD GASES ANY COMBINATION: CPT

## 2023-08-17 PROCEDURE — 6370000000 HC RX 637 (ALT 250 FOR IP): Performed by: EMERGENCY MEDICINE

## 2023-08-17 PROCEDURE — 85025 COMPLETE CBC W/AUTO DIFF WBC: CPT

## 2023-08-17 PROCEDURE — 99214 OFFICE O/P EST MOD 30 MIN: CPT | Performed by: FAMILY MEDICINE

## 2023-08-17 PROCEDURE — 94761 N-INVAS EAR/PLS OXIMETRY MLT: CPT

## 2023-08-17 PROCEDURE — 6360000002 HC RX W HCPCS: Performed by: EMERGENCY MEDICINE

## 2023-08-17 PROCEDURE — G8399 PT W/DXA RESULTS DOCUMENT: HCPCS | Performed by: FAMILY MEDICINE

## 2023-08-17 PROCEDURE — 87636 SARSCOV2 & INF A&B AMP PRB: CPT

## 2023-08-17 PROCEDURE — 93010 ELECTROCARDIOGRAM REPORT: CPT | Performed by: INTERNAL MEDICINE

## 2023-08-17 PROCEDURE — 2580000003 HC RX 258: Performed by: INTERNAL MEDICINE

## 2023-08-17 PROCEDURE — 6370000000 HC RX 637 (ALT 250 FOR IP)

## 2023-08-17 PROCEDURE — 6370000000 HC RX 637 (ALT 250 FOR IP): Performed by: NURSE PRACTITIONER

## 2023-08-17 PROCEDURE — 2700000000 HC OXYGEN THERAPY PER DAY

## 2023-08-17 PROCEDURE — G9899 SCRN MAM PERF RSLTS DOC: HCPCS | Performed by: FAMILY MEDICINE

## 2023-08-17 PROCEDURE — 83880 ASSAY OF NATRIURETIC PEPTIDE: CPT

## 2023-08-17 PROCEDURE — 83735 ASSAY OF MAGNESIUM: CPT

## 2023-08-17 PROCEDURE — 84484 ASSAY OF TROPONIN QUANT: CPT

## 2023-08-17 PROCEDURE — G8427 DOCREV CUR MEDS BY ELIG CLIN: HCPCS | Performed by: FAMILY MEDICINE

## 2023-08-17 PROCEDURE — 93005 ELECTROCARDIOGRAM TRACING: CPT | Performed by: EMERGENCY MEDICINE

## 2023-08-17 PROCEDURE — 4004F PT TOBACCO SCREEN RCVD TLK: CPT | Performed by: FAMILY MEDICINE

## 2023-08-17 PROCEDURE — 94640 AIRWAY INHALATION TREATMENT: CPT

## 2023-08-17 PROCEDURE — 80053 COMPREHEN METABOLIC PANEL: CPT

## 2023-08-17 PROCEDURE — 3017F COLORECTAL CA SCREEN DOC REV: CPT | Performed by: FAMILY MEDICINE

## 2023-08-17 PROCEDURE — 3023F SPIROM DOC REV: CPT | Performed by: FAMILY MEDICINE

## 2023-08-17 PROCEDURE — 99285 EMERGENCY DEPT VISIT HI MDM: CPT

## 2023-08-17 PROCEDURE — 71045 X-RAY EXAM CHEST 1 VIEW: CPT

## 2023-08-17 PROCEDURE — 1123F ACP DISCUSS/DSCN MKR DOCD: CPT | Performed by: FAMILY MEDICINE

## 2023-08-17 PROCEDURE — G8419 CALC BMI OUT NRM PARAM NOF/U: HCPCS | Performed by: FAMILY MEDICINE

## 2023-08-17 PROCEDURE — 1200000000 HC SEMI PRIVATE

## 2023-08-17 PROCEDURE — 1090F PRES/ABSN URINE INCON ASSESS: CPT | Performed by: FAMILY MEDICINE

## 2023-08-17 PROCEDURE — 6370000000 HC RX 637 (ALT 250 FOR IP): Performed by: INTERNAL MEDICINE

## 2023-08-17 PROCEDURE — 96374 THER/PROPH/DIAG INJ IV PUSH: CPT

## 2023-08-17 RX ORDER — ONDANSETRON 4 MG/1
4 TABLET, ORALLY DISINTEGRATING ORAL EVERY 8 HOURS PRN
Status: DISCONTINUED | OUTPATIENT
Start: 2023-08-17 | End: 2023-08-21 | Stop reason: HOSPADM

## 2023-08-17 RX ORDER — IPRATROPIUM BROMIDE AND ALBUTEROL SULFATE 2.5; .5 MG/3ML; MG/3ML
1 SOLUTION RESPIRATORY (INHALATION) ONCE
Status: COMPLETED | OUTPATIENT
Start: 2023-08-17 | End: 2023-08-17

## 2023-08-17 RX ORDER — IPRATROPIUM BROMIDE AND ALBUTEROL SULFATE 2.5; .5 MG/3ML; MG/3ML
2 SOLUTION RESPIRATORY (INHALATION) ONCE
Status: COMPLETED | OUTPATIENT
Start: 2023-08-17 | End: 2023-08-17

## 2023-08-17 RX ORDER — ONDANSETRON 2 MG/ML
4 INJECTION INTRAMUSCULAR; INTRAVENOUS EVERY 6 HOURS PRN
Status: DISCONTINUED | OUTPATIENT
Start: 2023-08-17 | End: 2023-08-21 | Stop reason: HOSPADM

## 2023-08-17 RX ORDER — PREDNISONE 20 MG/1
40 TABLET ORAL DAILY
Status: DISCONTINUED | OUTPATIENT
Start: 2023-08-18 | End: 2023-08-21 | Stop reason: HOSPADM

## 2023-08-17 RX ORDER — GUAIFENESIN 600 MG/1
600 TABLET, EXTENDED RELEASE ORAL 2 TIMES DAILY
Status: DISCONTINUED | OUTPATIENT
Start: 2023-08-18 | End: 2023-08-21 | Stop reason: HOSPADM

## 2023-08-17 RX ORDER — SODIUM CHLORIDE 0.9 % (FLUSH) 0.9 %
5-40 SYRINGE (ML) INJECTION EVERY 12 HOURS SCHEDULED
Status: DISCONTINUED | OUTPATIENT
Start: 2023-08-17 | End: 2023-08-21 | Stop reason: HOSPADM

## 2023-08-17 RX ORDER — AZITHROMYCIN 250 MG/1
500 TABLET, FILM COATED ORAL ONCE
Status: COMPLETED | OUTPATIENT
Start: 2023-08-17 | End: 2023-08-17

## 2023-08-17 RX ORDER — SODIUM CHLORIDE 0.9 % (FLUSH) 0.9 %
5-40 SYRINGE (ML) INJECTION PRN
Status: DISCONTINUED | OUTPATIENT
Start: 2023-08-17 | End: 2023-08-21 | Stop reason: HOSPADM

## 2023-08-17 RX ORDER — ACETAMINOPHEN 650 MG/1
650 SUPPOSITORY RECTAL EVERY 6 HOURS PRN
Status: DISCONTINUED | OUTPATIENT
Start: 2023-08-17 | End: 2023-08-21 | Stop reason: HOSPADM

## 2023-08-17 RX ORDER — TRAZODONE HYDROCHLORIDE 50 MG/1
50 TABLET ORAL NIGHTLY PRN
Status: DISCONTINUED | OUTPATIENT
Start: 2023-08-17 | End: 2023-08-21 | Stop reason: HOSPADM

## 2023-08-17 RX ORDER — IPRATROPIUM BROMIDE AND ALBUTEROL SULFATE 2.5; .5 MG/3ML; MG/3ML
1 SOLUTION RESPIRATORY (INHALATION)
Status: DISCONTINUED | OUTPATIENT
Start: 2023-08-17 | End: 2023-08-19

## 2023-08-17 RX ORDER — POLYETHYLENE GLYCOL 3350 17 G/17G
17 POWDER, FOR SOLUTION ORAL DAILY PRN
Status: DISCONTINUED | OUTPATIENT
Start: 2023-08-17 | End: 2023-08-21 | Stop reason: HOSPADM

## 2023-08-17 RX ORDER — ACETAMINOPHEN 325 MG/1
650 TABLET ORAL EVERY 6 HOURS PRN
Status: DISCONTINUED | OUTPATIENT
Start: 2023-08-17 | End: 2023-08-21 | Stop reason: HOSPADM

## 2023-08-17 RX ORDER — ENOXAPARIN SODIUM 100 MG/ML
30 INJECTION SUBCUTANEOUS DAILY
Status: DISCONTINUED | OUTPATIENT
Start: 2023-08-17 | End: 2023-08-21 | Stop reason: HOSPADM

## 2023-08-17 RX ORDER — AZITHROMYCIN 250 MG/1
250 TABLET, FILM COATED ORAL SEE ADMIN INSTRUCTIONS
Qty: 6 TABLET | Refills: 0 | Status: ON HOLD
Start: 2023-08-17 | End: 2023-08-21 | Stop reason: HOSPADM

## 2023-08-17 RX ORDER — DOCUSATE SODIUM 100 MG/1
100 CAPSULE, LIQUID FILLED ORAL ONCE
Status: COMPLETED | OUTPATIENT
Start: 2023-08-17 | End: 2023-08-17

## 2023-08-17 RX ORDER — SODIUM CHLORIDE 9 MG/ML
INJECTION, SOLUTION INTRAVENOUS PRN
Status: DISCONTINUED | OUTPATIENT
Start: 2023-08-17 | End: 2023-08-21 | Stop reason: HOSPADM

## 2023-08-17 RX ORDER — DEXAMETHASONE SODIUM PHOSPHATE 4 MG/ML
6 INJECTION, SOLUTION INTRA-ARTICULAR; INTRALESIONAL; INTRAMUSCULAR; INTRAVENOUS; SOFT TISSUE ONCE
Status: COMPLETED | OUTPATIENT
Start: 2023-08-17 | End: 2023-08-17

## 2023-08-17 RX ORDER — METHYLPREDNISOLONE 4 MG/1
TABLET ORAL
Qty: 1 KIT | Refills: 0 | Status: ON HOLD
Start: 2023-08-17 | End: 2023-08-21 | Stop reason: HOSPADM

## 2023-08-17 RX ADMIN — SODIUM CHLORIDE, PRESERVATIVE FREE 10 ML: 5 INJECTION INTRAVENOUS at 21:07

## 2023-08-17 RX ADMIN — AZITHROMYCIN 500 MG: 250 TABLET, FILM COATED ORAL at 13:42

## 2023-08-17 RX ADMIN — TRAZODONE HYDROCHLORIDE 50 MG: 50 TABLET ORAL at 23:25

## 2023-08-17 RX ADMIN — DEXAMETHASONE SODIUM PHOSPHATE 6 MG: 4 INJECTION, SOLUTION INTRAMUSCULAR; INTRAVENOUS at 13:42

## 2023-08-17 RX ADMIN — IPRATROPIUM BROMIDE AND ALBUTEROL SULFATE 2 DOSE: .5; 2.5 SOLUTION RESPIRATORY (INHALATION) at 14:20

## 2023-08-17 RX ADMIN — DOCUSATE SODIUM 100 MG: 100 CAPSULE, LIQUID FILLED ORAL at 13:42

## 2023-08-17 RX ADMIN — IPRATROPIUM BROMIDE AND ALBUTEROL SULFATE 1 DOSE: .5; 2.5 SOLUTION RESPIRATORY (INHALATION) at 13:12

## 2023-08-17 RX ADMIN — IPRATROPIUM BROMIDE AND ALBUTEROL SULFATE 1 DOSE: .5; 3 SOLUTION RESPIRATORY (INHALATION) at 16:06

## 2023-08-17 RX ADMIN — IPRATROPIUM BROMIDE AND ALBUTEROL SULFATE 1 DOSE: .5; 3 SOLUTION RESPIRATORY (INHALATION) at 20:09

## 2023-08-17 ASSESSMENT — ENCOUNTER SYMPTOMS
COUGH: 1
WHEEZING: 1
NAUSEA: 0
DIARRHEA: 0
CHEST TIGHTNESS: 1
SHORTNESS OF BREATH: 1
COUGH: 1
SHORTNESS OF BREATH: 1
VOMITING: 0
CONSTIPATION: 0

## 2023-08-17 ASSESSMENT — PAIN - FUNCTIONAL ASSESSMENT: PAIN_FUNCTIONAL_ASSESSMENT: 0-10

## 2023-08-17 ASSESSMENT — PAIN DESCRIPTION - LOCATION: LOCATION: EAR

## 2023-08-17 ASSESSMENT — PAIN DESCRIPTION - ORIENTATION: ORIENTATION: LEFT

## 2023-08-17 ASSESSMENT — PAIN SCALES - GENERAL: PAINLEVEL_OUTOF10: 3

## 2023-08-17 NOTE — PROGRESS NOTES
Lanette Goodwin (:  1955) is a Established patient, presenting virtually for evaluation of the following:    Assessment & Plan   Below is the assessment and plan developed based on review of pertinent history, physical exam, labs, studies, and medications. 1. COPD exacerbation (HCC)  Azithromycin is a Z-Martinez at this methylprednisolone and a Medrol Dosepak have been given. No follow-ups on file. Subjective   Lanette Goodwin is a 75 y.o. female. Patient presents with:  Cough  Shortness of Breath      Patient started coughing last evening. Has been having shortness of breath. Patient notes oxygen was 95%. Not wheezing, but feeling short of breath. Cough has been productive. Patient has been having some ear pressure, cannot hear out of left ear. No nasal drainage. Did double breathing treatment this am.    The patients PMH, surgical history, family history, medications, allergies were all reviewed and updated as appropriate today. Cough  Associated symptoms include shortness of breath. Shortness of Breath    Review of Systems   Respiratory:  Positive for cough and shortness of breath.          Objective   Patient-Reported Vitals  Patient-Reported Pulse: 95       Physical Exam  [INSTRUCTIONS:  \"[x]\" Indicates a positive item  \"[]\" Indicates a negative item  -- DELETE ALL ITEMS NOT EXAMINED]    Constitutional: [x] Appears well-developed and well-nourished [x] No apparent distress      [] Abnormal -     Mental status: [x] Alert and awake  [x] Oriented to person/place/time [x] Able to follow commands    [] Abnormal -     Eyes:   EOM    [x]  Normal    [] Abnormal -   Sclera  [x]  Normal    [] Abnormal -          Discharge [x]  None visible   [] Abnormal -     HENT: [x] Normocephalic, atraumatic  [] Abnormal -   [x] Mouth/Throat: Mucous membranes are moist    External Ears [x] Normal  [] Abnormal -    Neck: [x] No visualized mass [] Abnormal -     Pulmonary/Chest: [x] Respiratory effort normal   [x] No

## 2023-08-17 NOTE — ED PROVIDER NOTES
I independently performed a history and physical on [facility]. All diagnostic, treatment, and disposition decisions were made by myself in conjunction with the resident physician. I personally saw the patient and performed a substantive portion of the visit including all aspects of the medical decision making. For further details of [facility] emergency department encounter, please see Jai Salcedo DO's documentation. Patient is a 79-year-old female who started developing a cough last night and became very short of breath with cough and trouble breathing this morning similar to whenever she was admitted for COPD in the past.  She denies any chest pain or abdominal pain. No vomiting or diarrhea. No fever. No falls or trauma. No history of blood clots. Due to concern for trouble breathing, she came to ED for further evaluation. She is currently on nasal cannula although does not normally wear oxygen per patient and was reportedly hypoxic on arrival to the ED on room air. Physical:   Gen: Moderate acute distress. AOx3. Pysch: Anxious mood and affect  HEENT: NCAT,  MMM  Neck: supple, normal ROM  Cardiac: RRR, pulses 2+ in upper extremities  Lungs: Diminished breath sounds in all lung fields with no wheezing noted throughout, barrel chested, moderate respiratory distress  Abdomen: soft and nontender with no R/D/G  Neuro: GCS equals 15    The Ekg interpreted by me shows  normal sinus rhythm with a rate of 79  Axis is   Normal  QTc is  normal  Intervals and Durations are unremarkable. ST Segments: no acute change and nonspecific changes  No significant change from prior EKG dated - 3/9/23  No STEMI    Critical Care Time:    I personally saw the patient and independently provided 35 minutes of non-concurrent critical care out of the total shared critical care time provided.   Includes repeat examinations, speaking with consultants, lab and x-ray interpretation, charting, treating

## 2023-08-17 NOTE — H&P
CONTRAST: 75 mL Isovue-370 intravenous and oral contrast obtained. FINDINGS: : No additional findings. ABDOMEN: Lung bases: Scarring in the right lower lobe. Emphysema. 7 mm subpleural nodular density in the posterior medial left lower lobe (series 6 3, image 6). Mediastinum: Normal. Liver and biliary system: There is intra and extrahepatic biliary ductal dilatation. Common duct measures up to 1.4 cm in caliber. This extends to the distal common duct near the ampulla where there is a 1.2 x 1.3 x 1.6 cm area of hypoattenuation (series 3 image 46 and series 603 image 45). The liver is otherwise unremarkable. Gallbladder collapsed which limits evaluation. Pancreas: As detailed above, in the region of the pancreatic head near the ampulla, there is a focal area of hypoattenuation. This causes mild pancreatic ductal dilatation. There is no significant parenchymal atrophy. Spleen: Normal. Adrenal glands: Normal. Genitourinary: Symmetric renal enhancement. No hydronephrosis. Bowel: The small and large bowel are normal in caliber. No bowel wall thickening. Abdominal wall: Normal. Peritoneum/retroperitoneum: No fluid collections are seen. No free air. Vasculature: Mild to moderate atherosclerosis. Lymph nodes: No lymphadenopathy is appreciated. Osseous structures: Levoconvex scoliosis with multilevel spondylosis. Focal area of hypoattenuation in the region of the pancreatic head or distal common duct causing intra and extrahepatic bile duct dilatation as well as pancreatic ductal dilatation. Recommend follow-up MRCP and/or ERCP. This is indeterminate for pancreatic head mass or choledocholithiasis. Biliary ductal dilatation has been described on multiple outside facility studies which are not available for comparison. 7 mm subpleural nodular density in the left lower lobe. Recommend comparison with outside facility studies. INCIDENTAL FINDINGS: None.     CTA Abdomen Pelvis W WO Contrast    Result Date: 8/8/2023  Site:

## 2023-08-17 NOTE — ED PROVIDER NOTES
[address] [place] [address]  ED  EMERGENCY DEPARTMENT ENCOUNTER      Pt Name: Zay Michelle  MRN: [id number]  [address] [facility] Yuly 1955  Date of evaluation: 8/17/2023  Provider: Kelly Vieira       Chief Complaint   Patient presents with    Shortness of Breath    Cough     Pt reports persistent cough/shortness of breath since last night. Pt reports feels pain in left ear, feeling \"clogged\" pt denies additional symptoms at triage. HISTORY OF PRESENT ILLNESS   (Location/Symptom, Timing/Onset, Context/Setting, Quality, Duration, Modifying Factors, Severity)  Note limiting factors. Zay Michelle is a 75 y.o. female who presents to the emergency department due to new onset shortness of breath. Patient states that she has been experiencing shortness of breath and cough since last night. Patient has a past medical history significant for COPD. Patient states that she does not use oxygen at home. Patient has not tried any fever therapy at home, however does state that she is compliant with all of her DuoNeb's as well as her Trelegy inhalers at home. Patient denies any fevers, chills, nausea, vomiting, chest pain, headaches, vision changes. Patient did state that she has been experiencing some fullness in her left ear. States that she has been having some difficulty with hearing out of the left ear. No additional concerns noted today. The history is provided by the patient. Nursing Notes were reviewed. REVIEW OF SYSTEMS    (2-9 systems for level 4, 10 or more for level 5)     Review of Systems   Constitutional:  Negative for chills, fatigue and fever. HENT:  Positive for hearing loss. Negative for congestion and tinnitus. Eyes:  Negative for visual disturbance. Respiratory:  Positive for cough, chest tightness, shortness of breath and wheezing. Cardiovascular:  Negative for chest pain, palpitations and leg swelling.    Gastrointestinal:  Negative for constipation,

## 2023-08-18 ENCOUNTER — APPOINTMENT (OUTPATIENT)
Dept: GENERAL RADIOLOGY | Age: 68
DRG: 192 | End: 2023-08-18
Payer: MEDICARE

## 2023-08-18 LAB
ANION GAP SERPL CALCULATED.3IONS-SCNC: 9 MMOL/L (ref 3–16)
BASOPHILS # BLD: 0 K/UL (ref 0–0.2)
BASOPHILS NFR BLD: 0.3 %
BUN SERPL-MCNC: 10 MG/DL (ref 7–20)
CALCIUM SERPL-MCNC: 8.8 MG/DL (ref 8.3–10.6)
CHLORIDE SERPL-SCNC: 101 MMOL/L (ref 99–110)
CO2 SERPL-SCNC: 27 MMOL/L (ref 21–32)
CREAT SERPL-MCNC: <0.5 MG/DL (ref 0.6–1.2)
DEPRECATED RDW RBC AUTO: 14.5 % (ref 12.4–15.4)
EOSINOPHIL # BLD: 0.1 K/UL (ref 0–0.6)
EOSINOPHIL NFR BLD: 1.8 %
GFR SERPLBLD CREATININE-BSD FMLA CKD-EPI: >60 ML/MIN/{1.73_M2}
GLUCOSE BLD-MCNC: 102 MG/DL (ref 70–99)
GLUCOSE SERPL-MCNC: 115 MG/DL (ref 70–99)
HCT VFR BLD AUTO: 40.4 % (ref 36–48)
HGB BLD-MCNC: 13.7 G/DL (ref 12–16)
LYMPHOCYTES # BLD: 0.7 K/UL (ref 1–5.1)
LYMPHOCYTES NFR BLD: 14.8 %
MCH RBC QN AUTO: 33.6 PG (ref 26–34)
MCHC RBC AUTO-ENTMCNC: 33.9 G/DL (ref 31–36)
MCV RBC AUTO: 99.4 FL (ref 80–100)
MONOCYTES # BLD: 0.4 K/UL (ref 0–1.3)
MONOCYTES NFR BLD: 8.5 %
NEUTROPHILS # BLD: 3.3 K/UL (ref 1.7–7.7)
NEUTROPHILS NFR BLD: 74.6 %
PERFORMED ON: ABNORMAL
PLATELET # BLD AUTO: 210 K/UL (ref 135–450)
PMV BLD AUTO: 7 FL (ref 5–10.5)
POTASSIUM SERPL-SCNC: 4.7 MMOL/L (ref 3.5–5.1)
RBC # BLD AUTO: 4.07 M/UL (ref 4–5.2)
SODIUM SERPL-SCNC: 137 MMOL/L (ref 136–145)
WBC # BLD AUTO: 4.5 K/UL (ref 4–11)

## 2023-08-18 PROCEDURE — 2700000000 HC OXYGEN THERAPY PER DAY

## 2023-08-18 PROCEDURE — 85025 COMPLETE CBC W/AUTO DIFF WBC: CPT

## 2023-08-18 PROCEDURE — 1200000000 HC SEMI PRIVATE

## 2023-08-18 PROCEDURE — 94669 MECHANICAL CHEST WALL OSCILL: CPT

## 2023-08-18 PROCEDURE — 6370000000 HC RX 637 (ALT 250 FOR IP): Performed by: INTERNAL MEDICINE

## 2023-08-18 PROCEDURE — 2580000003 HC RX 258: Performed by: INTERNAL MEDICINE

## 2023-08-18 PROCEDURE — 94761 N-INVAS EAR/PLS OXIMETRY MLT: CPT

## 2023-08-18 PROCEDURE — 6360000002 HC RX W HCPCS: Performed by: INTERNAL MEDICINE

## 2023-08-18 PROCEDURE — 80048 BASIC METABOLIC PNL TOTAL CA: CPT

## 2023-08-18 PROCEDURE — 71045 X-RAY EXAM CHEST 1 VIEW: CPT

## 2023-08-18 PROCEDURE — 94640 AIRWAY INHALATION TREATMENT: CPT

## 2023-08-18 RX ORDER — IPRATROPIUM BROMIDE AND ALBUTEROL SULFATE 2.5; .5 MG/3ML; MG/3ML
1 SOLUTION RESPIRATORY (INHALATION) EVERY 4 HOURS PRN
Status: DISCONTINUED | OUTPATIENT
Start: 2023-08-18 | End: 2023-08-21 | Stop reason: HOSPADM

## 2023-08-18 RX ADMIN — IPRATROPIUM BROMIDE AND ALBUTEROL SULFATE 1 DOSE: .5; 3 SOLUTION RESPIRATORY (INHALATION) at 19:58

## 2023-08-18 RX ADMIN — SODIUM CHLORIDE, PRESERVATIVE FREE 10 ML: 5 INJECTION INTRAVENOUS at 09:47

## 2023-08-18 RX ADMIN — ENOXAPARIN SODIUM 30 MG: 100 INJECTION SUBCUTANEOUS at 09:46

## 2023-08-18 RX ADMIN — AZITHROMYCIN MONOHYDRATE 500 MG: 500 INJECTION, POWDER, LYOPHILIZED, FOR SOLUTION INTRAVENOUS at 12:25

## 2023-08-18 RX ADMIN — GUAIFENESIN 600 MG: 600 TABLET ORAL at 21:00

## 2023-08-18 RX ADMIN — IPRATROPIUM BROMIDE AND ALBUTEROL SULFATE 1 DOSE: .5; 3 SOLUTION RESPIRATORY (INHALATION) at 08:10

## 2023-08-18 RX ADMIN — IPRATROPIUM BROMIDE AND ALBUTEROL SULFATE 1 DOSE: .5; 3 SOLUTION RESPIRATORY (INHALATION) at 11:26

## 2023-08-18 RX ADMIN — GUAIFENESIN 600 MG: 600 TABLET ORAL at 09:46

## 2023-08-18 RX ADMIN — SODIUM CHLORIDE: 9 INJECTION, SOLUTION INTRAVENOUS at 12:13

## 2023-08-18 RX ADMIN — IPRATROPIUM BROMIDE AND ALBUTEROL SULFATE 1 DOSE: .5; 3 SOLUTION RESPIRATORY (INHALATION) at 15:24

## 2023-08-18 RX ADMIN — IPRATROPIUM BROMIDE AND ALBUTEROL SULFATE 1 DOSE: .5; 3 SOLUTION RESPIRATORY (INHALATION) at 04:10

## 2023-08-18 RX ADMIN — PREDNISONE 40 MG: 20 TABLET ORAL at 09:46

## 2023-08-18 NOTE — PLAN OF CARE
Problem: Respiratory - Adult  Goal: Achieves optimal ventilation and oxygenation  Outcome: Not Progressing  Flowsheets (Taken 8/18/2023 1158)  Achieves optimal ventilation and oxygenation:   Assess for changes in respiratory status   Position to facilitate oxygenation and minimize respiratory effort   Initiate smoking cessation protocol as indicated   Encourage broncho-pulmonary hygiene including cough, deep breathe, incentive spirometry  Note: Pt continues to have SOB and is dyspneic at rest. O2 97% on 2L NC. Repeat CXR complete with no acute findings.     Problem: Discharge Planning  Goal: Discharge to home or other facility with appropriate resources  Outcome: Progressing     Problem: Pain  Goal: Verbalizes/displays adequate comfort level or baseline comfort level  Outcome: Progressing

## 2023-08-18 NOTE — CARE COORDINATION
Case Management Assessment  Initial Evaluation    Date/Time of Evaluation: 8/18/2023 9:22 AM  Assessment Completed by: Silvana Gutierrez RN    If patient is discharged prior to next notation, then this note serves as note for discharge by case management. Patient Name: Lindsey Singer                   YOB: 1955  Diagnosis: Shortness of breath [R06.02]  Acute respiratory distress [R06.03]  COPD exacerbation ([address]) [J44.1]                   Date / Time: 8/17/2023 12:33 PM    Patient Admission Status: Inpatient   Readmission Risk (Low < 19, Mod (19-27), High > 27): Readmission Risk Score: 9.6    Current PCP: Jacqueline Lozada MD  PCP verified by CM? Chart Reviewed: Yes      History Provided by:    Patient Orientation:      Patient Cognition:      Hospitalization in the last 30 days (Readmission):  No    If yes, Readmission Assessment in CM Navigator will be completed. Advance Directives:      Code Status: Full Code   Patient's Primary Decision Maker is:      Primary Decision Maker: aviva damon - [facility] - [phone]    Discharge Planning:    Patient lives with: Family Members Type of Home: Apartment  Primary Care Giver:    Patient Support Systems include: Family Members   Current Financial resources:    Current community resources:    Current services prior to admission: None            Current DME:              Type of Home Care services:  None    ADLS  Prior functional level:    Current functional level:      PT AM-PAC:   /24  OT AM-PAC:   /24    Family can provide assistance at DC: Would you like Case Management to discuss the discharge plan with any other family members/significant others, and if so, who?     Plans to Return to Present Housing:    Other Identified Issues/Barriers to RETURNING to current housing:   Potential Assistance needed at discharge: N/A            Potential DME:    Patient expects to discharge to: [address] [place] [address] Road for transportation at discharge:

## 2023-08-19 LAB
ANION GAP SERPL CALCULATED.3IONS-SCNC: 7 MMOL/L (ref 3–16)
BUN SERPL-MCNC: 18 MG/DL (ref 7–20)
CALCIUM SERPL-MCNC: 8.9 MG/DL (ref 8.3–10.6)
CHLORIDE SERPL-SCNC: 101 MMOL/L (ref 99–110)
CO2 SERPL-SCNC: 29 MMOL/L (ref 21–32)
CREAT SERPL-MCNC: <0.5 MG/DL (ref 0.6–1.2)
GFR SERPLBLD CREATININE-BSD FMLA CKD-EPI: >60 ML/MIN/{1.73_M2}
GLUCOSE SERPL-MCNC: 92 MG/DL (ref 70–99)
POTASSIUM SERPL-SCNC: 4 MMOL/L (ref 3.5–5.1)
SODIUM SERPL-SCNC: 137 MMOL/L (ref 136–145)

## 2023-08-19 PROCEDURE — 6370000000 HC RX 637 (ALT 250 FOR IP): Performed by: INTERNAL MEDICINE

## 2023-08-19 PROCEDURE — 1200000000 HC SEMI PRIVATE

## 2023-08-19 PROCEDURE — 94761 N-INVAS EAR/PLS OXIMETRY MLT: CPT

## 2023-08-19 PROCEDURE — 94640 AIRWAY INHALATION TREATMENT: CPT

## 2023-08-19 PROCEDURE — 6360000002 HC RX W HCPCS: Performed by: INTERNAL MEDICINE

## 2023-08-19 PROCEDURE — 2580000003 HC RX 258: Performed by: INTERNAL MEDICINE

## 2023-08-19 PROCEDURE — 94669 MECHANICAL CHEST WALL OSCILL: CPT

## 2023-08-19 PROCEDURE — 36415 COLL VENOUS BLD VENIPUNCTURE: CPT

## 2023-08-19 PROCEDURE — 2700000000 HC OXYGEN THERAPY PER DAY

## 2023-08-19 PROCEDURE — 80048 BASIC METABOLIC PNL TOTAL CA: CPT

## 2023-08-19 RX ORDER — IPRATROPIUM BROMIDE AND ALBUTEROL SULFATE 2.5; .5 MG/3ML; MG/3ML
1 SOLUTION RESPIRATORY (INHALATION)
Status: DISCONTINUED | OUTPATIENT
Start: 2023-08-19 | End: 2023-08-21 | Stop reason: HOSPADM

## 2023-08-19 RX ADMIN — GUAIFENESIN 600 MG: 600 TABLET ORAL at 20:56

## 2023-08-19 RX ADMIN — SODIUM CHLORIDE, PRESERVATIVE FREE 10 ML: 5 INJECTION INTRAVENOUS at 09:19

## 2023-08-19 RX ADMIN — GUAIFENESIN 600 MG: 600 TABLET ORAL at 09:19

## 2023-08-19 RX ADMIN — AZITHROMYCIN MONOHYDRATE 500 MG: 500 INJECTION, POWDER, LYOPHILIZED, FOR SOLUTION INTRAVENOUS at 12:18

## 2023-08-19 RX ADMIN — IPRATROPIUM BROMIDE AND ALBUTEROL SULFATE 1 DOSE: .5; 3 SOLUTION RESPIRATORY (INHALATION) at 19:35

## 2023-08-19 RX ADMIN — ENOXAPARIN SODIUM 30 MG: 100 INJECTION SUBCUTANEOUS at 09:19

## 2023-08-19 RX ADMIN — IPRATROPIUM BROMIDE AND ALBUTEROL SULFATE 1 DOSE: .5; 3 SOLUTION RESPIRATORY (INHALATION) at 03:58

## 2023-08-19 RX ADMIN — IPRATROPIUM BROMIDE AND ALBUTEROL SULFATE 1 DOSE: .5; 3 SOLUTION RESPIRATORY (INHALATION) at 15:52

## 2023-08-19 RX ADMIN — IPRATROPIUM BROMIDE AND ALBUTEROL SULFATE 1 DOSE: .5; 3 SOLUTION RESPIRATORY (INHALATION) at 23:40

## 2023-08-19 RX ADMIN — IPRATROPIUM BROMIDE AND ALBUTEROL SULFATE 1 DOSE: .5; 3 SOLUTION RESPIRATORY (INHALATION) at 00:34

## 2023-08-19 RX ADMIN — PREDNISONE 40 MG: 20 TABLET ORAL at 09:19

## 2023-08-19 RX ADMIN — IPRATROPIUM BROMIDE AND ALBUTEROL SULFATE 1 DOSE: .5; 3 SOLUTION RESPIRATORY (INHALATION) at 07:52

## 2023-08-19 RX ADMIN — IPRATROPIUM BROMIDE AND ALBUTEROL SULFATE 1 DOSE: .5; 3 SOLUTION RESPIRATORY (INHALATION) at 11:50

## 2023-08-19 NOTE — PLAN OF CARE
Problem: Respiratory - Adult  Goal: Achieves optimal ventilation and oxygenation  8/18/2023 2145 by Tesha Martinez RN  Outcome: Progressing  Flowsheets (Taken 8/18/2023 2145)  Achieves optimal ventilation and oxygenation:   Assess for changes in respiratory status   Assess for changes in mentation and behavior   Position to facilitate oxygenation and minimize respiratory effort   Oxygen supplementation based on oxygen saturation or arterial blood gases   Encourage broncho-pulmonary hygiene including cough, deep breathe, incentive spirometry   Assess and instruct to report shortness of breath or any respiratory difficulty   Respiratory therapy support as indicated     Problem: Respiratory - Adult  Goal: Achieves optimal ventilation and oxygenation  8/18/2023 2145 by Tesha Martinez RN  Outcome: Progressing  Flowsheets (Taken 8/18/2023 2145)  Achieves optimal ventilation and oxygenation:   Assess for changes in respiratory status   Assess for changes in mentation and behavior   Position to facilitate oxygenation and minimize respiratory effort   Oxygen supplementation based on oxygen saturation or arterial blood gases   Encourage broncho-pulmonary hygiene including cough, deep breathe, incentive spirometry   Assess and instruct to report shortness of breath or any respiratory difficulty   Respiratory therapy support as indicated  8/18/2023 1158 by Tomás Chance RN  Outcome: Not Progressing  Flowsheets (Taken 8/18/2023 1158)  Achieves optimal ventilation and oxygenation:   Assess for changes in respiratory status   Position to facilitate oxygenation and minimize respiratory effort   Initiate smoking cessation protocol as indicated   Encourage broncho-pulmonary hygiene including cough, deep breathe, incentive spirometry  Note: Pt continues to have SOB and is dyspneic at rest. O2 97% on RA. Repeat CXR complete with no acute findings.

## 2023-08-20 LAB
ANION GAP SERPL CALCULATED.3IONS-SCNC: 4 MMOL/L (ref 3–16)
BUN SERPL-MCNC: 21 MG/DL (ref 7–20)
CALCIUM SERPL-MCNC: 8.8 MG/DL (ref 8.3–10.6)
CHLORIDE SERPL-SCNC: 101 MMOL/L (ref 99–110)
CO2 SERPL-SCNC: 33 MMOL/L (ref 21–32)
CREAT SERPL-MCNC: <0.5 MG/DL (ref 0.6–1.2)
GFR SERPLBLD CREATININE-BSD FMLA CKD-EPI: >60 ML/MIN/{1.73_M2}
GLUCOSE SERPL-MCNC: 84 MG/DL (ref 70–99)
POTASSIUM SERPL-SCNC: 4.2 MMOL/L (ref 3.5–5.1)
SODIUM SERPL-SCNC: 138 MMOL/L (ref 136–145)

## 2023-08-20 PROCEDURE — 6370000000 HC RX 637 (ALT 250 FOR IP): Performed by: INTERNAL MEDICINE

## 2023-08-20 PROCEDURE — 2580000003 HC RX 258: Performed by: INTERNAL MEDICINE

## 2023-08-20 PROCEDURE — 94669 MECHANICAL CHEST WALL OSCILL: CPT

## 2023-08-20 PROCEDURE — 6360000002 HC RX W HCPCS: Performed by: INTERNAL MEDICINE

## 2023-08-20 PROCEDURE — 2700000000 HC OXYGEN THERAPY PER DAY

## 2023-08-20 PROCEDURE — 36415 COLL VENOUS BLD VENIPUNCTURE: CPT

## 2023-08-20 PROCEDURE — 80048 BASIC METABOLIC PNL TOTAL CA: CPT

## 2023-08-20 PROCEDURE — 94640 AIRWAY INHALATION TREATMENT: CPT

## 2023-08-20 PROCEDURE — 1200000000 HC SEMI PRIVATE

## 2023-08-20 PROCEDURE — 94761 N-INVAS EAR/PLS OXIMETRY MLT: CPT

## 2023-08-20 RX ADMIN — PREDNISONE 40 MG: 20 TABLET ORAL at 07:50

## 2023-08-20 RX ADMIN — SODIUM CHLORIDE, PRESERVATIVE FREE 10 ML: 5 INJECTION INTRAVENOUS at 07:51

## 2023-08-20 RX ADMIN — IPRATROPIUM BROMIDE AND ALBUTEROL SULFATE 1 DOSE: .5; 3 SOLUTION RESPIRATORY (INHALATION) at 15:31

## 2023-08-20 RX ADMIN — AZITHROMYCIN MONOHYDRATE 500 MG: 500 INJECTION, POWDER, LYOPHILIZED, FOR SOLUTION INTRAVENOUS at 13:44

## 2023-08-20 RX ADMIN — ENOXAPARIN SODIUM 30 MG: 100 INJECTION SUBCUTANEOUS at 07:50

## 2023-08-20 RX ADMIN — IPRATROPIUM BROMIDE AND ALBUTEROL SULFATE 1 DOSE: .5; 3 SOLUTION RESPIRATORY (INHALATION) at 23:16

## 2023-08-20 RX ADMIN — IPRATROPIUM BROMIDE AND ALBUTEROL SULFATE 1 DOSE: .5; 3 SOLUTION RESPIRATORY (INHALATION) at 07:59

## 2023-08-20 RX ADMIN — IPRATROPIUM BROMIDE AND ALBUTEROL SULFATE 1 DOSE: .5; 3 SOLUTION RESPIRATORY (INHALATION) at 11:54

## 2023-08-20 RX ADMIN — GUAIFENESIN 600 MG: 600 TABLET ORAL at 07:50

## 2023-08-20 RX ADMIN — GUAIFENESIN 600 MG: 600 TABLET ORAL at 21:32

## 2023-08-20 RX ADMIN — IPRATROPIUM BROMIDE AND ALBUTEROL SULFATE 1 DOSE: .5; 3 SOLUTION RESPIRATORY (INHALATION) at 04:24

## 2023-08-20 RX ADMIN — IPRATROPIUM BROMIDE AND ALBUTEROL SULFATE 1 DOSE: .5; 3 SOLUTION RESPIRATORY (INHALATION) at 19:54

## 2023-08-20 RX ADMIN — SODIUM CHLORIDE, PRESERVATIVE FREE 10 ML: 5 INJECTION INTRAVENOUS at 21:32

## 2023-08-20 NOTE — PLAN OF CARE
Problem: Discharge Planning  Goal: Discharge to home or other facility with appropriate resources  Outcome: Progressing     Problem: Pain  Goal: Verbalizes/displays adequate comfort level or baseline comfort level  Outcome: Progressing     Problem: Respiratory - Adult  Goal: Achieves optimal ventilation and oxygenation  Outcome: Progressing  Flowsheets (Taken 8/19/2023 0915 by Nury Ayon RN)  Achieves optimal ventilation and oxygenation:   Assess for changes in respiratory status   Assess for changes in mentation and behavior

## 2023-08-21 VITALS
OXYGEN SATURATION: 93 % | RESPIRATION RATE: 17 BRPM | HEIGHT: 64 IN | SYSTOLIC BLOOD PRESSURE: 173 MMHG | DIASTOLIC BLOOD PRESSURE: 80 MMHG | BODY MASS INDEX: 18.04 KG/M2 | HEART RATE: 75 BPM | TEMPERATURE: 98.4 F | WEIGHT: 105.7 LBS

## 2023-08-21 PROCEDURE — 94669 MECHANICAL CHEST WALL OSCILL: CPT

## 2023-08-21 PROCEDURE — 6360000002 HC RX W HCPCS: Performed by: INTERNAL MEDICINE

## 2023-08-21 PROCEDURE — 6370000000 HC RX 637 (ALT 250 FOR IP): Performed by: INTERNAL MEDICINE

## 2023-08-21 PROCEDURE — 94640 AIRWAY INHALATION TREATMENT: CPT

## 2023-08-21 PROCEDURE — 2580000003 HC RX 258: Performed by: INTERNAL MEDICINE

## 2023-08-21 RX ORDER — GUAIFENESIN 600 MG/1
600 TABLET, EXTENDED RELEASE ORAL 2 TIMES DAILY
Qty: 14 TABLET | Refills: 0 | Status: SHIPPED | OUTPATIENT
Start: 2023-08-21 | End: 2023-08-28

## 2023-08-21 RX ADMIN — GUAIFENESIN 600 MG: 600 TABLET ORAL at 08:53

## 2023-08-21 RX ADMIN — SODIUM CHLORIDE, PRESERVATIVE FREE 10 ML: 5 INJECTION INTRAVENOUS at 08:54

## 2023-08-21 RX ADMIN — AZITHROMYCIN MONOHYDRATE 500 MG: 500 INJECTION, POWDER, LYOPHILIZED, FOR SOLUTION INTRAVENOUS at 12:50

## 2023-08-21 RX ADMIN — ENOXAPARIN SODIUM 30 MG: 100 INJECTION SUBCUTANEOUS at 08:53

## 2023-08-21 RX ADMIN — IPRATROPIUM BROMIDE AND ALBUTEROL SULFATE 1 DOSE: .5; 3 SOLUTION RESPIRATORY (INHALATION) at 03:22

## 2023-08-21 RX ADMIN — IPRATROPIUM BROMIDE AND ALBUTEROL SULFATE 1 DOSE: .5; 3 SOLUTION RESPIRATORY (INHALATION) at 07:18

## 2023-08-21 RX ADMIN — IPRATROPIUM BROMIDE AND ALBUTEROL SULFATE 1 DOSE: .5; 3 SOLUTION RESPIRATORY (INHALATION) at 11:17

## 2023-08-21 RX ADMIN — PREDNISONE 40 MG: 20 TABLET ORAL at 08:53

## 2023-08-21 ASSESSMENT — PAIN SCALES - GENERAL: PAINLEVEL_OUTOF10: 0

## 2023-08-21 NOTE — CARE COORDINATION
CASE MANAGEMENT DISCHARGE SUMMARY      Discharge to: Home     IMM given: 8/21/2023    Transportation:    Family/car: private     Confirmed discharge plan with:     Patient: yes     Family:  yes per pt     RN, name: Didier Petty RN

## 2023-08-21 NOTE — PLAN OF CARE
Problem: Discharge Planning  Goal: Discharge to home or other facility with appropriate resources  Outcome: Progressing  Flowsheets (Taken 8/20/2023 0745 by Jose De Jesus Bower, RN)  Discharge to home or other facility with appropriate resources:   Identify barriers to discharge with patient and caregiver   Arrange for needed discharge resources and transportation as appropriate   Identify discharge learning needs (meds, wound care, etc)     Problem: Pain  Goal: Verbalizes/displays adequate comfort level or baseline comfort level  Outcome: Progressing     Problem: Respiratory - Adult  Goal: Achieves optimal ventilation and oxygenation  Outcome: Progressing  Flowsheets (Taken 8/20/2023 0745 by Jose De Jesus Bower RN)  Achieves optimal ventilation and oxygenation:   Assess for changes in respiratory status   Assess for changes in mentation and behavior   Position to facilitate oxygenation and minimize respiratory effort

## 2023-08-21 NOTE — PLAN OF CARE
Problem: Respiratory - Adult  Goal: Achieves optimal ventilation and oxygenation  8/21/2023 1020 by Mary Childress RN  Outcome: Progressing  Flowsheets (Taken 8/21/2023 1020)  Achieves optimal ventilation and oxygenation:   Assess for changes in respiratory status   Assess for changes in mentation and behavior   Position to facilitate oxygenation and minimize respiratory effort   Oxygen supplementation based on oxygen saturation or arterial blood gases   Encourage broncho-pulmonary hygiene including cough, deep breathe, incentive spirometry   Assess and instruct to report shortness of breath or any respiratory difficulty   Respiratory therapy support as indicated

## 2023-08-22 ENCOUNTER — TELEPHONE (OUTPATIENT)
Dept: FAMILY MEDICINE CLINIC | Age: 68
End: 2023-08-22

## 2023-08-22 NOTE — TELEPHONE ENCOUNTER
Care Transitions Initial Follow Up Call    Outreach made within 2 business days of discharge: Yes    Patient: Tanesha Bhatia Patient : 1955   MRN: [id number]  Reason for Admission: There are no discharge diagnoses documented for the most recent discharge. Discharge Date: 23       Spoke with: patient    Discharge department/facility: [facility] Interactive Patient Contact:  Was patient able to fill all prescriptions: Yes  Was patient instructed to bring all medications to the follow-up visit: No:   Is patient taking all medications as directed in the discharge summary?  Yes  Does patient understand their discharge instructions: Yes  Does patient have questions or concerns that need addressed prior to 7-14 day follow up office visit: no    Scheduled appointment with PCP within 7-14 days    Follow Up  Future Appointments   Date Time Provider [address] [place] [address]   2024 11:40 AM Barron Ellis MD AND JADA Velazquez MA

## 2023-08-29 ENCOUNTER — OFFICE VISIT (OUTPATIENT)
Dept: FAMILY MEDICINE CLINIC | Age: 68
End: 2023-08-29

## 2023-08-29 VITALS
WEIGHT: 109 LBS | BODY MASS INDEX: 18.61 KG/M2 | SYSTOLIC BLOOD PRESSURE: 136 MMHG | HEART RATE: 92 BPM | OXYGEN SATURATION: 92 % | DIASTOLIC BLOOD PRESSURE: 70 MMHG | HEIGHT: 64 IN

## 2023-08-29 DIAGNOSIS — Z09 HOSPITAL DISCHARGE FOLLOW-UP: ICD-10-CM

## 2023-08-29 DIAGNOSIS — J44.1 COPD EXACERBATION (HCC): Primary | ICD-10-CM

## 2023-08-29 RX ORDER — PREDNISONE 10 MG/1
TABLET ORAL
Qty: 30 TABLET | Refills: 0 | Status: SHIPPED | OUTPATIENT
Start: 2023-08-29 | End: 2023-09-10

## 2023-08-29 RX ORDER — NEBULIZER ACCESSORIES
1 KIT MISCELLANEOUS DAILY PRN
Qty: 1 KIT | Refills: 0 | Status: SHIPPED | OUTPATIENT
Start: 2023-08-29

## 2023-09-08 ENCOUNTER — HOSPITAL ENCOUNTER (OUTPATIENT)
Dept: WOMENS IMAGING | Age: 68
Discharge: HOME | End: 2023-09-08
Payer: MEDICARE

## 2023-09-08 ENCOUNTER — HOSPITAL ENCOUNTER (OUTPATIENT)
Dept: WOMENS IMAGING | Age: 68
Discharge: HOME | End: 2023-09-08
Attending: FAMILY MEDICINE
Payer: MEDICARE

## 2023-09-08 VITALS — BODY MASS INDEX: 18.61 KG/M2 | WEIGHT: 109 LBS | HEIGHT: 64 IN

## 2023-09-08 DIAGNOSIS — R92.8 ABNORMAL MAMMOGRAM OF LEFT BREAST: ICD-10-CM

## 2023-09-08 DIAGNOSIS — Z09 FOLLOW-UP EXAM, 3-6 MONTHS SINCE PREVIOUS EXAM: ICD-10-CM

## 2023-09-08 PROCEDURE — 76642 ULTRASOUND BREAST LIMITED: CPT

## 2023-09-08 PROCEDURE — G0279 TOMOSYNTHESIS, MAMMO: HCPCS

## 2023-09-13 ENCOUNTER — ANESTHESIA EVENT (OUTPATIENT)
Dept: ENDOSCOPY | Age: 68
End: 2023-09-13
Payer: MEDICARE

## 2023-09-14 NOTE — PROGRESS NOTES
crepitus  CHEST: Normal respiratory effort, no retractions, breathing comfortably  SKIN: No rashes, normal appearing skin, no evidence of skin lesions/tumors  RADIOLOGY  Summary of findings:    PROCEDURE  Otomicroscopy and Cerumen Removal  An operating microscope was utilized to visualize the external auditory canals using a speculum. The external auditory canals where occluded with cerumen bilaterally. The cerumen was removed with instrumentation under microscopic evaluation. The bilateral tympanic membranes and ossicles are intact. No fluid visualized in the bilateral middle ears. ASSESSMENT/PLAN  Maggi Munguia is a very pleasant 75 y.o. female with     1. Bilateral impacted cerumen  2. Bilateral hearing loss, unspecified hearing loss type  3. Other infective acute otitis externa of left ear  - ciprofloxacin (CILOXAN) 0.3 % ophthalmic solution; Place 4 drops in ear(s) 2 times daily for 5 days  Dispense: 2.5 mL; Refill: 0  After removal of the cerumen Maggi Munguia hearing did improve slightly and there was found to be infection in the left external auditory canal.  I will place her on Floxin eardrops and follow-up in 1 week for repeat evaluation to ensure complete resolution of the infection. If the hearing is a return normal we will get audiogram at that point time    Skyler Lyons, DO  9/15/2023    Medical Decision Making:   The following items were considered in medical decision making:  Independent review of images  Review / order clinical lab tests  Review / order radiology tests  Decision to obtain old records

## 2023-09-15 ENCOUNTER — OFFICE VISIT (OUTPATIENT)
Dept: ENT CLINIC | Age: 68
End: 2023-09-15

## 2023-09-15 VITALS — WEIGHT: 109 LBS | BODY MASS INDEX: 18.61 KG/M2 | HEIGHT: 64 IN

## 2023-09-15 DIAGNOSIS — H91.93 BILATERAL HEARING LOSS, UNSPECIFIED HEARING LOSS TYPE: ICD-10-CM

## 2023-09-15 DIAGNOSIS — H61.23 BILATERAL IMPACTED CERUMEN: Primary | ICD-10-CM

## 2023-09-15 DIAGNOSIS — H60.392 OTHER INFECTIVE ACUTE OTITIS EXTERNA OF LEFT EAR: ICD-10-CM

## 2023-09-15 RX ORDER — CIPROFLOXACIN HYDROCHLORIDE 3.5 MG/ML
4 SOLUTION/ DROPS TOPICAL 2 TIMES DAILY
Qty: 2.5 ML | Refills: 0 | Status: SHIPPED | OUTPATIENT
Start: 2023-09-15 | End: 2023-09-20

## 2023-09-15 ASSESSMENT — ENCOUNTER SYMPTOMS
RHINORRHEA: 0
SHORTNESS OF BREATH: 0
EYE PAIN: 0
COUGH: 0
NAUSEA: 0
VOMITING: 0

## 2023-09-20 NOTE — PROGRESS NOTES
..1.  Do not eat or drink anything after 12 midnight prior to surgery. This includes no water, chewing gum or mints, except for bowel prep complete per MD.  2.  Take the following pills with a small sip of water on the morning of surgery 09/28/2023.  3.  Aspirin, Ibuprofen, Advil, Naproxen, Vitamin E and other Anti-inflammatory products should be stopped for 5 days before surgery or as directed by your physician. 4.  Check with your doctor regarding stopping Plavix, Coumadin, Lovenox, Fragmin or other blood thinners. 5.  Do not smoke and do not drink alcoholic beverages 24 hours prior to surgery. This includes NA Beer. 6.  You may brush your teeth and gargle the morning of surgery. DO NOT SWALLOW WATER.  7.  You MUST make arrangements for a responsible adult to take you home after your surgery. You will not be allowed to leave alone or drive yourself home. It is strongly suggested someone stay with you the first 24 hours. Your surgery will be cancelled if you do not have a ride home. 8.  A parent/legal guardian must accompany a child scheduled for surgery and plan to stay at the hospital until the child is discharged. Please do not bring other children with you. 9.  Please wear simple, loose fitting clothing to the hospital.  Bucklin Loser not bring valuables ( money, credit cards, checkbooks, etc.)  Do not wear any makeup (including no eye makeup) or nail polish on your fingers or toes. 10.  Do not wear any jewelry or piercing on the day of surgery. All body piercing jewelry must be removed. 11.  If you have dentures, they will be removed before going to the OR; we will provide you a container. If you wear contact lenses or glasses, they will be removed; please bring a case for them.   15.  Notify your Surgeon if you develop any illness between now and surgery time; cough, cold, fever, sore throat, nausea, vomiting, etc.  Please notify your surgeon if you experience dizziness, shortness of breath or blurred

## 2023-09-21 ASSESSMENT — ENCOUNTER SYMPTOMS
SHORTNESS OF BREATH: 0
VOMITING: 0
RHINORRHEA: 0
EYE PAIN: 0
NAUSEA: 0
COUGH: 0

## 2023-09-21 NOTE — PROGRESS NOTES
[address]      Patient Name: Darlene Prado  Medical Record Number:  [id number]  Primary Care Physician:  Elvira Ball MD  Date of Consultation: 9/22/2023    Chief Complaint:   Chief Complaint   Patient presents with    Follow-up     Patient is here for a 1 week follow up on her ears. She states her head still feels stopped up. HISTORY OF PRESENT ILLNESS  Arthur Marie is a(n) 75 y.o. female who presents for evaluation of hearing loss. States her ears been clogged for several months. She did have some pain on the left side but the discomfort has subsided. Interval History 9/22/2023  Arthur Marie states that her ears are back to normal however she continues to have hearing loss.   She also complains of fullness in her nose and sinuses    Patient Active Problem List   Diagnosis    Asthma    Arthritis    COPD (chronic obstructive pulmonary disease) ([address])    Tobacco dependence    Primary cancer of right upper lobe of lung ([address])    Bacterial pneumonia    Mild persistent asthma without complication    COPD exacerbation ([address])     Past Surgical History:   Procedure Laterality Date    BREAST BIOPSY      Right, was benign    COLONOSCOPY N/A 10/9/2018    COLONOSCOPY WITH BIOPSY performed by Teri Cortez MD at [address] [place] [address]  5/31/2022    CT NEEDLE BIOPSY LUNG PERCUTANEOUS 5/31/2022 [facility] CT SCAN    CYST REMOVAL      Behind ear    YESI STEROTACTIC LOC BREAST BIOPSY LEFT Left 2/15/2023    YESI STEROTACTIC LOC BREAST BIOPSY LEFT 2/15/2023 [facility]    THORACOSCOPY Right 7/14/2022    RIGHT VIDEO ASSISTED THORACOSCOPIC SURGERY, RIGHT UPPER LOBE LOBECTOMY, MEDIASTINAL LYMPHADENECTOMY AND INTERCOSTAL NERVE BLOCK performed by Miguel Roa MD at [address]       Family History   Problem Relation Age of Onset    Cancer Mother         stomach    Heart Disease Father     Colon Cancer

## 2023-09-22 ENCOUNTER — OFFICE VISIT (OUTPATIENT)
Dept: ENT CLINIC | Age: 68
End: 2023-09-22
Payer: MEDICARE

## 2023-09-22 VITALS
BODY MASS INDEX: 18.78 KG/M2 | SYSTOLIC BLOOD PRESSURE: 130 MMHG | HEIGHT: 64 IN | DIASTOLIC BLOOD PRESSURE: 72 MMHG | TEMPERATURE: 98.1 F | HEART RATE: 92 BPM | RESPIRATION RATE: 16 BRPM | WEIGHT: 110 LBS | OXYGEN SATURATION: 93 %

## 2023-09-22 DIAGNOSIS — J01.40 ACUTE NON-RECURRENT PANSINUSITIS: Primary | ICD-10-CM

## 2023-09-22 DIAGNOSIS — H91.93 BILATERAL HEARING LOSS, UNSPECIFIED HEARING LOSS TYPE: ICD-10-CM

## 2023-09-22 PROCEDURE — 1090F PRES/ABSN URINE INCON ASSESS: CPT | Performed by: STUDENT IN AN ORGANIZED HEALTH CARE EDUCATION/TRAINING PROGRAM

## 2023-09-22 PROCEDURE — G9899 SCRN MAM PERF RSLTS DOC: HCPCS | Performed by: STUDENT IN AN ORGANIZED HEALTH CARE EDUCATION/TRAINING PROGRAM

## 2023-09-22 PROCEDURE — 4004F PT TOBACCO SCREEN RCVD TLK: CPT | Performed by: STUDENT IN AN ORGANIZED HEALTH CARE EDUCATION/TRAINING PROGRAM

## 2023-09-22 PROCEDURE — G8420 CALC BMI NORM PARAMETERS: HCPCS | Performed by: STUDENT IN AN ORGANIZED HEALTH CARE EDUCATION/TRAINING PROGRAM

## 2023-09-22 PROCEDURE — G8399 PT W/DXA RESULTS DOCUMENT: HCPCS | Performed by: STUDENT IN AN ORGANIZED HEALTH CARE EDUCATION/TRAINING PROGRAM

## 2023-09-22 PROCEDURE — 99214 OFFICE O/P EST MOD 30 MIN: CPT | Performed by: STUDENT IN AN ORGANIZED HEALTH CARE EDUCATION/TRAINING PROGRAM

## 2023-09-22 PROCEDURE — G8427 DOCREV CUR MEDS BY ELIG CLIN: HCPCS | Performed by: STUDENT IN AN ORGANIZED HEALTH CARE EDUCATION/TRAINING PROGRAM

## 2023-09-22 PROCEDURE — 3017F COLORECTAL CA SCREEN DOC REV: CPT | Performed by: STUDENT IN AN ORGANIZED HEALTH CARE EDUCATION/TRAINING PROGRAM

## 2023-09-22 PROCEDURE — 1123F ACP DISCUSS/DSCN MKR DOCD: CPT | Performed by: STUDENT IN AN ORGANIZED HEALTH CARE EDUCATION/TRAINING PROGRAM

## 2023-09-22 RX ORDER — AMOXICILLIN AND CLAVULANATE POTASSIUM 875; 125 MG/1; MG/1
1 TABLET, FILM COATED ORAL 2 TIMES DAILY
Qty: 14 TABLET | Refills: 0 | Status: SHIPPED | OUTPATIENT
Start: 2023-09-22 | End: 2023-09-29

## 2023-09-22 ASSESSMENT — ENCOUNTER SYMPTOMS
SINUS PAIN: 1
SINUS PRESSURE: 1

## 2023-09-24 RX ORDER — ALBUTEROL SULFATE 90 UG/1
2 AEROSOL, METERED RESPIRATORY (INHALATION) EVERY 6 HOURS PRN
Qty: 18 G | Refills: 3 | OUTPATIENT
Start: 2023-09-24

## 2023-09-24 RX ORDER — ALBUTEROL SULFATE 90 UG/1
2 AEROSOL, METERED RESPIRATORY (INHALATION) EVERY 6 HOURS PRN
Qty: 8.5 G | Refills: 0 | Status: SHIPPED | OUTPATIENT
Start: 2023-09-24 | End: 2023-10-19 | Stop reason: SDUPTHER

## 2023-09-25 ENCOUNTER — TELEPHONE (OUTPATIENT)
Dept: ADMINISTRATIVE | Age: 68
End: 2023-09-25

## 2023-09-25 NOTE — TELEPHONE ENCOUNTER
Submitted PA for Ventolin  (90 Base)MCG/ACT aerosol  Via CMM Key: [id number] STATUS: PENDING. Follow up done daily; if no response in three days we will refax for status check. If another three days goes by with no response we will call the insurance for status.

## 2023-09-27 NOTE — H&P
[place]   Pre-operative History and Physical    Patient: Kamilah Villareal  : 1955  Acct#:     HISTORY OF PRESENT ILLNESS:    Indications: pancreatic mass    76-year-old female with medical history of COPD, peripheral artery disease, descending thoracic aortic aneurysm, tobacco use, history of non-small cell lung cancer status post lobectomy referred for pancreatic head mass noted on CTA for abdominal aortic aneurysm surveillance. .    Labs on 2023 reviewed with unremarkable liver test, total bilirubin 0.2, ALT 7, AST 15, ALP 94, albumin 3.7. Normal CBC with hemoglobin 13.2, hematocrit 39.5, platelets 664, WBC 6.2. CTA abdomen and pelvis with and without contrast on 8/15/2023 noted dilated intra and extrahepatic biliary ducts with CBD 1.4 cm. A 1.2 x 1.3 x 1.6 cm area of hypoattenuation in the pancreatic head near the ampulla. Mild pancreatic ductal dilation. CT abdomen and pelvis on 2023 noted 2.5 cm mass in the head of the pancreas with dilation of pancreatic duct. Dilated intra and extrahepatic bile ducts with CBD measuring 18 mm tapering to the pancreatic mass. Last colonoscopy 2018 Dr. Adela Kennedy: Finch Snare prep. Tortuous and redundant sigmoid colon. A 4 mm polyp in the ascending colon status post cold forceps (tubular adenoma). 1 cm sessile ascending colon polyp status post hot snare (tubular adenoma). Small internal hemorrhoids. Repeat in 3 years.         Past Medical History:        Diagnosis Date    Arthritis     Asthma     COPD (chronic obstructive pulmonary disease) ([address])     Hormone replacement therapy       Past Surgical History:        Procedure Laterality Date    BREAST BIOPSY      Right, was benign    COLONOSCOPY N/A 10/9/2018    COLONOSCOPY WITH BIOPSY performed by Deysi Velarde MD at [address] [place] [address]  2022    CT NEEDLE BIOPSY LUNG PERCUTANEOUS 2022 [facility] CT SCAN    CYST REMOVAL

## 2023-09-28 ENCOUNTER — ANESTHESIA (OUTPATIENT)
Dept: ENDOSCOPY | Age: 68
End: 2023-09-28
Payer: MEDICARE

## 2023-09-28 ENCOUNTER — HOSPITAL ENCOUNTER (OUTPATIENT)
Age: 68
Setting detail: OUTPATIENT SURGERY
Discharge: HOME | End: 2023-09-28
Attending: INTERNAL MEDICINE | Admitting: INTERNAL MEDICINE
Payer: MEDICARE

## 2023-09-28 DIAGNOSIS — K86.89 PANCREATIC MASS: ICD-10-CM

## 2023-09-28 PROCEDURE — 82787 IGG 1 2 3 OR 4 EACH: CPT

## 2023-09-28 PROCEDURE — 88305 TISSUE EXAM BY PATHOLOGIST: CPT

## 2023-09-28 PROCEDURE — 2500000003 HC RX 250 WO HCPCS: Performed by: NURSE ANESTHETIST, CERTIFIED REGISTERED

## 2023-09-28 PROCEDURE — 2720000010 HC SURG SUPPLY STERILE: Performed by: INTERNAL MEDICINE

## 2023-09-28 PROCEDURE — 88177 CYTP FNA EVAL EA ADDL: CPT

## 2023-09-28 PROCEDURE — 7100000010 HC PHASE II RECOVERY - FIRST 15 MIN: Performed by: INTERNAL MEDICINE

## 2023-09-28 PROCEDURE — 6360000002 HC RX W HCPCS: Performed by: NURSE ANESTHETIST, CERTIFIED REGISTERED

## 2023-09-28 PROCEDURE — 86301 IMMUNOASSAY TUMOR CA 19-9: CPT

## 2023-09-28 PROCEDURE — 3609020800 HC EGD W/EUS FNA: Performed by: INTERNAL MEDICINE

## 2023-09-28 PROCEDURE — 88172 CYTP DX EVAL FNA 1ST EA SITE: CPT

## 2023-09-28 PROCEDURE — 36415 COLL VENOUS BLD VENIPUNCTURE: CPT

## 2023-09-28 PROCEDURE — 82378 CARCINOEMBRYONIC ANTIGEN: CPT

## 2023-09-28 PROCEDURE — 3700000001 HC ADD 15 MINUTES (ANESTHESIA): Performed by: INTERNAL MEDICINE

## 2023-09-28 PROCEDURE — 7100000011 HC PHASE II RECOVERY - ADDTL 15 MIN: Performed by: INTERNAL MEDICINE

## 2023-09-28 PROCEDURE — 2500000003 HC RX 250 WO HCPCS: Performed by: ANESTHESIOLOGY

## 2023-09-28 PROCEDURE — 2709999900 HC NON-CHARGEABLE SUPPLY: Performed by: INTERNAL MEDICINE

## 2023-09-28 PROCEDURE — 3700000000 HC ANESTHESIA ATTENDED CARE: Performed by: INTERNAL MEDICINE

## 2023-09-28 PROCEDURE — 80076 HEPATIC FUNCTION PANEL: CPT

## 2023-09-28 PROCEDURE — 88173 CYTOPATH EVAL FNA REPORT: CPT

## 2023-09-28 PROCEDURE — 3609012400 HC EGD TRANSORAL BIOPSY SINGLE/MULTIPLE: Performed by: INTERNAL MEDICINE

## 2023-09-28 RX ORDER — SODIUM CHLORIDE 9 MG/ML
INJECTION, SOLUTION INTRAVENOUS PRN
Status: DISCONTINUED | OUTPATIENT
Start: 2023-09-28 | End: 2023-09-28 | Stop reason: HOSPADM

## 2023-09-28 RX ORDER — FENTANYL CITRATE 50 UG/ML
INJECTION, SOLUTION INTRAMUSCULAR; INTRAVENOUS PRN
Status: DISCONTINUED | OUTPATIENT
Start: 2023-09-28 | End: 2023-09-28 | Stop reason: SDUPTHER

## 2023-09-28 RX ORDER — PROPOFOL 10 MG/ML
INJECTION, EMULSION INTRAVENOUS CONTINUOUS PRN
Status: DISCONTINUED | OUTPATIENT
Start: 2023-09-28 | End: 2023-09-28 | Stop reason: SDUPTHER

## 2023-09-28 RX ORDER — OXYCODONE HYDROCHLORIDE 5 MG/1
10 TABLET ORAL PRN
Status: DISCONTINUED | OUTPATIENT
Start: 2023-09-28 | End: 2023-09-28 | Stop reason: HOSPADM

## 2023-09-28 RX ORDER — SODIUM CHLORIDE 0.9 % (FLUSH) 0.9 %
5-40 SYRINGE (ML) INJECTION PRN
Status: DISCONTINUED | OUTPATIENT
Start: 2023-09-28 | End: 2023-09-28 | Stop reason: HOSPADM

## 2023-09-28 RX ORDER — SODIUM CHLORIDE, SODIUM LACTATE, POTASSIUM CHLORIDE, CALCIUM CHLORIDE 600; 310; 30; 20 MG/100ML; MG/100ML; MG/100ML; MG/100ML
INJECTION, SOLUTION INTRAVENOUS CONTINUOUS
Status: DISCONTINUED | OUTPATIENT
Start: 2023-09-28 | End: 2023-09-28 | Stop reason: HOSPADM

## 2023-09-28 RX ORDER — MEPERIDINE HYDROCHLORIDE 25 MG/ML
12.5 INJECTION INTRAMUSCULAR; INTRAVENOUS; SUBCUTANEOUS EVERY 5 MIN PRN
Status: DISCONTINUED | OUTPATIENT
Start: 2023-09-28 | End: 2023-09-28 | Stop reason: HOSPADM

## 2023-09-28 RX ORDER — SODIUM CHLORIDE 0.9 % (FLUSH) 0.9 %
5-40 SYRINGE (ML) INJECTION EVERY 12 HOURS SCHEDULED
Status: DISCONTINUED | OUTPATIENT
Start: 2023-09-28 | End: 2023-09-28 | Stop reason: HOSPADM

## 2023-09-28 RX ORDER — DIPHENHYDRAMINE HYDROCHLORIDE 50 MG/ML
12.5 INJECTION INTRAMUSCULAR; INTRAVENOUS
Status: DISCONTINUED | OUTPATIENT
Start: 2023-09-28 | End: 2023-09-28 | Stop reason: HOSPADM

## 2023-09-28 RX ORDER — LABETALOL HYDROCHLORIDE 5 MG/ML
5 INJECTION, SOLUTION INTRAVENOUS EVERY 10 MIN PRN
Status: DISCONTINUED | OUTPATIENT
Start: 2023-09-28 | End: 2023-09-28 | Stop reason: HOSPADM

## 2023-09-28 RX ORDER — FAMOTIDINE 10 MG/ML
20 INJECTION, SOLUTION INTRAVENOUS ONCE
Status: COMPLETED | OUTPATIENT
Start: 2023-09-28 | End: 2023-09-28

## 2023-09-28 RX ORDER — ONDANSETRON 2 MG/ML
4 INJECTION INTRAMUSCULAR; INTRAVENOUS
Status: DISCONTINUED | OUTPATIENT
Start: 2023-09-28 | End: 2023-09-28 | Stop reason: HOSPADM

## 2023-09-28 RX ORDER — PROPOFOL 10 MG/ML
INJECTION, EMULSION INTRAVENOUS PRN
Status: DISCONTINUED | OUTPATIENT
Start: 2023-09-28 | End: 2023-09-28 | Stop reason: SDUPTHER

## 2023-09-28 RX ORDER — OXYCODONE HYDROCHLORIDE 5 MG/1
5 TABLET ORAL PRN
Status: DISCONTINUED | OUTPATIENT
Start: 2023-09-28 | End: 2023-09-28 | Stop reason: HOSPADM

## 2023-09-28 RX ORDER — LIDOCAINE HYDROCHLORIDE 20 MG/ML
INJECTION, SOLUTION INFILTRATION; PERINEURAL PRN
Status: DISCONTINUED | OUTPATIENT
Start: 2023-09-28 | End: 2023-09-28 | Stop reason: SDUPTHER

## 2023-09-28 RX ORDER — GLYCOPYRROLATE 0.2 MG/ML
INJECTION INTRAMUSCULAR; INTRAVENOUS PRN
Status: DISCONTINUED | OUTPATIENT
Start: 2023-09-28 | End: 2023-09-28 | Stop reason: SDUPTHER

## 2023-09-28 RX ORDER — MIDAZOLAM HYDROCHLORIDE 1 MG/ML
INJECTION INTRAMUSCULAR; INTRAVENOUS PRN
Status: DISCONTINUED | OUTPATIENT
Start: 2023-09-28 | End: 2023-09-28 | Stop reason: SDUPTHER

## 2023-09-28 RX ADMIN — PROPOFOL 100 MCG/KG/MIN: 10 INJECTION, EMULSION INTRAVENOUS at 12:04

## 2023-09-28 RX ADMIN — FENTANYL CITRATE 25 MCG: 50 INJECTION INTRAMUSCULAR; INTRAVENOUS at 11:55

## 2023-09-28 RX ADMIN — PROPOFOL 10 MG: 10 INJECTION, EMULSION INTRAVENOUS at 13:05

## 2023-09-28 RX ADMIN — FAMOTIDINE 20 MG: 10 INJECTION, SOLUTION INTRAVENOUS at 11:49

## 2023-09-28 RX ADMIN — LIDOCAINE HYDROCHLORIDE 60 MG: 20 INJECTION, SOLUTION INFILTRATION; PERINEURAL at 12:04

## 2023-09-28 RX ADMIN — MIDAZOLAM 0.5 MG: 1 INJECTION INTRAMUSCULAR; INTRAVENOUS at 12:04

## 2023-09-28 RX ADMIN — PROPOFOL 10 MG: 10 INJECTION, EMULSION INTRAVENOUS at 12:47

## 2023-09-28 RX ADMIN — PROPOFOL 20 MG: 10 INJECTION, EMULSION INTRAVENOUS at 12:37

## 2023-09-28 RX ADMIN — PROPOFOL 30 MG: 10 INJECTION, EMULSION INTRAVENOUS at 12:06

## 2023-09-28 RX ADMIN — GLYCOPYRROLATE 0.1 MG: 0.2 INJECTION, SOLUTION INTRAMUSCULAR; INTRAVENOUS at 11:55

## 2023-09-28 ASSESSMENT — PAIN - FUNCTIONAL ASSESSMENT: PAIN_FUNCTIONAL_ASSESSMENT: NONE - DENIES PAIN

## 2023-09-28 NOTE — TELEPHONE ENCOUNTER
The medication was DENIED; DENIAL letter uploaded to MED[employer]. If you want an APPEAL; please note in this encounter what new information you would like to APPEAL with. Once complete route back to PA POOL. If this requires a response please respond to the pool ( [phone] [phone]). Thank you please advise patient.

## 2023-09-28 NOTE — OP NOTE
[facility],  [address], [address],[address]  Phone: [phone]  [address] [facility], [address] [facility]  Phone: [phone]   EJB:[phone]    Upper Endoscopic Ultrasound with Fine Needle Aspiration Procedure Note    Patient: Luciana Eid  : 1955    Procedure: Upper EUS with pancreatic tissue sampling    Date:  2023     Endoscopist:  Moriah Bates MD, MD    Referring Physician:  Angel Luis Gordon MD    Preoperative Diagnosis:  Pancreatic mass [K86.89]    Postoperative Diagnosis: dilated CBD, dilated pancreatic duct, pancreatic head mass    Anesthesia: Anesthesia: MAC  ASA Class: 2  Mallampati: II (soft palate, uvula, fauces visible)    Indications: This is a 75y.o. year old female with medical history of COPD, peripheral artery disease, descending thoracic aortic aneurysm, tobacco use, history of non-small cell lung cancer status post lobectomy referred for pancreatic head mass noted on CTA for abdominal aortic aneurysm surveillance. CTA abdomen and pelvis with and without contrast on 8/15/2023 noted dilated intra and extrahepatic biliary ducts with CBD 1.4 cm. A 1.2 x 1.3 x 1.6 cm area of hypoattenuation in the pancreatic head near the ampulla. Mild pancreatic ductal dilation. CT abdomen and pelvis on 2023 noted 2.5 cm mass in the head of the pancreas with dilation of pancreatic duct. Dilated intra and extrahepatic bile ducts with CBD measuring 18 mm tapering to the pancreatic mass. Procedure Details   Prior to the procedure, a history and physical was performed, and patient medications and allergies were reviewed. The patient's tolerance of previous anesthesia was also reviewed. Informed consent was obtained for the procedure, including anesthesia, risks of infection, perforation, hemorrhage, pancreatitis, adverse drug reaction were discussed. All questions were answered, and informed consent was obtained.   Prior anticoagulants: are any questions about the content of this document, please contact the author as some errors in translation may have occurred.

## 2023-09-28 NOTE — DISCHARGE INSTRUCTIONS
Eneida's office will call you with the biopsy findings. Call Dr. Lillian Sifuentes if there are any GI concerns.  [phone]

## 2023-09-28 NOTE — ANESTHESIA POSTPROCEDURE EVALUATION
Department of Anesthesiology  Postprocedure Note    Patient: Maria G Vigil  MRN: [id number]  YOB: 1955  Date of evaluation: 9/28/2023      Procedure Summary     Date: 09/28/23 Room / Location: [address] [place] [address],[address] / [facility]    Anesthesia Start: 1153 Anesthesia Stop: 1325    Procedures:       EGD W/EUS FNA      EGD BIOPSY Diagnosis:       Pancreatic mass      (Pancreatic mass [K86.89])    Surgeons: Trent Cohen MD Responsible Provider: Maria E Javier MD    Anesthesia Type: general ASA Status: 2          Anesthesia Type: No value filed.     Chelsy Phase I: Chelsy Score: 10    Chelsy Phase II: Chelsy Score: 10      Anesthesia Post Evaluation    Patient location during evaluation: PACU  Level of consciousness: awake  Airway patency: patent  Nausea & Vomiting: no nausea  Complications: no  Cardiovascular status: blood pressure returned to baseline  Respiratory status: acceptable  Hydration status: euvolemic  Pain management: adequate

## 2023-09-28 NOTE — PROGRESS NOTES
Pt arrived to PACU from ENDO in stable condition. Report received from [facility] and Dr Meme Ceron anesthesia. Phase II. Care of pt transferred at this time. Pt  placed on cont pulse ox and BP. Pt arrived to unit without any oxygen requirements. SPO2 96% on RA.

## 2023-09-29 VITALS
HEIGHT: 64 IN | BODY MASS INDEX: 18.78 KG/M2 | SYSTOLIC BLOOD PRESSURE: 141 MMHG | RESPIRATION RATE: 18 BRPM | HEART RATE: 70 BPM | DIASTOLIC BLOOD PRESSURE: 81 MMHG | WEIGHT: 110 LBS | TEMPERATURE: 97 F | OXYGEN SATURATION: 97 %

## 2023-09-29 LAB
ALBUMIN SERPL-MCNC: 3.5 G/DL (ref 3.4–5)
ALP SERPL-CCNC: 77 U/L (ref 40–129)
ALT SERPL-CCNC: 10 U/L (ref 10–40)
AST SERPL-CCNC: 16 U/L (ref 15–37)
BILIRUB DIRECT SERPL-MCNC: <0.2 MG/DL (ref 0–0.3)
BILIRUB INDIRECT SERPL-MCNC: ABNORMAL MG/DL (ref 0–1)
BILIRUB SERPL-MCNC: 0.3 MG/DL (ref 0–1)
CEA SERPL-MCNC: 8.6 NG/ML (ref 0–5)
PROT SERPL-MCNC: 5.9 G/DL (ref 6.4–8.2)

## 2023-09-29 NOTE — PROGRESS NOTES
IV x 1 removed per discharge hemostasis achieved, dressing applied, no complications noted. Patient denies further needs. Pt transported to private car via wheel chair. Vitals per doc flow, pt cousin Delma Wilde assumes responsibility.

## 2023-09-29 NOTE — PROGRESS NOTES
MD ordered labs to be drawn prior to discharge. Spoke with Kelsea in lab, states only one  working at this time and  unsure how quickly she will be able to obtain labs.

## 2023-09-29 NOTE — PROGRESS NOTES
Discharge instructions explained in detail at bedside to pt and pts cousin Aaron Green. Pt and cousin received copy of discharge instructions. Pt  and cousin deny having any questions about discharge.

## 2023-10-02 LAB — IGG4 SER-MCNC: 70 MG/DL

## 2023-10-03 DIAGNOSIS — K86.89 MASS OF HEAD OF PANCREAS: Primary | ICD-10-CM

## 2023-10-04 LAB — CANCER AG19-9 SERPL IA-ACNC: 8 U/ML (ref 0–35)

## 2023-10-04 NOTE — TELEPHONE ENCOUNTER
Per Dr. Jaiden Núñez:  [facility] Overcast radiology recommending PET CT first, possible biopsy of lymph node rather than lung. PET scan scheduled 9/18/20 @ [facility] 10 AM (9:30 am arrival). Pt informed of date/time/instructions.     Will watch for results and then forward to Dr. Jaiden Núñez and radiologist. O-T Advancement Flap Text: The defect edges were debeveled with a #15 scalpel blade.  Given the location of the defect, shape of the defect and the proximity to free margins an O-T advancement flap was deemed most appropriate.  Using a sterile surgical marker, an appropriate advancement flap was drawn incorporating the defect and placing the expected incisions within the relaxed skin tension lines where possible.    The area thus outlined was incised deep to adipose tissue with a #15 scalpel blade. The skin margins were undermined to an appropriate distance in all directions utilizing iris scissors and carried over to close the primary defect.

## 2023-10-09 ENCOUNTER — TELEMEDICINE (OUTPATIENT)
Dept: FAMILY MEDICINE CLINIC | Age: 68
End: 2023-10-09
Payer: MEDICARE

## 2023-10-09 DIAGNOSIS — J44.1 COPD EXACERBATION (HCC): Primary | ICD-10-CM

## 2023-10-09 PROCEDURE — G9899 SCRN MAM PERF RSLTS DOC: HCPCS | Performed by: NURSE PRACTITIONER

## 2023-10-09 PROCEDURE — G8427 DOCREV CUR MEDS BY ELIG CLIN: HCPCS | Performed by: NURSE PRACTITIONER

## 2023-10-09 PROCEDURE — 3017F COLORECTAL CA SCREEN DOC REV: CPT | Performed by: NURSE PRACTITIONER

## 2023-10-09 PROCEDURE — G8399 PT W/DXA RESULTS DOCUMENT: HCPCS | Performed by: NURSE PRACTITIONER

## 2023-10-09 PROCEDURE — 1090F PRES/ABSN URINE INCON ASSESS: CPT | Performed by: NURSE PRACTITIONER

## 2023-10-09 PROCEDURE — 1123F ACP DISCUSS/DSCN MKR DOCD: CPT | Performed by: NURSE PRACTITIONER

## 2023-10-09 PROCEDURE — 99213 OFFICE O/P EST LOW 20 MIN: CPT | Performed by: NURSE PRACTITIONER

## 2023-10-09 RX ORDER — AMOXICILLIN AND CLAVULANATE POTASSIUM 875; 125 MG/1; MG/1
1 TABLET, FILM COATED ORAL 2 TIMES DAILY
Qty: 20 TABLET | Refills: 0 | Status: SHIPPED | OUTPATIENT
Start: 2023-10-09 | End: 2023-10-19

## 2023-10-09 RX ORDER — PREDNISONE 20 MG/1
TABLET ORAL
Qty: 18 TABLET | Refills: 0 | Status: SHIPPED | OUTPATIENT
Start: 2023-10-09

## 2023-10-09 ASSESSMENT — ENCOUNTER SYMPTOMS
COUGH: 1
WHEEZING: 1
GASTROINTESTINAL NEGATIVE: 1
SHORTNESS OF BREATH: 1
SINUS PRESSURE: 1

## 2023-10-09 ASSESSMENT — PATIENT HEALTH QUESTIONNAIRE - PHQ9
SUM OF ALL RESPONSES TO PHQ QUESTIONS 1-9: 0
SUM OF ALL RESPONSES TO PHQ QUESTIONS 1-9: 0
2. FEELING DOWN, DEPRESSED OR HOPELESS: 0
SUM OF ALL RESPONSES TO PHQ9 QUESTIONS 1 & 2: 0
SUM OF ALL RESPONSES TO PHQ QUESTIONS 1-9: 0
SUM OF ALL RESPONSES TO PHQ QUESTIONS 1-9: 0
1. LITTLE INTEREST OR PLEASURE IN DOING THINGS: 0

## 2023-10-09 NOTE — PROGRESS NOTES
Kathryn Anthony (:  1955) is a Established patient, presenting virtually for evaluation of the following:    Assessment & Plan   Below is the assessment and plan developed based on review of pertinent history, physical exam, labs, studies, and medications. 1. COPD exacerbation (HCC)  Treat with below medications and follow up if no improvement. -     amoxicillin-clavulanate (AUGMENTIN) 875-125 MG per tablet; Take 1 tablet by mouth 2 times daily for 10 days, Disp-20 tablet, R-0Normal  -     predniSONE (DELTASONE) 20 MG tablet; Take 3 tablets daily for 3 days, 2 tablets daily for 3 days and 1 tablet daily for 3 days. , Disp-18 tablet, R-0Normal           Subjective   HPI  Patient presents today with concerns of fever, cough, chills, shortness of breath and wheezing. She has a history of COPD. She has had similar symptoms in the past. She report symptoms started about a week ago and her covid test was negative. She does have a productive cough with yellow sputum and a history of lung cancer which she reports is stable. Review of Systems   Constitutional:  Positive for fatigue and fever. HENT:  Positive for congestion and sinus pressure. Respiratory:  Positive for cough, shortness of breath and wheezing. Cardiovascular: Negative. Gastrointestinal: Negative. Musculoskeletal: Negative. Neurological: Negative. Psychiatric/Behavioral: Negative.             Objective   Patient-Reported Vitals  Patient-Reported Temperature: 101.1  Patient-Reported Weight: 105 lbs  Patient-Reported Height: 5'4       Physical Exam  [INSTRUCTIONS:  \"[x]\" Indicates a positive item  \"[]\" Indicates a negative item  -- DELETE ALL ITEMS NOT EXAMINED]    Constitutional: [x] Appears well-developed and well-nourished [x] No apparent distress      [] Abnormal -     Mental status: [x] Alert and awake  [x] Oriented to person/place/time [x] Able to follow commands    [] Abnormal -     Eyes:   EOM    [x]  Normal    []

## 2023-10-11 ASSESSMENT — ENCOUNTER SYMPTOMS
EYE PAIN: 0
SHORTNESS OF BREATH: 0
COUGH: 0
VOMITING: 0
NAUSEA: 0
RHINORRHEA: 0

## 2023-10-11 NOTE — PROGRESS NOTES
[address]      Patient Name: Maria G Vigil  Medical Record Number:  [id number]  Primary Care Physician:  Narcisa Hatfield MD  Date of Consultation: 10/12/2023    Chief Complaint:   Chief Complaint   Patient presents with    Follow-up     Patient is here for a 1 month follow up. She states that she is feeling much better. HISTORY OF PRESENT ILLNESS  Elizabeth Sepulveda is a(n) 75 y.o. female who presents for evaluation of hearing loss. States her ears been clogged for several months. She did have some pain on the left side but the discomfort has subsided. Interval History 9/22/2023  Elizabeth Sepulveda states that her ears are back to normal however she continues to have hearing loss. She also complains of fullness in her nose and sinuses    Interval History 10/12/2023  Elizabeth Sepulveda states that she is feeling significantly better after the Augmentin. She did have an audiogram performed today as well.     Patient Active Problem List   Diagnosis    Asthma    Arthritis    COPD (chronic obstructive pulmonary disease) ([address])    Tobacco dependence    Primary cancer of right upper lobe of lung ([address])    Bacterial pneumonia    Mild persistent asthma without complication    COPD exacerbation ([address])     Past Surgical History:   Procedure Laterality Date    BREAST BIOPSY      Right, was benign    COLONOSCOPY N/A 10/9/2018    COLONOSCOPY WITH BIOPSY performed by Milka Vergara MD at [address] [place] [address]  5/31/2022    CT NEEDLE BIOPSY LUNG PERCUTANEOUS 5/31/2022 [facility] SCAN    CYST REMOVAL      Behind ear    YESI STEROTACTIC LOC BREAST BIOPSY LEFT Left 2/15/2023    YESI STEROTACTIC LOC BREAST BIOPSY LEFT 2/15/2023 [facility]    THORACOSCOPY Right 7/14/2022    RIGHT VIDEO ASSISTED THORACOSCOPIC SURGERY, RIGHT UPPER LOBE LOBECTOMY, MEDIASTINAL LYMPHADENECTOMY AND INTERCOSTAL NERVE BLOCK performed by Diandra Newby MD at [address] [place] [address]

## 2023-10-12 ENCOUNTER — OFFICE VISIT (OUTPATIENT)
Dept: ENT CLINIC | Age: 68
End: 2023-10-12
Payer: MEDICARE

## 2023-10-12 ENCOUNTER — PROCEDURE VISIT (OUTPATIENT)
Dept: AUDIOLOGY | Age: 68
End: 2023-10-12
Payer: MEDICARE

## 2023-10-12 VITALS
TEMPERATURE: 98.1 F | RESPIRATION RATE: 16 BRPM | SYSTOLIC BLOOD PRESSURE: 125 MMHG | HEART RATE: 68 BPM | BODY MASS INDEX: 18.78 KG/M2 | HEIGHT: 64 IN | WEIGHT: 110 LBS | DIASTOLIC BLOOD PRESSURE: 68 MMHG

## 2023-10-12 DIAGNOSIS — H93.13 TINNITUS OF BOTH EARS: ICD-10-CM

## 2023-10-12 DIAGNOSIS — H93.19 TINNITUS, UNSPECIFIED LATERALITY: ICD-10-CM

## 2023-10-12 DIAGNOSIS — H90.3 SENSORINEURAL HEARING LOSS, BILATERAL: Primary | ICD-10-CM

## 2023-10-12 DIAGNOSIS — H90.3 SENSORINEURAL HEARING LOSS (SNHL) OF BOTH EARS: ICD-10-CM

## 2023-10-12 DIAGNOSIS — J01.40 ACUTE NON-RECURRENT PANSINUSITIS: Primary | ICD-10-CM

## 2023-10-12 PROCEDURE — G8427 DOCREV CUR MEDS BY ELIG CLIN: HCPCS | Performed by: STUDENT IN AN ORGANIZED HEALTH CARE EDUCATION/TRAINING PROGRAM

## 2023-10-12 PROCEDURE — 4004F PT TOBACCO SCREEN RCVD TLK: CPT | Performed by: STUDENT IN AN ORGANIZED HEALTH CARE EDUCATION/TRAINING PROGRAM

## 2023-10-12 PROCEDURE — 92557 COMPREHENSIVE HEARING TEST: CPT | Performed by: AUDIOLOGIST

## 2023-10-12 PROCEDURE — G8399 PT W/DXA RESULTS DOCUMENT: HCPCS | Performed by: STUDENT IN AN ORGANIZED HEALTH CARE EDUCATION/TRAINING PROGRAM

## 2023-10-12 PROCEDURE — G9899 SCRN MAM PERF RSLTS DOC: HCPCS | Performed by: STUDENT IN AN ORGANIZED HEALTH CARE EDUCATION/TRAINING PROGRAM

## 2023-10-12 PROCEDURE — 1123F ACP DISCUSS/DSCN MKR DOCD: CPT | Performed by: STUDENT IN AN ORGANIZED HEALTH CARE EDUCATION/TRAINING PROGRAM

## 2023-10-12 PROCEDURE — G8484 FLU IMMUNIZE NO ADMIN: HCPCS | Performed by: STUDENT IN AN ORGANIZED HEALTH CARE EDUCATION/TRAINING PROGRAM

## 2023-10-12 PROCEDURE — 99214 OFFICE O/P EST MOD 30 MIN: CPT | Performed by: STUDENT IN AN ORGANIZED HEALTH CARE EDUCATION/TRAINING PROGRAM

## 2023-10-12 PROCEDURE — 92567 TYMPANOMETRY: CPT | Performed by: AUDIOLOGIST

## 2023-10-12 PROCEDURE — 1090F PRES/ABSN URINE INCON ASSESS: CPT | Performed by: STUDENT IN AN ORGANIZED HEALTH CARE EDUCATION/TRAINING PROGRAM

## 2023-10-12 PROCEDURE — 3017F COLORECTAL CA SCREEN DOC REV: CPT | Performed by: STUDENT IN AN ORGANIZED HEALTH CARE EDUCATION/TRAINING PROGRAM

## 2023-10-12 PROCEDURE — G8420 CALC BMI NORM PARAMETERS: HCPCS | Performed by: STUDENT IN AN ORGANIZED HEALTH CARE EDUCATION/TRAINING PROGRAM

## 2023-10-12 ASSESSMENT — ENCOUNTER SYMPTOMS
SINUS PAIN: 0
SINUS PRESSURE: 0

## 2023-10-12 NOTE — PROGRESS NOTES
Edelmira Arevalo   1955, 75 y.o. female   [id number]       Referring Provider: Shayy Suggs DO  Referral Type: In an order in [address] [place] [address]    Reason for Visit: Evaluation of the cause of disorders of hearing, tinnitus, or balance. ADULT AUDIOLOGIC EVALUATION      Edelmira Arevalo is a 75 y.o. female seen today, 10/12/2023 , for an initial audiologic evaluation. Patient was seen by Shayy Suggs DO following today's evaluation. Patient was alone. AUDIOLOGIC AND OTHER PERTINENT MEDICAL HISTORY:      Edelmira Arevalo noted tinnitus and history of occupational noise exposure. Patient reports decline in hearing with intermittent tinnitus with unknown laterality. Patient has a history of occupational noise exposure without the use of hearing protection. Edelmira Arevalo denied otalgia, aural fullness, dizziness, history of head trauma, history of ear surgery, and family history of hearing loss. Date: 10/12/2023     IMPRESSIONS:      Abnormal middle ear pressure with normal compliance in the right ear and abnormal compliance with normal pressure in the left ear. Abnormal hearing sensitivity, bilaterally, which can affect every day listening needs. Word understanding was excellent presented at elevated sensation levels, bilaterally. Hearing aids are recommended at this time. Follow medical recommendations of Emanuel Lopez DO.     ASSESSMENT AND FINDINGS:     Otoscopy revealed: Clear ear canals bilaterally    RIGHT EAR:  Hearing Sensitivity: Mild to profound sensorineural hearing loss from 250-8000 Hz  Speech Recognition Threshold: 35 dB HL  Word Recognition:Excellent (96%), based on NU-6 25-word list at 75 dBHL using recorded speech stimuli. Tympanometry: Negative peak pressure with normal compliance, Type C tympanogram, consistent with ETD/history of otitis media.       LEFT EAR:  Hearing Sensitivity: Mild to a profound sensorineural hearing loss from 250-8000 Hz  Speech Recognition Threshold: 35 dB HL  Word Recognition:

## 2023-10-19 RX ORDER — ALBUTEROL SULFATE 90 UG/1
2 AEROSOL, METERED RESPIRATORY (INHALATION) EVERY 6 HOURS PRN
Qty: 8.5 G | Refills: 0 | Status: SHIPPED | OUTPATIENT
Start: 2023-10-19

## 2023-10-19 NOTE — TELEPHONE ENCOUNTER
Pt requesting refill    albuterol sulfate HFA (PROVENTIL;VENTOLIN;PROAIR) 108 (90 Base) MCG/ACT inhaler     [facility] [address]

## 2023-10-19 NOTE — TELEPHONE ENCOUNTER
Last Office Visit  -  10/9/23  Next Office Visit  -  11/29/23    Last Filled  -    Last UDS -    Contract -

## 2023-11-03 DIAGNOSIS — J44.89 COPD WITH ASTHMA: ICD-10-CM

## 2023-11-03 RX ORDER — BUDESONIDE, GLYCOPYRROLATE, AND FORMOTEROL FUMARATE 160; 9; 4.8 UG/1; UG/1; UG/1
2 AEROSOL, METERED RESPIRATORY (INHALATION) 2 TIMES DAILY
Qty: 1 EACH | Refills: 3 | Status: SHIPPED | OUTPATIENT
Start: 2023-11-03

## 2023-11-09 ENCOUNTER — HOSPITAL ENCOUNTER (OUTPATIENT)
Dept: MRI IMAGING | Age: 68
Discharge: HOME | End: 2023-11-09
Attending: INTERNAL MEDICINE
Payer: MEDICARE

## 2023-11-09 ENCOUNTER — TELEPHONE (OUTPATIENT)
Dept: FAMILY MEDICINE CLINIC | Age: 68
End: 2023-11-09

## 2023-11-09 DIAGNOSIS — C34.11 PRIMARY CANCER OF RIGHT UPPER LOBE OF LUNG (HCC): ICD-10-CM

## 2023-11-09 DIAGNOSIS — K86.89 MASS OF HEAD OF PANCREAS: ICD-10-CM

## 2023-11-09 PROCEDURE — 74183 MRI ABD W/O CNTR FLWD CNTR: CPT

## 2023-11-09 PROCEDURE — 6360000004 HC RX CONTRAST MEDICATION: Performed by: INTERNAL MEDICINE

## 2023-11-09 PROCEDURE — A9579 GAD-BASE MR CONTRAST NOS,1ML: HCPCS | Performed by: INTERNAL MEDICINE

## 2023-11-09 RX ORDER — ALBUTEROL SULFATE 90 UG/1
2 AEROSOL, METERED RESPIRATORY (INHALATION) EVERY 6 HOURS PRN
Qty: 8.5 G | Refills: 0 | Status: SHIPPED | OUTPATIENT
Start: 2023-11-09 | End: 2023-12-07

## 2023-11-09 RX ADMIN — GADOTERIDOL 10 ML: 279.3 INJECTION, SOLUTION INTRAVENOUS at 09:09

## 2023-11-09 NOTE — TELEPHONE ENCOUNTER
Received call from pharmacy requesting a rx for Proair with LARISA removed unless a PA for brand is preferred.  Advise

## 2023-11-13 DIAGNOSIS — K86.9 PANCREATIC LESION: Primary | ICD-10-CM

## 2023-11-17 ENCOUNTER — HOSPITAL ENCOUNTER (OUTPATIENT)
Dept: VASCULAR LAB | Age: 68
Discharge: HOME | End: 2023-11-17
Attending: INTERNAL MEDICINE
Payer: MEDICARE

## 2023-11-17 DIAGNOSIS — C34.11 MALIGNANT NEOPLASM OF UPPER LOBE OF RIGHT LUNG (HCC): ICD-10-CM

## 2023-11-17 PROCEDURE — 93971 EXTREMITY STUDY: CPT

## 2023-11-29 ENCOUNTER — OFFICE VISIT (OUTPATIENT)
Dept: FAMILY MEDICINE CLINIC | Age: 68
End: 2023-11-29
Payer: MEDICARE

## 2023-11-29 VITALS
WEIGHT: 115 LBS | OXYGEN SATURATION: 95 % | DIASTOLIC BLOOD PRESSURE: 70 MMHG | SYSTOLIC BLOOD PRESSURE: 136 MMHG | HEART RATE: 76 BPM | HEIGHT: 64 IN | BODY MASS INDEX: 19.63 KG/M2

## 2023-11-29 DIAGNOSIS — Z01.419 WELL WOMAN EXAM: Primary | ICD-10-CM

## 2023-11-29 DIAGNOSIS — Z23 NEED FOR PNEUMOCOCCAL 20-VALENT CONJUGATE VACCINATION: ICD-10-CM

## 2023-11-29 DIAGNOSIS — J44.9 CHRONIC OBSTRUCTIVE PULMONARY DISEASE, UNSPECIFIED COPD TYPE (HCC): ICD-10-CM

## 2023-11-29 DIAGNOSIS — Z12.11 SCREENING FOR COLON CANCER: ICD-10-CM

## 2023-11-29 PROCEDURE — G0009 ADMIN PNEUMOCOCCAL VACCINE: HCPCS | Performed by: FAMILY MEDICINE

## 2023-11-29 PROCEDURE — 3023F SPIROM DOC REV: CPT | Performed by: FAMILY MEDICINE

## 2023-11-29 PROCEDURE — G8427 DOCREV CUR MEDS BY ELIG CLIN: HCPCS | Performed by: FAMILY MEDICINE

## 2023-11-29 PROCEDURE — G8399 PT W/DXA RESULTS DOCUMENT: HCPCS | Performed by: FAMILY MEDICINE

## 2023-11-29 PROCEDURE — 90677 PCV20 VACCINE IM: CPT | Performed by: FAMILY MEDICINE

## 2023-11-29 PROCEDURE — G8420 CALC BMI NORM PARAMETERS: HCPCS | Performed by: FAMILY MEDICINE

## 2023-11-29 PROCEDURE — G8484 FLU IMMUNIZE NO ADMIN: HCPCS | Performed by: FAMILY MEDICINE

## 2023-11-29 PROCEDURE — 1123F ACP DISCUSS/DSCN MKR DOCD: CPT | Performed by: FAMILY MEDICINE

## 2023-11-29 PROCEDURE — 1090F PRES/ABSN URINE INCON ASSESS: CPT | Performed by: FAMILY MEDICINE

## 2023-11-29 PROCEDURE — 99213 OFFICE O/P EST LOW 20 MIN: CPT | Performed by: FAMILY MEDICINE

## 2023-11-29 PROCEDURE — 3017F COLORECTAL CA SCREEN DOC REV: CPT | Performed by: FAMILY MEDICINE

## 2023-11-29 PROCEDURE — 4004F PT TOBACCO SCREEN RCVD TLK: CPT | Performed by: FAMILY MEDICINE

## 2023-11-29 PROCEDURE — G9899 SCRN MAM PERF RSLTS DOC: HCPCS | Performed by: FAMILY MEDICINE

## 2023-12-07 RX ORDER — ALBUTEROL SULFATE 90 UG/1
2 AEROSOL, METERED RESPIRATORY (INHALATION) EVERY 6 HOURS PRN
Qty: 8.5 G | Refills: 0 | Status: SHIPPED | OUTPATIENT
Start: 2023-12-07

## 2023-12-07 RX ORDER — ALBUTEROL SULFATE 90 UG/1
2 AEROSOL, METERED RESPIRATORY (INHALATION) EVERY 6 HOURS PRN
Qty: 8.5 G | Refills: 0 | Status: CANCELLED | OUTPATIENT
Start: 2023-12-07

## 2023-12-07 NOTE — TELEPHONE ENCOUNTER
Last Office Visit  -  11/29/23  Next Office Visit  -  3/4/24    Last Filled  -  11/9/23  Last UDS -    Contract -

## 2023-12-10 ASSESSMENT — COPD QUESTIONNAIRES: COPD: 1

## 2023-12-15 ENCOUNTER — OFFICE VISIT (OUTPATIENT)
Dept: FAMILY MEDICINE CLINIC | Age: 68
End: 2023-12-15
Payer: MEDICARE

## 2023-12-15 VITALS
BODY MASS INDEX: 18.47 KG/M2 | HEIGHT: 64 IN | SYSTOLIC BLOOD PRESSURE: 138 MMHG | HEART RATE: 74 BPM | OXYGEN SATURATION: 98 % | WEIGHT: 108.2 LBS | DIASTOLIC BLOOD PRESSURE: 78 MMHG

## 2023-12-15 DIAGNOSIS — L01.00 IMPETIGO: Primary | ICD-10-CM

## 2023-12-15 PROCEDURE — G8399 PT W/DXA RESULTS DOCUMENT: HCPCS | Performed by: NURSE PRACTITIONER

## 2023-12-15 PROCEDURE — 1090F PRES/ABSN URINE INCON ASSESS: CPT | Performed by: NURSE PRACTITIONER

## 2023-12-15 PROCEDURE — G8484 FLU IMMUNIZE NO ADMIN: HCPCS | Performed by: NURSE PRACTITIONER

## 2023-12-15 PROCEDURE — 99213 OFFICE O/P EST LOW 20 MIN: CPT | Performed by: NURSE PRACTITIONER

## 2023-12-15 PROCEDURE — G9899 SCRN MAM PERF RSLTS DOC: HCPCS | Performed by: NURSE PRACTITIONER

## 2023-12-15 PROCEDURE — G8420 CALC BMI NORM PARAMETERS: HCPCS | Performed by: NURSE PRACTITIONER

## 2023-12-15 PROCEDURE — 3017F COLORECTAL CA SCREEN DOC REV: CPT | Performed by: NURSE PRACTITIONER

## 2023-12-15 PROCEDURE — G8427 DOCREV CUR MEDS BY ELIG CLIN: HCPCS | Performed by: NURSE PRACTITIONER

## 2023-12-15 PROCEDURE — 1123F ACP DISCUSS/DSCN MKR DOCD: CPT | Performed by: NURSE PRACTITIONER

## 2023-12-15 PROCEDURE — 4004F PT TOBACCO SCREEN RCVD TLK: CPT | Performed by: NURSE PRACTITIONER

## 2023-12-15 ASSESSMENT — PATIENT HEALTH QUESTIONNAIRE - PHQ9
SUM OF ALL RESPONSES TO PHQ9 QUESTIONS 1 & 2: 0
SUM OF ALL RESPONSES TO PHQ QUESTIONS 1-9: 0
1. LITTLE INTEREST OR PLEASURE IN DOING THINGS: 0
SUM OF ALL RESPONSES TO PHQ QUESTIONS 1-9: 0
2. FEELING DOWN, DEPRESSED OR HOPELESS: 0

## 2023-12-15 NOTE — PROGRESS NOTES
Negative. Physical Exam  Vitals reviewed. HENT:      Nose:     Neurological:      Mental Status: She is alert. Vitals:    12/15/23 0834   BP: 138/78   Pulse: 74   SpO2: 98%       This chart was generated using the Dragon dictation system. I created this record but it may contain dictation errors due to the limitation of the software.

## 2023-12-26 ENCOUNTER — APPOINTMENT (OUTPATIENT)
Dept: CT IMAGING | Age: 68
End: 2023-12-26
Payer: MEDICARE

## 2023-12-26 ENCOUNTER — APPOINTMENT (OUTPATIENT)
Dept: GENERAL RADIOLOGY | Age: 68
End: 2023-12-26
Payer: MEDICARE

## 2023-12-26 ENCOUNTER — HOSPITAL ENCOUNTER (EMERGENCY)
Age: 68
Discharge: HOME | End: 2023-12-26
Attending: EMERGENCY MEDICINE
Payer: MEDICARE

## 2023-12-26 VITALS
RESPIRATION RATE: 20 BRPM | DIASTOLIC BLOOD PRESSURE: 73 MMHG | BODY MASS INDEX: 18.78 KG/M2 | SYSTOLIC BLOOD PRESSURE: 121 MMHG | TEMPERATURE: 98.2 F | OXYGEN SATURATION: 93 % | HEIGHT: 64 IN | WEIGHT: 110 LBS | HEART RATE: 87 BPM

## 2023-12-26 DIAGNOSIS — J18.9 PNEUMONIA DUE TO INFECTIOUS ORGANISM, UNSPECIFIED LATERALITY, UNSPECIFIED PART OF LUNG: Primary | ICD-10-CM

## 2023-12-26 LAB
ALBUMIN SERPL-MCNC: 3.2 G/DL (ref 3.4–5)
ALBUMIN/GLOB SERPL: 1 {RATIO} (ref 1.1–2.2)
ALP SERPL-CCNC: 76 U/L (ref 40–129)
ALT SERPL-CCNC: 9 U/L (ref 10–40)
ANION GAP SERPL CALCULATED.3IONS-SCNC: 6 MMOL/L (ref 3–16)
AST SERPL-CCNC: 12 U/L (ref 15–37)
BASE EXCESS BLDV CALC-SCNC: 5.3 MMOL/L (ref -3–3)
BASOPHILS # BLD: 0 K/UL (ref 0–0.2)
BASOPHILS NFR BLD: 0.4 %
BILIRUB SERPL-MCNC: <0.2 MG/DL (ref 0–1)
BUN SERPL-MCNC: 10 MG/DL (ref 7–20)
CALCIUM SERPL-MCNC: 8.9 MG/DL (ref 8.3–10.6)
CHLORIDE SERPL-SCNC: 102 MMOL/L (ref 99–110)
CO2 BLDV-SCNC: 32 MMOL/L
CO2 SERPL-SCNC: 28 MMOL/L (ref 21–32)
COHGB MFR BLDV: 2.5 % (ref 0–1.5)
CREAT SERPL-MCNC: <0.5 MG/DL (ref 0.6–1.2)
DEPRECATED RDW RBC AUTO: 13.6 % (ref 12.4–15.4)
EOSINOPHIL # BLD: 0.1 K/UL (ref 0–0.6)
EOSINOPHIL NFR BLD: 2.6 %
FLUAV RNA RESP QL NAA+PROBE: NOT DETECTED
FLUBV RNA RESP QL NAA+PROBE: NOT DETECTED
GFR SERPLBLD CREATININE-BSD FMLA CKD-EPI: >60 ML/MIN/{1.73_M2}
GLUCOSE SERPL-MCNC: 120 MG/DL (ref 70–99)
HCO3 BLDV-SCNC: 30.9 MMOL/L (ref 23–29)
HCT VFR BLD AUTO: 35.4 % (ref 36–48)
HGB BLD-MCNC: 11.9 G/DL (ref 12–16)
LYMPHOCYTES # BLD: 0.5 K/UL (ref 1–5.1)
LYMPHOCYTES NFR BLD: 17.4 %
MCH RBC QN AUTO: 32.9 PG (ref 26–34)
MCHC RBC AUTO-ENTMCNC: 33.8 G/DL (ref 31–36)
MCV RBC AUTO: 97.5 FL (ref 80–100)
METHGB MFR BLDV: 0.6 %
MONOCYTES # BLD: 0.3 K/UL (ref 0–1.3)
MONOCYTES NFR BLD: 8.3 %
NEUTROPHILS # BLD: 2.2 K/UL (ref 1.7–7.7)
NEUTROPHILS NFR BLD: 71.3 %
O2 THERAPY: ABNORMAL
PCO2 BLDV: 49.5 MMHG (ref 40–50)
PH BLDV: 7.41 [PH] (ref 7.35–7.45)
PLATELET # BLD AUTO: 298 K/UL (ref 135–450)
PMV BLD AUTO: 6.6 FL (ref 5–10.5)
PO2 BLDV: 164.7 MMHG (ref 25–40)
POTASSIUM SERPL-SCNC: 4 MMOL/L (ref 3.5–5.1)
PROT SERPL-MCNC: 6.5 G/DL (ref 6.4–8.2)
RBC # BLD AUTO: 3.63 M/UL (ref 4–5.2)
SAO2 % BLDV: 99 %
SARS-COV-2 RNA RESP QL NAA+PROBE: NOT DETECTED
SODIUM SERPL-SCNC: 136 MMOL/L (ref 136–145)
TROPONIN, HIGH SENSITIVITY: 11 NG/L (ref 0–14)
TROPONIN, HIGH SENSITIVITY: 11 NG/L (ref 0–14)
WBC # BLD AUTO: 3.1 K/UL (ref 4–11)

## 2023-12-26 PROCEDURE — 96374 THER/PROPH/DIAG INJ IV PUSH: CPT

## 2023-12-26 PROCEDURE — 6360000004 HC RX CONTRAST MEDICATION: Performed by: PHYSICIAN ASSISTANT

## 2023-12-26 PROCEDURE — 96375 TX/PRO/DX INJ NEW DRUG ADDON: CPT

## 2023-12-26 PROCEDURE — 87636 SARSCOV2 & INF A&B AMP PRB: CPT

## 2023-12-26 PROCEDURE — 71045 X-RAY EXAM CHEST 1 VIEW: CPT

## 2023-12-26 PROCEDURE — 71260 CT THORAX DX C+: CPT

## 2023-12-26 PROCEDURE — 6370000000 HC RX 637 (ALT 250 FOR IP): Performed by: EMERGENCY MEDICINE

## 2023-12-26 PROCEDURE — 71250 CT THORAX DX C-: CPT

## 2023-12-26 PROCEDURE — 84484 ASSAY OF TROPONIN QUANT: CPT

## 2023-12-26 PROCEDURE — 6370000000 HC RX 637 (ALT 250 FOR IP): Performed by: PHYSICIAN ASSISTANT

## 2023-12-26 PROCEDURE — 99285 EMERGENCY DEPT VISIT HI MDM: CPT

## 2023-12-26 PROCEDURE — 85025 COMPLETE CBC W/AUTO DIFF WBC: CPT

## 2023-12-26 PROCEDURE — 82803 BLOOD GASES ANY COMBINATION: CPT

## 2023-12-26 PROCEDURE — 36415 COLL VENOUS BLD VENIPUNCTURE: CPT

## 2023-12-26 PROCEDURE — 6360000002 HC RX W HCPCS: Performed by: PHYSICIAN ASSISTANT

## 2023-12-26 PROCEDURE — 80053 COMPREHEN METABOLIC PANEL: CPT

## 2023-12-26 RX ORDER — LEVOFLOXACIN 500 MG/1
500 TABLET, FILM COATED ORAL ONCE
Status: COMPLETED | OUTPATIENT
Start: 2023-12-26 | End: 2023-12-26

## 2023-12-26 RX ORDER — BENZONATATE 100 MG/1
100 CAPSULE ORAL ONCE
Status: COMPLETED | OUTPATIENT
Start: 2023-12-26 | End: 2023-12-26

## 2023-12-26 RX ORDER — LEVOFLOXACIN 750 MG/1
750 TABLET, FILM COATED ORAL DAILY
Qty: 5 TABLET | Refills: 0 | Status: SHIPPED | OUTPATIENT
Start: 2023-12-26 | End: 2023-12-31

## 2023-12-26 RX ORDER — METHYLPREDNISOLONE SODIUM SUCCINATE 125 MG/2ML
125 INJECTION, POWDER, LYOPHILIZED, FOR SOLUTION INTRAMUSCULAR; INTRAVENOUS ONCE
Status: COMPLETED | OUTPATIENT
Start: 2023-12-26 | End: 2023-12-26

## 2023-12-26 RX ORDER — ACETAMINOPHEN 325 MG/1
650 TABLET ORAL ONCE
Status: COMPLETED | OUTPATIENT
Start: 2023-12-26 | End: 2023-12-26

## 2023-12-26 RX ORDER — KETOROLAC TROMETHAMINE 30 MG/ML
15 INJECTION, SOLUTION INTRAMUSCULAR; INTRAVENOUS ONCE
Status: COMPLETED | OUTPATIENT
Start: 2023-12-26 | End: 2023-12-26

## 2023-12-26 RX ORDER — HYDROCODONE BITARTRATE AND ACETAMINOPHEN 5; 325 MG/1; MG/1
1 TABLET ORAL ONCE
Status: COMPLETED | OUTPATIENT
Start: 2023-12-26 | End: 2023-12-26

## 2023-12-26 RX ORDER — BENZONATATE 100 MG/1
100 CAPSULE ORAL 3 TIMES DAILY PRN
Qty: 21 CAPSULE | Refills: 0 | Status: SHIPPED | OUTPATIENT
Start: 2023-12-26 | End: 2024-01-02

## 2023-12-26 RX ORDER — GUAIFENESIN 600 MG/1
600 TABLET, EXTENDED RELEASE ORAL 2 TIMES DAILY
Qty: 30 TABLET | Refills: 0 | Status: SHIPPED | OUTPATIENT
Start: 2023-12-26 | End: 2024-01-10

## 2023-12-26 RX ORDER — IPRATROPIUM BROMIDE AND ALBUTEROL SULFATE 2.5; .5 MG/3ML; MG/3ML
2 SOLUTION RESPIRATORY (INHALATION) ONCE
Status: COMPLETED | OUTPATIENT
Start: 2023-12-26 | End: 2023-12-26

## 2023-12-26 RX ADMIN — ACETAMINOPHEN 650 MG: 325 TABLET ORAL at 16:36

## 2023-12-26 RX ADMIN — METHYLPREDNISOLONE SODIUM SUCCINATE 125 MG: 125 INJECTION INTRAMUSCULAR; INTRAVENOUS at 13:11

## 2023-12-26 RX ADMIN — LEVOFLOXACIN 500 MG: 500 TABLET, FILM COATED ORAL at 19:04

## 2023-12-26 RX ADMIN — KETOROLAC TROMETHAMINE 15 MG: 30 INJECTION, SOLUTION INTRAMUSCULAR; INTRAVENOUS at 16:36

## 2023-12-26 RX ADMIN — BENZONATATE 100 MG: 100 CAPSULE ORAL at 19:04

## 2023-12-26 RX ADMIN — IPRATROPIUM BROMIDE AND ALBUTEROL SULFATE 2 DOSE: 2.5; .5 SOLUTION RESPIRATORY (INHALATION) at 13:04

## 2023-12-26 RX ADMIN — HYDROCODONE BITARTRATE AND ACETAMINOPHEN 1 TABLET: 5; 325 TABLET ORAL at 16:36

## 2023-12-26 RX ADMIN — IOPAMIDOL 75 ML: 755 INJECTION, SOLUTION INTRAVENOUS at 16:48

## 2023-12-27 RX ORDER — ALBUTEROL SULFATE 90 UG/1
2 AEROSOL, METERED RESPIRATORY (INHALATION) EVERY 6 HOURS PRN
Qty: 8.5 G | Refills: 0 | Status: SHIPPED | OUTPATIENT
Start: 2023-12-27

## 2023-12-27 NOTE — ED PROVIDER NOTES
Emergency Department Attending Provider Note  Location: [address] [place] [address]  ED  12/26/2023   Note Started: 11:32 PM EST 12/26/23       Patient Identification  Maria G Vigil is a 70 y.o. female      Jeris Overcast was evaluated in the Emergency Department for shortness of breath. Patient has history of COPD with ongoing smoking. She has noted ongoing shortness of breath with nonproductive cough and spite of nebulizer treatments. Patient denies fevers, chills, or sweats. No sick contacts. . Although initial history and physical exam information was obtained by TIM/NPP/MD/DO (who also dictated a record of this visit), I personally saw the patient and performed a substantive portion of the visit including all aspects of the medical decision making. PHYSICAL EXAM:    General: No acute distress. Moderate discomfort. Heart: Regular rate and rhythm. Normal S1-S2. Lungs: Diffuse rhonchi/wheezing. No rales. Frequent nonproductive harsh cough noted. Abdomen soft. Extremities: No swelling or edema. Patient seen and evaluated. Relevant records reviewed. CT CHEST W CONTRAST    Result Date: 12/26/2023  EXAMINATION: CT OF THE CHEST WITH CONTRAST 12/26/2023 3:39 pm TECHNIQUE: CT of the chest was performed with the administration of intravenous contrast. Multiplanar reformatted images are provided for review. Automated exposure control, iterative reconstruction, and/or weight based adjustment of the mA/kV was utilized to reduce the radiation dose to as low as reasonably achievable. COMPARISON: 07/24/2023. HISTORY: ORDERING SYSTEM PROVIDED HISTORY: RU lung mass vs scaring. Contrasted study requsted TECHNOLOGIST PROVIDED HISTORY: Reason for exam:->RU lung mass vs scaring.  Contrasted study requsted Additional Contrast?->None Reason for Exam: SOB, lung mass Relevant Medical/Surgical History: Lung Ca, COPD, smoker 62 years FINDINGS: Pulmonary Arteries: No evidence of intraluminal filling defect to suggest
to Dr. Dar Chilel. Please see his note for final clinical impression and plan. FINAL IMPRESSION      1.  Pneumonia due to infectious organism, unspecified laterality, unspecified part of lung          DISPOSITION/PLAN   DISPOSITION Decision To Discharge 12/26/2023 06:50:16 PM      PATIENT REFERRED TO:  Latrice Cabezas, [address] [phone]    In 3 days      [facility]  ED  [facility]  [phone]    If symptoms worsen      DISCHARGE MEDICATIONS:  Discharge Medication List as of 12/26/2023  6:57 PM        START taking these medications    Details   levoFLOXacin (LEVAQUIN) 750 MG tablet Take 1 tablet by mouth daily for 5 days, Disp-5 tablet, R-0Normal      benzonatate (TESSALON PERLES) 100 MG capsule Take 1 capsule by mouth 3 times daily as needed for Cough, Disp-21 capsule, R-0Normal      guaiFENesin (MUCINEX) 600 MG extended release tablet Take 1 tablet by mouth 2 times daily for 15 days, Disp-30 tablet, R-0Normal             BASIM Nolan (electronically signed)       Cachorro Salcedo[address]  12/27/23 6610

## 2023-12-27 NOTE — ED NOTES
Ambulated patient 100 feet. O2 levels stayed in between 92 and 93% and pulse stayed in between 83 and 92 bpm. Patient tolerated well.

## 2023-12-27 NOTE — TELEPHONE ENCOUNTER
Last Office Visit  -  12/15/2023  Next Office Visit  -  n/a    Last Filled  -    Last UDS -    Contract -

## 2023-12-29 ENCOUNTER — OFFICE VISIT (OUTPATIENT)
Dept: FAMILY MEDICINE CLINIC | Age: 68
End: 2023-12-29
Payer: MEDICARE

## 2023-12-29 VITALS
SYSTOLIC BLOOD PRESSURE: 122 MMHG | HEART RATE: 83 BPM | DIASTOLIC BLOOD PRESSURE: 70 MMHG | HEIGHT: 64 IN | BODY MASS INDEX: 18.44 KG/M2 | WEIGHT: 108 LBS | OXYGEN SATURATION: 96 %

## 2023-12-29 DIAGNOSIS — J18.9 PNEUMONIA DUE TO INFECTIOUS ORGANISM, UNSPECIFIED LATERALITY, UNSPECIFIED PART OF LUNG: Primary | ICD-10-CM

## 2023-12-29 PROCEDURE — G8420 CALC BMI NORM PARAMETERS: HCPCS | Performed by: STUDENT IN AN ORGANIZED HEALTH CARE EDUCATION/TRAINING PROGRAM

## 2023-12-29 PROCEDURE — G8399 PT W/DXA RESULTS DOCUMENT: HCPCS | Performed by: STUDENT IN AN ORGANIZED HEALTH CARE EDUCATION/TRAINING PROGRAM

## 2023-12-29 PROCEDURE — 1123F ACP DISCUSS/DSCN MKR DOCD: CPT | Performed by: STUDENT IN AN ORGANIZED HEALTH CARE EDUCATION/TRAINING PROGRAM

## 2023-12-29 PROCEDURE — G8484 FLU IMMUNIZE NO ADMIN: HCPCS | Performed by: STUDENT IN AN ORGANIZED HEALTH CARE EDUCATION/TRAINING PROGRAM

## 2023-12-29 PROCEDURE — 3017F COLORECTAL CA SCREEN DOC REV: CPT | Performed by: STUDENT IN AN ORGANIZED HEALTH CARE EDUCATION/TRAINING PROGRAM

## 2023-12-29 PROCEDURE — 1090F PRES/ABSN URINE INCON ASSESS: CPT | Performed by: STUDENT IN AN ORGANIZED HEALTH CARE EDUCATION/TRAINING PROGRAM

## 2023-12-29 PROCEDURE — 99213 OFFICE O/P EST LOW 20 MIN: CPT | Performed by: STUDENT IN AN ORGANIZED HEALTH CARE EDUCATION/TRAINING PROGRAM

## 2023-12-29 PROCEDURE — G9899 SCRN MAM PERF RSLTS DOC: HCPCS | Performed by: STUDENT IN AN ORGANIZED HEALTH CARE EDUCATION/TRAINING PROGRAM

## 2023-12-29 PROCEDURE — 4004F PT TOBACCO SCREEN RCVD TLK: CPT | Performed by: STUDENT IN AN ORGANIZED HEALTH CARE EDUCATION/TRAINING PROGRAM

## 2023-12-29 PROCEDURE — G8427 DOCREV CUR MEDS BY ELIG CLIN: HCPCS | Performed by: STUDENT IN AN ORGANIZED HEALTH CARE EDUCATION/TRAINING PROGRAM

## 2023-12-29 NOTE — PROGRESS NOTES
[facility]  [address],  [place], [address] [facility]    Assessment/Plan:    Amalia Caballero was seen today for follow-up. Diagnoses and all orders for this visit:    Pneumonia due to infectious organism, unspecified laterality, unspecified part of lung    Complete Levaquin antibiotics as prescribed  Overall improved and almost back to baseline. Has upcoming apt with pulm Feb 14th and encouraged to make this appointment. Encouraged smoking cessation. Advised that if interested she can discuss options to assist with smoking cessation at next visit. Patient remains in precontemplation stage. Continue home inhalors: Breztri (Budeson-Glycopyrrol-formoterol) and albuterol prn. No follow-ups on file. Upcoming apt with PCP on March 4th    Patient: Vijaya Nova is a pleasant 70 y. o.female who presents today for:      Chief Complaint   Patient presents with    Follow-up     [facility] 12/26/23, Pneumonia, still having SOB, still on abx       HPI:     ED follow-up for shortness of breath:  Patient presented to  the ED on 12/26/23 with shortness of breath symptoms. Symptoms started Sat Dec 23rd 2023. Patient was diagnosed with pneumonia. Management included levaquin and iv steroids. Patient was discharged with  levaquin, tessalon perles and mucinex medications. Patient has 2 more days of antibiotics. At this time patient reports that symptoms are resolving. Denies any new symptoms or concerns at this time. Saw her pulmonologist in Aug 2023. Continues to use Breztri (Budeson-Glycopyrrol-formoterol) and albuterol prn. Patient's past medical history and medications were all reviewed and updated as appropriate today. Review of Systems:  Review of Systems   Constitutional:  Negative for fatigue and fever. HENT:  Negative for congestion. Respiratory:  Positive for cough. Negative for shortness of breath. Cardiovascular:  Negative for leg swelling.    Gastrointestinal:

## 2024-01-03 ENCOUNTER — TELEMEDICINE (OUTPATIENT)
Dept: FAMILY MEDICINE CLINIC | Age: 69
End: 2024-01-03
Payer: MEDICARE

## 2024-01-03 DIAGNOSIS — J44.1 COPD EXACERBATION (HCC): Primary | ICD-10-CM

## 2024-01-03 PROCEDURE — G9899 SCRN MAM PERF RSLTS DOC: HCPCS | Performed by: STUDENT IN AN ORGANIZED HEALTH CARE EDUCATION/TRAINING PROGRAM

## 2024-01-03 PROCEDURE — G8427 DOCREV CUR MEDS BY ELIG CLIN: HCPCS | Performed by: STUDENT IN AN ORGANIZED HEALTH CARE EDUCATION/TRAINING PROGRAM

## 2024-01-03 PROCEDURE — 1090F PRES/ABSN URINE INCON ASSESS: CPT | Performed by: STUDENT IN AN ORGANIZED HEALTH CARE EDUCATION/TRAINING PROGRAM

## 2024-01-03 PROCEDURE — G8399 PT W/DXA RESULTS DOCUMENT: HCPCS | Performed by: STUDENT IN AN ORGANIZED HEALTH CARE EDUCATION/TRAINING PROGRAM

## 2024-01-03 PROCEDURE — 99213 OFFICE O/P EST LOW 20 MIN: CPT | Performed by: STUDENT IN AN ORGANIZED HEALTH CARE EDUCATION/TRAINING PROGRAM

## 2024-01-03 PROCEDURE — 1123F ACP DISCUSS/DSCN MKR DOCD: CPT | Performed by: STUDENT IN AN ORGANIZED HEALTH CARE EDUCATION/TRAINING PROGRAM

## 2024-01-03 PROCEDURE — 3017F COLORECTAL CA SCREEN DOC REV: CPT | Performed by: STUDENT IN AN ORGANIZED HEALTH CARE EDUCATION/TRAINING PROGRAM

## 2024-01-03 RX ORDER — PREDNISONE 20 MG/1
40 TABLET ORAL DAILY
Qty: 14 TABLET | Refills: 0 | Status: SHIPPED | OUTPATIENT
Start: 2024-01-03 | End: 2024-01-10

## 2024-01-03 ASSESSMENT — PATIENT HEALTH QUESTIONNAIRE - PHQ9
SUM OF ALL RESPONSES TO PHQ QUESTIONS 1-9: 0
SUM OF ALL RESPONSES TO PHQ QUESTIONS 1-9: 0
2. FEELING DOWN, DEPRESSED OR HOPELESS: 0
SUM OF ALL RESPONSES TO PHQ9 QUESTIONS 1 & 2: 0
1. LITTLE INTEREST OR PLEASURE IN DOING THINGS: 0
SUM OF ALL RESPONSES TO PHQ QUESTIONS 1-9: 0
SUM OF ALL RESPONSES TO PHQ QUESTIONS 1-9: 0

## 2024-01-03 NOTE — PROGRESS NOTES
Assessment/Plan:    Na was seen today for cough and congestion.    Diagnoses and all orders for this visit:    COPD exacerbation (HCC)  -     predniSONE (DELTASONE) 20 MG tablet; Take 2 tablets by mouth daily for 7 days    Patient recently completed Levaquin antibiotics   Had some improvement however now complaining of chest tightness and shortness of breath.  Will provide course of steroids at this time.  Patient did not appear in distress during her visit however caution to present to the emergency room if symptoms do not improve with steroid course and patient at high risk given COPD history.  Patient verbalized understanding.  Has upcoming apt with pulm Feb 14th and encouraged to make this appointment.  Continue home inhalors: Breztri (Budeson-Glycopyrrol-formoterol) and albuterol prn.    No follow-ups on file.      Patient: Na Johnston is a 68 y.o.female who presents today for:   Chief Complaint   Patient presents with    Cough    Congestion     Pt states that her chest fills tight and she keep coughing to get something up pt states she was given any atb at the hospital when she was dx with pneumonia but they only gave her 5 day of atb. Pt states that it got a little better then got worse after she was off the atb         HPI:     Follow-up for Bronchitis :  Since last visit states that symptoms improved for a couple days however symptoms worsened again .  Patient has completed levaquin. No new symptoms however still experiencing cough, shortness of breath. Feels some tightness in chest. Denies fever, coughing blood.       Patient's past medical history,surgical history, family history, medications, and allergies  were all reviewed and updated as appropriate today.    Review of Systems         [ INSTRUCTIONS:  \"[x]\" Indicates a positive item  \"[]\" Indicates a negative item  -- DELETE ALL ITEMS NOT EXAMINED]  [x] Alert  [x] Oriented to person/place/time    [x] No apparent distress  []  Appears Unwell:

## 2024-01-12 ENCOUNTER — OFFICE VISIT (OUTPATIENT)
Dept: OBGYN CLINIC | Age: 69
End: 2024-01-12

## 2024-01-12 VITALS
HEART RATE: 72 BPM | DIASTOLIC BLOOD PRESSURE: 85 MMHG | WEIGHT: 105.6 LBS | BODY MASS INDEX: 18.03 KG/M2 | HEIGHT: 64 IN | OXYGEN SATURATION: 95 % | SYSTOLIC BLOOD PRESSURE: 125 MMHG | TEMPERATURE: 97.6 F

## 2024-01-12 DIAGNOSIS — R87.810 CERVICAL HIGH RISK HUMAN PAPILLOMAVIRUS (HPV) DNA TEST POSITIVE: Primary | ICD-10-CM

## 2024-01-12 LAB
CONTROL: NORMAL
PREGNANCY TEST URINE, POC: NEGATIVE

## 2024-01-12 NOTE — PROGRESS NOTES
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 She had a previous PAP smear in 2-17 that was abnormal with no repeat pap smear since then      Procedure Details   The risks and benefits of the procedure were discussed in detail with the patient. She was aware the risks may include, but are not limited to bleeding, infection and damage to surround structures. She acknowledged these risks and elected to proceed. Written consent was obtained.    Urine HCG testing: negative     A speculum was placed in vagina and excellent visualization of cervix was achieved, the cervix was swabbed x3 with acetic acid solution. No lesions were visualized.      Biopsies were not obtained. ECC was performed.     Colposcopy was  adequate .       Findings:  Cervix: normal without gross visible lesions   Vaginal inspection: Normal without gross visible lesions  Vulvar inspection: Normal without gross visible lesions    Specimens: endocervical curettage     Complications: none    Plan:  Specimens labeled and sent to Pathology  Aftercare instructions were discussed  Will follow up with results and treatment plan  Patient to call with any further questions or concerns    Patito Galeas, DO

## 2024-01-16 ENCOUNTER — TELEPHONE (OUTPATIENT)
Dept: FAMILY MEDICINE CLINIC | Age: 69
End: 2024-01-16

## 2024-01-16 DIAGNOSIS — R92.8 ABNORMAL MAMMOGRAM: Primary | ICD-10-CM

## 2024-01-16 NOTE — TELEPHONE ENCOUNTER
Pt states that she was supposed to have repeat diagnostic mammogram in 6 months on her Lt breast. She received a letter explaining this.

## 2024-01-16 NOTE — TELEPHONE ENCOUNTER
Eliane Dunlap from central scheduling called and asked of diagnostic mammogram and US be place for Pt

## 2024-01-19 ENCOUNTER — ANESTHESIA EVENT (OUTPATIENT)
Dept: ENDOSCOPY | Age: 69
End: 2024-01-19
Payer: MEDICARE

## 2024-01-21 NOTE — H&P
[facility]   Pre-operative History and Physical    Patient: Na Johnston  : 1955  Acct#:     HISTORY OF PRESENT ILLNESS:    Indications: personal history of tubular adenomatous colon polyps      67-year-old female with medical history of COPD, peripheral artery disease, descending thoracic aortic aneurysm, tobacco use, history of non-small cell lung cancer status post lobectomy follows for surveillance colonoscopy. Denies abdominal pain, nausea, vomiting, fever, chills, unintentional weight loss, constipation, diarrhea, hematochezia or melenic stools.       MRI Abdomen/pelvis 10/3/23: Subtle hypointense area of nodularity in the region of the pancreatic head,difficult to determine if this is a true nodule, or an area of heterogeneous  enhancement / partial volume averaging with the duodenum. Intra and extrahepatic biliary ductal dilatation, similar compared to prior MRI 2019    Labs on 2023 reviewed with unremarkable liver test, total bilirubin 0.2, ALT 7, AST 15, ALP 94, albumin 3.7.  Normal CBC with hemoglobin 13.2, hematocrit 39.5, platelets 241, WBC 6.2.     CTA abdomen and pelvis with and without contrast on 8/15/2023 noted dilated intra and extrahepatic biliary ducts with CBD 1.4 cm.  A 1.2 x 1.3 x 1.6 cm area of hypoattenuation in the pancreatic head near the ampulla.  Mild pancreatic ductal dilation.     CT abdomen and pelvis on 2023 noted 2.5 cm mass in the head of the pancreas with dilation of pancreatic duct.  Dilated intra and extrahepatic bile ducts with CBD measuring 18 mm tapering to the pancreatic mass.     EUS 23: Dilated common bile duct measuring 16.1 mm with a distal stricture. Dilated pancreatic 4 mm in the head, 2.2 mm in the body as it coursed towards the tail.   A 24.9 mm by 21.3 mm isoechoic ill-defined mass distal to the dilated common bile duct in the head of the pancreas. Fine needle biopsy performed (path- benign pancreatic tissue with increased

## 2024-01-22 ENCOUNTER — HOSPITAL ENCOUNTER (OUTPATIENT)
Age: 69
Setting detail: OUTPATIENT SURGERY
Discharge: HOME | End: 2024-01-22
Attending: INTERNAL MEDICINE | Admitting: INTERNAL MEDICINE
Payer: MEDICARE

## 2024-01-22 ENCOUNTER — ANESTHESIA (OUTPATIENT)
Dept: ENDOSCOPY | Age: 69
End: 2024-01-22
Payer: MEDICARE

## 2024-01-22 VITALS
TEMPERATURE: 98.7 F | BODY MASS INDEX: 17.93 KG/M2 | HEART RATE: 80 BPM | HEIGHT: 64 IN | SYSTOLIC BLOOD PRESSURE: 110 MMHG | OXYGEN SATURATION: 97 % | DIASTOLIC BLOOD PRESSURE: 70 MMHG | RESPIRATION RATE: 18 BRPM | WEIGHT: 105 LBS

## 2024-01-22 DIAGNOSIS — Z86.010 HISTORY OF COLONIC POLYPS: ICD-10-CM

## 2024-01-22 PROCEDURE — 6360000002 HC RX W HCPCS: Performed by: NURSE ANESTHETIST, CERTIFIED REGISTERED

## 2024-01-22 PROCEDURE — 2709999900 HC NON-CHARGEABLE SUPPLY: Performed by: INTERNAL MEDICINE

## 2024-01-22 PROCEDURE — 3700000000 HC ANESTHESIA ATTENDED CARE: Performed by: INTERNAL MEDICINE

## 2024-01-22 PROCEDURE — 88305 TISSUE EXAM BY PATHOLOGIST: CPT

## 2024-01-22 PROCEDURE — 2580000003 HC RX 258: Performed by: ANESTHESIOLOGY

## 2024-01-22 PROCEDURE — 2580000003 HC RX 258: Performed by: NURSE ANESTHETIST, CERTIFIED REGISTERED

## 2024-01-22 PROCEDURE — 7100000011 HC PHASE II RECOVERY - ADDTL 15 MIN: Performed by: INTERNAL MEDICINE

## 2024-01-22 PROCEDURE — 3609010600 HC COLONOSCOPY POLYPECTOMY SNARE/COLD BIOPSY: Performed by: INTERNAL MEDICINE

## 2024-01-22 PROCEDURE — 3700000001 HC ADD 15 MINUTES (ANESTHESIA): Performed by: INTERNAL MEDICINE

## 2024-01-22 PROCEDURE — 7100000010 HC PHASE II RECOVERY - FIRST 15 MIN: Performed by: INTERNAL MEDICINE

## 2024-01-22 RX ORDER — SODIUM CHLORIDE, SODIUM LACTATE, POTASSIUM CHLORIDE, CALCIUM CHLORIDE 600; 310; 30; 20 MG/100ML; MG/100ML; MG/100ML; MG/100ML
INJECTION, SOLUTION INTRAVENOUS CONTINUOUS PRN
Status: DISCONTINUED | OUTPATIENT
Start: 2024-01-22 | End: 2024-01-22 | Stop reason: SDUPTHER

## 2024-01-22 RX ORDER — SODIUM CHLORIDE 0.9 % (FLUSH) 0.9 %
5-40 SYRINGE (ML) INJECTION EVERY 12 HOURS SCHEDULED
Status: DISCONTINUED | OUTPATIENT
Start: 2024-01-22 | End: 2024-01-22 | Stop reason: HOSPADM

## 2024-01-22 RX ORDER — PROPOFOL 10 MG/ML
INJECTION, EMULSION INTRAVENOUS PRN
Status: DISCONTINUED | OUTPATIENT
Start: 2024-01-22 | End: 2024-01-22 | Stop reason: SDUPTHER

## 2024-01-22 RX ORDER — SODIUM CHLORIDE, SODIUM LACTATE, POTASSIUM CHLORIDE, CALCIUM CHLORIDE 600; 310; 30; 20 MG/100ML; MG/100ML; MG/100ML; MG/100ML
INJECTION, SOLUTION INTRAVENOUS CONTINUOUS
Status: DISCONTINUED | OUTPATIENT
Start: 2024-01-22 | End: 2024-01-22 | Stop reason: HOSPADM

## 2024-01-22 RX ORDER — SODIUM CHLORIDE 9 MG/ML
INJECTION, SOLUTION INTRAVENOUS PRN
Status: DISCONTINUED | OUTPATIENT
Start: 2024-01-22 | End: 2024-01-22 | Stop reason: HOSPADM

## 2024-01-22 RX ORDER — LIDOCAINE HYDROCHLORIDE 10 MG/ML
1 INJECTION, SOLUTION EPIDURAL; INFILTRATION; INTRACAUDAL; PERINEURAL
Status: DISCONTINUED | OUTPATIENT
Start: 2024-01-22 | End: 2024-01-22 | Stop reason: HOSPADM

## 2024-01-22 RX ORDER — SODIUM CHLORIDE 0.9 % (FLUSH) 0.9 %
5-40 SYRINGE (ML) INJECTION PRN
Status: DISCONTINUED | OUTPATIENT
Start: 2024-01-22 | End: 2024-01-22 | Stop reason: HOSPADM

## 2024-01-22 RX ADMIN — SODIUM CHLORIDE, POTASSIUM CHLORIDE, SODIUM LACTATE AND CALCIUM CHLORIDE: 600; 310; 30; 20 INJECTION, SOLUTION INTRAVENOUS at 06:55

## 2024-01-22 RX ADMIN — PROPOFOL 120 MCG/KG/MIN: 10 INJECTION, EMULSION INTRAVENOUS at 07:37

## 2024-01-22 RX ADMIN — PROPOFOL 50 MG: 10 INJECTION, EMULSION INTRAVENOUS at 07:38

## 2024-01-22 RX ADMIN — SODIUM CHLORIDE, SODIUM LACTATE, POTASSIUM CHLORIDE, AND CALCIUM CHLORIDE: .6; .31; .03; .02 INJECTION, SOLUTION INTRAVENOUS at 07:35

## 2024-01-22 ASSESSMENT — PAIN - FUNCTIONAL ASSESSMENT: PAIN_FUNCTIONAL_ASSESSMENT: 0-10

## 2024-01-22 ASSESSMENT — PAIN SCALES - GENERAL
PAINLEVEL_OUTOF10: 0
PAINLEVEL_OUTOF10: 0

## 2024-01-22 NOTE — PROGRESS NOTES
Na Johnston    Age 68 y.o.    female    1955    MRN [id number]    1/22/2024  Arrival Time_____________  OR Time____________30 Min     Procedure(s):  COLONOSCOPY                      General    Surgeon(s):  Eneida, Chris, MD       Phone [phone] (home)     Initals  Date  Info Source  Home  Cell         Work  _____________________________________________________________________  _____________________________________________________________________  _____________________________________________________________________  _____________________________________________________________________  _____________________________________________________________________    PCP _____________________________ Phone_________________     H&P  ________________  Bringing      Chart              Epic      DOS      Called________  EKG ________________   Bringing      Chart              Epic      DOS      Called________  LABS________________   Bringing     Chart              Epic      DOS      Called________  Cardiac Clearance ______ Bringing      Chart              Epic      DOS      Called________  Pulmonary Clearance____ Bringing      Chart              Epic      DOS      Called________    Cardiologist________________________ Phone___________________________  Pulmonologist_______________________Phone___________________________    ? Advance Directives   ? Sikhism concerns / Waiver on Chart            PAT Communications________________  ? Pre-op Instructions Given /Understood          _________________________________  ? Directions to Surgery Center                          _________________________________  ? Transportation Home_______________      __________________________________  ? Crutches/Walker__________________        __________________________________    Orders: Hard copy/ EPIC                 Transcribed/ EPIC              _______Wt.    ________Pharmacy                         _______SCD                      
Pre and post operative expectations and routines explained to patient, verbalized understanding.  
Pt arrived to pacu from sx.  vSS.  Pt drowsy but arousable. Denies pain  
your surgeon if you experience dizziness, shortness of breath or blurred vision between now & the time of your surgery  To provide excellent care visitors will be limited to two per room at any given time. No visitors under the age of 14.  If you use oxygen and have a portable tank please bring it with you the DOS  For your convenience [facility] has a pharmacy on site to fill your prescriptions.     *Please call pre admission testing if you have any further questions             [place]      [phone]                            Address: [address] [place] [address]     When you pull into the hospital and are looking at the main hospital entrance, turn right.   We are a tan building to the right of the main entrance.   [id number] over the door.  .                                                        Revision History

## 2024-01-22 NOTE — DISCHARGE INSTRUCTIONS
PATIENT INSTRUCTIONS  POST-SEDATION          IMMEDIATELY FOLLOWING PROCEDURE:    Do not drive or operate machinery for the first twenty four hours after surgery.     Do not make any important decisions for twenty four hours after surgery or while taking narcotic pain medications or sedatives.     You should NOT BE LEFT UNATTENDED OR ALONE. A responsible adult should be with you for the rest of the day of your procedure and also during the night for your protection and safety.    You may experience some light headedness. Rest at home with activity as tolerated. You may not need to go to bed, but it is important to rest for the next 24 hours. You should not engage in athletic sports such as basketball, volleyball, jogging, skating, or activities requiring refined motor skills for 24 hours.   If you develop intractable nausea and vomiting or a severe headache please notify your doctor immediately.   You are not expected to have any fever, but if you feel warm, take your temperature. If you have a fever 101 degrees or higher, call your doctor.     If you have had an Endoscopy:   *Eat lightly for your first meal and gradually resume your normal / prescribed diet.    *If you have had a colonoscopy, do not expect a normal bowel movement for approximately three days due to the cleansing of the large intestine prior to colonoscopy.    ONCE YOU ARE HOME, IF YOU SHOULD HAVE:  Difficulty in breathing, persistent nausea or vomiting, bleeding you feel is excessive, or pain that is unusual, increased abdominal bloating, or any swelling, fever / chills, call your physician. If you cannot contact your physician, but feel that your signs and symptoms need a physician's attention, go to the Emergency Department.      FOLLOW-UP:    Please follow up with your Primary Care Provider as scheduled or needed.    Call Chris Myers MD if there are any GI concerns. [phone]    Repeat Colonoscopy ***    You may be receiving a follow

## 2024-01-22 NOTE — ANESTHESIA POSTPROCEDURE EVALUATION
Department of Anesthesiology  Postprocedure Note    Patient: Na Johnston  MRN: [id number]  YOB: 1955  Date of evaluation: 1/22/2024    Procedure Summary       Date: 01/22/24 Room / Location: [facility] ENDO  / [facility]    Anesthesia Start: 0735 Anesthesia Stop: 0804    Procedure: COLONOSCOPY POLYPECTOMY BIOPSY Diagnosis:       History of colonic polyps      (History of colonic polyps [Z86.010])    Surgeons: Chris Monique MD Responsible Provider: Justin Foster MD    Anesthesia Type: MAC ASA Status: 3            Anesthesia Type: No value filed.    Chelsy Phase I: Chelsy Score: 10    Chelsy Phase II:      Anesthesia Post Evaluation    Comments: Anes Post-op Note    Name:    Na Johnston  MRN:      [id number]    Patient Vitals in the past 12 hrs:  01/22/24 0812, BP:114/65, Pulse:80, Resp:16, SpO2:97 %  01/22/24 0808, BP:107/67, Pulse:78, Resp:20, SpO2:96 %  01/22/24 0647, BP:139/75, Temp:98.7 °F (37.1 °C), Temp src:Temporal, Pulse:84, Resp:20, SpO2:95 %, Height:1.626 m (5' 4.02\"), Weight:47.6 kg (105 lb)     LABS:    CBC  Lab Results       Component                Value               Date/Time                  WBC                      3.1 (L)             12/26/2023 01:12 PM        HGB                      11.9 (L)            12/26/2023 01:12 PM        HCT                      35.4 (L)            12/26/2023 01:12 PM        PLT                      298                 12/26/2023 01:12 PM   RENAL  Lab Results       Component                Value               Date/Time                  NA                       136                 12/26/2023 01:12 PM        K                        4.0                 12/26/2023 01:12 PM        K                        4.2                 08/20/2023 05:40 AM        CL                       102                 12/26/2023 01:12 PM        CO2                      28                  12/26/2023 01:12 PM        BUN                      10

## 2024-01-22 NOTE — ANESTHESIA PRE PROCEDURE
Evaluation  Patient summary reviewed and Nursing notes reviewed   no history of anesthetic complications:   Airway: Mallampati: II     Neck ROM: full     Dental:          Pulmonary:   (+) pneumonia:  COPD:          asthma:                            Cardiovascular:                      Neuro/Psych:               GI/Hepatic/Renal:             Endo/Other:                     Abdominal:             Vascular:          Other Findings:       Anesthesia Plan      MAC     ASA 3     (Medications & allergies reviewed  All available lab & EKG data reviewed)  Induction: intravenous.      Anesthetic plan and risks discussed with patient.      Plan discussed with CRNA.                MELY WASHINGTON MD   1/22/2024

## 2024-02-01 ENCOUNTER — HOSPITAL ENCOUNTER (OUTPATIENT)
Dept: WOMENS IMAGING | Age: 69
Discharge: HOME | End: 2024-02-01
Payer: MEDICARE

## 2024-02-01 VITALS — WEIGHT: 108 LBS | BODY MASS INDEX: 17.99 KG/M2 | HEIGHT: 65 IN

## 2024-02-01 DIAGNOSIS — R92.8 ABNORMAL MAMMOGRAM: ICD-10-CM

## 2024-02-01 PROCEDURE — 76882 US LMTD JT/FCL EVL NVASC XTR: CPT

## 2024-02-01 PROCEDURE — G0279 TOMOSYNTHESIS, MAMMO: HCPCS

## 2024-02-14 ENCOUNTER — OFFICE VISIT (OUTPATIENT)
Dept: PULMONOLOGY | Age: 69
End: 2024-02-14
Payer: MEDICARE

## 2024-02-14 VITALS
BODY MASS INDEX: 17.97 KG/M2 | TEMPERATURE: 98.5 F | WEIGHT: 105.25 LBS | HEART RATE: 77 BPM | RESPIRATION RATE: 16 BRPM | HEIGHT: 64 IN | SYSTOLIC BLOOD PRESSURE: 146 MMHG | OXYGEN SATURATION: 94 % | DIASTOLIC BLOOD PRESSURE: 79 MMHG

## 2024-02-14 DIAGNOSIS — C34.90 ADENOCARCINOMA OF LUNG, UNSPECIFIED LATERALITY (HCC): ICD-10-CM

## 2024-02-14 DIAGNOSIS — J44.9 COPD, MODERATE (HCC): Primary | ICD-10-CM

## 2024-02-14 DIAGNOSIS — F17.200 TOBACCO DEPENDENCE: ICD-10-CM

## 2024-02-14 PROCEDURE — 99214 OFFICE O/P EST MOD 30 MIN: CPT | Performed by: STUDENT IN AN ORGANIZED HEALTH CARE EDUCATION/TRAINING PROGRAM

## 2024-02-14 PROCEDURE — 1123F ACP DISCUSS/DSCN MKR DOCD: CPT | Performed by: STUDENT IN AN ORGANIZED HEALTH CARE EDUCATION/TRAINING PROGRAM

## 2024-02-14 PROCEDURE — G9899 SCRN MAM PERF RSLTS DOC: HCPCS | Performed by: STUDENT IN AN ORGANIZED HEALTH CARE EDUCATION/TRAINING PROGRAM

## 2024-02-14 PROCEDURE — G8419 CALC BMI OUT NRM PARAM NOF/U: HCPCS | Performed by: STUDENT IN AN ORGANIZED HEALTH CARE EDUCATION/TRAINING PROGRAM

## 2024-02-14 PROCEDURE — G8427 DOCREV CUR MEDS BY ELIG CLIN: HCPCS | Performed by: STUDENT IN AN ORGANIZED HEALTH CARE EDUCATION/TRAINING PROGRAM

## 2024-02-14 PROCEDURE — 3023F SPIROM DOC REV: CPT | Performed by: STUDENT IN AN ORGANIZED HEALTH CARE EDUCATION/TRAINING PROGRAM

## 2024-02-14 PROCEDURE — G8399 PT W/DXA RESULTS DOCUMENT: HCPCS | Performed by: STUDENT IN AN ORGANIZED HEALTH CARE EDUCATION/TRAINING PROGRAM

## 2024-02-14 PROCEDURE — 1090F PRES/ABSN URINE INCON ASSESS: CPT | Performed by: STUDENT IN AN ORGANIZED HEALTH CARE EDUCATION/TRAINING PROGRAM

## 2024-02-14 PROCEDURE — G8484 FLU IMMUNIZE NO ADMIN: HCPCS | Performed by: STUDENT IN AN ORGANIZED HEALTH CARE EDUCATION/TRAINING PROGRAM

## 2024-02-14 PROCEDURE — 4004F PT TOBACCO SCREEN RCVD TLK: CPT | Performed by: STUDENT IN AN ORGANIZED HEALTH CARE EDUCATION/TRAINING PROGRAM

## 2024-02-14 PROCEDURE — 3017F COLORECTAL CA SCREEN DOC REV: CPT | Performed by: STUDENT IN AN ORGANIZED HEALTH CARE EDUCATION/TRAINING PROGRAM

## 2024-02-14 RX ORDER — NICOTINE 21 MG/24HR
1 PATCH, TRANSDERMAL 24 HOURS TRANSDERMAL DAILY
Qty: 14 PATCH | Refills: 5 | Status: SHIPPED | OUTPATIENT
Start: 2024-02-14 | End: 2024-05-08

## 2024-02-14 RX ORDER — BUDESONIDE, GLYCOPYRROLATE, AND FORMOTEROL FUMARATE 160; 9; 4.8 UG/1; UG/1; UG/1
2 AEROSOL, METERED RESPIRATORY (INHALATION) 2 TIMES DAILY
Qty: 2 EACH | Refills: 0 | Status: SHIPPED | COMMUNITY
Start: 2024-02-14

## 2024-02-14 NOTE — PATIENT INSTRUCTIONS
Remember to bring a list of pulmonary medications and any CPAP or BiPAP machines to your next appointment with the office.     Please keep all of your future appointments scheduled by [facility] office. Out of respect for other patients and providers, you may be asked to reschedule your appointment if you arrive later than your scheduled appointment time. Appointments cancelled less than 24hrs in advance will be considered a no show. Patients with three missed appointments within 1 year or four missed appointments within 2 years can be dismissed from the practice.     Please be aware that our physicians are required to work in the Intensive Care Unit at [facility].  Your appointment may need to be rescheduled if they are designated to work during your appointment time.      You may receive a survey regarding the care you received during your visit.  Your input is valuable to us.  We encourage you to complete and return your survey.  We hope you will choose us in the future for your healthcare needs.     Pt instructed of all future appointment dates & times, including radiology, labs, procedures & referrals. If procedures were scheduled preparation instructions provided. Instructions on future appointments with [facility] were given.      Odell Aviles's Easy Way to Stop Smoking.

## 2024-02-14 NOTE — PROGRESS NOTES
[facility] Follow-up  [address] [place] [address]  [phone]        Na Johnston (: 1955 ) is a 68 y.o. female here for an evaluation of   Chief Complaint   Patient presents with    Follow-up     6 mo    Asthma    COPD         SUBJECTIVE/OBJECTIVE:    Patient is a 68-year-old female with significant past medical history of prior lung cancer status post lobectomy, tobacco dependence, moderate COPD that presents to [facility] pulmonary clinic for follow-up visit.  Patient has no complaints today.  She denies any fever, chills, chest pain, shortness of breath, cough.  She is still smoking, she is smoking up to 5 cigarettes/day.  She used to smoke up to half a pack per day.       Review of Systems   Constitutional:  Negative for activity change, appetite change, chills, diaphoresis and fatigue.   HENT:  Negative for congestion, sore throat and trouble swallowing.    Eyes:  Negative for pain, discharge, redness and itching.   Respiratory:  Negative for cough, shortness of breath, wheezing and stridor.    Cardiovascular:  Negative for chest pain, palpitations and leg swelling.   Gastrointestinal:  Negative for abdominal distention, abdominal pain, constipation, diarrhea, nausea and vomiting.   Endocrine: Negative for polydipsia, polyphagia and polyuria.   Genitourinary:  Negative for difficulty urinating.   Musculoskeletal:  Negative for back pain, myalgias and neck pain.   Skin:  Negative for color change.   Neurological:  Negative for dizziness, weakness and light-headedness.   Psychiatric/Behavioral:  Negative for agitation and behavioral problems.          Vitals:    24 1329   BP: (!) 146/79   Pulse: 77   Resp: 16   Temp: 98.5 °F (36.9 °C)   TempSrc: Temporal   SpO2: 94%   Weight: 47.7 kg (105 lb 4 oz)   Height: 1.626 m (5' 4\")        Physical Exam  Constitutional:       General: She is not in acute distress.     Appearance: She is not toxic-appearing.   HENT:      Head:

## 2024-02-14 NOTE — PROGRESS NOTES
MA Communication:  The following orders are received by verbal communication from   Derrick Lugo MD    Orders include:  FU 1 yr       Americo samples       Nicotine patches

## 2024-02-20 ENCOUNTER — OFFICE VISIT (OUTPATIENT)
Dept: FAMILY MEDICINE CLINIC | Age: 69
End: 2024-02-20
Payer: MEDICARE

## 2024-02-20 VITALS
BODY MASS INDEX: 18.71 KG/M2 | WEIGHT: 109 LBS | OXYGEN SATURATION: 97 % | DIASTOLIC BLOOD PRESSURE: 90 MMHG | SYSTOLIC BLOOD PRESSURE: 140 MMHG | HEART RATE: 74 BPM

## 2024-02-20 DIAGNOSIS — L08.9 SKIN INFECTION: Primary | ICD-10-CM

## 2024-02-20 DIAGNOSIS — R03.0 ELEVATED BP WITHOUT DIAGNOSIS OF HYPERTENSION: ICD-10-CM

## 2024-02-20 PROCEDURE — 1123F ACP DISCUSS/DSCN MKR DOCD: CPT | Performed by: NURSE PRACTITIONER

## 2024-02-20 PROCEDURE — 99214 OFFICE O/P EST MOD 30 MIN: CPT | Performed by: NURSE PRACTITIONER

## 2024-02-20 PROCEDURE — 3017F COLORECTAL CA SCREEN DOC REV: CPT | Performed by: NURSE PRACTITIONER

## 2024-02-20 PROCEDURE — G8427 DOCREV CUR MEDS BY ELIG CLIN: HCPCS | Performed by: NURSE PRACTITIONER

## 2024-02-20 PROCEDURE — 4004F PT TOBACCO SCREEN RCVD TLK: CPT | Performed by: NURSE PRACTITIONER

## 2024-02-20 PROCEDURE — G8484 FLU IMMUNIZE NO ADMIN: HCPCS | Performed by: NURSE PRACTITIONER

## 2024-02-20 PROCEDURE — 1090F PRES/ABSN URINE INCON ASSESS: CPT | Performed by: NURSE PRACTITIONER

## 2024-02-20 PROCEDURE — G8420 CALC BMI NORM PARAMETERS: HCPCS | Performed by: NURSE PRACTITIONER

## 2024-02-20 PROCEDURE — G9899 SCRN MAM PERF RSLTS DOC: HCPCS | Performed by: NURSE PRACTITIONER

## 2024-02-20 PROCEDURE — G8399 PT W/DXA RESULTS DOCUMENT: HCPCS | Performed by: NURSE PRACTITIONER

## 2024-02-20 RX ORDER — SULFAMETHOXAZOLE AND TRIMETHOPRIM 800; 160 MG/1; MG/1
1 TABLET ORAL 2 TIMES DAILY
Qty: 20 TABLET | Refills: 0 | Status: SHIPPED | OUTPATIENT
Start: 2024-02-20 | End: 2024-03-01

## 2024-02-20 RX ORDER — CEPHALEXIN 500 MG/1
500 CAPSULE ORAL 3 TIMES DAILY
Qty: 30 CAPSULE | Refills: 0 | Status: SHIPPED | OUTPATIENT
Start: 2024-02-20 | End: 2024-03-01

## 2024-02-20 NOTE — PROGRESS NOTES
Patient: Na Johnston is a 68 y.o. female who presents today with the following Chief Complaint(s):  Chief Complaint   Patient presents with    Hand Pain     Raw, dry, swollen, possible cellulitis, right hand worse, but on both hands, now upper lip is swollen, x3 days been the worse          HPI  Skin around right thumb has been blistery and peeling- states she did not burn her skin- been going on a couple weeks-  both hands are dry and cracking but worse on right thumb area.  Noticed this morning the area above her upper lip was swollen  No fevers. No new foods or meds- no trouble breathing besides her normal COPD- takes her inhalers       Current Outpatient Medications   Medication Sig Dispense Refill    sulfamethoxazole-trimethoprim (BACTRIM DS) 800-160 MG per tablet Take 1 tablet by mouth 2 times daily for 10 days 20 tablet 0    cephALEXin (KEFLEX) 500 MG capsule Take 1 capsule by mouth 3 times daily for 10 days 30 capsule 0    Budeson-Glycopyrrol-Formoterol (BREZTRI AEROSPHERE) 160-9-4.8 MCG/ACT AERO Inhale 2 puffs into the lungs in the morning and at bedtime Lot [id number] ex 2/25 2 each 0    nicotine (NICODERM CQ) 14 MG/24HR Place 1 patch onto the skin daily 14 patch 5    nicotine polacrilex (NICORETTE) 2 MG gum Take 1 each by mouth as needed for Smoking cessation 110 each 3    albuterol sulfate HFA (PROVENTIL;VENTOLIN;PROAIR) 108 (90 Base) MCG/ACT inhaler Inhale 2 puffs into the lungs every 6 hours as needed for Wheezing 8.5 g 0    Budeson-Glycopyrrol-Formoterol (BREZTRI AEROSPHERE) 160-9-4.8 MCG/ACT AERO Inhale 2 puffs into the lungs 2 times daily Rinse mouth out after use. 1 each 3    ipratropium 0.5 mg-albuterol 2.5 mg (DUONEB) 0.5-2.5 (3) MG/3ML SOLN nebulizer solution Inhale 3 mLs into the lungs every 6 hours as needed for Shortness of Breath 360 mL 5     No current facility-administered medications for this visit.       Patient's past medical history, surgical history, family history, medications,

## 2024-02-21 ENCOUNTER — APPOINTMENT (OUTPATIENT)
Dept: GENERAL RADIOLOGY | Age: 69
End: 2024-02-21
Payer: MEDICARE

## 2024-02-21 ENCOUNTER — HOSPITAL ENCOUNTER (EMERGENCY)
Age: 69
Discharge: HOME | End: 2024-02-21
Payer: MEDICARE

## 2024-02-21 VITALS
RESPIRATION RATE: 18 BRPM | OXYGEN SATURATION: 97 % | TEMPERATURE: 98.7 F | DIASTOLIC BLOOD PRESSURE: 84 MMHG | SYSTOLIC BLOOD PRESSURE: 125 MMHG | HEART RATE: 77 BPM

## 2024-02-21 DIAGNOSIS — M79.89 SWELLING OF BOTH HANDS: Primary | ICD-10-CM

## 2024-02-21 LAB
ALBUMIN SERPL-MCNC: 3.2 G/DL (ref 3.4–5)
ALBUMIN/GLOB SERPL: 1.1 {RATIO} (ref 1.1–2.2)
ALP SERPL-CCNC: 82 U/L (ref 40–129)
ALT SERPL-CCNC: 8 U/L (ref 10–40)
ANION GAP SERPL CALCULATED.3IONS-SCNC: 8 MMOL/L (ref 3–16)
AST SERPL-CCNC: 12 U/L (ref 15–37)
BASOPHILS # BLD: 0 K/UL (ref 0–0.2)
BASOPHILS NFR BLD: 0.6 %
BILIRUB SERPL-MCNC: <0.2 MG/DL (ref 0–1)
BUN SERPL-MCNC: 18 MG/DL (ref 7–20)
CALCIUM SERPL-MCNC: 8.6 MG/DL (ref 8.3–10.6)
CHLORIDE SERPL-SCNC: 104 MMOL/L (ref 99–110)
CO2 SERPL-SCNC: 27 MMOL/L (ref 21–32)
CREAT SERPL-MCNC: 0.8 MG/DL (ref 0.6–1.2)
DEPRECATED RDW RBC AUTO: 14.8 % (ref 12.4–15.4)
EOSINOPHIL # BLD: 0.5 K/UL (ref 0–0.6)
EOSINOPHIL NFR BLD: 13.1 %
GFR SERPLBLD CREATININE-BSD FMLA CKD-EPI: >60 ML/MIN/{1.73_M2}
GLUCOSE SERPL-MCNC: 87 MG/DL (ref 70–99)
HCT VFR BLD AUTO: 37.4 % (ref 36–48)
HGB BLD-MCNC: 12.3 G/DL (ref 12–16)
LYMPHOCYTES # BLD: 0.6 K/UL (ref 1–5.1)
LYMPHOCYTES NFR BLD: 14.8 %
MCH RBC QN AUTO: 32.3 PG (ref 26–34)
MCHC RBC AUTO-ENTMCNC: 33 G/DL (ref 31–36)
MCV RBC AUTO: 97.8 FL (ref 80–100)
MONOCYTES # BLD: 0.3 K/UL (ref 0–1.3)
MONOCYTES NFR BLD: 6.4 %
NEUTROPHILS # BLD: 2.6 K/UL (ref 1.7–7.7)
NEUTROPHILS NFR BLD: 65.1 %
PLATELET # BLD AUTO: 229 K/UL (ref 135–450)
PMV BLD AUTO: 6.6 FL (ref 5–10.5)
POTASSIUM SERPL-SCNC: 3.9 MMOL/L (ref 3.5–5.1)
PROT SERPL-MCNC: 6 G/DL (ref 6.4–8.2)
RBC # BLD AUTO: 3.82 M/UL (ref 4–5.2)
SODIUM SERPL-SCNC: 139 MMOL/L (ref 136–145)
WBC # BLD AUTO: 3.9 K/UL (ref 4–11)

## 2024-02-21 PROCEDURE — 99284 EMERGENCY DEPT VISIT MOD MDM: CPT

## 2024-02-21 PROCEDURE — 85025 COMPLETE CBC W/AUTO DIFF WBC: CPT

## 2024-02-21 PROCEDURE — 73130 X-RAY EXAM OF HAND: CPT

## 2024-02-21 PROCEDURE — 80053 COMPREHEN METABOLIC PANEL: CPT

## 2024-02-21 RX ORDER — METHYLPREDNISOLONE 4 MG/1
TABLET ORAL
Qty: 1 KIT | Refills: 0 | Status: SHIPPED | OUTPATIENT
Start: 2024-02-21 | End: 2024-02-27

## 2024-02-21 ASSESSMENT — PAIN - FUNCTIONAL ASSESSMENT: PAIN_FUNCTIONAL_ASSESSMENT: NONE - DENIES PAIN

## 2024-02-21 NOTE — DISCHARGE INSTRUCTIONS
Please follow-up with your primary care physician for further evaluation and treatment.  Continue antibiotics as previously prescribed.  I have prescribed steroids as well for acute inflammation.  This can also help with the itching and skin changes.

## 2024-02-21 NOTE — ED PROVIDER NOTES
[facility]  ED  EMERGENCY DEPARTMENT ENCOUNTER        Pt Name: Na Johnston  MRN: [id number]  Birthdate 1955  Date of evaluation: 2/21/2024  Provider: BASIM Conti  PCP: Burt Goyal MD  Note Started: 4:23 PM EST 2/21/24      TIM. I have evaluated this patient.        CHIEF COMPLAINT       Chief Complaint   Patient presents with    Other     Pt to ED for bilateral swelling in her hands, pt states this started a couple weeks ago. Pt states she is on abx for cellulitis but its not helping.        HISTORY OF PRESENT ILLNESS: 1 or more Elements     History from : Patient    Limitations to history : None    Na Johnston is a 68 y.o. female who presents to the emergency department today for bilateral hand swelling.  Patient states symptoms began a couple of weeks ago.  She has some chronic skin changes on her hands as well.  She states she saw her primary care doctor who placed her on antibiotics.  She is taking them as prescribed.  However she is complaining of significant itching to her hands.  She came to the emergency department because she woke up this morning and hand seemed more swollen than normal.  There is no pitting edema.  She denies any chest pain, shortness of breath or cough.  No fevers or chills.  No swelling anywhere else.  No significant past medical history of arthritis.    Nursing Notes were all reviewed and agreed with or any disagreements were addressed in the HPI.    REVIEW OF SYSTEMS :      Review of Systems    Positives and Pertinent negatives as per HPI.     SURGICAL HISTORY     Past Surgical History:   Procedure Laterality Date    BREAST BIOPSY      Right, was benign    COLONOSCOPY N/A 10/9/2018    COLONOSCOPY WITH BIOPSY performed by Guillermo Amor MD at [facility] ENDOSCOPY    COLONOSCOPY N/A 1/22/2024    COLONOSCOPY POLYPECTOMY BIOPSY performed by Chris Monique MD at [facility] ENDOSCOPY    CT NEEDLE BIOPSY LUNG PERCUTANEOUS  5/31/2022    CT

## 2024-03-24 DIAGNOSIS — J44.89 COPD WITH ASTHMA (HCC): ICD-10-CM

## 2024-03-25 RX ORDER — BUDESONIDE, GLYCOPYRROLATE, AND FORMOTEROL FUMARATE 160; 9; 4.8 UG/1; UG/1; UG/1
AEROSOL, METERED RESPIRATORY (INHALATION)
Qty: 32.1 G | Refills: 1 | Status: SHIPPED | OUTPATIENT
Start: 2024-03-25

## 2024-03-27 RX ORDER — ALBUTEROL SULFATE 90 UG/1
AEROSOL, METERED RESPIRATORY (INHALATION)
Qty: 18 G | Refills: 3 | Status: SHIPPED | OUTPATIENT
Start: 2024-03-27

## 2024-03-27 NOTE — TELEPHONE ENCOUNTER
Last Office Visit  -  2/20/24  Next Office Visit  -      Last Filled  -    Last UDS -    Contract -

## 2024-04-16 DIAGNOSIS — L30.9 ECZEMA, UNSPECIFIED TYPE: ICD-10-CM

## 2024-04-16 RX ORDER — TRIAMCINOLONE ACETONIDE 1 MG/G
CREAM TOPICAL
Qty: 30 G | Refills: 0 | Status: SHIPPED | OUTPATIENT
Start: 2024-04-16

## 2024-04-16 NOTE — TELEPHONE ENCOUNTER
Last Office Visit  -  2/20/2024  Next Office Visit  -  none    Last Filled  -  4/12/2022  Last UDS -    Contract -

## 2024-05-02 ENCOUNTER — HOSPITAL ENCOUNTER (OUTPATIENT)
Dept: CT IMAGING | Age: 69
Discharge: HOME | End: 2024-05-02
Payer: MEDICARE

## 2024-05-02 DIAGNOSIS — C34.11 MALIGNANT NEOPLASM OF UPPER LOBE, RIGHT BRONCHUS OR LUNG (HCC): ICD-10-CM

## 2024-05-02 LAB
PERFORMED ON: NORMAL
POC CREATININE: 0.6 MG/DL (ref 0.6–1.2)
POC SAMPLE TYPE: NORMAL

## 2024-05-02 PROCEDURE — 82565 ASSAY OF CREATININE: CPT

## 2024-05-02 PROCEDURE — 6360000004 HC RX CONTRAST MEDICATION: Performed by: NURSE PRACTITIONER

## 2024-05-02 PROCEDURE — 71260 CT THORAX DX C+: CPT

## 2024-05-02 RX ADMIN — IOPAMIDOL 75 ML: 755 INJECTION, SOLUTION INTRAVENOUS at 10:18

## 2024-05-14 ENCOUNTER — HOSPITAL ENCOUNTER (OUTPATIENT)
Dept: NUCLEAR MEDICINE | Age: 69
Discharge: HOME | End: 2024-05-14
Attending: INTERNAL MEDICINE
Payer: MEDICARE

## 2024-05-14 ENCOUNTER — HOSPITAL ENCOUNTER (OUTPATIENT)
Dept: MRI IMAGING | Age: 69
Discharge: HOME | End: 2024-05-14
Attending: INTERNAL MEDICINE
Payer: MEDICARE

## 2024-05-14 DIAGNOSIS — C34.11 MALIGNANT NEOPLASM OF UPPER LOBE, RIGHT BRONCHUS OR LUNG (HCC): ICD-10-CM

## 2024-05-14 PROCEDURE — 3430000000 HC RX DIAGNOSTIC RADIOPHARMACEUTICAL: Performed by: INTERNAL MEDICINE

## 2024-05-14 PROCEDURE — 6360000004 HC RX CONTRAST MEDICATION: Performed by: INTERNAL MEDICINE

## 2024-05-14 PROCEDURE — 78306 BONE IMAGING WHOLE BODY: CPT | Performed by: INTERNAL MEDICINE

## 2024-05-14 PROCEDURE — A9503 TC99M MEDRONATE: HCPCS | Performed by: INTERNAL MEDICINE

## 2024-05-14 PROCEDURE — 74183 MRI ABD W/O CNTR FLWD CNTR: CPT

## 2024-05-14 PROCEDURE — A9579 GAD-BASE MR CONTRAST NOS,1ML: HCPCS | Performed by: INTERNAL MEDICINE

## 2024-05-14 RX ORDER — TC 99M MEDRONATE 20 MG/10ML
27.5 INJECTION, POWDER, LYOPHILIZED, FOR SOLUTION INTRAVENOUS
Status: COMPLETED | OUTPATIENT
Start: 2024-05-14 | End: 2024-05-14

## 2024-05-14 RX ADMIN — GADOTERIDOL 9 ML: 279.3 INJECTION, SOLUTION INTRAVENOUS at 10:42

## 2024-05-14 RX ADMIN — TC 99M MEDRONATE 27.5 MILLICURIE: 20 INJECTION, POWDER, LYOPHILIZED, FOR SOLUTION INTRAVENOUS at 08:45

## 2024-05-25 NOTE — PLAN OF CARE
Problem: Discharge Planning  Goal: Discharge to home or other facility with appropriate resources  7/15/2022 1220 by Anaid Valladares  Outcome: Progressing  7/15/2022 0828 by Debo Avila RN  Outcome: Progressing  Flowsheets (Taken 7/14/2022 1938 by Linwood Garcia RN)  Discharge to home or other facility with appropriate resources:   Identify barriers to discharge with patient and caregiver   Arrange for needed discharge resources and transportation as appropriate   Identify discharge learning needs (meds, wound care, etc)     Problem: Safety - Adult  Goal: Free from fall injury  7/15/2022 0828 by Debo Avila RN  Outcome: Progressing     Problem: ABCDS Injury Assessment  Goal: Absence of physical injury  7/15/2022 0828 by Debo Avila RN  Outcome: Progressing     Problem: Pain  Goal: Verbalizes/displays adequate comfort level or baseline comfort level  7/15/2022 0828 by Debo Avila RN  Outcome: Progressing  Flowsheets (Taken 7/14/2022 1900 by Linwood Garcia RN)  Verbalizes/displays adequate comfort level or baseline comfort level:   Encourage patient to monitor pain and request assistance   Assess pain using appropriate pain scale   Administer analgesics based on type and severity of pain and evaluate response     Problem: Respiratory - Adult  Goal: Achieves optimal ventilation and oxygenation  7/15/2022 0828 by Debo Avila RN  Outcome: Progressing     Problem: Infection - Adult  Goal: Absence of infection at discharge  7/15/2022 0828 by Debo Avila RN  Outcome: Progressing  Goal: Absence of infection during hospitalization  7/15/2022 0828 by Debo Avila RN  Outcome: Progressing     Problem: Metabolic/Fluid and Electrolytes - Adult  Goal: Electrolytes maintained within normal limits  7/15/2022 0828 by Debo Avila RN  Outcome: Progressing Positive

## 2024-06-24 DIAGNOSIS — L01.00 IMPETIGO: ICD-10-CM

## 2024-07-07 RX ORDER — ALBUTEROL SULFATE 90 UG/1
AEROSOL, METERED RESPIRATORY (INHALATION)
Qty: 18 G | Refills: 3 | Status: SHIPPED | OUTPATIENT
Start: 2024-07-07

## 2024-08-29 ENCOUNTER — TELEMEDICINE (OUTPATIENT)
Dept: FAMILY MEDICINE CLINIC | Age: 69
End: 2024-08-29

## 2024-08-29 DIAGNOSIS — L30.9 ECZEMA, UNSPECIFIED TYPE: ICD-10-CM

## 2024-08-29 RX ORDER — METHYLPREDNISOLONE 4 MG
TABLET, DOSE PACK ORAL
Qty: 21 TABLET | Refills: 0 | Status: SHIPPED | OUTPATIENT
Start: 2024-08-29

## 2024-08-29 RX ORDER — TRIAMCINOLONE ACETONIDE 1 MG/G
CREAM TOPICAL
Qty: 30 G | Refills: 0 | Status: SHIPPED | OUTPATIENT
Start: 2024-08-29

## 2024-08-29 SDOH — ECONOMIC STABILITY: FOOD INSECURITY: WITHIN THE PAST 12 MONTHS, THE FOOD YOU BOUGHT JUST DIDN'T LAST AND YOU DIDN'T HAVE MONEY TO GET MORE.: NEVER TRUE

## 2024-08-29 SDOH — ECONOMIC STABILITY: INCOME INSECURITY: HOW HARD IS IT FOR YOU TO PAY FOR THE VERY BASICS LIKE FOOD, HOUSING, MEDICAL CARE, AND HEATING?: NOT HARD AT ALL

## 2024-08-29 SDOH — ECONOMIC STABILITY: FOOD INSECURITY: WITHIN THE PAST 12 MONTHS, YOU WORRIED THAT YOUR FOOD WOULD RUN OUT BEFORE YOU GOT MONEY TO BUY MORE.: NEVER TRUE

## 2024-08-29 ASSESSMENT — ENCOUNTER SYMPTOMS
RESPIRATORY NEGATIVE: 1
GASTROINTESTINAL NEGATIVE: 1

## 2024-08-29 NOTE — PROGRESS NOTES
Na Johnston (:  1955) is a Established patient, presenting virtually for evaluation of the following:    Assessment & Plan   Below is the assessment and plan developed based on review of pertinent history, physical exam, labs, studies, and medications.  1. Eczema, unspecified type  Chronic condition with flare up, treat with steroid and topical steroid cream  -     triamcinolone (KENALOG) 0.1 % cream; Apply topically 2 times daily., Disp-30 g, R-0, Normal  -     methylPREDNISolone (MEDROL, GUILLERMO,) 4 MG tablet; Take 6 tablets all at once on day 1, 5 tablets on day 2, 4 tablets on day 3, 3 tablets on day 4 and 2 tablets on day 5 and 1 tablet on day 6., Disp-21 tablet, R-0Normal           Subjective   HPI  Patient presents today with concerns of hands itching, skin peeling and cracks in her hands. She states she has been using neosporin pain relief cream. She has had this in the past. She states it comes and goes but is currently in flare up.       Review of Systems   Constitutional: Negative.    HENT: Negative.     Respiratory: Negative.     Cardiovascular: Negative.    Gastrointestinal: Negative.    Skin:  Positive for rash.   Neurological: Negative.    Psychiatric/Behavioral: Negative.            Objective   Patient-Reported Vitals  Patient-Reported Temperature: 98.3  Patient-Reported Weight: 110  Patient-Reported Height: 5'4\"  Patient-Reported Pulse Oximetry: 96       Physical Exam  [INSTRUCTIONS:  \"[x]\" Indicates a positive item  \"[]\" Indicates a negative item  -- DELETE ALL ITEMS NOT EXAMINED]    Constitutional: [x] Appears well-developed and well-nourished [x] No apparent distress      [] Abnormal -     Mental status: [x] Alert and awake  [x] Oriented to person/place/time [x] Able to follow commands    [] Abnormal -     Eyes:   EOM    [x]  Normal    [] Abnormal -   Sclera  [x]  Normal    [] Abnormal -          Discharge [x]  None visible   [] Abnormal -     HENT: [x] Normocephalic, atraumatic  []  Abnormal -   [x] Mouth/Throat: Mucous membranes are moist    External Ears [x] Normal  [] Abnormal -    Neck: [x] No visualized mass [] Abnormal -     Pulmonary/Chest: [x] Respiratory effort normal   [x] No visualized signs of difficulty breathing or respiratory distress        [] Abnormal -      Musculoskeletal:   [x] Normal gait with no signs of ataxia         [x] Normal range of motion of neck        [] Abnormal -     Neurological:        [x] No Facial Asymmetry (Cranial nerve 7 motor function) (limited exam due to video visit)          [x] No gaze palsy        [] Abnormal -          Skin:        [x] No significant exanthematous lesions or discoloration noted on facial skin         [x] Abnormal -  hands peeling, cracking and dryness.            Psychiatric:       [x] Normal Affect [] Abnormal -        [x] No Hallucinations    Other pertinent observable physical exam findings:-             Na Johnston, was evaluated through a synchronous (real-time) audio-video encounter. The patient (or guardian if applicable) is aware that this is a billable service, which includes applicable co-pays. This Virtual Visit was conducted with patient's (and/or legal guardian's) consent. Patient identification was verified, and a caregiver was present when appropriate.   The patient was located at Home: [address] [place] [address]  Provider was located at Facility (Appt Dept): [address] [place] [address]  Confirm you are appropriately licensed, registered, or certified to deliver care in the state where the patient is located as indicated above. If you are not or unsure, please re-schedule the visit: Yes, I confirm.        --TAMMY Yates - CNP

## 2024-09-10 NOTE — PROGRESS NOTES
2019    Francisca Anthony (:  1955) is a 58 y.o. female, here for a preventive medicine evaluation. Patient Active Problem List   Diagnosis    Asthma    Arthritis    Hormone replacement therapy    COPD (chronic obstructive pulmonary disease) (HCC)    Polyp of ascending colon       Review of Systems   Constitutional: Negative for fatigue and unexpected weight change. HENT: Negative for congestion, rhinorrhea and trouble swallowing. Eyes: Negative for pain and visual disturbance. Respiratory: Negative for chest tightness and shortness of breath. Cardiovascular: Negative for chest pain and palpitations. Gastrointestinal: Negative for constipation and diarrhea. Endocrine: Negative for cold intolerance and heat intolerance. Genitourinary: Negative for frequency and urgency. Musculoskeletal: Negative for arthralgias and back pain. Skin: Negative for color change and rash. Allergic/Immunologic: Negative for environmental allergies and food allergies. Neurological: Negative for dizziness and light-headedness. Hematological: Negative for adenopathy. Does not bruise/bleed easily. Psychiatric/Behavioral: Negative for dysphoric mood and sleep disturbance. Prior to Visit Medications    Medication Sig Taking?  Authorizing Provider   estrogen, conjugated,-medroxyprogesterone (PREMPRO) 0.45-1.5 MG per tablet TAKE ONE TABLET BY MOUTH DAILY Yes Elicia Humphreys MD   meloxicam (MOBIC) 15 MG tablet TAKE ONE TABLET BY MOUTH DAILY Yes Elicia Humphreys MD   albuterol sulfate HFA (PROAIR HFA) 108 (90 Base) MCG/ACT inhaler INHALE TWO PUFFS BY MOUTH EVERY 6 HOURS AS NEEDED FOR WHEEZING Yes Elicia Humphreys MD   Tiotropium Bromide-Olodaterol (STIOLTO RESPIMAT) 2.5-2.5 MCG/ACT AERS INHALE TWO INHALATION(S) BY MOUTH DAILY Yes Elicia Humphreys MD   ipratropium-albuterol (DUONEB) 0.5-2.5 (3) MG/3ML SOLN nebulizer solution Inhale 3 mLs into the lungs every 6 hours as needed for Shortness of Breath Yes Rx for colonoscopy bowel prep Suprep e-prescribed to pharmacy indicated by patient.     Social History Narrative    Not on file        Family History   Problem Relation Age of Onset    Cancer Mother         stomach    Heart Disease Father     Colon Cancer Brother     Cancer Brother        ADVANCE DIRECTIVE: N, Not Received    Vitals:    05/06/19 1308   BP: 138/62   Pulse: 79   SpO2: 98%   Weight: 126 lb (57.2 kg)     Estimated body mass index is 19.62 kg/m² as calculated from the following:    Height as of 11/27/18: 5' 7.2\" (1.707 m). Weight as of this encounter: 126 lb (57.2 kg). Physical Exam   Constitutional: She is oriented to person, place, and time. She appears well-developed and well-nourished. HENT:   Head: Normocephalic and atraumatic. Right Ear: External ear normal.   Left Ear: External ear normal.   Nose: Nose normal.   Mouth/Throat: Oropharynx is clear and moist.   Eyes: Pupils are equal, round, and reactive to light. Conjunctivae are normal.   Neck: Normal range of motion. Neck supple. Cardiovascular: Normal rate, regular rhythm, normal heart sounds and intact distal pulses. Pulmonary/Chest: Effort normal and breath sounds normal.   Abdominal: Soft. Bowel sounds are normal.   Musculoskeletal: Normal range of motion. Neurological: She is alert and oriented to person, place, and time. She has normal reflexes. Skin: Skin is dry. Psychiatric: She has a normal mood and affect. Her behavior is normal. Judgment and thought content normal.   Nursing note and vitals reviewed. No flowsheet data found.     Lab Results   Component Value Date    CHOL 170 05/06/2019    CHOL 210 01/11/2017    CHOL 177 06/02/2015    TRIG 68 05/06/2019    TRIG 71 01/11/2017    TRIG 59 06/02/2015    HDL 80 05/06/2019    HDL 81 01/11/2017    HDL 74 06/02/2015    LDLCALC 76 05/06/2019    LDLCALC 115 01/11/2017    LDLCALC 91 06/02/2015    GLUCOSE 90 05/06/2019    LABA1C 5.1 05/06/2019    LABA1C 5.3 08/11/2017    LABA1C 5.3 06/02/2015       The 10-year ASCVD risk score (Zarina Sanchez et al., 2013) is: 4.4%    Values used to calculate the score:      Age: 58 years      Sex: Female      Is Non- : No      Diabetic: No      Tobacco smoker: No      Systolic Blood Pressure: 148 mmHg      Is BP treated: No      HDL Cholesterol: 80 mg/dL      Total Cholesterol: 170 mg/dL    Immunization History   Administered Date(s) Administered    Influenza, Quadv, 3 Years and older, IM (Fluzone 3 yrs and older or Afluria 5 yrs and older) 11/06/2018    Pneumococcal 13-valent Conjugate (Stikenq62) 10/01/2018    Pneumococcal Polysaccharide ([id number]) 10/12/2015    Tdap (Boostrix, Adacel) 01/11/2017       Health Maintenance   Topic Date Due    Hepatitis C screen  1955    HIV screen  11/19/1969    Shingles Vaccine (1 of 2) 11/19/2004    Low dose CT lung screening  11/13/2019    Breast cancer screen  01/07/2021    Colon cancer screen colonoscopy  10/09/2021    Cervical cancer screen  05/30/2022    Lipid screen  05/06/2024    DTaP/Tdap/Td vaccine (2 - Td) 01/11/2027    Flu vaccine  Completed    Pneumococcal 0-64 years Vaccine  Completed       ASSESSMENT/PLAN:  1. Healthy adult on routine physical examination    - COMPREHENSIVE METABOLIC PANEL  - LIPID PANEL  - HEMOGLOBIN A1C  - Hep C AB RLFX HCV PCR-A      No follow-ups on file. An electronic signature was used to authenticate this note.     --Nghia Guerra MD on 5/13/2019 at 1:02 PM

## 2024-10-15 DIAGNOSIS — J44.89 COPD WITH ASTHMA (HCC): ICD-10-CM

## 2024-10-15 RX ORDER — BUDESONIDE, GLYCOPYRROLATE, AND FORMOTEROL FUMARATE 160; 9; 4.8 UG/1; UG/1; UG/1
AEROSOL, METERED RESPIRATORY (INHALATION)
Qty: 32.1 G | Refills: 1 | Status: SHIPPED | OUTPATIENT
Start: 2024-10-15

## 2024-10-15 NOTE — TELEPHONE ENCOUNTER
Last Office Visit  -  8/29/24  Next Office Visit  -  n/a    Last Filled  -    Last UDS -    Contract -

## 2024-11-05 RX ORDER — ALBUTEROL SULFATE 90 UG/1
AEROSOL, METERED RESPIRATORY (INHALATION)
Qty: 18 G | Refills: 3 | Status: SHIPPED | OUTPATIENT
Start: 2024-11-05

## 2024-11-05 NOTE — TELEPHONE ENCOUNTER
Last Office Visit  -  8/29/24  Next Office Visit  -       Last Filled  -    Last UDS -    Contract -

## 2024-11-22 DIAGNOSIS — L30.9 ECZEMA, UNSPECIFIED TYPE: ICD-10-CM

## 2024-11-22 RX ORDER — TRIAMCINOLONE ACETONIDE 1 MG/G
CREAM TOPICAL
Qty: 30 G | Refills: 0 | Status: SHIPPED | OUTPATIENT
Start: 2024-11-22

## 2024-11-24 DIAGNOSIS — L01.00 IMPETIGO: ICD-10-CM

## 2024-11-25 RX ORDER — MUPIROCIN 20 MG/G
OINTMENT TOPICAL
Qty: 22 G | Refills: 0 | Status: SHIPPED | OUTPATIENT
Start: 2024-11-25

## 2024-11-26 ENCOUNTER — HOSPITAL ENCOUNTER (OUTPATIENT)
Dept: CT IMAGING | Age: 69
Discharge: HOME | End: 2024-11-26
Attending: INTERNAL MEDICINE
Payer: MEDICARE

## 2024-11-26 ENCOUNTER — HOSPITAL ENCOUNTER (OUTPATIENT)
Dept: MRI IMAGING | Age: 69
Discharge: HOME | End: 2024-11-26
Attending: INTERNAL MEDICINE
Payer: MEDICARE

## 2024-11-26 DIAGNOSIS — C34.11 MALIGNANT NEOPLASM OF UPPER LOBE, RIGHT BRONCHUS OR LUNG (HCC): ICD-10-CM

## 2024-11-26 LAB
PERFORMED ON: ABNORMAL
POC CREATININE: 0.5 MG/DL (ref 0.6–1.2)
POC SAMPLE TYPE: ABNORMAL

## 2024-11-26 PROCEDURE — 6360000004 HC RX CONTRAST MEDICATION: Performed by: INTERNAL MEDICINE

## 2024-11-26 PROCEDURE — 82565 ASSAY OF CREATININE: CPT

## 2024-11-26 PROCEDURE — 71260 CT THORAX DX C+: CPT

## 2024-11-26 PROCEDURE — 74183 MRI ABD W/O CNTR FLWD CNTR: CPT

## 2024-11-26 PROCEDURE — A9579 GAD-BASE MR CONTRAST NOS,1ML: HCPCS | Performed by: INTERNAL MEDICINE

## 2024-11-26 RX ORDER — IOPAMIDOL 755 MG/ML
75 INJECTION, SOLUTION INTRAVASCULAR
Status: COMPLETED | OUTPATIENT
Start: 2024-11-26 | End: 2024-11-26

## 2024-11-26 RX ADMIN — GADOTERIDOL 10 ML: 279.3 INJECTION, SOLUTION INTRAVENOUS at 10:59

## 2024-11-26 RX ADMIN — IOPAMIDOL 75 ML: 755 INJECTION, SOLUTION INTRAVENOUS at 10:13

## 2024-12-15 ENCOUNTER — APPOINTMENT (OUTPATIENT)
Dept: CT IMAGING | Age: 69
DRG: 190 | End: 2024-12-15
Payer: MEDICARE

## 2024-12-15 ENCOUNTER — APPOINTMENT (OUTPATIENT)
Dept: GENERAL RADIOLOGY | Age: 69
DRG: 190 | End: 2024-12-15
Payer: MEDICARE

## 2024-12-15 ENCOUNTER — HOSPITAL ENCOUNTER (INPATIENT)
Age: 69
LOS: 3 days | Discharge: HOME | DRG: 190 | End: 2024-12-18
Attending: EMERGENCY MEDICINE
Payer: MEDICARE

## 2024-12-15 DIAGNOSIS — R06.03 ACUTE RESPIRATORY DISTRESS: ICD-10-CM

## 2024-12-15 DIAGNOSIS — R59.0 MEDIASTINAL LYMPHADENOPATHY: ICD-10-CM

## 2024-12-15 DIAGNOSIS — J44.1 COPD EXACERBATION (HCC): Primary | ICD-10-CM

## 2024-12-15 DIAGNOSIS — E83.42 HYPOMAGNESEMIA: ICD-10-CM

## 2024-12-15 DIAGNOSIS — R91.1 PULMONARY NODULE: ICD-10-CM

## 2024-12-15 DIAGNOSIS — J18.9 ATYPICAL PNEUMONIA: ICD-10-CM

## 2024-12-15 LAB
ALBUMIN SERPL-MCNC: 3.3 G/DL (ref 3.4–5)
ALBUMIN/GLOB SERPL: 0.9 {RATIO} (ref 1.1–2.2)
ALP SERPL-CCNC: 87 U/L (ref 40–129)
ALT SERPL-CCNC: 9 U/L (ref 10–40)
ANION GAP SERPL CALCULATED.3IONS-SCNC: 12 MMOL/L (ref 3–16)
AST SERPL-CCNC: 17 U/L (ref 15–37)
BASE EXCESS BLDV CALC-SCNC: 1.8 MMOL/L (ref -3–3)
BASOPHILS # BLD: 0 K/UL (ref 0–0.2)
BASOPHILS NFR BLD: 0.6 %
BILIRUB SERPL-MCNC: 0.5 MG/DL (ref 0–1)
BUN SERPL-MCNC: 19 MG/DL (ref 7–20)
CALCIUM SERPL-MCNC: 9.2 MG/DL (ref 8.3–10.6)
CHLORIDE SERPL-SCNC: 98 MMOL/L (ref 99–110)
CO2 BLDV-SCNC: 28 MMOL/L
CO2 SERPL-SCNC: 25 MMOL/L (ref 21–32)
COHGB MFR BLDV: 3.4 % (ref 0–1.5)
CREAT SERPL-MCNC: 0.7 MG/DL (ref 0.6–1.2)
D-DIMER QUANTITATIVE: 0.99 UG/ML FEU (ref 0–0.6)
DEPRECATED RDW RBC AUTO: 13.4 % (ref 12.4–15.4)
EKG ATRIAL RATE: 84 BPM
EKG DIAGNOSIS: NORMAL
EKG P AXIS: 88 DEGREES
EKG P-R INTERVAL: 122 MS
EKG Q-T INTERVAL: 348 MS
EKG QRS DURATION: 82 MS
EKG QTC CALCULATION (BAZETT): 411 MS
EKG R AXIS: 74 DEGREES
EKG T AXIS: 41 DEGREES
EKG VENTRICULAR RATE: 84 BPM
EOSINOPHIL # BLD: 0 K/UL (ref 0–0.6)
EOSINOPHIL NFR BLD: 0.4 %
FLUAV RNA RESP QL NAA+PROBE: NOT DETECTED
FLUBV RNA RESP QL NAA+PROBE: NOT DETECTED
GFR SERPLBLD CREATININE-BSD FMLA CKD-EPI: >90 ML/MIN/{1.73_M2}
GLUCOSE SERPL-MCNC: 143 MG/DL (ref 70–99)
HCO3 BLDV-SCNC: 26.4 MMOL/L (ref 23–29)
HCT VFR BLD AUTO: 38.4 % (ref 36–48)
HGB BLD-MCNC: 13 G/DL (ref 12–16)
LYMPHOCYTES # BLD: 0.6 K/UL (ref 1–5.1)
LYMPHOCYTES NFR BLD: 9 %
MAGNESIUM SERPL-MCNC: 1.62 MG/DL (ref 1.8–2.4)
MCH RBC QN AUTO: 32.9 PG (ref 26–34)
MCHC RBC AUTO-ENTMCNC: 33.7 G/DL (ref 31–36)
MCV RBC AUTO: 97.5 FL (ref 80–100)
METHGB MFR BLDV: 0.1 %
MONOCYTES # BLD: 0.6 K/UL (ref 0–1.3)
MONOCYTES NFR BLD: 8.9 %
NEUTROPHILS # BLD: 5.4 K/UL (ref 1.7–7.7)
NEUTROPHILS NFR BLD: 81.1 %
NT-PROBNP SERPL-MCNC: 314 PG/ML (ref 0–124)
O2 THERAPY: ABNORMAL
PCO2 BLDV: 41.5 MMHG (ref 40–50)
PH BLDV: 7.42 [PH] (ref 7.35–7.45)
PLATELET # BLD AUTO: 198 K/UL (ref 135–450)
PMV BLD AUTO: 7.3 FL (ref 5–10.5)
PO2 BLDV: 79.4 MMHG (ref 25–40)
POTASSIUM SERPL-SCNC: 4 MMOL/L (ref 3.5–5.1)
PROT SERPL-MCNC: 7 G/DL (ref 6.4–8.2)
RBC # BLD AUTO: 3.94 M/UL (ref 4–5.2)
REASON FOR REJECTION: NORMAL
REJECTED TEST: NORMAL
RSV AG NOSE QL: NEGATIVE
SAO2 % BLDV: 96 %
SARS-COV-2 RNA RESP QL NAA+PROBE: NOT DETECTED
SODIUM SERPL-SCNC: 135 MMOL/L (ref 136–145)
TROPONIN, HIGH SENSITIVITY: 10 NG/L (ref 0–14)
TROPONIN, HIGH SENSITIVITY: 14 NG/L (ref 0–14)
WBC # BLD AUTO: 6.6 K/UL (ref 4–11)

## 2024-12-15 PROCEDURE — 1200000000 HC SEMI PRIVATE

## 2024-12-15 PROCEDURE — 84484 ASSAY OF TROPONIN QUANT: CPT

## 2024-12-15 PROCEDURE — 87807 RSV ASSAY W/OPTIC: CPT

## 2024-12-15 PROCEDURE — 93005 ELECTROCARDIOGRAM TRACING: CPT | Performed by: EMERGENCY MEDICINE

## 2024-12-15 PROCEDURE — 93010 ELECTROCARDIOGRAM REPORT: CPT | Performed by: INTERNAL MEDICINE

## 2024-12-15 PROCEDURE — 94761 N-INVAS EAR/PLS OXIMETRY MLT: CPT

## 2024-12-15 PROCEDURE — 96375 TX/PRO/DX INJ NEW DRUG ADDON: CPT

## 2024-12-15 PROCEDURE — 85379 FIBRIN DEGRADATION QUANT: CPT

## 2024-12-15 PROCEDURE — 6370000000 HC RX 637 (ALT 250 FOR IP)

## 2024-12-15 PROCEDURE — 36415 COLL VENOUS BLD VENIPUNCTURE: CPT

## 2024-12-15 PROCEDURE — 71045 X-RAY EXAM CHEST 1 VIEW: CPT

## 2024-12-15 PROCEDURE — 71260 CT THORAX DX C+: CPT

## 2024-12-15 PROCEDURE — 82803 BLOOD GASES ANY COMBINATION: CPT

## 2024-12-15 PROCEDURE — 80053 COMPREHEN METABOLIC PANEL: CPT

## 2024-12-15 PROCEDURE — 85025 COMPLETE CBC W/AUTO DIFF WBC: CPT

## 2024-12-15 PROCEDURE — 6360000002 HC RX W HCPCS

## 2024-12-15 PROCEDURE — 6370000000 HC RX 637 (ALT 250 FOR IP): Performed by: EMERGENCY MEDICINE

## 2024-12-15 PROCEDURE — 83880 ASSAY OF NATRIURETIC PEPTIDE: CPT

## 2024-12-15 PROCEDURE — 6360000004 HC RX CONTRAST MEDICATION

## 2024-12-15 PROCEDURE — 87636 SARSCOV2 & INF A&B AMP PRB: CPT

## 2024-12-15 PROCEDURE — 94640 AIRWAY INHALATION TREATMENT: CPT

## 2024-12-15 PROCEDURE — 2700000000 HC OXYGEN THERAPY PER DAY

## 2024-12-15 PROCEDURE — 99285 EMERGENCY DEPT VISIT HI MDM: CPT

## 2024-12-15 PROCEDURE — 2580000003 HC RX 258

## 2024-12-15 PROCEDURE — 96365 THER/PROPH/DIAG IV INF INIT: CPT

## 2024-12-15 PROCEDURE — 83735 ASSAY OF MAGNESIUM: CPT

## 2024-12-15 PROCEDURE — 6360000002 HC RX W HCPCS: Performed by: EMERGENCY MEDICINE

## 2024-12-15 RX ORDER — MAGNESIUM SULFATE IN WATER 40 MG/ML
2000 INJECTION, SOLUTION INTRAVENOUS PRN
Status: DISCONTINUED | OUTPATIENT
Start: 2024-12-15 | End: 2024-12-18 | Stop reason: HOSPADM

## 2024-12-15 RX ORDER — SODIUM CHLORIDE 9 MG/ML
INJECTION, SOLUTION INTRAVENOUS PRN
Status: DISCONTINUED | OUTPATIENT
Start: 2024-12-15 | End: 2024-12-18 | Stop reason: HOSPADM

## 2024-12-15 RX ORDER — MAGNESIUM SULFATE 1 G/100ML
1000 INJECTION INTRAVENOUS ONCE
Status: COMPLETED | OUTPATIENT
Start: 2024-12-15 | End: 2024-12-15

## 2024-12-15 RX ORDER — ONDANSETRON 4 MG/1
4 TABLET, ORALLY DISINTEGRATING ORAL EVERY 8 HOURS PRN
Status: DISCONTINUED | OUTPATIENT
Start: 2024-12-15 | End: 2024-12-18 | Stop reason: HOSPADM

## 2024-12-15 RX ORDER — SODIUM CHLORIDE 0.9 % (FLUSH) 0.9 %
5-40 SYRINGE (ML) INJECTION EVERY 12 HOURS SCHEDULED
Status: DISCONTINUED | OUTPATIENT
Start: 2024-12-15 | End: 2024-12-18 | Stop reason: HOSPADM

## 2024-12-15 RX ORDER — IPRATROPIUM BROMIDE AND ALBUTEROL SULFATE 2.5; .5 MG/3ML; MG/3ML
1 SOLUTION RESPIRATORY (INHALATION) ONCE
Status: COMPLETED | OUTPATIENT
Start: 2024-12-15 | End: 2024-12-15

## 2024-12-15 RX ORDER — ACETAMINOPHEN 650 MG/1
650 SUPPOSITORY RECTAL EVERY 6 HOURS PRN
Status: DISCONTINUED | OUTPATIENT
Start: 2024-12-15 | End: 2024-12-18 | Stop reason: HOSPADM

## 2024-12-15 RX ORDER — IPRATROPIUM BROMIDE AND ALBUTEROL SULFATE 2.5; .5 MG/3ML; MG/3ML
1 SOLUTION RESPIRATORY (INHALATION)
Status: DISCONTINUED | OUTPATIENT
Start: 2024-12-15 | End: 2024-12-16

## 2024-12-15 RX ORDER — ACETAMINOPHEN 325 MG/1
650 TABLET ORAL EVERY 6 HOURS PRN
Status: DISCONTINUED | OUTPATIENT
Start: 2024-12-15 | End: 2024-12-18 | Stop reason: HOSPADM

## 2024-12-15 RX ORDER — POTASSIUM CHLORIDE 1500 MG/1
40 TABLET, EXTENDED RELEASE ORAL PRN
Status: DISCONTINUED | OUTPATIENT
Start: 2024-12-15 | End: 2024-12-18 | Stop reason: HOSPADM

## 2024-12-15 RX ORDER — DEXAMETHASONE SODIUM PHOSPHATE 10 MG/ML
6 INJECTION INTRAMUSCULAR; INTRAVENOUS ONCE
Status: COMPLETED | OUTPATIENT
Start: 2024-12-15 | End: 2024-12-15

## 2024-12-15 RX ORDER — ONDANSETRON 2 MG/ML
4 INJECTION INTRAMUSCULAR; INTRAVENOUS EVERY 6 HOURS PRN
Status: DISCONTINUED | OUTPATIENT
Start: 2024-12-15 | End: 2024-12-18 | Stop reason: HOSPADM

## 2024-12-15 RX ORDER — BUDESONIDE AND FORMOTEROL FUMARATE DIHYDRATE 160; 4.5 UG/1; UG/1
2 AEROSOL RESPIRATORY (INHALATION)
Status: DISCONTINUED | OUTPATIENT
Start: 2024-12-15 | End: 2024-12-18 | Stop reason: HOSPADM

## 2024-12-15 RX ORDER — POTASSIUM CHLORIDE 7.45 MG/ML
10 INJECTION INTRAVENOUS PRN
Status: DISCONTINUED | OUTPATIENT
Start: 2024-12-15 | End: 2024-12-18 | Stop reason: HOSPADM

## 2024-12-15 RX ORDER — NICOTINE 21 MG/24HR
1 PATCH, TRANSDERMAL 24 HOURS TRANSDERMAL DAILY
Status: DISCONTINUED | OUTPATIENT
Start: 2024-12-15 | End: 2024-12-18 | Stop reason: HOSPADM

## 2024-12-15 RX ORDER — IOPAMIDOL 755 MG/ML
75 INJECTION, SOLUTION INTRAVASCULAR
Status: COMPLETED | OUTPATIENT
Start: 2024-12-15 | End: 2024-12-15

## 2024-12-15 RX ORDER — SODIUM CHLORIDE 0.9 % (FLUSH) 0.9 %
5-40 SYRINGE (ML) INJECTION PRN
Status: DISCONTINUED | OUTPATIENT
Start: 2024-12-15 | End: 2024-12-18 | Stop reason: HOSPADM

## 2024-12-15 RX ORDER — IPRATROPIUM BROMIDE AND ALBUTEROL SULFATE 2.5; .5 MG/3ML; MG/3ML
1 SOLUTION RESPIRATORY (INHALATION)
Status: DISCONTINUED | OUTPATIENT
Start: 2024-12-15 | End: 2024-12-15

## 2024-12-15 RX ORDER — ENOXAPARIN SODIUM 100 MG/ML
30 INJECTION SUBCUTANEOUS DAILY
Status: DISCONTINUED | OUTPATIENT
Start: 2024-12-15 | End: 2024-12-18 | Stop reason: HOSPADM

## 2024-12-15 RX ORDER — POLYETHYLENE GLYCOL 3350 17 G/17G
17 POWDER, FOR SOLUTION ORAL DAILY PRN
Status: DISCONTINUED | OUTPATIENT
Start: 2024-12-15 | End: 2024-12-18 | Stop reason: HOSPADM

## 2024-12-15 RX ADMIN — IPRATROPIUM BROMIDE AND ALBUTEROL SULFATE 1 DOSE: 2.5; .5 SOLUTION RESPIRATORY (INHALATION) at 23:35

## 2024-12-15 RX ADMIN — MAGNESIUM SULFATE HEPTAHYDRATE 1000 MG: 1 INJECTION, SOLUTION INTRAVENOUS at 14:30

## 2024-12-15 RX ADMIN — IOPAMIDOL 75 ML: 755 INJECTION, SOLUTION INTRAVENOUS at 15:42

## 2024-12-15 RX ADMIN — IPRATROPIUM BROMIDE AND ALBUTEROL SULFATE 1 DOSE: 2.5; .5 SOLUTION RESPIRATORY (INHALATION) at 17:07

## 2024-12-15 RX ADMIN — IPRATROPIUM BROMIDE AND ALBUTEROL SULFATE 1 DOSE: 2.5; .5 SOLUTION RESPIRATORY (INHALATION) at 11:46

## 2024-12-15 RX ADMIN — IPRATROPIUM BROMIDE AND ALBUTEROL SULFATE 1 DOSE: 2.5; .5 SOLUTION RESPIRATORY (INHALATION) at 14:33

## 2024-12-15 RX ADMIN — Medication 2 PUFF: at 19:20

## 2024-12-15 RX ADMIN — SODIUM CHLORIDE, PRESERVATIVE FREE 10 ML: 5 INJECTION INTRAVENOUS at 19:34

## 2024-12-15 RX ADMIN — DEXAMETHASONE SODIUM PHOSPHATE 6 MG: 10 INJECTION INTRAMUSCULAR; INTRAVENOUS at 11:43

## 2024-12-15 RX ADMIN — IPRATROPIUM BROMIDE AND ALBUTEROL SULFATE 1 DOSE: 2.5; .5 SOLUTION RESPIRATORY (INHALATION) at 19:20

## 2024-12-15 RX ADMIN — ENOXAPARIN SODIUM 30 MG: 100 INJECTION SUBCUTANEOUS at 16:16

## 2024-12-15 ASSESSMENT — PAIN SCALES - GENERAL
PAINLEVEL_OUTOF10: 0
PAINLEVEL_OUTOF10: 0

## 2024-12-15 ASSESSMENT — LIFESTYLE VARIABLES
HOW MANY STANDARD DRINKS CONTAINING ALCOHOL DO YOU HAVE ON A TYPICAL DAY: PATIENT DOES NOT DRINK
HOW OFTEN DO YOU HAVE A DRINK CONTAINING ALCOHOL: NEVER
HOW OFTEN DO YOU HAVE A DRINK CONTAINING ALCOHOL: NEVER
HOW MANY STANDARD DRINKS CONTAINING ALCOHOL DO YOU HAVE ON A TYPICAL DAY: PATIENT DOES NOT DRINK

## 2024-12-15 ASSESSMENT — PAIN - FUNCTIONAL ASSESSMENT: PAIN_FUNCTIONAL_ASSESSMENT: NONE - DENIES PAIN

## 2024-12-15 NOTE — ED PROVIDER NOTES
EMERGENCY DEPARTMENT ENCOUNTER        Pt Name: Na Johnston  MRN: [id number]  Birthdate 1955  Date of evaluation: 12/15/2024  Provider: Arthur Brumfield MD  PCP: Burt Goyal MD      CHIEF COMPLAINT       Chief Complaint   Patient presents with    Shortness of Breath     SOB for 2 days, states it is worse at night. Hx of COPD. Denies CP.        HISTORY OFPRESENT ILLNESS   (Location/Symptom, Timing/Onset, Context/Setting, Quality, Duration, Modifying Factors,Severity)  Note limiting factors.     Na Johnston is a 69 y.o. female presenting today due to concern for shortness of breath for the last 2 days to the point where she is having significant trouble with breathing.  She denies any headache or sore throat or runny nose.  She denies any chest pain or abdominal pain.  No vomiting or diarrhea.  No urinary complaints.  No leg swelling or history of blood clots.  She denies any hemoptysis.  When I was in the room talking to her she did cough up yellow sputum.  She reports it has been quite a while since she felt this bad.  She tried albuterol at home but states that was not helping.  She has a history of COPD and still is smoking although states she has been unable to smoke for the past few days.  She denies any fever.  She reports somebody dropped her off to the emergency department.  Due to concern for increasing shortness of breath and trouble breathing, she came to the ED for further evaluation.        REVIEW OF SYSTEMS    (2-9 systems for level 4, 10 or more for level 5)     Review of Systems   Constitutional:  Positive for fatigue. Negative for chills and fever.   HENT:  Negative for congestion, sinus pain and sore throat.    Respiratory:  Positive for cough, shortness of breath and wheezing.    Cardiovascular:  Negative for chest pain and leg swelling.   Gastrointestinal:  Negative for abdominal pain, diarrhea and vomiting.   Genitourinary:  Negative for difficulty urinating and dysuria.

## 2024-12-15 NOTE — ED NOTES
Patient ambulated approximately 200 ft, Sp02 stayed above 90% on room air, -109 with ambulation. Patient reports she felt SOB with ambulation but better than she had the past 2 days.

## 2024-12-15 NOTE — RT PROTOCOL NOTE
RT Nebulizer Bronchodilator Protocol Note    There is a bronchodilator order in the chart from a provider indicating to follow the RT Bronchodilator Protocol and there is an “Initiate RT Bronchodilator Protocol” order as well (see protocol at bottom of note).    CXR Findings:  XR CHEST PORTABLE    Result Date: 12/15/2024  1. Hyperinflation of the lungs in keeping with COPD. 2. Right basal pleuroparenchymal scarring. 3. No confluent infiltrate.       The findings from the last RT Protocol Assessment were as follows:  Smoking: Chronic pulmonary disease  Respiratory Pattern: Use of accessory muscles, prolonged exhalation, or RR 26-30 bpm  Breath Sounds: Slightly diminished and/or crackles  Cough: Strong, spontaneous, non-productive  Indication for Bronchodilator Therapy: Decreased or absent breath sounds  Bronchodilator Assessment Score: 10    Aerosolized bronchodilator medication orders have been revised according to the RT Nebulizer Bronchodilator Protocol below.    Respiratory Therapist to perform RT Therapy Protocol Assessment initially then follow the protocol.  Repeat RT Therapy Protocol Assessment PRN for score 0-3 or on second treatment, BID, and PRN for scores above 3.    No Indications - adjust the frequency to every 6 hours PRN wheezing or bronchospasm, if no treatments needed after 48 hours then discontinue using Per Protocol order mode.     If indication present, adjust the RT bronchodilator orders based on the Bronchodilator Assessment Score as indicated below.  If a patient is on this medication at home then do not decrease Frequency below that used at home.    0-3 - enter or revise RT bronchodilator order(s) to equivalent RT Bronchodilator order with Frequency of every 4 hours PRN for wheezing or increased work of breathing using Per Protocol order mode.       4-6 - enter or revise RT Bronchodilator order(s) to two equivalent RT bronchodilator orders with one order with BID Frequency and one order with

## 2024-12-15 NOTE — H&P
retroperitoneum: No aortic aneurysm.  No retroperitoneal adenopathy Bones and soft tissues:  Spurring is seen in the spine     Motion limited.  No definite metastatic disease Persistent biliary ductal dilatation, similar to prior.  No retained common duct stone Large stool load in colon       PCP: Burt Goyal MD    Past Medical History:        Diagnosis Date    Arthritis     Asthma     Cancer (HCC)     lung/ right 1/3 removed    COPD (chronic obstructive pulmonary disease) (HCC)     Emphysema of lung (HCC)        Past Surgical History:        Procedure Laterality Date    BREAST BIOPSY      Right, was benign    COLONOSCOPY N/A 10/9/2018    COLONOSCOPY WITH BIOPSY performed by Guillermo Amor MD at [facility] ENDOSCOPY    COLONOSCOPY N/A 1/22/2024    COLONOSCOPY POLYPECTOMY BIOPSY performed by Chris Monique MD at [facility] ENDOSCOPY    CT NEEDLE BIOPSY LUNG PERCUTANEOUS  5/31/2022    CT NEEDLE BIOPSY LUNG PERCUTANEOUS 5/31/2022 Chandana Mojica [facility] CT SCAN    CYST REMOVAL      Behind ear    [facility] STEROTACTIC LOC BREAST BIOPSY LEFT Left 2/15/2023    [facility] STEROTACTIC LOC BREAST BIOPSY LEFT 2/15/2023 [facility]    THORACOSCOPY Right 7/14/2022    RIGHT VIDEO ASSISTED THORACOSCOPIC SURGERY, RIGHT UPPER LOBE LOBECTOMY, MEDIASTINAL LYMPHADENECTOMY AND INTERCOSTAL NERVE BLOCK performed by Jessica Pimentel MD at [facility] CVOR    TUBAL LIGATION      UPPER GASTROINTESTINAL ENDOSCOPY N/A 9/28/2023    EGD W/EUS FNA performed by Chris Monique MD at [facility] ENDOSCOPY    UPPER GASTROINTESTINAL ENDOSCOPY N/A 9/28/2023    EGD BIOPSY performed by Chris Monique MD at [facility] ENDOSCOPY       Medications Prior to Admission:   Prior to Admission medications    Medication Sig Start Date End Date Taking? Authorizing Provider   VENTOLIN  (90 Base) MCG/ACT inhaler INHALE 2 PUFFS BY MOUTH 4 TIMES A DAY AS NEEDED FOR WHEEZING 11/5/24  Yes Burt Goyal MD   [facility] AEROSPHERE 160-9-4.8 MCG/ACT AERO INHALE 2 PUFFS BY

## 2024-12-15 NOTE — ED NOTES
Na Johnston is a 69 y.o. female admitted for  Principal Problem:    COPD exacerbation (HCC)  Resolved Problems:    * No resolved hospital problems. *  .   Patient Home via self with   Chief Complaint   Patient presents with    Shortness of Breath     SOB for 2 days, states it is worse at night. Hx of COPD. Denies CP.    .  Patient is alert and Person, Place, Time, and Situation  Patient's baseline mobility: Baseline Mobility: Independent   Code Status: Full Code   Cardiac Rhythm:       Is patient on baseline Oxygen: no how many Liters    Abnormal Assessment Findings:     Isolation:       NIH Score:    C-SSRS: Risk of Suicide: No Risk  Bedside swallow:        Active LDA's:   Peripheral IV 12/15/24 Right Antecubital (Active)   Site Assessment Clean, dry & intact 12/15/24 1142   Line Status Blood return noted 12/15/24 1142           Family/Caregiver Present no Any Concerns: no   Restraints no  Sitter no         Vitals: MEWS Score: 2    Vitals:    12/15/24 1147 12/15/24 1227 12/15/24 1330 12/15/24 1433   BP:  107/69 133/75 120/86   Pulse: 82 81 88 90   Resp: 18 26 26 25   Temp:       TempSrc:       SpO2: 93% 93% 92% 95%   Weight:       Height:           Last documented pain score (0-10 scale)    Pain medication administered No.    Pertinent or High Risk Medications/Drips: No.    Pending Blood Product Administration: no    Abnormal labs:   Abnormal Labs Reviewed   CBC WITH AUTO DIFFERENTIAL - Abnormal; Notable for the following components:       Result Value    RBC 3.94 (*)     Lymphocytes Absolute 0.6 (*)     All other components within normal limits   COMPREHENSIVE METABOLIC PANEL - Abnormal; Notable for the following components:    Sodium 135 (*)     Chloride 98 (*)     Glucose 143 (*)     Albumin 3.3 (*)     Albumin/Globulin Ratio 0.9 (*)     ALT 9 (*)     All other components within normal limits   BRAIN NATRIURETIC PEPTIDE - Abnormal; Notable for the following components:    NT Pro- (*)     All other

## 2024-12-16 PROBLEM — R59.0 MEDIASTINAL LYMPHADENOPATHY: Status: ACTIVE | Noted: 2024-12-16

## 2024-12-16 PROBLEM — R91.1 PULMONARY NODULE: Status: ACTIVE | Noted: 2024-12-16

## 2024-12-16 LAB
ANION GAP SERPL CALCULATED.3IONS-SCNC: 9 MMOL/L (ref 3–16)
BASOPHILS # BLD: 0 K/UL (ref 0–0.2)
BASOPHILS NFR BLD: 0.3 %
BUN SERPL-MCNC: 21 MG/DL (ref 7–20)
CALCIUM SERPL-MCNC: 9.1 MG/DL (ref 8.3–10.6)
CHLORIDE SERPL-SCNC: 103 MMOL/L (ref 99–110)
CO2 SERPL-SCNC: 26 MMOL/L (ref 21–32)
CREAT SERPL-MCNC: 0.5 MG/DL (ref 0.6–1.2)
DEPRECATED RDW RBC AUTO: 13.2 % (ref 12.4–15.4)
EOSINOPHIL # BLD: 0 K/UL (ref 0–0.6)
EOSINOPHIL NFR BLD: 0.2 %
GFR SERPLBLD CREATININE-BSD FMLA CKD-EPI: >90 ML/MIN/{1.73_M2}
GLUCOSE SERPL-MCNC: 122 MG/DL (ref 70–99)
HCT VFR BLD AUTO: 36.5 % (ref 36–48)
HGB BLD-MCNC: 12.4 G/DL (ref 12–16)
LYMPHOCYTES # BLD: 0.5 K/UL (ref 1–5.1)
LYMPHOCYTES NFR BLD: 11.4 %
MCH RBC QN AUTO: 33.5 PG (ref 26–34)
MCHC RBC AUTO-ENTMCNC: 34 G/DL (ref 31–36)
MCV RBC AUTO: 98.5 FL (ref 80–100)
MONOCYTES # BLD: 0.5 K/UL (ref 0–1.3)
MONOCYTES NFR BLD: 11.9 %
NEUTROPHILS # BLD: 3.5 K/UL (ref 1.7–7.7)
NEUTROPHILS NFR BLD: 76.2 %
PLATELET # BLD AUTO: 193 K/UL (ref 135–450)
PMV BLD AUTO: 7.4 FL (ref 5–10.5)
POTASSIUM SERPL-SCNC: 4.1 MMOL/L (ref 3.5–5.1)
RBC # BLD AUTO: 3.71 M/UL (ref 4–5.2)
SODIUM SERPL-SCNC: 138 MMOL/L (ref 136–145)
WBC # BLD AUTO: 4.6 K/UL (ref 4–11)

## 2024-12-16 PROCEDURE — 94640 AIRWAY INHALATION TREATMENT: CPT

## 2024-12-16 PROCEDURE — 2580000003 HC RX 258: Performed by: INTERNAL MEDICINE

## 2024-12-16 PROCEDURE — 94761 N-INVAS EAR/PLS OXIMETRY MLT: CPT

## 2024-12-16 PROCEDURE — 6370000000 HC RX 637 (ALT 250 FOR IP): Performed by: NURSE PRACTITIONER

## 2024-12-16 PROCEDURE — 87305 ASPERGILLUS AG IA: CPT

## 2024-12-16 PROCEDURE — 2580000003 HC RX 258

## 2024-12-16 PROCEDURE — 87205 SMEAR GRAM STAIN: CPT

## 2024-12-16 PROCEDURE — 85025 COMPLETE CBC W/AUTO DIFF WBC: CPT

## 2024-12-16 PROCEDURE — 87449 NOS EACH ORGANISM AG IA: CPT

## 2024-12-16 PROCEDURE — 87070 CULTURE OTHR SPECIMN AEROBIC: CPT

## 2024-12-16 PROCEDURE — 87102 FUNGUS ISOLATION CULTURE: CPT

## 2024-12-16 PROCEDURE — 1200000000 HC SEMI PRIVATE

## 2024-12-16 PROCEDURE — 87633 RESP VIRUS 12-25 TARGETS: CPT

## 2024-12-16 PROCEDURE — 87185 SC STD ENZYME DETCJ PER NZM: CPT

## 2024-12-16 PROCEDURE — 2700000000 HC OXYGEN THERAPY PER DAY

## 2024-12-16 PROCEDURE — 99223 1ST HOSP IP/OBS HIGH 75: CPT | Performed by: INTERNAL MEDICINE

## 2024-12-16 PROCEDURE — 6370000000 HC RX 637 (ALT 250 FOR IP)

## 2024-12-16 PROCEDURE — 87077 CULTURE AEROBIC IDENTIFY: CPT

## 2024-12-16 PROCEDURE — 6370000000 HC RX 637 (ALT 250 FOR IP): Performed by: INTERNAL MEDICINE

## 2024-12-16 PROCEDURE — 87385 HISTOPLASMA CAPSUL AG IA: CPT

## 2024-12-16 PROCEDURE — 6360000002 HC RX W HCPCS: Performed by: INTERNAL MEDICINE

## 2024-12-16 PROCEDURE — 6360000002 HC RX W HCPCS

## 2024-12-16 PROCEDURE — 36415 COLL VENOUS BLD VENIPUNCTURE: CPT

## 2024-12-16 PROCEDURE — 80048 BASIC METABOLIC PNL TOTAL CA: CPT

## 2024-12-16 RX ORDER — ALBUTEROL SULFATE 90 UG/1
2 INHALANT RESPIRATORY (INHALATION) EVERY 6 HOURS PRN
Status: DISCONTINUED | OUTPATIENT
Start: 2024-12-16 | End: 2024-12-18 | Stop reason: HOSPADM

## 2024-12-16 RX ORDER — ACETYLCYSTEINE 200 MG/ML
600 SOLUTION ORAL; RESPIRATORY (INHALATION)
Status: DISCONTINUED | OUTPATIENT
Start: 2024-12-16 | End: 2024-12-18 | Stop reason: HOSPADM

## 2024-12-16 RX ORDER — IPRATROPIUM BROMIDE AND ALBUTEROL SULFATE 2.5; .5 MG/3ML; MG/3ML
1 SOLUTION RESPIRATORY (INHALATION)
Status: DISCONTINUED | OUTPATIENT
Start: 2024-12-16 | End: 2024-12-18 | Stop reason: HOSPADM

## 2024-12-16 RX ORDER — GUAIFENESIN 600 MG/1
600 TABLET, EXTENDED RELEASE ORAL 2 TIMES DAILY
Status: DISCONTINUED | OUTPATIENT
Start: 2024-12-16 | End: 2024-12-18 | Stop reason: HOSPADM

## 2024-12-16 RX ORDER — DOXYCYCLINE HYCLATE 100 MG
100 TABLET ORAL 2 TIMES DAILY
Status: DISCONTINUED | OUTPATIENT
Start: 2024-12-16 | End: 2024-12-18 | Stop reason: HOSPADM

## 2024-12-16 RX ORDER — BENZONATATE 100 MG/1
100 CAPSULE ORAL 3 TIMES DAILY PRN
Status: DISCONTINUED | OUTPATIENT
Start: 2024-12-16 | End: 2024-12-18 | Stop reason: HOSPADM

## 2024-12-16 RX ADMIN — WATER 40 MG: 1 INJECTION INTRAMUSCULAR; INTRAVENOUS; SUBCUTANEOUS at 08:34

## 2024-12-16 RX ADMIN — IPRATROPIUM BROMIDE AND ALBUTEROL SULFATE 1 DOSE: 2.5; .5 SOLUTION RESPIRATORY (INHALATION) at 08:09

## 2024-12-16 RX ADMIN — ENOXAPARIN SODIUM 30 MG: 100 INJECTION SUBCUTANEOUS at 08:34

## 2024-12-16 RX ADMIN — SODIUM CHLORIDE, PRESERVATIVE FREE 10 ML: 5 INJECTION INTRAVENOUS at 08:39

## 2024-12-16 RX ADMIN — DOXYCYCLINE HYCLATE 100 MG: 100 TABLET, COATED ORAL at 14:54

## 2024-12-16 RX ADMIN — DOXYCYCLINE HYCLATE 100 MG: 100 TABLET, COATED ORAL at 20:29

## 2024-12-16 RX ADMIN — IPRATROPIUM BROMIDE AND ALBUTEROL SULFATE 1 DOSE: 2.5; .5 SOLUTION RESPIRATORY (INHALATION) at 03:39

## 2024-12-16 RX ADMIN — SODIUM CHLORIDE, PRESERVATIVE FREE 10 ML: 5 INJECTION INTRAVENOUS at 20:29

## 2024-12-16 RX ADMIN — ACETYLCYSTEINE 600 MG: 200 INHALANT RESPIRATORY (INHALATION) at 20:45

## 2024-12-16 RX ADMIN — Medication 2 PUFF: at 08:10

## 2024-12-16 RX ADMIN — GUAIFENESIN 600 MG: 600 TABLET ORAL at 14:54

## 2024-12-16 RX ADMIN — WATER 1000 MG: 1 INJECTION INTRAMUSCULAR; INTRAVENOUS; SUBCUTANEOUS at 17:55

## 2024-12-16 RX ADMIN — IPRATROPIUM BROMIDE AND ALBUTEROL SULFATE 1 DOSE: 2.5; .5 SOLUTION RESPIRATORY (INHALATION) at 11:38

## 2024-12-16 RX ADMIN — IPRATROPIUM BROMIDE AND ALBUTEROL SULFATE 1 DOSE: 2.5; .5 SOLUTION RESPIRATORY (INHALATION) at 20:44

## 2024-12-16 RX ADMIN — IPRATROPIUM BROMIDE AND ALBUTEROL SULFATE 1 DOSE: 2.5; .5 SOLUTION RESPIRATORY (INHALATION) at 15:47

## 2024-12-16 RX ADMIN — WATER 40 MG: 1 INJECTION INTRAMUSCULAR; INTRAVENOUS; SUBCUTANEOUS at 20:28

## 2024-12-16 RX ADMIN — GUAIFENESIN 600 MG: 600 TABLET ORAL at 20:29

## 2024-12-16 RX ADMIN — Medication 2 PUFF: at 20:45

## 2024-12-16 NOTE — PLAN OF CARE
Problem: Discharge Planning  Goal: Discharge to home or other facility with appropriate resources  12/15/2024 2101 by Keyla Hope, RN  Outcome: Progressing       Problem: Pain  Goal: Verbalizes/displays adequate comfort level or baseline comfort level  12/15/2024 2101 by Keyla Hope, RN  Outcome: Progressing

## 2024-12-16 NOTE — CARE COORDINATION
Case Management Assessment  Initial Evaluation    Date/Time of Evaluation: 12/16/2024 10:02 AM  Assessment Completed by: Gisella Grant RN    If patient is discharged prior to next notation, then this note serves as note for discharge by case management.    Patient Name: Na Johnston                   YOB: 1955  Diagnosis: Acute respiratory distress [R06.03]  Hypomagnesemia [E83.42]  COPD exacerbation (HCC) [J44.1]                   Date / Time: 12/15/2024 11:11 AM    Patient Admission Status: Inpatient   Readmission Risk (Low < 19, Mod (19-27), High > 27): Readmission Risk Score: 12.1    Current PCP: Burt Goyal MD  PCP verified by CM? Yes    Chart Reviewed: Yes      History Provided by: Patient  Patient Orientation: Alert and Oriented    Patient Cognition: Alert    Hospitalization in the last 30 days (Readmission):  Yes    If yes, Readmission Assessment in  Navigator will be completed.    Advance Directives:      Code Status: Full Code   Patient's Primary Decision Maker is: Patient Declined (Legal Next of Kin Remains as Decision Maker)    Primary Decision Maker: marisolaviva - Child - [phone]    Discharge Planning:    Patient lives with: Family Members Type of Home: Apartment  Primary Care Giver: Self  Patient Support Systems include: Family Members   Current Financial resources: Medicare  Current community resources: None  Current services prior to admission: None            Current DME:              Type of Home Care services:  None    ADLS  Prior functional level: Independent in ADLs/IADLs  Current functional level: Independent in ADLs/IADLs    PT AM-PAC:   /24  OT AM-PAC:   /24    Family can provide assistance at DC: Yes  Would you like Case Management to discuss the discharge plan with any other family members/significant others, and if so, who? No  Plans to Return to Present Housing: Yes  Other Identified Issues/Barriers to RETURNING to current housing: none  Potential

## 2024-12-16 NOTE — CARE COORDINATION
Advance Care Planning   Advance Care Planning Clinical Specialist  Conversation Note      Date of ACP Conversation: 12/16/2024    Conversation Conducted with:   Patient with Decision Making Capacity   Healthcare Decision Maker: Next of Kin by law (only applies in absence of above) daughter Aviva as below    ACP Clinical Specialist: Gisella Grant, RN      *When Decision Maker makes decisions on behalf of the incapacitated patient: Decision Maker is asked to consider and make decisions based on patient values, known preferences, or best interests.       Current Designated Health Care Decision Maker:   Primary Decision Maker: aviva damon - Child - [phone]  (as entered in ACP Activity Healthcare Decision Maker field.  Validate  this information as still accurate & up-to-date; edit Healthcare Decision Maker field as needed.)       Hospitalization  If your health were to worsen and it became clear that your chance of recovery was unlikely, what would your preferences be regarding hospitalization?:    Choice:  [x]  The patient would want hospitalization  []  The patient would prefer comfort-focused treatment without hospitalization.    Ventilation  If you were in your present state of health and suddenly became very ill and were unable to breathe on your own, what would your preference be about the use of a ventilator (breathing machine) if it were available to you?      If patient would desire the use of a ventilator (breathing machine), answer \"yes\", if not \"no\":yes    If your health were to worsen and it became clear that your chance of recovery was unlikely, would that change your answer? Yes    Resuscitation  CPR works best to restart the heart when there is a sudden event, like a heart attack, in someone who is otherwise healthy. Unfortunately, CPR does not typically restart the heart for people who have serious health conditions or who are very sick.    In the event your heart stopped, would you want attempts

## 2024-12-16 NOTE — PLAN OF CARE
Problem: Discharge Planning  Goal: Discharge to home or other facility with appropriate resources  12/16/2024 0955 by Margarita Matamoros, RN  Outcome: Progressing     Problem: Pain  Goal: Verbalizes/displays adequate comfort level or baseline comfort level  12/16/2024 0955 by Margarita Matamoros, RN  Outcome: Progressing

## 2024-12-16 NOTE — CONSULTS
PULMONARY AND CRITICAL CARE INPATIENT NOTE        Na Johnston   : 1955  MRN: [id number]     Admitting Physician: Tima Haro DO  Attending Physician: Brit Seay MD  PCP: Burt Goyal MD    Admission: 12/15/2024   Date of Service: 2024    Chief Complaint   Patient presents with    Shortness of Breath     SOB for 2 days, states it is worse at night. Hx of COPD. Denies CP.            ASSESSMENT & PLAN       69 y.o. pleasant  female patient with:    Assessment:  AECOPD.  On Breztri at baseline with albuterol  Bilateral tree-in-bud nodular opacities/bronchiolitis, mucous plugging.  Exposure to unclean old mask and tubing of nebulizer before getting sick.  Sick grand grandson exposure.  Atypical pneumonia  Tobacco dependence  Pulmonary cachexia  RUL AdenoCA.  Status post VATS, RUL resection with mediastinal lymph node dissection 2022 on surveillance      Plan:              Continue antimicrobials  Follow-up on micro work-up including fungal serology and antigens   Continue systemic steroids  Continue Symbicort with DuoNebs  Smoking cessation counseling provided.  No interest in nicotine replacement currently.  Bronchial hygiene measures  Aspiration precautions  Target blood sugar between 140 and 180 mg/dL  DVT ppx measures.  PT/OT when patient is able to participate  Continue to wean oxygen with target 90 to 94%.    LOS: Hospital Day: 2     Code:Full Code          Subjective/Objective           CC/Reason for Consult: copd exac, worsening dyspnea         HPI: 2024  69-year-old female patient with PMH of moderate COPD, lung cancer status post right VATS with RUL lobectomy and mediastinal lymphadenectomy in 2022, tobacco dependence who follows in the pulmonary clinic with Dr. Lugo on Breztri, albuterol who was admitted on December 15 with worsening shortness of breath over the last 4 days prior to admission with cough and chest congestion and mucus production.  Patient

## 2024-12-17 ENCOUNTER — TELEPHONE (OUTPATIENT)
Dept: PULMONOLOGY | Age: 69
End: 2024-12-17

## 2024-12-17 DIAGNOSIS — I26.99 PULMONARY EMBOLISM, UNSPECIFIED CHRONICITY, UNSPECIFIED PULMONARY EMBOLISM TYPE, UNSPECIFIED WHETHER ACUTE COR PULMONALE PRESENT (HCC): Primary | ICD-10-CM

## 2024-12-17 LAB
EKG ATRIAL RATE: 74 BPM
EKG DIAGNOSIS: NORMAL
EKG P AXIS: -86 DEGREES
EKG P-R INTERVAL: 130 MS
EKG Q-T INTERVAL: 386 MS
EKG QRS DURATION: 88 MS
EKG QTC CALCULATION (BAZETT): 428 MS
EKG R AXIS: 64 DEGREES
EKG T AXIS: 84 DEGREES
EKG VENTRICULAR RATE: 74 BPM
LEGIONELLA AG UR QL: NORMAL
LOEFFLER MB STN SPEC: NORMAL
ORGANISM: ABNORMAL
REPORT: NORMAL
RESP PATH DNA+RNA PNL L RESP NAA+NON-PRB: ABNORMAL
S PNEUM AG UR QL: NORMAL

## 2024-12-17 PROCEDURE — 2580000003 HC RX 258

## 2024-12-17 PROCEDURE — 86606 ASPERGILLUS ANTIBODY: CPT

## 2024-12-17 PROCEDURE — 2500000003 HC RX 250 WO HCPCS

## 2024-12-17 PROCEDURE — 6370000000 HC RX 637 (ALT 250 FOR IP): Performed by: INTERNAL MEDICINE

## 2024-12-17 PROCEDURE — 36415 COLL VENOUS BLD VENIPUNCTURE: CPT

## 2024-12-17 PROCEDURE — 86698 HISTOPLASMA ANTIBODY: CPT

## 2024-12-17 PROCEDURE — 6360000002 HC RX W HCPCS

## 2024-12-17 PROCEDURE — 1200000000 HC SEMI PRIVATE

## 2024-12-17 PROCEDURE — 99232 SBSQ HOSP IP/OBS MODERATE 35: CPT | Performed by: INTERNAL MEDICINE

## 2024-12-17 PROCEDURE — 86635 COCCIDIOIDES ANTIBODY: CPT

## 2024-12-17 PROCEDURE — 6370000000 HC RX 637 (ALT 250 FOR IP): Performed by: NURSE PRACTITIONER

## 2024-12-17 PROCEDURE — 94669 MECHANICAL CHEST WALL OSCILL: CPT

## 2024-12-17 PROCEDURE — 93010 ELECTROCARDIOGRAM REPORT: CPT | Performed by: INTERNAL MEDICINE

## 2024-12-17 PROCEDURE — 86612 BLASTOMYCES ANTIBODY: CPT

## 2024-12-17 PROCEDURE — 94640 AIRWAY INHALATION TREATMENT: CPT

## 2024-12-17 PROCEDURE — 93005 ELECTROCARDIOGRAM TRACING: CPT | Performed by: NURSE PRACTITIONER

## 2024-12-17 PROCEDURE — 87449 NOS EACH ORGANISM AG IA: CPT

## 2024-12-17 RX ORDER — PREDNISONE 20 MG/1
40 TABLET ORAL DAILY
Status: DISCONTINUED | OUTPATIENT
Start: 2024-12-17 | End: 2024-12-18 | Stop reason: HOSPADM

## 2024-12-17 RX ORDER — LEVOFLOXACIN 500 MG/1
500 TABLET, FILM COATED ORAL DAILY
Status: DISCONTINUED | OUTPATIENT
Start: 2024-12-17 | End: 2024-12-18 | Stop reason: HOSPADM

## 2024-12-17 RX ORDER — MECOBALAMIN 5000 MCG
10 TABLET,DISINTEGRATING ORAL NIGHTLY
Status: DISCONTINUED | OUTPATIENT
Start: 2024-12-17 | End: 2024-12-18 | Stop reason: HOSPADM

## 2024-12-17 RX ADMIN — IPRATROPIUM BROMIDE AND ALBUTEROL SULFATE 1 DOSE: 2.5; .5 SOLUTION RESPIRATORY (INHALATION) at 07:24

## 2024-12-17 RX ADMIN — PREDNISONE 40 MG: 20 TABLET ORAL at 09:17

## 2024-12-17 RX ADMIN — IPRATROPIUM BROMIDE AND ALBUTEROL SULFATE 1 DOSE: 2.5; .5 SOLUTION RESPIRATORY (INHALATION) at 16:02

## 2024-12-17 RX ADMIN — Medication 2 PUFF: at 07:25

## 2024-12-17 RX ADMIN — GUAIFENESIN 600 MG: 600 TABLET ORAL at 20:45

## 2024-12-17 RX ADMIN — SODIUM CHLORIDE, PRESERVATIVE FREE 10 ML: 5 INJECTION INTRAVENOUS at 20:51

## 2024-12-17 RX ADMIN — GUAIFENESIN 600 MG: 600 TABLET ORAL at 09:17

## 2024-12-17 RX ADMIN — Medication 10 MG: at 20:45

## 2024-12-17 RX ADMIN — IPRATROPIUM BROMIDE AND ALBUTEROL SULFATE 1 DOSE: 2.5; .5 SOLUTION RESPIRATORY (INHALATION) at 00:02

## 2024-12-17 RX ADMIN — ACETYLCYSTEINE 600 MG: 200 INHALANT RESPIRATORY (INHALATION) at 20:24

## 2024-12-17 RX ADMIN — ENOXAPARIN SODIUM 30 MG: 100 INJECTION SUBCUTANEOUS at 09:18

## 2024-12-17 RX ADMIN — DOXYCYCLINE HYCLATE 100 MG: 100 TABLET, COATED ORAL at 09:18

## 2024-12-17 RX ADMIN — IPRATROPIUM BROMIDE AND ALBUTEROL SULFATE 1 DOSE: 2.5; .5 SOLUTION RESPIRATORY (INHALATION) at 03:47

## 2024-12-17 RX ADMIN — BENZONATATE 100 MG: 100 CAPSULE ORAL at 20:51

## 2024-12-17 RX ADMIN — SODIUM CHLORIDE, PRESERVATIVE FREE 10 ML: 5 INJECTION INTRAVENOUS at 09:55

## 2024-12-17 RX ADMIN — ACETYLCYSTEINE 600 MG: 200 INHALANT RESPIRATORY (INHALATION) at 07:24

## 2024-12-17 RX ADMIN — IPRATROPIUM BROMIDE AND ALBUTEROL SULFATE 1 DOSE: 2.5; .5 SOLUTION RESPIRATORY (INHALATION) at 11:17

## 2024-12-17 RX ADMIN — LEVOFLOXACIN 500 MG: 500 TABLET, FILM COATED ORAL at 09:18

## 2024-12-17 RX ADMIN — IPRATROPIUM BROMIDE AND ALBUTEROL SULFATE 1 DOSE: 2.5; .5 SOLUTION RESPIRATORY (INHALATION) at 20:24

## 2024-12-17 RX ADMIN — DOXYCYCLINE HYCLATE 100 MG: 100 TABLET, COATED ORAL at 20:45

## 2024-12-17 RX ADMIN — Medication 2 PUFF: at 20:26

## 2024-12-17 ASSESSMENT — PAIN SCALES - WONG BAKER: WONGBAKER_NUMERICALRESPONSE: NO HURT

## 2024-12-17 ASSESSMENT — PAIN SCALES - GENERAL
PAINLEVEL_OUTOF10: 0

## 2024-12-17 NOTE — CARE COORDINATION
CM update; LOS #2; Followed by IM, Pulmonology. [facility] Patient to continue with Inhalers and antimicrobials. Discharge 1-2 days. No needs at this time. Will follow.Michelle Peña RN

## 2024-12-17 NOTE — TELEPHONE ENCOUNTER
----- Message from Dr. Boubacar Camargo MD sent at 12/17/2024  8:22 AM EST -----  · Follow-up in the pulmonary clinic in 3 months with CT chest without contrast to follow-up on the abnormal CT scan findings

## 2024-12-17 NOTE — PLAN OF CARE
Problem: Discharge Planning  Goal: Discharge to home or other facility with appropriate resources  12/17/2024 1148 by Margarita Matamoros, RN  Outcome: Progressing     Problem: Pain  Goal: Verbalizes/displays adequate comfort level or baseline comfort level  12/17/2024 1148 by Margarita Matamoros, RN  Outcome: Progressing     Problem: Nutrition Deficit:  Goal: Optimize nutritional status  12/17/2024 1148 by Margarita Matamoros, RN  Outcome: Progressing

## 2024-12-17 NOTE — PLAN OF CARE
Problem: Discharge Planning  Goal: Discharge to home or other facility with appropriate resources  12/16/2024 2232 by Keyla Hope, RN  Outcome: Progressing     Problem: Pain  Goal: Verbalizes/displays adequate comfort level or baseline comfort level  12/16/2024 2232 by Keyla Hope, RN  Outcome: Progressing  Problem: Nutrition Deficit:  Goal: Optimize nutritional status  Outcome: Progressing

## 2024-12-18 VITALS
BODY MASS INDEX: 18.1 KG/M2 | SYSTOLIC BLOOD PRESSURE: 144 MMHG | TEMPERATURE: 98.7 F | WEIGHT: 106 LBS | DIASTOLIC BLOOD PRESSURE: 88 MMHG | OXYGEN SATURATION: 94 % | RESPIRATION RATE: 22 BRPM | HEIGHT: 64 IN | HEART RATE: 88 BPM

## 2024-12-18 LAB
BACTERIA SPEC RESP CULT: ABNORMAL
GRAM STN SPEC: ABNORMAL
GRAM STN SPEC: ABNORMAL
H CAPSUL AG UR IA-ACNC: NOT DETECTED NG/ML
H CAPSUL AG UR QL IA: NOT DETECTED
ORGANISM: ABNORMAL
ORGANISM: ABNORMAL

## 2024-12-18 PROCEDURE — 94669 MECHANICAL CHEST WALL OSCILL: CPT

## 2024-12-18 PROCEDURE — 6360000002 HC RX W HCPCS

## 2024-12-18 PROCEDURE — 94640 AIRWAY INHALATION TREATMENT: CPT

## 2024-12-18 PROCEDURE — 6370000000 HC RX 637 (ALT 250 FOR IP): Performed by: INTERNAL MEDICINE

## 2024-12-18 PROCEDURE — 2500000003 HC RX 250 WO HCPCS

## 2024-12-18 PROCEDURE — 6370000000 HC RX 637 (ALT 250 FOR IP): Performed by: NURSE PRACTITIONER

## 2024-12-18 RX ORDER — PREDNISONE 20 MG/1
40 TABLET ORAL DAILY
Qty: 6 TABLET | Refills: 0 | Status: SHIPPED | OUTPATIENT
Start: 2024-12-18 | End: 2024-12-21

## 2024-12-18 RX ORDER — BENZONATATE 100 MG/1
100 CAPSULE ORAL 3 TIMES DAILY PRN
Qty: 30 CAPSULE | Refills: 0 | Status: SHIPPED | OUTPATIENT
Start: 2024-12-18 | End: 2024-12-28

## 2024-12-18 RX ORDER — DOXYCYCLINE HYCLATE 100 MG
100 TABLET ORAL 2 TIMES DAILY
Qty: 10 TABLET | Refills: 0 | Status: SHIPPED | OUTPATIENT
Start: 2024-12-18 | End: 2024-12-23

## 2024-12-18 RX ORDER — GUAIFENESIN 600 MG/1
600 TABLET, EXTENDED RELEASE ORAL 2 TIMES DAILY
Qty: 20 TABLET | Refills: 0 | Status: SHIPPED | OUTPATIENT
Start: 2024-12-18

## 2024-12-18 RX ORDER — LEVOFLOXACIN 500 MG/1
500 TABLET, FILM COATED ORAL DAILY
Qty: 5 TABLET | Refills: 0 | Status: SHIPPED | OUTPATIENT
Start: 2024-12-18 | End: 2024-12-23

## 2024-12-18 RX ADMIN — IPRATROPIUM BROMIDE AND ALBUTEROL SULFATE 1 DOSE: 2.5; .5 SOLUTION RESPIRATORY (INHALATION) at 07:44

## 2024-12-18 RX ADMIN — ACETYLCYSTEINE 600 MG: 200 INHALANT RESPIRATORY (INHALATION) at 07:44

## 2024-12-18 RX ADMIN — Medication 2 PUFF: at 07:46

## 2024-12-18 RX ADMIN — BENZONATATE 100 MG: 100 CAPSULE ORAL at 09:44

## 2024-12-18 RX ADMIN — LEVOFLOXACIN 500 MG: 500 TABLET, FILM COATED ORAL at 09:37

## 2024-12-18 RX ADMIN — GUAIFENESIN 600 MG: 600 TABLET ORAL at 09:37

## 2024-12-18 RX ADMIN — DOXYCYCLINE HYCLATE 100 MG: 100 TABLET, COATED ORAL at 09:37

## 2024-12-18 RX ADMIN — IPRATROPIUM BROMIDE AND ALBUTEROL SULFATE 1 DOSE: 2.5; .5 SOLUTION RESPIRATORY (INHALATION) at 00:18

## 2024-12-18 RX ADMIN — SODIUM CHLORIDE, PRESERVATIVE FREE 10 ML: 5 INJECTION INTRAVENOUS at 09:38

## 2024-12-18 RX ADMIN — IPRATROPIUM BROMIDE AND ALBUTEROL SULFATE 1 DOSE: 2.5; .5 SOLUTION RESPIRATORY (INHALATION) at 03:23

## 2024-12-18 RX ADMIN — PREDNISONE 40 MG: 20 TABLET ORAL at 09:37

## 2024-12-18 RX ADMIN — ENOXAPARIN SODIUM 30 MG: 100 INJECTION SUBCUTANEOUS at 09:38

## 2024-12-18 ASSESSMENT — PAIN SCALES - WONG BAKER: WONGBAKER_NUMERICALRESPONSE: NO HURT

## 2024-12-18 ASSESSMENT — PAIN SCALES - GENERAL: PAINLEVEL_OUTOF10: 0

## 2024-12-18 NOTE — PLAN OF CARE
Problem: Discharge Planning  Goal: Discharge to home or other facility with appropriate resources  12/17/2024 2031 by Christie Hassan, RN  Outcome: Progressing  Flowsheets (Taken 12/17/2024 1649)  Discharge to home or other facility with appropriate resources:   Identify barriers to discharge with patient and caregiver   Arrange for needed discharge resources and transportation as appropriate   Identify discharge learning needs (meds, wound care, etc)  12/17/2024 1148 by Margarita Matamoros, RN  Outcome: Progressing     Problem: Pain  Goal: Verbalizes/displays adequate comfort level or baseline comfort level  12/17/2024 2031 by Christie Hassan, RN  Outcome: Progressing  Flowsheets (Taken 12/17/2024 2015)  Verbalizes/displays adequate comfort level or baseline comfort level:   Assess pain using appropriate pain scale   Encourage patient to monitor pain and request assistance   Administer analgesics based on type and severity of pain and evaluate response   Implement non-pharmacological measures as appropriate and evaluate response  12/17/2024 1148 by Margarita Matamoros, RN  Outcome: Progressing     Problem: Nutrition Deficit:  Goal: Optimize nutritional status  12/17/2024 2031 by Christie Hassan, RN  Outcome: Progressing  12/17/2024 1148 by Margarita Matamoros, RN  Outcome: Progressing

## 2024-12-18 NOTE — CARE COORDINATION
CASE MANAGEMENT DISCHARGE SUMMARY      Discharge to: home with family   :     IMM given:12/18/24      Patient: yes only concern is that she needs new tubing for nebulizer at home. Family is to stop at provider on way home to  new tubing.         Note: Discharging nurse to complete JOSE DANIEL, reconcile AVS, and place final copy with patient's discharge packet. RN to ensure that written prescriptions for  Level II medications are sent with patient to the facility as per protocol.    Gisella Grant, RN

## 2024-12-18 NOTE — PROGRESS NOTES
[facility] Medicine Progress Note  V 10.25      Date of Admission: 12/15/2024    Hospital Day: 3      Chief Admission Complaint:    Chief Complaint   Patient presents with    Shortness of Breath     SOB for 2 days, states it is worse at night. Hx of COPD. Denies CP.      Subjective:    Molecular pneumonia panel with haemophilus influenzae, moraxella catarrhalis, pseudomonas aeruginosa, MRSA, streptococcus pneumoniae, and adenovirus.     Pt currently on doxy and levaquin per pulm recs. She is feeling better, but still having dyspnea, especially with exertion. Will monitor overnight.      Presenting Admission History:       69 y.o. female who presented to [facility] with shortness of breath.  PMHx significant for moderate COPD on Breztri Aerosphere, lung cancer s/p resection 2022, tobacco use.     States her shortness of breath started worsening about 4 days ago.  Was endorsing sinus congestion as well as cough with production of a yellow sputum.  She has not cough or produce sputum at baseline.  Denies any fevers or chills.  Has been using her Breztri twice daily as well as her home DuoNeb nebulizations 4 times a day.  Was not having any significant improvement in her symptoms today, so presents for evaluation.  She does still smoke cigarettes about 5 cigarettes/day, but has not over the past 2 days.  Lives with her sister-in-law, denies any recent sick exposure.    Assessment/Plan:      Current Principal Problem:  COPD exacerbation (HCC)    AECOPD with dyspnea, wheezing, and productive cough. Suspect likely triggered by viral upper respiratory infection. Possible atypical pneumonia noted on CT chest.  - CT chest with no evidence of PE. Multiple micro nodules, can be seen with atypical pneumonia. Mildly prominent mediastinal lymph nodes.  - Flu and covid negative  - Prednisone x 5 days per pulm recs  - Levaquin + doxy   - Continue symbicort  - Duonebs, mucinex, tessalon PRN  - Pulmonology consulted, appreciate 
  [facility] Medicine Progress Note  V 10.25      Date of Admission: 12/15/2024    Hospital Day: 2      Chief Admission Complaint:    Chief Complaint   Patient presents with    Shortness of Breath     SOB for 2 days, states it is worse at night. Hx of COPD. Denies CP.      Subjective:    Pt sitting in the bed, dyspneic during my visit. Expiratory wheezes heard in the bases bilaterally. She tells me that she does not feel any better. She is now off oxygen however and is no longer hypoxic.     Presenting Admission History:       69 y.o. female who presented to [facility] with shortness of breath.  PMHx significant for moderate COPD on Breztri Aerosphere, lung cancer s/p resection 2022, tobacco use.     States her shortness of breath started worsening about 4 days ago.  Was endorsing sinus congestion as well as cough with production of a yellow sputum.  She has not cough or produce sputum at baseline.  Denies any fevers or chills.  Has been using her Breztri twice daily as well as her home DuoNeb nebulizations 4 times a day.  Was not having any significant improvement in her symptoms today, so presents for evaluation.  She does still smoke cigarettes about 5 cigarettes/day, but has not over the past 2 days.  Lives with her sister-in-law, denies any recent sick exposure.    Assessment/Plan:      Current Principal Problem:  COPD exacerbation (HCC)    AECOPD with dyspnea, wheezing, and productive cough. Suspect likely triggered by viral upper respiratory infection. Possible atypical pneumonia noted on CT chest.  - CT chest with no evidence of PE. Multiple micro nodules, can be seen with atypical pneumonia. Mildly prominent mediastinal lymph nodes.  - Flu and covid negative  - IV solumedrol BID  - Continue symbicort  - Duonebs, mucinex, tessalon PRN, and doxy BID  - Pulmonology consulted after discussion with the patient    Tobacco abuse disorder. Current 1/3 pack a day smoker.  - Tobacco cessation education  - Nicotine 
4 Eyes Skin Assessment     The patient is being assess for  Admission    I agree that 2 RN's have performed a thorough Head to Toe Skin Assessment on the patient. ALL assessment sites listed below have been assessed.       Areas assessed by both nurses: JACQUELINE Maravilla and JACQUELINE Schmitz  [x]   Head, Face, and Ears   [x]   Shoulders, Back, and Chest  [x]   Arms, Elbows, and Hands   [x]   Coccyx, Sacrum, and Ischum  [x]   Legs, Feet, and Heels        Does the Patient have Skin Breakdown?  No         Federico Prevention initiated:  NA   Wound Care Orders initiated:  NA      [facility] nurse consulted for Pressure Injury (Stage 3,4, Unstageable, DTI, NWPT, and Complex wounds):  NA      Nurse 1 eSignature: Electronically signed by Renita Overton RN on 12/15/24 at 4:42 PM EST      Nurse 2 eSignature: Electronically signed by Ainsley Flowers RN on 12/15/24 at 5:28 PM EST            
Pt admitted to rm 103 from ED at this time. Pt orientated to rm and use of call light. Assessment completed. VSS. Pt with c/o sob but states that it is feeling better after second breathing tx. Currently on room air with O2 sat of 94%.   
Pt d/c'd home with family.  Removed peripheral IV and stopped bleeding.  Catheter intact. Pt tolerated well. No redness noted at site.  Notified CMU and removed tele box. Reviewed d/c instructions, home meds, and  f/u information utilizing teach-back method. Tessalon Pearls, Vibra-Tabs, Mucinex, Levaquin, and Prednisone sent to [facility] and picked up and given to patient. Patient verbalized understanding. PCT wheeled patient to personal family vehicle. Electronically signed by Montserrat Samuel RN on 12/18/2024 at 12:42 PM     
120 lbs (55 kg)    Admission Body Weight: 48.1 kg (106 lb 0.7 oz) (standing scale)  Current Body Weight: 48.1 kg (106 lb 0.7 oz), 88.4 % IBW. Weight Source: Standing scale  Current BMI (kg/m2): 18.2  Usual Body Weight: 49.9 kg (110 lb) (per pt)  % Weight Change (Calculated): -3.6  Weight Adjustment For: No Adjustment  BMI Categories: Underweight (BMI less than 22) age over 65    Estimated Daily Nutrient Needs:  Energy Requirements Based On: Kcal/kg  Weight Used for Energy Requirements: Current  Energy (kcal/day): 1,443-1,684 (30-35 kcal/kg CBW)  Weight Used for Protein Requirements: Current  Protein (g/day): 48-58 (1-1.2 g/kg CBW)  Method Used for Fluid Requirements: 1 ml/kcal  Fluid (ml/day): 1,443-1,684    Nutrition Diagnosis:   Inadequate oral intake related to early satiety as evidenced by BMI, intake 0-25%    Nutrition Interventions:   Food and/or Nutrient Delivery: Continue Current Diet  Nutrition Education/Counseling: No recommendation at this time  Coordination of Nutrition Care: Continue to monitor while inpatient     Goals:  Goals: PO intake 50% or greater, within 7 days  Type of Goal: New goal     Nutrition Monitoring and Evaluation:   Behavioral-Environmental Outcomes: None Identified  Food/Nutrient Intake Outcomes: Food and Nutrient Intake  Physical Signs/Symptoms Outcomes: Weight, Biochemical Data, Constipation, Meal Time Behavior    Discharge Planning:    Continue current diet     Blanca Elkins RD  Contact: [phone]    
dyspnea         HPI: 12/16/2024  69-year-old female patient with PMH of moderate COPD, lung cancer status post right VATS with RUL lobectomy and mediastinal lymphadenectomy in July 2022, tobacco dependence who follows in the pulmonary clinic with Dr. Lugo on Breztri, albuterol who was admitted on December 15 with worsening shortness of breath over the last 4 days prior to admission with cough and chest congestion and mucus production.  Patient continues to smoke 5 cigarettes a day.    Patient required 2 L of oxygen while she is not on oxygen at home  No fever recorded  She was started on Symbicort, Solu-Medrol, DuoNebs, doxycycline initial viral screen was negative  CBC and chemistry unremarkable had CT chest showed no PE but multiple micronodules with mildly prominent mediastinal adenopathy      Interval History: Encounter 12/17/2024  Patient remained afebrile and hemodynamically stable and weaned off oxygen over last 24 hours  I's and O's was active 930  She remains on Mucomyst, Symbicort, ceftriaxone, doxycycline, enoxaparin prophylaxis, DuoNebs, Solu-Medrol  Blood sugar remains within range  Respiratory molecular panel positive for Moraxella, haemophilus influenza, Pseudomonas, Staph aureus, strep pneumonia, adenovirus  No CBC or chemistry from today  No new chest imaging        Objective    Test Results:  Imaging:  I have reviewed radiology images personally.    CT CHEST PULMONARY EMBOLISM W CONTRAST    Result Date: 12/15/2024  1. No evidence of pulmonary embolism. 2. Multiple micro nodules.  This can be seen with an atypical pneumonia. Follow-up chest CT in 3-6 months is recommended 3. Mildly prominent mediastinal lymph nodes.     XR CHEST PORTABLE    Result Date: 12/15/2024  1. Hyperinflation of the lungs in keeping with COPD. 2. Right basal pleuroparenchymal scarring. 3. No confluent infiltrate.          PFTS:        Cardiology:      Sleep:        Physical Exam:  General appearance: In no apparent

## 2024-12-18 NOTE — PLAN OF CARE
Problem: Discharge Planning  Goal: Discharge to home or other facility with appropriate resources  12/18/2024 1008 by Montserrat Samuel RN  Outcome: Progressing    Problem: Pain  Goal: Verbalizes/displays adequate comfort level or baseline comfort level  12/18/2024 1008 by Montserrat Samuel RN  Outcome: Progressing    Problem: Nutrition Deficit:  Goal: Optimize nutritional status  12/18/2024 1008 by Montserrat Samuel RN  Outcome: Progressing

## 2024-12-18 NOTE — CARE COORDINATION
Follow-Up copy of Important Message from Medicare (IMM2) has been explained to patient and/or designated healthcare decision maker (HCDM). Pt and/or HCDM aware that patient is permitted to stay an additional 4 hours prior to discharge to consider an appeal if they feel as though they are being discharged too soon. Patient may discharge as planned if chooses to do so.  Patient/HCDM voice no other concerns or questions regarding this process.      Gisella Grant, RN

## 2024-12-18 NOTE — DISCHARGE SUMMARY
retroperitoneal adenopathy Bones and soft tissues:  Spurring is seen in the spine     Motion limited.  No definite metastatic disease Persistent biliary ductal dilatation, similar to prior.  No retained common duct stone Large stool load in colon       Consults:     IP CONSULT TO HOSPITALIST  IP CONSULT TO PULMONOLOGY    Labs:     Recent Labs     12/16/24  0617   WBC 4.6   HGB 12.4   HCT 36.5        Recent Labs     12/16/24  0617      K 4.1      CO2 26   BUN 21*   CREATININE 0.5*   CALCIUM 9.1     No results for input(s): \"PROBNP\", \"TROPHS\" in the last 72 hours.    No results for input(s): \"LABA1C\" in the last 72 hours.  No results for input(s): \"AST\", \"ALT\", \"BILIDIR\", \"BILITOT\", \"ALKPHOS\" in the last 72 hours.    No results for input(s): \"INR\", \"LACTA\", \"TSH\" in the last 72 hours.    Urine Cultures: No results found for: \"LABURIN\"  Blood Cultures: No results found for: \"BC\"  No results found for: \"BLOODCULT2\"  Organism:   Lab Results   Component Value Date/Time    ORG Haemophilus influenzae 12/16/2024 04:30 PM       Signed:    TAMMY Farrell

## 2024-12-18 NOTE — PLAN OF CARE
Problem: Discharge Planning  Goal: Discharge to home or other facility with appropriate resources  12/18/2024 1105 by Montserrat Samuel RN  Outcome: Adequate for Discharge       Problem: Pain  Goal: Verbalizes/displays adequate comfort level or baseline comfort level  12/18/2024 1105 by Montserrat Samuel RN  Outcome: Adequate for Discharge       Problem: Nutrition Deficit:  Goal: Optimize nutritional status  12/18/2024 1105 by Montserrat Samuel RN  Outcome: Adequate for Discharge

## 2024-12-19 ENCOUNTER — CARE COORDINATION (OUTPATIENT)
Dept: CASE MANAGEMENT | Age: 69
End: 2024-12-19

## 2024-12-19 DIAGNOSIS — J44.1 CHRONIC OBSTRUCTIVE PULMONARY DISEASE WITH ACUTE EXACERBATION (HCC): Primary | ICD-10-CM

## 2024-12-19 LAB
(1,3)-BETA-D-GLUCAN (FUNGITELL) INTERPRETATION: POSITIVE
1,3 BETA GLUCAN SER-MCNC: 88 PG/ML
GALACTOMANNAN AG SERPL IA-ACNC: 0.05
GALACTOMANNAN AG SERPL QL IA: NEGATIVE

## 2024-12-19 PROCEDURE — 1111F DSCHRG MED/CURRENT MED MERGE: CPT | Performed by: FAMILY MEDICINE

## 2024-12-19 NOTE — TELEPHONE ENCOUNTER
Patient schedule 3/3/25 w/Dr Lugo, please advise if pt need to be seen sooner and can you please place CT order to schedule patient.

## 2024-12-19 NOTE — CARE COORDINATION
Care Transitions Note    Initial Call - Call within 2 business days of discharge: Yes    Patient Current Location:  Home: [address] [place] [address] [address]  [facility] [address]    Care Transition Nurse contacted the patient by telephone to perform post hospital discharge assessment, verified name and  as identifiers. Provided introduction to self, and explanation of the Care Transition Nurse role.     Patient: Na Johnston    Patient : 1955   MRN: [id number]     Reason for Admission: COPD exacerbation, Upper respiratory infection  Discharge Date: 24    RURS: Readmission Risk Score: 12.5      Last Discharge Facility       Date Complaint Diagnosis Description Type Department Provider    12/15/24 Shortness of Breath COPD exacerbation (HCC) ... ED to Hosp-Admission (Discharged) (ADMITTED) Brit Hermosillo MD; Price, ...            Was this an external facility discharge? No    Additional needs identified to be addressed with provider   No needs identified             Method of communication with provider: none.    Patients top risk factors for readmission: medical condition-COPD exacerbation, Upper respiratory infection    Interventions to address risk factors:   Education: patient instructed to continue to monitor for any worsening SOB/SOBE, any fevers or chills, cough, any weakness or fatigue, reporting to MD immediately.  Review of patient management of conditions/medications: make sure to rest as needed, take all medications as prescribed, follow up with PCP as instructed.    Care Summary Note: CTN spoke with patient this am for initial 24 hour discharge follow up CTN call.  Patient states she is not having anymore SOB at rest, SOBE, but states she is able to recover herself, pretty quickly.  No reports of any fever, chills, nausea, vomiting, chest pain, or cough.   Patient with no pain, difficulty emptying bladder, LE edema, feeling lightheaded, dizziness, and heart palpitations.   Patient

## 2024-12-21 LAB
H CAPSUL AG SER IA-ACNC: NOT DETECTED
H CAPSUL AG SER QL IA: NOT DETECTED

## 2024-12-22 LAB
ASPERGILLUS AB SER QL ID: NOT DETECTED
B DERMAT AB SER QL ID: NOT DETECTED
COCCIDIOIDES AB SPEC QL ID: NOT DETECTED
H CAPSUL AB TITR SER ID: NOT DETECTED {TITER}

## 2024-12-26 ENCOUNTER — TELEMEDICINE (OUTPATIENT)
Dept: FAMILY MEDICINE CLINIC | Age: 69
End: 2024-12-26
Payer: MEDICARE

## 2024-12-26 DIAGNOSIS — J44.1 COPD EXACERBATION (HCC): Primary | ICD-10-CM

## 2024-12-26 PROCEDURE — G9899 SCRN MAM PERF RSLTS DOC: HCPCS | Performed by: FAMILY MEDICINE

## 2024-12-26 PROCEDURE — G8419 CALC BMI OUT NRM PARAM NOF/U: HCPCS | Performed by: FAMILY MEDICINE

## 2024-12-26 PROCEDURE — 4004F PT TOBACCO SCREEN RCVD TLK: CPT | Performed by: FAMILY MEDICINE

## 2024-12-26 PROCEDURE — 3023F SPIROM DOC REV: CPT | Performed by: FAMILY MEDICINE

## 2024-12-26 PROCEDURE — 3017F COLORECTAL CA SCREEN DOC REV: CPT | Performed by: FAMILY MEDICINE

## 2024-12-26 PROCEDURE — 99214 OFFICE O/P EST MOD 30 MIN: CPT | Performed by: FAMILY MEDICINE

## 2024-12-26 PROCEDURE — 1159F MED LIST DOCD IN RCRD: CPT | Performed by: FAMILY MEDICINE

## 2024-12-26 PROCEDURE — 1111F DSCHRG MED/CURRENT MED MERGE: CPT | Performed by: FAMILY MEDICINE

## 2024-12-26 PROCEDURE — G2211 COMPLEX E/M VISIT ADD ON: HCPCS | Performed by: FAMILY MEDICINE

## 2024-12-26 PROCEDURE — G8484 FLU IMMUNIZE NO ADMIN: HCPCS | Performed by: FAMILY MEDICINE

## 2024-12-26 PROCEDURE — 1090F PRES/ABSN URINE INCON ASSESS: CPT | Performed by: FAMILY MEDICINE

## 2024-12-26 PROCEDURE — G8399 PT W/DXA RESULTS DOCUMENT: HCPCS | Performed by: FAMILY MEDICINE

## 2024-12-26 PROCEDURE — G8427 DOCREV CUR MEDS BY ELIG CLIN: HCPCS | Performed by: FAMILY MEDICINE

## 2024-12-26 PROCEDURE — 1124F ACP DISCUSS-NO DSCNMKR DOCD: CPT | Performed by: FAMILY MEDICINE

## 2024-12-26 RX ORDER — DOXYCYCLINE HYCLATE 100 MG
100 TABLET ORAL 2 TIMES DAILY
Qty: 20 TABLET | Refills: 0 | Status: SHIPPED | OUTPATIENT
Start: 2024-12-26 | End: 2025-01-05

## 2024-12-26 RX ORDER — PREDNISONE 20 MG/1
TABLET ORAL
Qty: 9 TABLET | Refills: 0 | Status: SHIPPED | OUTPATIENT
Start: 2024-12-26 | End: 2025-01-01

## 2024-12-26 NOTE — PROGRESS NOTES
Na Johnston, was evaluated through a synchronous (real-time) audio-video encounter. The patient (or guardian if applicable) is aware that this is a billable service, which includes applicable co-pays. This Virtual Visit was conducted with patient's (and/or legal guardian's) consent. Patient identification was verified, and a caregiver was present when appropriate.   The patient was located at Home: [address] [place] [address]  Provider was located at Facility (Appt Dept): [address] [place], [place] [address] [place] [address]  Confirm you are appropriately licensed, registered, or certified to deliver care in the state where the patient is located as indicated above. If you are not or unsure, please re-schedule the visit: Yes, I confirm.     Na Johnston (:  1955) is a Established patient, presenting virtually for evaluation of the following:      Below is the assessment and plan developed based on review of pertinent history, physical exam, labs, studies, and medications.     Assessment & Plan  COPD exacerbation (HCC)   Chronic, worsening (exacerbation), changes made today:      Orders:    doxycycline hyclate (VIBRA-TABS) 100 MG tablet; Take 1 tablet by mouth 2 times daily for 10 days    predniSONE (DELTASONE) 20 MG tablet; Take 2 tablets by mouth daily for 3 days, THEN 1 tablet daily for 3 days.      No follow-ups on file.       Subjective   Na Johnston is a 69 y.o. female. Patient presents with:  Congestion      Hospitalized recently with COPD exacerbation.  Did finish her course of antibiotics and prednisone.  Since that time she has been had recurrent congestion and difficulty breathing.  Notes that she is coughing more.  Does not have ability to check her O2 at home.  Has been shortness of breath with activity.  Denies shortness of breath at rest.  Patient denies any chest pain.  Has had no fever or myalgia.  Is coughing slightly productive of sputum.  Patient denies rhinorrhea.

## 2024-12-27 ENCOUNTER — CARE COORDINATION (OUTPATIENT)
Dept: CARE COORDINATION | Age: 69
End: 2024-12-27

## 2024-12-27 NOTE — CARE COORDINATION
Care Transitions Note    Follow Up Call     Attempted to reach patient for transitions of care follow up.  Unable to reach patient.      Outreach Attempts:   HIPAA compliant voicemail left for patient.     Follow Up Appointment:   Future Appointments         Provider Specialty Dept Phone    3/3/2025 12:15 PM (Arrive by 11:45 AM) [facility] [phone]    3/3/2025 1:00 PM Derrick Lugo MD Pulmonology [phone]            Plan for follow-up call in 2-5 days based on severity of symptoms and risk factors. Plan for next call: symptom management-   self management-     Iris Akhtar

## 2024-12-30 LAB
FUNGUS SPEC CULT: NORMAL
LOEFFLER MB STN SPEC: NORMAL

## 2025-01-01 NOTE — ASSESSMENT & PLAN NOTE
Chronic, worsening (exacerbation), changes made today:      Orders:    doxycycline hyclate (VIBRA-TABS) 100 MG tablet; Take 1 tablet by mouth 2 times daily for 10 days    predniSONE (DELTASONE) 20 MG tablet; Take 2 tablets by mouth daily for 3 days, THEN 1 tablet daily for 3 days.

## 2025-01-03 ENCOUNTER — CARE COORDINATION (OUTPATIENT)
Dept: CASE MANAGEMENT | Age: 70
End: 2025-01-03

## 2025-01-03 ENCOUNTER — TELEPHONE (OUTPATIENT)
Dept: FAMILY MEDICINE CLINIC | Age: 70
End: 2025-01-03

## 2025-01-03 DIAGNOSIS — J44.1 COPD EXACERBATION (HCC): Primary | ICD-10-CM

## 2025-01-03 RX ORDER — PREDNISONE 20 MG/1
TABLET ORAL
Qty: 18 TABLET | Refills: 0 | Status: SHIPPED | OUTPATIENT
Start: 2025-01-03 | End: 2025-01-12

## 2025-01-03 NOTE — TELEPHONE ENCOUNTER
Pt had a appt 12/26/ her symptom keep coming back when she is done with the meds LAURIE gave her. She is asking for something to be called in

## 2025-01-03 NOTE — CARE COORDINATION
Care Transitions Note    Follow Up Call     Attempted to reach patient for transitions of care follow up.  Unable to reach patient.      Outreach Attempts:   2ND CTC attempt to reach Pt regarding recent hospital discharge.  CTC left voice recording with call back number requesting a call back.  CTN will close out CTN episode at this time.    Follow Up Appointment:   Future Appointments         Provider Specialty Dept Phone    3/3/2025 11:30 AM (Arrive by 11:00 AM) [facility] Radiology [phone]    3/3/2025 1:00 PM Derrick Lugo MD Pulmonology [phone]            No further follow-up call indicated based on severity of symptoms and risk factors.     Thank You,    Doreen Hester RN  Care Transition Coordinator  Contact Number:[phone]

## 2025-01-06 LAB
FUNGUS SPEC CULT: NORMAL
LOEFFLER MB STN SPEC: NORMAL

## 2025-01-13 LAB
FUNGUS SPEC CULT: NORMAL
LOEFFLER MB STN SPEC: NORMAL

## 2025-01-20 LAB
FUNGUS SPEC CULT: NORMAL
LOEFFLER MB STN SPEC: NORMAL

## 2025-01-24 ENCOUNTER — TELEMEDICINE (OUTPATIENT)
Dept: FAMILY MEDICINE CLINIC | Age: 70
End: 2025-01-24

## 2025-01-24 DIAGNOSIS — J44.1 COPD EXACERBATION (HCC): Primary | ICD-10-CM

## 2025-01-24 PROBLEM — C34.11 PRIMARY CANCER OF RIGHT UPPER LOBE OF LUNG (HCC): Status: RESOLVED | Noted: 2022-07-15 | Resolved: 2025-01-24

## 2025-01-24 RX ORDER — AZITHROMYCIN 250 MG/1
TABLET, FILM COATED ORAL
Qty: 6 TABLET | Refills: 0 | Status: SHIPPED | OUTPATIENT
Start: 2025-01-24 | End: 2025-02-03

## 2025-01-24 RX ORDER — PREDNISONE 50 MG/1
50 TABLET ORAL DAILY
Qty: 5 TABLET | Refills: 0 | Status: SHIPPED | OUTPATIENT
Start: 2025-01-24 | End: 2025-01-29

## 2025-01-24 NOTE — ASSESSMENT & PLAN NOTE
Patient with COPD and exacerbations recurrent.  Discussed with patient that she should be evaluated in person, since she has not seen anyone in person since being discharged from the hospital.  Discussed risks, benefits, alternatives.  Utilize shared decision making to move forward with a Z-Martinez and steroid at this time.  Patient will also call her pulmonologist to get a shorter term follow-up.    Orders:    azithromycin (ZITHROMAX) 250 MG tablet; 500mg on day 1 followed by 250mg on days 2 - 5    predniSONE (DELTASONE) 50 MG tablet; Take 1 tablet by mouth daily for 5 days

## 2025-01-24 NOTE — PROGRESS NOTES
Na Johnston, was evaluated through a synchronous (real-time) audio-video encounter. The patient (or guardian if applicable) is aware that this is a billable service, which includes applicable co-pays. This Virtual Visit was conducted with patient's (and/or legal guardian's) consent. Patient identification was verified, and a caregiver was present when appropriate.   The patient was located at Home: [address]  [place] [address]  Provider was located at Home (Appt Dept State): [place]  Confirm you are appropriately licensed, registered, or certified to deliver care in the state where the patient is located as indicated above. If you are not or unsure, please re-schedule the visit: Yes, I confirm.     Na Johnston (:  1955) is a Established patient, presenting virtually for evaluation of the following:      Below is the assessment and plan developed based on review of pertinent history, physical exam, labs, studies, and medications.     Assessment & Plan  COPD exacerbation (HCC)    Patient with COPD and exacerbations recurrent.  Discussed with patient that she should be evaluated in person, since she has not seen anyone in person since being discharged from the hospital.  Discussed risks, benefits, alternatives.  Utilize shared decision making to move forward with a Z-Martinez and steroid at this time.  Patient will also call her pulmonologist to get a shorter term follow-up.    Orders:    azithromycin (ZITHROMAX) 250 MG tablet; 500mg on day 1 followed by 250mg on days 2 - 5    predniSONE (DELTASONE) 50 MG tablet; Take 1 tablet by mouth daily for 5 days      No follow-ups on file.       Subjective   HPI    Patient is a 69-year-old female, who presents over the telephone for A/V communication to discuss recent ill symptoms.  Patient had a hospital admission earlier in December, for a COPD exacerbation.  Patient had persistent symptoms after a week or so and was given a course of steroids and

## 2025-01-28 NOTE — PROGRESS NOTES
Na Johnston is a 69 y.o. who comes in today for Follow-Up from Hospital and Shortness of Breath (Exertion. )   She has significant history of COPD with lung cancer, sp resection RUL 7/ 2022.  She did not have chemotherapy or radiation.  She is followed by [facility]. She continues to smoke 2 cig a day.  She was in the hospital with a COPD exacerbation , ME'ed 12/18/24.  She completed antibiotics and steroids at that time however never felt like she fully recovered.  She has been on steroids on and off since with minimal improvement.  She recently completed zpack and steroids that were prescribed by PCP on 1/24/25.   She continues Breztri and using albuterol 5-6 times a day, using duoneb 4 times.  States her breathing is not improving.     Shortness of Breath  This is a chronic problem. The current episode started more than 1 month ago. The problem has been unchanged. Associated symptoms include sputum production (thick, Yellow). Pertinent negatives include no abdominal pain, chest pain, fever, headaches, hemoptysis, leg swelling, rhinorrhea, sore throat or wheezing. The symptoms are aggravated by any activity and weather changes. She has tried beta agonist inhalers, oral steroids, rest and steroid inhalers for the symptoms. Her past medical history is significant for COPD and pneumonia.        Past Medical History:   Diagnosis Date    Arthritis     Asthma     Cancer (HCC)     lung/ right 1/3 removed    COPD (chronic obstructive pulmonary disease) (HCC)     Emphysema of lung (HCC)         Past Surgical History:   Procedure Laterality Date    BREAST BIOPSY      Right, was benign    COLONOSCOPY N/A 10/9/2018    COLONOSCOPY WITH BIOPSY performed by Guillermo Amor MD at [facility] ENDOSCOPY    COLONOSCOPY N/A 1/22/2024    COLONOSCOPY POLYPECTOMY BIOPSY performed by Chris Monique MD at [facility] ENDOSCOPY    CT NEEDLE BIOPSY LUNG PERCUTANEOUS W IMAGING GUIDANCE  5/31/2022    CT NEEDLE BIOPSY LUNG PERCUTANEOUS

## 2025-01-29 ENCOUNTER — OFFICE VISIT (OUTPATIENT)
Dept: PULMONOLOGY | Age: 70
End: 2025-01-29
Payer: MEDICARE

## 2025-01-29 VITALS
DIASTOLIC BLOOD PRESSURE: 80 MMHG | SYSTOLIC BLOOD PRESSURE: 137 MMHG | HEIGHT: 64 IN | RESPIRATION RATE: 16 BRPM | HEART RATE: 78 BPM | OXYGEN SATURATION: 96 % | BODY MASS INDEX: 18.44 KG/M2 | TEMPERATURE: 97.3 F | WEIGHT: 108 LBS

## 2025-01-29 DIAGNOSIS — R59.0 MEDIASTINAL LYMPHADENOPATHY: ICD-10-CM

## 2025-01-29 DIAGNOSIS — F17.200 TOBACCO DEPENDENCE: ICD-10-CM

## 2025-01-29 DIAGNOSIS — Z85.118 HISTORY OF LUNG CANCER: ICD-10-CM

## 2025-01-29 DIAGNOSIS — J44.9 COPD, MODERATE (HCC): Primary | ICD-10-CM

## 2025-01-29 DIAGNOSIS — R91.1 PULMONARY NODULE: ICD-10-CM

## 2025-01-29 PROCEDURE — 3023F SPIROM DOC REV: CPT | Performed by: NURSE PRACTITIONER

## 2025-01-29 PROCEDURE — 4004F PT TOBACCO SCREEN RCVD TLK: CPT | Performed by: NURSE PRACTITIONER

## 2025-01-29 PROCEDURE — 3017F COLORECTAL CA SCREEN DOC REV: CPT | Performed by: NURSE PRACTITIONER

## 2025-01-29 PROCEDURE — 99214 OFFICE O/P EST MOD 30 MIN: CPT | Performed by: NURSE PRACTITIONER

## 2025-01-29 PROCEDURE — 1159F MED LIST DOCD IN RCRD: CPT | Performed by: NURSE PRACTITIONER

## 2025-01-29 PROCEDURE — 1090F PRES/ABSN URINE INCON ASSESS: CPT | Performed by: NURSE PRACTITIONER

## 2025-01-29 PROCEDURE — 1124F ACP DISCUSS-NO DSCNMKR DOCD: CPT | Performed by: NURSE PRACTITIONER

## 2025-01-29 PROCEDURE — G8420 CALC BMI NORM PARAMETERS: HCPCS | Performed by: NURSE PRACTITIONER

## 2025-01-29 PROCEDURE — G8399 PT W/DXA RESULTS DOCUMENT: HCPCS | Performed by: NURSE PRACTITIONER

## 2025-01-29 PROCEDURE — G8427 DOCREV CUR MEDS BY ELIG CLIN: HCPCS | Performed by: NURSE PRACTITIONER

## 2025-01-29 RX ORDER — ARFORMOTEROL TARTRATE 15 UG/2ML
15 SOLUTION RESPIRATORY (INHALATION) 2 TIMES DAILY
Qty: 120 ML | Refills: 5 | Status: SHIPPED | OUTPATIENT
Start: 2025-01-29

## 2025-01-29 RX ORDER — BUDESONIDE 0.5 MG/2ML
1 INHALANT ORAL 2 TIMES DAILY
Qty: 120 ML | Refills: 5 | Status: SHIPPED | OUTPATIENT
Start: 2025-01-29

## 2025-01-29 ASSESSMENT — ENCOUNTER SYMPTOMS
SPUTUM PRODUCTION: 1
WHEEZING: 0
SHORTNESS OF BREATH: 1
EYE PAIN: 0
SORE THROAT: 0
HEMOPTYSIS: 0
ABDOMINAL PAIN: 0
COUGH: 0
RHINORRHEA: 0
CHEST TIGHTNESS: 0

## 2025-01-29 NOTE — PROGRESS NOTES
MA Communication:  The following orders are received by verbal communication from Lory Oliver CNP    Orders include:    Ctwo re schedule for 2/1/25  Follow up w/Dr Lugo on march

## 2025-01-29 NOTE — PATIENT INSTRUCTIONS
Start arformoterol 15 mcg in nebulizer twice a day     Start budesonide 0.5mg in nebulizer twice a day    Can continue to use ipratropium albuterol up to 4 times a day as needed for increased SOB, wheezing    STOP BREZTRI    Nasal Saline spray 3 times a day to help moisturize nose.     Remember to bring a list of pulmonary medications and any CPAP or BiPAP machines to your next appointment with the office.     Please keep all of your future appointments scheduled by [facility], [place] [facility] office. Out of respect for other patients and providers, you may be asked to reschedule your appointment if you arrive later than your scheduled appointment time. Appointments cancelled less than 24hrs in advance will be considered a no show. Patients with three missed appointments within 1 year or four missed appointments within 2 years can be dismissed from the practice.     Please be aware that our physicians are required to work in the Intensive Care Unit at [facility].  Your appointment may need to be rescheduled if they are designated to work during your appointment time.      You may receive a survey regarding the care you received during your visit.  Your input is valuable to us.  We encourage you to complete and return your survey.  We hope you will choose us in the future for your healthcare needs.     Pt instructed of all future appointment dates & times, including radiology, labs, procedures & referrals. If procedures were scheduled preparation instructions provided. Instructions on future appointments with [facility] were given.         Patient understands that smoking is aggravating the respiratory disease and must be discontinued.      May use Mucinex or guaifenesin 600-1200 mg twice a day to help thin secretions. Drink with plenty of water.      Repeat CT scan of chest now.      Call with worsening symptoms such as increased shortness of breath, productive cough,

## 2025-02-01 ENCOUNTER — HOSPITAL ENCOUNTER (OUTPATIENT)
Dept: CT IMAGING | Age: 70
Discharge: HOME | End: 2025-02-01
Payer: MEDICARE

## 2025-02-01 DIAGNOSIS — I26.99 PULMONARY EMBOLISM, UNSPECIFIED CHRONICITY, UNSPECIFIED PULMONARY EMBOLISM TYPE, UNSPECIFIED WHETHER ACUTE COR PULMONALE PRESENT (HCC): ICD-10-CM

## 2025-02-01 PROCEDURE — 71250 CT THORAX DX C-: CPT

## 2025-02-04 ENCOUNTER — TELEPHONE (OUTPATIENT)
Dept: PULMONOLOGY | Age: 70
End: 2025-02-04

## 2025-02-04 NOTE — TELEPHONE ENCOUNTER
This was lynsey for 3/3 same day as OV but pt. RS on her own and still has OV on 3/3. Please advise.

## 2025-02-21 ENCOUNTER — TELEMEDICINE (OUTPATIENT)
Dept: FAMILY MEDICINE CLINIC | Age: 70
End: 2025-02-21

## 2025-02-21 DIAGNOSIS — J44.1 COPD EXACERBATION (HCC): Primary | ICD-10-CM

## 2025-02-21 RX ORDER — PREDNISONE 20 MG/1
40 TABLET ORAL DAILY
Qty: 10 TABLET | Refills: 0 | Status: SHIPPED | OUTPATIENT
Start: 2025-02-21 | End: 2025-02-26

## 2025-02-21 ASSESSMENT — PATIENT HEALTH QUESTIONNAIRE - PHQ9
SUM OF ALL RESPONSES TO PHQ9 QUESTIONS 1 & 2: 0
2. FEELING DOWN, DEPRESSED OR HOPELESS: NOT AT ALL
1. LITTLE INTEREST OR PLEASURE IN DOING THINGS: NOT AT ALL
SUM OF ALL RESPONSES TO PHQ QUESTIONS 1-9: 0

## 2025-02-21 ASSESSMENT — ENCOUNTER SYMPTOMS
COUGH: 1
SHORTNESS OF BREATH: 0
CONSTIPATION: 0
DIARRHEA: 0

## 2025-02-21 NOTE — PROGRESS NOTES
Assessment/Plan:    Na was seen today for cough.    Diagnoses and all orders for this visit:    COPD exacerbation (HCC)  -     predniSONE (DELTASONE) 20 MG tablet; Take 2 tablets by mouth daily for 5 days To be taken with food.  -     amoxicillin-clavulanate (AUGMENTIN) 875-125 MG per tablet; Take 1 tablet by mouth 2 times daily for 5 days      Patient does not have a ride to come in for COVID and flu test though recommended this.  Encouraged to use albuterol  and ipratropium inhalers needed  Oral prednisone prescribed for total of 5 days  Rescue antibiotics sent in if symptoms do not following completion of steroids.  Appropriate return precautions provided.    No follow-ups on file.      Patient: Na Johnston is a 69 y.o.female who presents today for:   Chief Complaint   Patient presents with    Cough     Fevers chills          HPI:     COPD exacerbation:  Currently reports increased cough frequency and severity over the last 2 days.  Cough is productive and green in color.  Denies increase of breath at this time.    Patient's pertinent past medical history,surgical history, family history, medications, and allergies  were reviewed and updated as appropriate today.    Review of Systems   Constitutional:  Negative for fatigue and fever.   HENT:  Positive for congestion.    Respiratory:  Positive for cough. Negative for shortness of breath.    Cardiovascular:  Negative for leg swelling.   Gastrointestinal:  Negative for constipation and diarrhea.   Genitourinary:  Negative for dysuria.   Neurological:  Negative for headaches.   Psychiatric/Behavioral:  Negative for sleep disturbance. The patient is not nervous/anxious.         [ INSTRUCTIONS:  \"[x]\" Indicates a positive item  \"[]\" Indicates a negative item  -- DELETE ALL ITEMS NOT EXAMINED]  [x] Alert  [x] Oriented to person/place/time    [x] No apparent distress  []  Appears Unwell:     [x] Breathing appears normal  [] Appears tachypneic      [x] Rash on

## 2025-02-28 RX ORDER — ALBUTEROL SULFATE 90 UG/1
AEROSOL, METERED RESPIRATORY (INHALATION)
Qty: 18 G | Refills: 3 | Status: SHIPPED | OUTPATIENT
Start: 2025-02-28

## 2025-02-28 NOTE — TELEPHONE ENCOUNTER
Last Office Visit  -  2/21/25  Next Office Visit  -  n/a    Last Filled  -  11/5/24  Last UDS -    Contract -

## 2025-03-03 ENCOUNTER — OFFICE VISIT (OUTPATIENT)
Dept: PULMONOLOGY | Age: 70
End: 2025-03-03
Payer: MEDICARE

## 2025-03-03 VITALS
RESPIRATION RATE: 14 BRPM | HEART RATE: 82 BPM | SYSTOLIC BLOOD PRESSURE: 139 MMHG | OXYGEN SATURATION: 91 % | DIASTOLIC BLOOD PRESSURE: 81 MMHG | BODY MASS INDEX: 16.73 KG/M2 | WEIGHT: 98 LBS | HEIGHT: 64 IN | TEMPERATURE: 98 F

## 2025-03-03 DIAGNOSIS — J44.9 COPD, MODERATE (HCC): Primary | ICD-10-CM

## 2025-03-03 DIAGNOSIS — F17.200 TOBACCO DEPENDENCE: ICD-10-CM

## 2025-03-03 DIAGNOSIS — C34.90 ADENOCARCINOMA OF LUNG, UNSPECIFIED LATERALITY (HCC): ICD-10-CM

## 2025-03-03 PROCEDURE — 1124F ACP DISCUSS-NO DSCNMKR DOCD: CPT | Performed by: STUDENT IN AN ORGANIZED HEALTH CARE EDUCATION/TRAINING PROGRAM

## 2025-03-03 PROCEDURE — G8419 CALC BMI OUT NRM PARAM NOF/U: HCPCS | Performed by: STUDENT IN AN ORGANIZED HEALTH CARE EDUCATION/TRAINING PROGRAM

## 2025-03-03 PROCEDURE — 1090F PRES/ABSN URINE INCON ASSESS: CPT | Performed by: STUDENT IN AN ORGANIZED HEALTH CARE EDUCATION/TRAINING PROGRAM

## 2025-03-03 PROCEDURE — G2211 COMPLEX E/M VISIT ADD ON: HCPCS | Performed by: STUDENT IN AN ORGANIZED HEALTH CARE EDUCATION/TRAINING PROGRAM

## 2025-03-03 PROCEDURE — 4004F PT TOBACCO SCREEN RCVD TLK: CPT | Performed by: STUDENT IN AN ORGANIZED HEALTH CARE EDUCATION/TRAINING PROGRAM

## 2025-03-03 PROCEDURE — G8427 DOCREV CUR MEDS BY ELIG CLIN: HCPCS | Performed by: STUDENT IN AN ORGANIZED HEALTH CARE EDUCATION/TRAINING PROGRAM

## 2025-03-03 PROCEDURE — 3023F SPIROM DOC REV: CPT | Performed by: STUDENT IN AN ORGANIZED HEALTH CARE EDUCATION/TRAINING PROGRAM

## 2025-03-03 PROCEDURE — 99213 OFFICE O/P EST LOW 20 MIN: CPT | Performed by: STUDENT IN AN ORGANIZED HEALTH CARE EDUCATION/TRAINING PROGRAM

## 2025-03-03 PROCEDURE — G8399 PT W/DXA RESULTS DOCUMENT: HCPCS | Performed by: STUDENT IN AN ORGANIZED HEALTH CARE EDUCATION/TRAINING PROGRAM

## 2025-03-03 PROCEDURE — 1159F MED LIST DOCD IN RCRD: CPT | Performed by: STUDENT IN AN ORGANIZED HEALTH CARE EDUCATION/TRAINING PROGRAM

## 2025-03-03 PROCEDURE — 3017F COLORECTAL CA SCREEN DOC REV: CPT | Performed by: STUDENT IN AN ORGANIZED HEALTH CARE EDUCATION/TRAINING PROGRAM

## 2025-03-03 RX ORDER — IPRATROPIUM BROMIDE AND ALBUTEROL SULFATE 2.5; .5 MG/3ML; MG/3ML
1 SOLUTION RESPIRATORY (INHALATION) EVERY 6 HOURS PRN
Qty: 360 ML | Refills: 5 | Status: SHIPPED | OUTPATIENT
Start: 2025-03-03

## 2025-03-03 RX ORDER — FLUTICASONE FUROATE, UMECLIDINIUM BROMIDE AND VILANTEROL TRIFENATATE 100; 62.5; 25 UG/1; UG/1; UG/1
1 POWDER RESPIRATORY (INHALATION) DAILY
Qty: 2 EACH | Refills: 0 | Status: SHIPPED | COMMUNITY
Start: 2025-03-03

## 2025-03-03 RX ORDER — NICOTINE 21 MG/24HR
1 PATCH, TRANSDERMAL 24 HOURS TRANSDERMAL DAILY
Qty: 42 PATCH | Refills: 0 | Status: SHIPPED | OUTPATIENT
Start: 2025-03-03 | End: 2025-04-14

## 2025-03-03 RX ORDER — PREDNISONE 20 MG/1
20 TABLET ORAL 2 TIMES DAILY
Qty: 10 TABLET | Refills: 0 | Status: SHIPPED | OUTPATIENT
Start: 2025-03-03 | End: 2025-03-08

## 2025-03-03 ASSESSMENT — ENCOUNTER SYMPTOMS
STRIDOR: 0
VOMITING: 0
SORE THROAT: 0
EYE REDNESS: 0
NAUSEA: 0
COLOR CHANGE: 0
ABDOMINAL PAIN: 0
SHORTNESS OF BREATH: 1
DIARRHEA: 0
EYE DISCHARGE: 0
EYE PAIN: 0
WHEEZING: 0
BACK PAIN: 0
COUGH: 0
TROUBLE SWALLOWING: 0
ABDOMINAL DISTENTION: 0
CONSTIPATION: 0
EYE ITCHING: 0

## 2025-03-03 NOTE — PROGRESS NOTES
[facility] Pulmonary Follow-up  [address] [place] [address]  [phone]        Na Johnston (: 1955 ) is a 69 y.o. female here for an evaluation of   Chief Complaint   Patient presents with    Follow-up    COPD    Results     CT         SUBJECTIVE/OBJECTIVE:  Patient is a 69-year-old female with significant past medical history of prior lung cancer status post lobectomy, tobacco dependence, moderate COPD that presents to [facility] pulmonary clinic for follow-up visit.  Patient says that she is short of breath.  Her inhaler therapy has been switched multiple times.  She does not have relief in her symptoms.  She denies any fever, chills, chest pain, nausea, vomiting, abdominal pain.  She was recently in the hospital for a COPD exacerbation.      Review of Systems   Constitutional:  Negative for activity change, appetite change, chills, diaphoresis and fatigue.   HENT:  Negative for congestion, sore throat and trouble swallowing.    Eyes:  Negative for pain, discharge, redness and itching.   Respiratory:  Positive for shortness of breath. Negative for cough, wheezing and stridor.    Cardiovascular:  Negative for chest pain, palpitations and leg swelling.   Gastrointestinal:  Negative for abdominal distention, abdominal pain, constipation, diarrhea, nausea and vomiting.   Endocrine: Negative for polydipsia, polyphagia and polyuria.   Genitourinary:  Negative for difficulty urinating.   Musculoskeletal:  Negative for back pain, myalgias and neck pain.   Skin:  Negative for color change.   Neurological:  Negative for dizziness, weakness and light-headedness.   Psychiatric/Behavioral:  Negative for agitation and behavioral problems.          Vitals:    25 1251   BP: 139/81   Pulse: 82   Resp: 14   Temp: 98 °F (36.7 °C)   TempSrc: Temporal   SpO2: 91%   Weight: 44.5 kg (98 lb)   Height: 1.626 m (5' 4\")        Physical Exam  Constitutional:       General: She is in acute distress.

## 2025-03-03 NOTE — PROGRESS NOTES
MA Communication:  The following orders are received by verbal communication from   Derrick Lugo MD    Orders include:  FU 2 mo       Trelegy samples

## 2025-03-03 NOTE — PATIENT INSTRUCTIONS
Remember to bring a list of pulmonary medications and any CPAP or BiPAP machines to your next appointment with the office.     Please keep all of your future appointments scheduled by [facility] [place] [facility] office. Out of respect for other patients and providers, you may be asked to reschedule your appointment if you arrive later than your scheduled appointment time. Appointments cancelled less than 24hrs in advance will be considered a no show. Patients with three missed appointments within 1 year or four missed appointments within 2 years can be dismissed from the practice.     Please be aware that our physicians are required to work in the Intensive Care Unit at [facility].  Your appointment may need to be rescheduled if they are designated to work during your appointment time.      You may receive a survey regarding the care you received during your visit.  Your input is valuable to us.  We encourage you to complete and return your survey.  We hope you will choose us in the future for your healthcare needs.     Pt instructed of all future appointment dates & times, including radiology, labs, procedures & referrals. If procedures were scheduled preparation instructions provided. Instructions on future appointments with [facility] were given.     In the next few weeks, you will be receiving a survey from [facility] regarding your visit today.  We would greatly appreciate it if you would take just a few minutes to fill that out.  It is very important to us that our patients receive top notch care and our surveys help keep us accountable. However, if your experience was not a good one, we want to hear about that as well. This is a key way we can keep track of problems and strive to correct any for future visits.    Again, we appreciate your time and thank you for choosing [facility]!    Shruthi ANGLIN

## 2025-03-06 NOTE — PROGRESS NOTES
Na Johnston    Age 69 y.o.    female    1955    N [id number]    3/14/2025  Arrival Time_____________  OR Time____________30 Min     Procedure(s):  COLONOSCOPY                      Monitor Anesthesia Care    Surgeon(s):  Chris Monique, MD       Phone [phone] (home)     Initals  Date  Info Source  Home  Cell         Work  _____________________________________________________________________  _____________________________________________________________________  _____________________________________________________________________  _____________________________________________________________________  _____________________________________________________________________    PCP _____________________________ Phone_________________     H&P  ________________  Bringing      Chart              Epic      DOS      Called________  EKG ________________   Bringing      Chart              Epic      DOS      Called________  LABS________________   Bringing     Chart              Epic      DOS      Called________  Cardiac Clearance ______ Bringing      Chart              Epic      DOS      Called________  Pulmonary Clearance____ Bringing      Chart              Epic      DOS      Called________    Cardiologist________________________ Phone___________________________  Pulmonologist_______________________Phone___________________________    ? Advance Directives   ? Christian concerns / Waiver on Chart            PAT Communications________________  ? Pre-op Instructions Given /Understood          _________________________________  ? Directions to Surgery Center                          _________________________________  ? Transportation Home_______________      __________________________________  ? Crutches/Walker__________________        __________________________________    Orders: Hard copy/ EPIC                 Transcribed/ EPIC              _______Wt.    ________Pharmacy                         _______SCD

## 2025-03-10 NOTE — PROGRESS NOTES
Date and time of surgery :  3/14/25 at 0945            Arrival Time:  0845     Bring Picture ID and insurance card.  Please wear simple, loose fitting clothing to the hospital.   Do not bring valuables (money, credit cards, checkbooks, etc.)   Do not wear any makeup (including  eye makeup) and no nail polish or artificial nails on your fingers or toes.  DO NOT wear any jewelry or piercings on day of surgery.  All body piercing jewelry must be removed.  If you have dentures, they will be removed before going to the OR; we will provide you a container.  If you wear contact lenses or glasses, they will be removed; please bring a case for them.  Shower the evening before or morning of surgery with antibacterial soap.  Nothing to eat or drink after 445 am the morning of your procedure.  You may brush your teeth and gargle the morning of surgery.  DO NOT SWALLOW WATER.   Do not take any morning meds the day of your surgery except use Trelegy prior to coming.  Aspirin, Ibuprofen, Advil, Naproxen, Vitamin E and other Anti-inflammatory products and supplements should be stopped for 5 -7days before surgery or as directed by your physician.  Do not smoke or drink any alcoholic beverages 24 hours prior to surgery.  This includes NA Beer. Refrain from the usage of any recreational drugs, including non-prescribed prescription drugs.   You MUST plan for a responsible adult to stay on site while you are here and take you home after your surgery. You will not be allowed to leave alone or drive yourself home. It is strongly suggested someone stay with you the first 24 hrs. Your surgery will be cancelled if you do not have a ride home.  To help prevent infection, change your sheets the night before surgery.   If you  have a Living Will and Durable Power of  for Healthcare, please bring in a copy.  Notify your Surgeon if you develop any illness between now and time of surgery. Cough, cold, fever, sore throat, nausea,  vomiting, etc.  Please notify your surgeon if you experience dizziness, shortness of breath or blurred vision between now & the time of your surgery  To provide excellent care visitors will be limited to two per room at any given time. No visitors under the age of 14.  If you use oxygen and have a portable tank please bring it with you the DOS  For your convenience [facility] has a pharmacy on site to fill your prescriptions.     *Please call pre admission testing if you have any further questions             [place]      [phone]                            Address: [address] [place] [address]     When you pull into the hospital and are looking at the main hospital entrance, turn right.   We are a tan building to the right of the main entrance.   [facility] over the door.  .                                                        Revision History

## 2025-03-14 ENCOUNTER — ANESTHESIA (OUTPATIENT)
Dept: ENDOSCOPY | Age: 70
End: 2025-03-14
Payer: MEDICARE

## 2025-03-14 ENCOUNTER — ANESTHESIA EVENT (OUTPATIENT)
Dept: ENDOSCOPY | Age: 70
End: 2025-03-14
Payer: MEDICARE

## 2025-03-14 ENCOUNTER — HOSPITAL ENCOUNTER (OUTPATIENT)
Age: 70
Setting detail: OUTPATIENT SURGERY
Discharge: HOME | End: 2025-03-14
Attending: INTERNAL MEDICINE | Admitting: INTERNAL MEDICINE
Payer: MEDICARE

## 2025-03-14 VITALS
DIASTOLIC BLOOD PRESSURE: 80 MMHG | BODY MASS INDEX: 17.07 KG/M2 | RESPIRATION RATE: 18 BRPM | TEMPERATURE: 98.6 F | SYSTOLIC BLOOD PRESSURE: 140 MMHG | WEIGHT: 100 LBS | OXYGEN SATURATION: 99 % | HEIGHT: 64 IN | HEART RATE: 80 BPM

## 2025-03-14 DIAGNOSIS — Z86.0100 HISTORY OF COLON POLYPS: ICD-10-CM

## 2025-03-14 PROCEDURE — 2580000003 HC RX 258: Performed by: ANESTHESIOLOGY

## 2025-03-14 PROCEDURE — 3700000000 HC ANESTHESIA ATTENDED CARE: Performed by: INTERNAL MEDICINE

## 2025-03-14 PROCEDURE — 3700000001 HC ADD 15 MINUTES (ANESTHESIA): Performed by: INTERNAL MEDICINE

## 2025-03-14 PROCEDURE — 2709999900 HC NON-CHARGEABLE SUPPLY: Performed by: INTERNAL MEDICINE

## 2025-03-14 PROCEDURE — 3609009900 HC COLONOSCOPY W/CONTROL BLEEDING ANY METHOD: Performed by: INTERNAL MEDICINE

## 2025-03-14 PROCEDURE — 7100000010 HC PHASE II RECOVERY - FIRST 15 MIN: Performed by: INTERNAL MEDICINE

## 2025-03-14 PROCEDURE — 88305 TISSUE EXAM BY PATHOLOGIST: CPT

## 2025-03-14 PROCEDURE — 3609010600 HC COLONOSCOPY POLYPECTOMY SNARE/COLD BIOPSY: Performed by: INTERNAL MEDICINE

## 2025-03-14 PROCEDURE — 6360000002 HC RX W HCPCS: Performed by: NURSE ANESTHETIST, CERTIFIED REGISTERED

## 2025-03-14 PROCEDURE — 7100000011 HC PHASE II RECOVERY - ADDTL 15 MIN: Performed by: INTERNAL MEDICINE

## 2025-03-14 PROCEDURE — C1889 IMPLANT/INSERT DEVICE, NOC: HCPCS | Performed by: INTERNAL MEDICINE

## 2025-03-14 DEVICE — WORKING LENGTH 235CM, WORKING CHANNEL 2.8MM
Type: IMPLANTABLE DEVICE | Site: RECTUM | Status: FUNCTIONAL
Brand: RESOLUTION 360 CLIP

## 2025-03-14 RX ORDER — SODIUM CHLORIDE 9 MG/ML
INJECTION, SOLUTION INTRAVENOUS PRN
Status: DISCONTINUED | OUTPATIENT
Start: 2025-03-14 | End: 2025-03-14 | Stop reason: HOSPADM

## 2025-03-14 RX ORDER — ONDANSETRON 2 MG/ML
4 INJECTION INTRAMUSCULAR; INTRAVENOUS EVERY 30 MIN PRN
Status: DISCONTINUED | OUTPATIENT
Start: 2025-03-14 | End: 2025-03-14 | Stop reason: HOSPADM

## 2025-03-14 RX ORDER — PROPOFOL 10 MG/ML
INJECTION, EMULSION INTRAVENOUS
Status: DISCONTINUED | OUTPATIENT
Start: 2025-03-14 | End: 2025-03-14 | Stop reason: SDUPTHER

## 2025-03-14 RX ORDER — SODIUM CHLORIDE, SODIUM LACTATE, POTASSIUM CHLORIDE, CALCIUM CHLORIDE 600; 310; 30; 20 MG/100ML; MG/100ML; MG/100ML; MG/100ML
INJECTION, SOLUTION INTRAVENOUS CONTINUOUS
Status: DISCONTINUED | OUTPATIENT
Start: 2025-03-14 | End: 2025-03-14 | Stop reason: HOSPADM

## 2025-03-14 RX ORDER — SODIUM CHLORIDE 0.9 % (FLUSH) 0.9 %
5-40 SYRINGE (ML) INJECTION PRN
Status: DISCONTINUED | OUTPATIENT
Start: 2025-03-14 | End: 2025-03-14 | Stop reason: HOSPADM

## 2025-03-14 RX ORDER — NALOXONE HYDROCHLORIDE 0.4 MG/ML
INJECTION, SOLUTION INTRAMUSCULAR; INTRAVENOUS; SUBCUTANEOUS PRN
Status: DISCONTINUED | OUTPATIENT
Start: 2025-03-14 | End: 2025-03-14 | Stop reason: HOSPADM

## 2025-03-14 RX ORDER — LIDOCAINE HYDROCHLORIDE 20 MG/ML
INJECTION, SOLUTION EPIDURAL; INFILTRATION; INTRACAUDAL; PERINEURAL
Status: DISCONTINUED | OUTPATIENT
Start: 2025-03-14 | End: 2025-03-14 | Stop reason: SDUPTHER

## 2025-03-14 RX ORDER — SODIUM CHLORIDE 0.9 % (FLUSH) 0.9 %
5-40 SYRINGE (ML) INJECTION EVERY 12 HOURS SCHEDULED
Status: DISCONTINUED | OUTPATIENT
Start: 2025-03-14 | End: 2025-03-14 | Stop reason: HOSPADM

## 2025-03-14 RX ORDER — LIDOCAINE HYDROCHLORIDE 10 MG/ML
0.3 INJECTION, SOLUTION EPIDURAL; INFILTRATION; INTRACAUDAL; PERINEURAL
Status: DISCONTINUED | OUTPATIENT
Start: 2025-03-14 | End: 2025-03-14 | Stop reason: HOSPADM

## 2025-03-14 RX ADMIN — PROPOFOL 50 MG: 10 INJECTION, EMULSION INTRAVENOUS at 09:17

## 2025-03-14 RX ADMIN — PROPOFOL 150 MCG/KG/MIN: 10 INJECTION, EMULSION INTRAVENOUS at 09:19

## 2025-03-14 RX ADMIN — PROPOFOL 20 MG: 10 INJECTION, EMULSION INTRAVENOUS at 09:30

## 2025-03-14 RX ADMIN — PROPOFOL 20 MG: 10 INJECTION, EMULSION INTRAVENOUS at 09:35

## 2025-03-14 RX ADMIN — SODIUM CHLORIDE: 0.9 INJECTION, SOLUTION INTRAVENOUS at 09:01

## 2025-03-14 RX ADMIN — PROPOFOL 20 MG: 10 INJECTION, EMULSION INTRAVENOUS at 09:53

## 2025-03-14 RX ADMIN — PROPOFOL 20 MG: 10 INJECTION, EMULSION INTRAVENOUS at 09:22

## 2025-03-14 RX ADMIN — LIDOCAINE HYDROCHLORIDE 20 MG: 20 INJECTION, SOLUTION EPIDURAL; INFILTRATION; INTRACAUDAL; PERINEURAL at 09:17

## 2025-03-14 ASSESSMENT — PAIN SCALES - GENERAL
PAINLEVEL_OUTOF10: 0
PAINLEVEL_OUTOF10: 0

## 2025-03-14 ASSESSMENT — PAIN - FUNCTIONAL ASSESSMENT: PAIN_FUNCTIONAL_ASSESSMENT: NONE - DENIES PAIN

## 2025-03-14 NOTE — H&P
Class  ASA 2 - Patient with mild systemic disease with no functional limitations    Mallampati Class: 2      ASSESSMENT AND PLAN:    personal history of tubular adenomatous colon polyps    Plan: colonoscopy    Colonoscopy with alternatives, rationale, benefits and risks, not limited to bleeding, infection, perforation, need of surgery, prolong hospital stay and anesthesia side effects were explained. Patient verbalized understanding and agrees to proceed with colonoscopy.       1.  Patient is a suitable candidate for endoscopic procedure and attendant anesthesia  2.  Risks, benefits, alternatives of procedure discussed in detail with patient including risks of bleeding, infection, perforation, risks of sedation, risks of missed lesions. The patient wishes to proceed.

## 2025-03-14 NOTE — ANESTHESIA PRE PROCEDURE
Department of Anesthesiology  Preprocedure Note       Name:  Na Johnston   Age:  69 y.o.  :  1955                                          MRN:  [id number]         Date:  3/14/2025      Surgeon: Surgeon(s):  Chris Monique MD    Procedure: Procedure(s):  COLONOSCOPY    Medications prior to admission:   Prior to Admission medications    Medication Sig Start Date End Date Taking? Authorizing Provider   ipratropium 0.5 mg-albuterol 2.5 mg (DUONEB) 0.5-2.5 (3) MG/3ML SOLN nebulizer solution Inhale 3 mLs into the lungs every 6 hours as needed for Shortness of Breath 3/3/25  Yes Derrick Lugo MD   fluticasone-umeclidin-vilant (TRELEGY ELLIPTA) 100-62.5-25 MCG/ACT AEPB inhaler Inhale 1 puff into the lungs daily Lot [id number] ex 2026 3/3/25  Yes Derrick Lugo MD   VENTOLIN  (90 Base) MCG/ACT inhaler INHALE 2 PUFFS BY MOUTH 4 TIMES A DAY AS NEEDED FOR WHEEZING 25  Yes Burt Goyal MD   guaiFENesin (MUCINEX) 600 MG extended release tablet Take 1 tablet by mouth 2 times daily  Patient taking differently: Take 1 tablet by mouth 2 times daily as needed 24  Yes Chanelle Sheikh APRN   mupirocin (BACTROBAN) 2 % ointment APPLY TOPICALLY THREE TIMES A DAY  Patient taking differently: as needed APPLY TOPICALLY THREE TIMES A DAY prn 24  Yes Burt Goyal MD   triamcinolone (KENALOG) 0.1 % cream Apply topically 2 times daily.  Patient taking differently: as needed Apply topically 2 times daily prn 24  Yes Meera Garcia APRN - CNP       Current medications:    No current facility-administered medications for this encounter.       Allergies:  No Known Allergies    Problem List:    Patient Active Problem List   Diagnosis Code   • Asthma J45.909   • Arthritis M19.90   • COPD, moderate (HCC) J44.9   • Tobacco dependence F17.200   • Bacterial pneumonia J15.9   • Mild persistent asthma without complication J45.30   • COPD exacerbation (HCC) J44.1   • Mediastinal lymphadenopathy R59.0

## 2025-03-14 NOTE — DISCHARGE INSTRUCTIONS
PATIENT INSTRUCTIONS  POST-SEDATION          IMMEDIATELY FOLLOWING PROCEDURE:    Do not drive or operate machinery for the first twenty four hours after surgery.     Do not make any important decisions for twenty four hours after surgery or while taking narcotic pain medications or sedatives.     You should NOT BE LEFT UNATTENDED OR ALONE. A responsible adult should be with you for the rest of the day of your procedure and also during the night for your protection and safety.    You may experience some light headedness. Rest at home with activity as tolerated. You may not need to go to bed, but it is important to rest for the next 24 hours. You should not engage in athletic sports such as basketball, volleyball, jogging, skating, or activities requiring refined motor skills for 24 hours.   If you develop intractable nausea and vomiting or a severe headache please notify your doctor immediately.   You are not expected to have any fever, but if you feel warm, take your temperature. If you have a fever 101 degrees or higher, call your doctor.     If you have had an Endoscopy:   *Eat lightly for your first meal and gradually resume your normal / prescribed diet.    *If you have had a colonoscopy, do not expect a normal bowel movement for approximately three days due to the cleansing of the large intestine prior to colonoscopy.    ONCE YOU ARE HOME, IF YOU SHOULD HAVE:  Difficulty in breathing, persistent nausea or vomiting, bleeding you feel is excessive, or pain that is unusual, increased abdominal bloating, or any swelling, fever / chills, call your physician. If you cannot contact your physician, but feel that your signs and symptoms need a physician's attention, go to the Emergency Department.      FOLLOW-UP:    Please follow up with your Primary Care Provider as scheduled or needed.    Call Chris Myers MD if there are any GI concerns. [phone]    Repeat Colonoscopy 3 years    You may be receiving a  a biopsy was done during the test, your doctor may tell you not to take aspirin or other anti-inflammatory medicines for a few days. These include ibuprofen (Advil, Motrin) and naproxen (Aleve).   Other instructions    For your safety, do not drive or operate machinery until the medicine wears off and you can think clearly. Your doctor may tell you not to drive or operate machinery until the day after your test.     Do not sign legal documents or make major decisions until the medicine wears off and you can think clearly. The anesthesia can make it hard for you to fully understand what you are agreeing to.   Follow-up care is a key part of your treatment and safety. Be sure to make and go to all appointments, and call your doctor if you are having problems. It's also a good idea to know your test results and keep a list of the medicines you take.  When should you call for help?  Call 911 anytime you think you may need emergency care. For example, call if:    You passed out (lost consciousness).     You pass maroon or bloody stools.     You have trouble breathing.    Call your doctor now or seek immediate medical care if:    You have pain that does not get better after you take pain medicine.     You are sick to your stomach or cannot drink fluids.     You have new or worse belly pain.     You have blood in your stools.     You have a fever.     You cannot pass stools or gas.    Watch closely for changes in your health, and be sure to contact your doctor if you have any problems.  Where can you learn more?  Go to [email or web address] and sign in to your [employer] account. Enter E264 in the Search Health Information box to learn more about \"Colonoscopy: What to Expect at Home.\"     If you do not have an account, please click on the \"Sign Up Now\" link.  Current as of: May 12, 2017  Content Version: 11.6  © 0320-7703 [employer]. Care instructions adapted under license by [employer] If

## 2025-03-14 NOTE — PROGRESS NOTES
History and Physical [facility] Hospitalist    PCP  Andres Bah DO    CC:  Fall, weakness       History of Present Ilness   This is a 77 y/o male with a PMH of DM, dementia, along with a recent humerus fracture presented to the hospital with a fall. Patient had a fall day prior to admission. He came to the ED the morninig of admission and was found to have a R humurs fracture. He was doing well, received a refferel to ortho and was DC home from ED. Unfortunaly patient then had some weakness at home and fell again, making his fracture worse. At this time due to second fall, he was admitted for PT and OT along with placment on labs patient does have sodium of 138, K of 4.0 Cr of 1.32, WBC of 9.5, Hg of 12.0 and LA of 2.3 and went down to 1.2.     Past Medical History     Past Medical History:   Diagnosis Date    Diabetes mellitus  (CMD) 7/19/2024    Malignant neoplasm  (CMD)     Uncomplicated senile dementia  (CMD)     West Nile encephalitis (CMD)         Surgical History     History reviewed. No pertinent surgical history.     Social History      Social History     Tobacco Use    Smoking status: Former     Current packs/day: 0.00     Types: Cigarettes     Quit date: 7/19/2009     Years since quitting: 15.0    Smokeless tobacco: Never   Vaping Use    Vaping status: never used   Substance Use Topics    Alcohol use: Never    Drug use: Never       Family History      History reviewed. No pertinent family history.     Allergies     ALLERGIES:  Patient has no known allergies.    Medications     Medications Prior to Admission   Medication Sig Dispense Refill    semaglutide,0.25 or 0.5 mg/DOSE, (Ozempic, 0.25 or 0.5 MG/DOSE,) (0.68 mg/mL) injection Inject 0.5 mg into the skin every 7 days. On Tuesday.         Current Facility-Administered Medications   Medication Dose Route Frequency Provider Last Rate Last Admin    acetaminophen (TYLENOL) tablet 650 mg  650 mg Oral Q4H PRN Jono Zheng DO        Or    acetaminophen  Pt arrived to pacu from endo.  vSS.  Pt awake but drowsy   (TYLENOL) suppository 650 mg  650 mg Rectal Q4H PRN Jono Zheng, DO        ondansetron (ZOFRAN ODT) disintegrating tablet 4 mg  4 mg Oral Q12H PRN Jono Zheng, DO        Or    ondansetron (ZOFRAN) injection 4 mg  4 mg Intravenous Q12H PRN Jono Zheng, DO        polyethylene glycol (MIRALAX) packet 17 g  17 g Oral Daily PRN Jono Zheng, DO        docusate sodium-sennosides (SENOKOT S) 50-8.6 MG 2 tablet  2 tablet Oral BID PRN Jono Zheng, DO        bisacodyl (DULCOLAX) suppository 10 mg  10 mg Rectal Daily PRN Jono Zheng, DO        magnesium hydroxide (MILK OF MAGNESIA) 400 MG/5ML suspension 30 mL  30 mL Oral Daily PRN Jono Zheng, DO        Potassium Standard Replacement Protocol (Levels 3.5 and lower)   Does not apply See Admin Instructions Jono Zheng DO        Magnesium Standard Replacement Protocol   Does not apply See Admin Instructions Jono Zheng, DO        sodium chloride 0.9 % flush bag 25 mL  25 mL Intravenous PRN Jono Zheng, DO        sodium chloride 0.9 % injection 2 mL  2 mL Intracatheter 2 times per day Jono Zheng DO   2 mL at 07/19/24 0848    sodium chloride (NORMAL SALINE) 0.9 % bolus 500 mL  500 mL Intravenous PRN Jono Zheng, DO        heparin (porcine) injection 5,000 Units  5,000 Units Subcutaneous 3 times per day Jono Zheng DO   5,000 Units at 07/19/24 0626    melatonin tablet 3 mg  3 mg Oral Nightly PRN Jono Zheng, DO        lactated ringers infusion   Intravenous Continuous Jono Zheng,  mL/hr at 07/19/24 0201 New Bag at 07/19/24 0201    dextrose 50 % injection 25 g  25 g Intravenous PRN Jono Zheng, DO        dextrose 50 % injection 12.5 g  12.5 g Intravenous PRN Jono Zheng, DO        glucagon (GLUCAGEN) injection 1 mg  1 mg Intramuscular PRN Jono Zheng, DO        dextrose (GLUTOSE) 40 % gel 15 g  15 g Oral PRN Jono Zheng, DO         dextrose (GLUTOSE) 40 % gel 30 g  30 g Oral PRN NormLeonidin J, DO        insulin lispro (ADMELOG,HumaLOG) - Correction Dose   Subcutaneous TID WC NormLeonidin J, DO   4 Units at 07/19/24 0847    insulin lispro (ADMELOG,HumaLOG) - Correction Dose   Subcutaneous Nightly Norm, Jono J, DO   3 Units at 07/19/24 0058    insulin glargine (LANTUS) injection 10 Units  10 Units Subcutaneous Nightly NormLeonidin J, DO   10 Units at 07/19/24 0058        ROS     ROS: 12 review of systems completed and negative unless noted below or in HPI  Constitutional: Negative except as noted in HPI  HEENT: Negative except as noted in HPI  Cardiovascular: Negative except as noted in HPI  Pulmonary: Negative except as noted in HPI  Gastrointestinal: Negative except as noted in HPI  Endocrine: Negative except as noted in HPI  Genitourinary: Negative except as noted in HPI  Musculoskeletal: Negative except as noted in HPI  Hematology: Negative except as noted in HPI  Skin: Negative except as noted in HPI  Neurologic: Negative except as noted in HPI  Psychiatric: Negative except as noted in HPI    Objective     Temp:  [97.7 °F (36.5 °C)-98.6 °F (37 °C)] 97.8 °F (36.6 °C)  Heart Rate:  [74-92] 74  Resp:  [11-19] 18  BP: (108-143)/(36-84) 126/65  SpO2 Readings from Last 1 Encounters:   07/18/24 100%       Intake/Output Summary (Last 24 hours) at 7/19/2024 1008  Last data filed at 7/19/2024 0020  Gross per 24 hour   Intake 20 ml   Output --   Net 20 ml      No results found for: \"PHARTERIAL\"         Physical Exam:  CONSTITUTIONAL: No acute distress   HEENT: Normocephalic,  PERRLA, moist oral mucosa  CHEST:  Arm in sling   RESPIRATORY: Clear to auscultation bilaterally  CARDIOVASCULAR:   Regular rate and rhythm with normal S1, S2 and no murmur.  GASTROINTESTINAL: normoactive bowel sounds, nontender/nondistended  MUSCULOSKELETAL: no joint effusions; no asymmetry   EXTREMITIES: no LE edema  NEURO:  Grossly normal, AAO self    SKIN:  No rashes  PSYCH: Appropriate         Labs   Recent Labs   Lab 07/19/24  0618 07/18/24  2148 07/18/24  1849   WBC 9.5  --  12.4*   HCT 36.4*  --  40.4   HGB 12.0*  --  13.3     --  238   SODIUM 138  --  138   POTASSIUM 4.0 5.3* 5.5*   CHLORIDE 103  --  101   CO2 24  --  26   GLUCOSE 222*  --  369*   BUN 40*  --  37*   CREATININE 1.32*  --  1.44*          Imaging      XR CHEST PA OR AP 1 VIEW   Final Result      No acute cardiopulmonary findings.            Electronically Signed by: SHANNON ROTHMAN M.D.    Signed on: 7/18/2024 8:30 PM    Workstation ID: [id number]      XR SHOULDER 3 VIEWS RIGHT   Final Result   FINDINGS/IMPRESSION:       Redemonstrated acute impacted displaced fracture of the right femoral   head/neck. The fracture impaction/alignment is slightly altered when   compared to today's previous exam from 3:27 a.m. This could reflect   reinjury at this site. No other fracture is identified. No joint   dislocation.      Mild glenohumeral joint degenerative changes. Multilevel spondylosis.            Electronically Signed by: SHANNON ROTHMAN M.D.    Signed on: 7/18/2024 8:31 PM    Workstation ID: [id number]      CT HEAD WO CONTRAST   Final Result      CT HEAD: No acute intracranial findings. Mild to moderate parenchymal   volume loss with mild chronic microvascular ischemic changes.      CT CERVICAL SPINE: No acute cervical spine fracture. Moderate degenerative   findings in the cervical spine. No critical canal narrowing.         Electronically Signed by: SHANNON ROTHMAN M.D.    Signed on: 7/18/2024 8:06 PM    Workstation ID: [id number]      CT CERVICAL SPINE WO CONTRAST   Final Result      CT HEAD: No acute intracranial findings. Mild to moderate parenchymal   volume loss with mild chronic microvascular ischemic changes.      CT CERVICAL SPINE: No acute cervical spine fracture. Moderate degenerative   findings in the cervical spine. No critical canal narrowing.          Electronically Signed by: SHANNON ROTHMAN M.D.    Signed on: 7/18/2024 8:06 PM    Workstation ID: [id number]              Assessment and Plan   This is a 79 y/o male with a PMH of DM, dementia, along with a recent humerus fracture presented to the hospital with a fall    Principal Problem:    Weakness  Active Problems:    Diabetes mellitus  (CMD)    Right supracondylar humerus fracture    CKD (chronic kidney disease) stage 3, GFR 30-59 ml/min  (CMD)      Plan  - xrays reviewed. Patient in sling  - will consult orthopedics reqs appreciated   - currently pain controlled. Will have norco 5 mg q6 as needed  - sliding scale. Holding po meds  - pt and ot   - labs in the am   - heparin for DVT prophylaxis.   - k and mg protocol in place   - will stop fluids. Patient appears euvolemic at this time.   - normal diet     # Advanced care planning:  - Spent 25 minutes with patient's wife discussing the code status. Went over in the event of an emergency whether she would want chest compressions, intubation, cardioversion, antiarrhythmics, pressors. She indicates that they would like to be Select DNR at this time         Nutrition status: Does Not Meet Criteria for Malnutrition       Current Active Medications for DVT Prophylaxis (From admission, onward)           Stop     heparin (porcine) injection 5,000 Units  5,000 Units,   Subcutaneous,   3 times per day         --                     Dietary Orders (From admission, onward)       Start     Ordered    07/18/24 2332  Consistent Carb Moderate (45-75 gm/meal) Diet  DIET EFFECTIVE NOW        Question:  Diet Modifiers  Answer:  Consistent Carb Moderate (45-75 gm/meal)    07/18/24 2331                     I have discussed and educated the patient regarding treatment plan. I have discussed with RN and other consultants at length regarding treatment plan.       FEN: Replete electrolytes as needed to maintain Mg 2, Phos 3, K 4 per protocol. Consistent Carb Moderate (45-75  Gm/Meal) Diet   DVT Proph: SCD's, Encourage Ambulation with assistance, heparin (porcine) - 5000 UNIT/ML   Code Status:   Code Status: Selective Treatment/DNR  Primary Care Provider: Andres Bah DO  Dispo: Obs      Eris Wu MD  [facility] Hospitalist   7/19/2024 10:08 AM

## 2025-03-18 SDOH — HEALTH STABILITY: PHYSICAL HEALTH: ON AVERAGE, HOW MANY DAYS PER WEEK DO YOU ENGAGE IN MODERATE TO STRENUOUS EXERCISE (LIKE A BRISK WALK)?: 0 DAYS

## 2025-03-18 SDOH — HEALTH STABILITY: PHYSICAL HEALTH: ON AVERAGE, HOW MANY MINUTES DO YOU ENGAGE IN EXERCISE AT THIS LEVEL?: 0 MIN

## 2025-03-18 ASSESSMENT — PATIENT HEALTH QUESTIONNAIRE - PHQ9
SUM OF ALL RESPONSES TO PHQ QUESTIONS 1-9: 1
1. LITTLE INTEREST OR PLEASURE IN DOING THINGS: SEVERAL DAYS
SUM OF ALL RESPONSES TO PHQ QUESTIONS 1-9: 1
2. FEELING DOWN, DEPRESSED OR HOPELESS: NOT AT ALL

## 2025-03-18 ASSESSMENT — LIFESTYLE VARIABLES
HOW MANY STANDARD DRINKS CONTAINING ALCOHOL DO YOU HAVE ON A TYPICAL DAY: 1
HOW OFTEN DO YOU HAVE SIX OR MORE DRINKS ON ONE OCCASION: 1
HOW MANY STANDARD DRINKS CONTAINING ALCOHOL DO YOU HAVE ON A TYPICAL DAY: 1 OR 2
HOW OFTEN DO YOU HAVE A DRINK CONTAINING ALCOHOL: MONTHLY OR LESS
HOW OFTEN DO YOU HAVE A DRINK CONTAINING ALCOHOL: 2

## 2025-03-19 ENCOUNTER — TELEMEDICINE (OUTPATIENT)
Dept: FAMILY MEDICINE CLINIC | Age: 70
End: 2025-03-19

## 2025-03-19 DIAGNOSIS — Z00.00 MEDICARE ANNUAL WELLNESS VISIT, SUBSEQUENT: Primary | ICD-10-CM

## 2025-03-19 NOTE — PROGRESS NOTES
Fam Hx  Sexual   Hx            I, ALICIA CORNELL LPN, 3/19/2025, performed the documented evaluation under the direct supervision of the attending physician.  Na Preston, was evaluated through a synchronous (real-time) audio-video encounter. The patient (or guardian if applicable) is aware that this is a billable service, which includes applicable co-pays. This Virtual Visit was conducted with patient's (and/or legal guardian's) consent. Patient identification was verified, and a caregiver was present when appropriate.   The patient was located at Home: [address]  [place] [address]  Provider was located at Home (Appt Dept State): [place]  Confirm you are appropriately licensed, registered, or certified to deliver care in the state where the patient is located as indicated above. If you are not or unsure, please re-schedule the visit: Yes, I confirm.    This encounter was performed under my, Burt Goyal MD’s, direct supervision, 3/19/2025.

## 2025-03-19 NOTE — PATIENT INSTRUCTIONS
Preventive Plan for Na Johnston - 3/19/2025  Medicare offers a range of preventive health benefits. Some of the tests and screenings are paid in full while other may be subject to a deductible, co-insurance, and/or copay.  Some of these benefits include a comprehensive review of your medical history including lifestyle, illnesses that may run in your family, and various assessments and screenings as appropriate.  After reviewing your medical record and screening and assessments performed today your provider may have ordered immunizations, labs, imaging, and/or referrals for you.  A list of these orders (if applicable) as well as your Preventive Care list are included within your After Visit Summary for your review.

## 2025-03-20 ENCOUNTER — RESULTS FOLLOW-UP (OUTPATIENT)
Dept: ENDOSCOPY | Age: 70
End: 2025-03-20

## 2025-05-01 ENCOUNTER — TELEMEDICINE (OUTPATIENT)
Dept: FAMILY MEDICINE CLINIC | Age: 70
End: 2025-05-01
Payer: MEDICARE

## 2025-05-01 DIAGNOSIS — L30.9 HAND DERMATITIS: Primary | ICD-10-CM

## 2025-05-01 PROCEDURE — 1090F PRES/ABSN URINE INCON ASSESS: CPT | Performed by: NURSE PRACTITIONER

## 2025-05-01 PROCEDURE — 1159F MED LIST DOCD IN RCRD: CPT | Performed by: NURSE PRACTITIONER

## 2025-05-01 PROCEDURE — 3017F COLORECTAL CA SCREEN DOC REV: CPT | Performed by: NURSE PRACTITIONER

## 2025-05-01 PROCEDURE — G8399 PT W/DXA RESULTS DOCUMENT: HCPCS | Performed by: NURSE PRACTITIONER

## 2025-05-01 PROCEDURE — G8427 DOCREV CUR MEDS BY ELIG CLIN: HCPCS | Performed by: NURSE PRACTITIONER

## 2025-05-01 PROCEDURE — 99213 OFFICE O/P EST LOW 20 MIN: CPT | Performed by: NURSE PRACTITIONER

## 2025-05-01 PROCEDURE — 1124F ACP DISCUSS-NO DSCNMKR DOCD: CPT | Performed by: NURSE PRACTITIONER

## 2025-05-01 PROCEDURE — 1160F RVW MEDS BY RX/DR IN RCRD: CPT | Performed by: NURSE PRACTITIONER

## 2025-05-01 RX ORDER — TRIAMCINOLONE ACETONIDE 5 MG/G
CREAM TOPICAL
Qty: 15 G | Refills: 1 | Status: SHIPPED | OUTPATIENT
Start: 2025-05-01 | End: 2025-05-08

## 2025-05-01 ASSESSMENT — ENCOUNTER SYMPTOMS
RESPIRATORY NEGATIVE: 1
GASTROINTESTINAL NEGATIVE: 1

## 2025-05-01 NOTE — PROGRESS NOTES
Na Johnston (:  1955) is a Established patient, presenting virtually for evaluation of the following:    Assessment & Plan   Below is the assessment and plan developed based on review of pertinent history, physical exam, labs, studies, and medications.  Assessment & Plan  Hand dermatitis    Will treat with a higher dose of triamcinolone cream at 0.5% 3 times daily.  Patient will follow-up if no improvement.  Can consider oral steroid if cream is not effective.    Orders:    triamcinolone (ARISTOCORT) 0.5 % cream; Apply topically 3 times daily.              Subjective   HPI  Patient presents today virtually with concerns of rash on bilateral hands that she states is dry itchy and causes her skin to be cracked.  She states she has used triamcinolone cream in the past with minimal improvement.  It was the 0.1% cream.  She states she has had to use oral steroids before.  She states this is something that comes and goes and is flared up currently.    Review of Systems   Constitutional: Negative.    HENT: Negative.     Respiratory: Negative.     Cardiovascular: Negative.    Gastrointestinal: Negative.    Musculoskeletal: Negative.    Skin:  Positive for rash.   Neurological: Negative.    Psychiatric/Behavioral: Negative.            Objective   Patient-Reported Vitals  No data recorded     Physical Exam  [INSTRUCTIONS:  \"[x]\" Indicates a positive item  \"[]\" Indicates a negative item  -- DELETE ALL ITEMS NOT EXAMINED]    Constitutional: [x] Appears well-developed and well-nourished [x] No apparent distress      [] Abnormal -     Mental status: [x] Alert and awake  [x] Oriented to person/place/time [x] Able to follow commands    [] Abnormal -     Eyes:   EOM    [x]  Normal    [] Abnormal -   Sclera  [x]  Normal    [] Abnormal -          Discharge [x]  None visible   [] Abnormal -     HENT: [x] Normocephalic, atraumatic  [] Abnormal -   [x] Mouth/Throat: Mucous membranes are moist    External Ears [x] Normal  []

## 2025-05-02 ENCOUNTER — TELEPHONE (OUTPATIENT)
Dept: FAMILY MEDICINE CLINIC | Age: 70
End: 2025-05-02

## 2025-05-02 RX ORDER — PREDNISONE 20 MG/1
20 TABLET ORAL DAILY
Qty: 5 TABLET | Refills: 0 | Status: SHIPPED | OUTPATIENT
Start: 2025-05-02 | End: 2025-05-07

## 2025-05-02 NOTE — TELEPHONE ENCOUNTER
Pt called office stating that she saw Meera BEJARANO yesterday and that provider advised her that if the cream prescribed didn't help with the itching to call the office and request to have provider send in rx for prednisone. Please advise

## 2025-05-26 ENCOUNTER — APPOINTMENT (OUTPATIENT)
Dept: GENERAL RADIOLOGY | Age: 70
End: 2025-05-26
Payer: MEDICARE

## 2025-05-26 ENCOUNTER — HOSPITAL ENCOUNTER (INPATIENT)
Age: 70
LOS: 5 days | Discharge: HOME | End: 2025-05-31
Attending: EMERGENCY MEDICINE
Payer: MEDICARE

## 2025-05-26 ENCOUNTER — APPOINTMENT (OUTPATIENT)
Dept: CT IMAGING | Age: 70
End: 2025-05-26
Payer: MEDICARE

## 2025-05-26 DIAGNOSIS — R09.02 HYPOXIA: ICD-10-CM

## 2025-05-26 DIAGNOSIS — J44.1 COPD EXACERBATION (HCC): Primary | ICD-10-CM

## 2025-05-26 LAB
ALBUMIN SERPL-MCNC: 3.6 G/DL (ref 3.4–5)
ALBUMIN/GLOB SERPL: 1.2 {RATIO} (ref 1.1–2.2)
ALP SERPL-CCNC: 84 U/L (ref 40–129)
ALT SERPL-CCNC: 8 U/L (ref 10–40)
ANION GAP SERPL CALCULATED.3IONS-SCNC: 8 MMOL/L (ref 3–16)
AST SERPL-CCNC: 16 U/L (ref 15–37)
BASE EXCESS BLDV CALC-SCNC: -0.8 MMOL/L (ref -3–3)
BASE EXCESS BLDV CALC-SCNC: 3.7 MMOL/L (ref -3–3)
BASOPHILS # BLD: 0 K/UL (ref 0–0.2)
BASOPHILS NFR BLD: 0.6 %
BILIRUB SERPL-MCNC: 0.4 MG/DL (ref 0–1)
BUN SERPL-MCNC: 10 MG/DL (ref 7–20)
CALCIUM SERPL-MCNC: 9.1 MG/DL (ref 8.3–10.6)
CHLORIDE SERPL-SCNC: 100 MMOL/L (ref 99–110)
CO2 BLDV-SCNC: 27 MMOL/L
CO2 BLDV-SCNC: 29 MMOL/L
CO2 SERPL-SCNC: 29 MMOL/L (ref 21–32)
COHGB MFR BLDV: 3 % (ref 0–1.5)
COHGB MFR BLDV: 5.5 % (ref 0–1.5)
CREAT SERPL-MCNC: 0.5 MG/DL (ref 0.6–1.2)
D-DIMER QUANTITATIVE: 0.64 UG/ML FEU (ref 0–0.6)
DEPRECATED RDW RBC AUTO: 15 % (ref 12.4–15.4)
EOSINOPHIL # BLD: 0 K/UL (ref 0–0.6)
EOSINOPHIL NFR BLD: 0.9 %
FLUAV RNA RESP QL NAA+PROBE: NOT DETECTED
FLUBV RNA RESP QL NAA+PROBE: NOT DETECTED
GFR SERPLBLD CREATININE-BSD FMLA CKD-EPI: >90 ML/MIN/{1.73_M2}
GLUCOSE SERPL-MCNC: 122 MG/DL (ref 70–99)
HCO3 BLDV-SCNC: 25.1 MMOL/L (ref 23–29)
HCO3 BLDV-SCNC: 27.4 MMOL/L (ref 23–29)
HCT VFR BLD AUTO: 41.6 % (ref 36–48)
HGB BLD-MCNC: 14.1 G/DL (ref 12–16)
LYMPHOCYTES # BLD: 0.6 K/UL (ref 1–5.1)
LYMPHOCYTES NFR BLD: 13.8 %
MCH RBC QN AUTO: 33.2 PG (ref 26–34)
MCHC RBC AUTO-ENTMCNC: 33.9 G/DL (ref 31–36)
MCV RBC AUTO: 98 FL (ref 80–100)
METHGB MFR BLDV: 0 %
METHGB MFR BLDV: 0.4 %
MONOCYTES # BLD: 0.3 K/UL (ref 0–1.3)
MONOCYTES NFR BLD: 6.5 %
NEUTROPHILS # BLD: 3.2 K/UL (ref 1.7–7.7)
NEUTROPHILS NFR BLD: 78.2 %
O2 THERAPY: ABNORMAL
O2 THERAPY: ABNORMAL
PCO2 BLDV: 38.5 MMHG (ref 40–50)
PCO2 BLDV: 45.7 MMHG (ref 40–50)
PH BLDV: 7.36 [PH] (ref 7.35–7.45)
PH BLDV: 7.47 [PH] (ref 7.35–7.45)
PLATELET # BLD AUTO: 218 K/UL (ref 135–450)
PMV BLD AUTO: 7.3 FL (ref 5–10.5)
PO2 BLDV: 58.3 MMHG (ref 25–40)
PO2 BLDV: 65.4 MMHG (ref 25–40)
POTASSIUM SERPL-SCNC: 4.1 MMOL/L (ref 3.5–5.1)
PROT SERPL-MCNC: 6.7 G/DL (ref 6.4–8.2)
RBC # BLD AUTO: 4.25 M/UL (ref 4–5.2)
REASON FOR REJECTION: NORMAL
REASON FOR REJECTION: NORMAL
REJECTED TEST: NORMAL
REJECTED TEST: NORMAL
SAO2 % BLDV: 90 %
SAO2 % BLDV: 95 %
SARS-COV-2 RNA RESP QL NAA+PROBE: NOT DETECTED
SODIUM SERPL-SCNC: 137 MMOL/L (ref 136–145)
TROPONIN, HIGH SENSITIVITY: 13 NG/L (ref 0–14)
WBC # BLD AUTO: 4 K/UL (ref 4–11)

## 2025-05-26 PROCEDURE — 36415 COLL VENOUS BLD VENIPUNCTURE: CPT

## 2025-05-26 PROCEDURE — 96374 THER/PROPH/DIAG INJ IV PUSH: CPT

## 2025-05-26 PROCEDURE — 85379 FIBRIN DEGRADATION QUANT: CPT

## 2025-05-26 PROCEDURE — 2580000003 HC RX 258

## 2025-05-26 PROCEDURE — 85025 COMPLETE CBC W/AUTO DIFF WBC: CPT

## 2025-05-26 PROCEDURE — 6370000000 HC RX 637 (ALT 250 FOR IP)

## 2025-05-26 PROCEDURE — 1200000000 HC SEMI PRIVATE

## 2025-05-26 PROCEDURE — 94761 N-INVAS EAR/PLS OXIMETRY MLT: CPT

## 2025-05-26 PROCEDURE — 6360000002 HC RX W HCPCS

## 2025-05-26 PROCEDURE — 2700000000 HC OXYGEN THERAPY PER DAY

## 2025-05-26 PROCEDURE — 94640 AIRWAY INHALATION TREATMENT: CPT

## 2025-05-26 PROCEDURE — 2500000003 HC RX 250 WO HCPCS

## 2025-05-26 PROCEDURE — 71045 X-RAY EXAM CHEST 1 VIEW: CPT

## 2025-05-26 PROCEDURE — 6360000004 HC RX CONTRAST MEDICATION

## 2025-05-26 PROCEDURE — 71260 CT THORAX DX C+: CPT

## 2025-05-26 PROCEDURE — 93005 ELECTROCARDIOGRAM TRACING: CPT

## 2025-05-26 PROCEDURE — 84484 ASSAY OF TROPONIN QUANT: CPT

## 2025-05-26 PROCEDURE — 82803 BLOOD GASES ANY COMBINATION: CPT

## 2025-05-26 PROCEDURE — 80053 COMPREHEN METABOLIC PANEL: CPT

## 2025-05-26 PROCEDURE — 99285 EMERGENCY DEPT VISIT HI MDM: CPT

## 2025-05-26 PROCEDURE — 87636 SARSCOV2 & INF A&B AMP PRB: CPT

## 2025-05-26 RX ORDER — ACETAMINOPHEN 325 MG/1
650 TABLET ORAL EVERY 6 HOURS PRN
Status: DISCONTINUED | OUTPATIENT
Start: 2025-05-26 | End: 2025-05-31 | Stop reason: HOSPADM

## 2025-05-26 RX ORDER — IPRATROPIUM BROMIDE AND ALBUTEROL SULFATE 2.5; .5 MG/3ML; MG/3ML
2 SOLUTION RESPIRATORY (INHALATION) ONCE
Status: COMPLETED | OUTPATIENT
Start: 2025-05-26 | End: 2025-05-26

## 2025-05-26 RX ORDER — IPRATROPIUM BROMIDE AND ALBUTEROL SULFATE 2.5; .5 MG/3ML; MG/3ML
1 SOLUTION RESPIRATORY (INHALATION)
Status: DISCONTINUED | OUTPATIENT
Start: 2025-05-26 | End: 2025-05-27

## 2025-05-26 RX ORDER — SODIUM CHLORIDE 0.9 % (FLUSH) 0.9 %
5-40 SYRINGE (ML) INJECTION EVERY 12 HOURS SCHEDULED
Status: DISCONTINUED | OUTPATIENT
Start: 2025-05-26 | End: 2025-05-31 | Stop reason: HOSPADM

## 2025-05-26 RX ORDER — ONDANSETRON 4 MG/1
4 TABLET, ORALLY DISINTEGRATING ORAL EVERY 8 HOURS PRN
Status: DISCONTINUED | OUTPATIENT
Start: 2025-05-26 | End: 2025-05-31 | Stop reason: HOSPADM

## 2025-05-26 RX ORDER — POLYETHYLENE GLYCOL 3350 17 G/17G
17 POWDER, FOR SOLUTION ORAL DAILY PRN
Status: DISCONTINUED | OUTPATIENT
Start: 2025-05-26 | End: 2025-05-31 | Stop reason: HOSPADM

## 2025-05-26 RX ORDER — SODIUM CHLORIDE 9 MG/ML
INJECTION, SOLUTION INTRAVENOUS PRN
Status: DISCONTINUED | OUTPATIENT
Start: 2025-05-26 | End: 2025-05-31 | Stop reason: HOSPADM

## 2025-05-26 RX ORDER — ALBUTEROL SULFATE 0.83 MG/ML
2.5 SOLUTION RESPIRATORY (INHALATION) EVERY 4 HOURS PRN
Status: DISCONTINUED | OUTPATIENT
Start: 2025-05-26 | End: 2025-05-31 | Stop reason: HOSPADM

## 2025-05-26 RX ORDER — ACETAMINOPHEN 650 MG/1
650 SUPPOSITORY RECTAL EVERY 6 HOURS PRN
Status: DISCONTINUED | OUTPATIENT
Start: 2025-05-26 | End: 2025-05-31 | Stop reason: HOSPADM

## 2025-05-26 RX ORDER — GUAIFENESIN 600 MG/1
600 TABLET, EXTENDED RELEASE ORAL 2 TIMES DAILY
Status: DISCONTINUED | OUTPATIENT
Start: 2025-05-26 | End: 2025-05-31 | Stop reason: HOSPADM

## 2025-05-26 RX ORDER — PREDNISONE 20 MG/1
40 TABLET ORAL DAILY
Status: COMPLETED | OUTPATIENT
Start: 2025-05-29 | End: 2025-05-31

## 2025-05-26 RX ORDER — ENOXAPARIN SODIUM 100 MG/ML
30 INJECTION SUBCUTANEOUS DAILY
Status: DISCONTINUED | OUTPATIENT
Start: 2025-05-26 | End: 2025-05-31 | Stop reason: HOSPADM

## 2025-05-26 RX ORDER — SODIUM CHLORIDE 0.9 % (FLUSH) 0.9 %
5-40 SYRINGE (ML) INJECTION PRN
Status: DISCONTINUED | OUTPATIENT
Start: 2025-05-26 | End: 2025-05-31 | Stop reason: HOSPADM

## 2025-05-26 RX ORDER — ONDANSETRON 2 MG/ML
4 INJECTION INTRAMUSCULAR; INTRAVENOUS EVERY 6 HOURS PRN
Status: DISCONTINUED | OUTPATIENT
Start: 2025-05-26 | End: 2025-05-31 | Stop reason: HOSPADM

## 2025-05-26 RX ORDER — IOPAMIDOL 755 MG/ML
75 INJECTION, SOLUTION INTRAVASCULAR
Status: COMPLETED | OUTPATIENT
Start: 2025-05-26 | End: 2025-05-26

## 2025-05-26 RX ADMIN — GUAIFENESIN 600 MG: 600 TABLET, MULTILAYER, EXTENDED RELEASE ORAL at 21:25

## 2025-05-26 RX ADMIN — WATER 40 MG: 1 INJECTION INTRAMUSCULAR; INTRAVENOUS; SUBCUTANEOUS at 18:35

## 2025-05-26 RX ADMIN — AZITHROMYCIN MONOHYDRATE 500 MG: 500 INJECTION, POWDER, LYOPHILIZED, FOR SOLUTION INTRAVENOUS at 18:39

## 2025-05-26 RX ADMIN — WATER 40 MG: 1 INJECTION INTRAMUSCULAR; INTRAVENOUS; SUBCUTANEOUS at 23:40

## 2025-05-26 RX ADMIN — ENOXAPARIN SODIUM 30 MG: 100 INJECTION SUBCUTANEOUS at 18:58

## 2025-05-26 RX ADMIN — IPRATROPIUM BROMIDE AND ALBUTEROL SULFATE 2 DOSE: 2.5; .5 SOLUTION RESPIRATORY (INHALATION) at 10:55

## 2025-05-26 RX ADMIN — IPRATROPIUM BROMIDE AND ALBUTEROL SULFATE 1 DOSE: 2.5; .5 SOLUTION RESPIRATORY (INHALATION) at 20:33

## 2025-05-26 RX ADMIN — IOPAMIDOL 75 ML: 755 INJECTION, SOLUTION INTRAVENOUS at 13:09

## 2025-05-26 RX ADMIN — WATER 125 MG: 1 INJECTION INTRAMUSCULAR; INTRAVENOUS; SUBCUTANEOUS at 10:41

## 2025-05-26 RX ADMIN — WATER 1000 MG: 1 INJECTION INTRAMUSCULAR; INTRAVENOUS; SUBCUTANEOUS at 18:38

## 2025-05-26 ASSESSMENT — PAIN SCALES - GENERAL: PAINLEVEL_OUTOF10: 0

## 2025-05-26 NOTE — PROGRESS NOTES
Pt arrived to c3. Call light within reach. Pt is very SOB on exertion. No complaints of pain. Orientated to room and surroundings.Gail Lopez RN

## 2025-05-26 NOTE — ED NOTES
Na Johnston is a 69 y.o. female admitted for  Principal Problem:    COPD with acute exacerbation (HCC)  Resolved Problems:    * No resolved hospital problems. *  .   Patient Home via EMS transportation with   Chief Complaint   Patient presents with    Shortness of Breath     Patient has been having shortness of breath for a couple of days. Patient denies chest pain.    Cough   .  Patient is alert and Person, Place, Time, and Situation  Patient's baseline mobility: Baseline Mobility: Independent   Code Status: Prior   Cardiac Rhythm:    O2 Flow Rate (L/min): 2 L/min  Is patient on baseline Oxygen: no how many Liters2:   Abnormal Assessment Findings: decreased lung sounds, sob    Isolation: None      NIH Score:    C-SSRS: Risk of Suicide: No Risk  Bedside swallow:        Active LDA's:   Peripheral IV 05/26/25 Left Antecubital (Active)   Site Assessment Clean, dry & intact 05/26/25 1220   Line Status Blood return noted;Brisk blood return 05/26/25 1220   Phlebitis Assessment No symptoms 05/26/25 1220   Infiltration Assessment 0 05/26/25 1220   Dressing Status New dressing applied;Clean, dry & intact 05/26/25 1220   Dressing Type Transparent 05/26/25 1220   Dressing Intervention New 05/26/25 1220     Patient admitted with a duff:  If the duff is chronic was it exchanged:  Reason for duff:   Patient admitted with Central Line:  . PICC line placement confirmed: YES OR NO:[id number]}   Reason for Central line:   Was central line Inserted from an outside facility:       Family/Caregiver Present no Any Concerns: no   Restraints no  Sitter no         Vitals: MEWS Score: 1    Vitals:    05/26/25 1218 05/26/25 1330 05/26/25 1415 05/26/25 1513   BP: 138/88 136/86 133/86 134/87   Pulse: 99 99 99 92   Resp: 22 27 (!) 35 (!) 36   Temp:       TempSrc:       SpO2: 95% 94% 95% 97%   Weight:       Height:           Last documented pain score (0-10 scale)    Pain medication administered No.    Pertinent or High Risk Medications/Drips:

## 2025-05-26 NOTE — PROGRESS NOTES
.  4 Eyes Skin Assessment and Patient belongings     The patient is being assess for  Admission    I agree that 2 Nurses have performed a thorough Head to Toe Skin Assessment on the patient. ALL assessment sites listed below have been assessed.       Areas assessed by both nurses: Gail RN and Milly RN  [x]   Head, Face, and Ears   [x]   Shoulders, Back, and Chest  [x]   Arms, Elbows, and Hands   [x]   Coccyx, Sacrum, and IschIum  [x]   Legs, Feet, and Heels        Does the Patient have Skin Breakdown?  No         Federico Prevention initiated:  No   Wound Care Orders initiated:  No      [facility] nurse consulted for Pressure Injury (Stage 3,4, Unstageable, DTI, NWPT, and Complex wounds), New and Established Ostomies:  No      I agree that 2 Nurses have reviewed patient belongings with the patient/family and documented in the flowsheet upon admission or transfer to the unit.             Nurse 1 eSignature: Electronically signed by Gail Lopez RN on 5/26/25 at 6:07 PM EDT    **SHARE this note so that the co-signing nurse is able to place an eSignature**    Nurse 2 eSignature:Electronically signed by Laisha Easley RN on 5/26/2025 at 6:09 PM

## 2025-05-26 NOTE — H&P
[facility] Medicine History & Physical    V 5.1    Date of Admission: 5/26/2025    Date of Service:  Pt seen/examined on 5/26/2025    [x]Admitted to Inpatient with expected LOS greater than two midnights due to medical therapy.  []Placed in Observation status.    Chief Admission Complaint: Cough, shortness of breath    Presenting Admission History:      Patient is a 69-year-old female with past medical history of COPD and lung cancer status post lobectomy in 2022 who presents today for cough and shortness of breath.  She states it has been ongoing for the last couple of days.  She is endorsing increased cough.  She states that she is having increased sputum production.  On arrival to the ED she was found to be tachycardic and tachypneic.  ED workup was unremarkable and we were consulted for admission.    Assessment/Plan:      Acute COPD Exacerbation  - with Acute respiratory failure   - not on baseline home O2  - Normally controlled on home medication regimen   - Continue DuoNebs every 4 hours while awake  - Continue Solu-Medrol  - Given increased sputum production and cough start Rocephin and azithromycin    Acute Respiratory Failure - POA  - W/ hypoxia  - Presence of clinical respiratory distress w/ tachypnea/dyspnea/SOB and wheezing w/ use of accessory muscles to breath  - On supplemental O2 as ordered and wean as tolerated    Discussed management and the need for Hospitalization of the patient w/ the Emergency Department Provider: Michi Hebert     CXR: I have reviewed the CXR with the following interpretation: Hyperinflated lungs   EKG:  I have reviewed the EKG with the following interpretation: normal sinus rhythm     Physical Exam Performed:      General appearance:  Moderate distress, appears stated age and cooperative.  HEENT:  Normal cephalic, atraumatic without obvious deformity. Conjunctivae/corneas clear.  Neck: No jugular venous distention.    Respiratory:  Increased respiratory effort.   Diffuse wheezing bilaterally.  Short of breath with conversation.  Tripoding with conversation.  Cardiovascular:  Regular rate and rhythm without murmurs, rubs or gallops.  Abdomen: Soft, non-tender, non-distended with normal bowel sounds.  Musculoskeletal:  No clubbing, cyanosis or edema bilaterally.    Skin: Skin color, texture, turgor normal.  No rashes or lesions.  Neurologic:  Neurovascularly intact without any focal sensory/motor deficits.   Psychiatric:  Alert and oriented, thought content appropriate, normal insight    /86   Pulse 99   Temp 98 °F (36.7 °C) (Oral)   Resp (!) 35   Ht 1.626 m (5' 4\")   Wt 47.1 kg (103 lb 13.9 oz)   LMP  (LMP Unknown)   SpO2 95%   BMI 17.83 kg/m²     Diet: [x]Adult/General  []Cardiac  []Diabetic  []Low Fat  []NPO  []NPO after Midnight  []Other     DVT Prophylaxis: PPX dose LMWH    Code status: [x]Full  []DNR/CCA  []Limited (DNR/CCA with Do Not Intubate)  []DNRCC    Surrogate Decision Maker:   Name Home Phone Work Phone Mobile Phone Relationship Lgl SRAVANI Vázquez [phone] Child No        PT/OT Eval Status: Not yet ordered    Anticipated Discharge Day/Date:  2-3 days     Anticipated Discharge Location: Home    Consults:      IP CONSULT TO HOSPITALIST    I personally have obtained, updated and/or reviewed the patient’s medication list on 5/26/2025   --------------------------------------------------------------------------------------------------------------------------------------------------------------------    Imaging:     CT CHEST PULMONARY EMBOLISM W CONTRAST  Result Date: 5/26/2025  CT ANGIOGRAPHY CHEST WITH CONTRAST HISTORY: Pulmonary embolism Comparison: 12/15/2024 and 2/1/2025 TECHNICAL FACTORS: Following administration of 80 cc of Omnipaque 350,CT angiography protocol was performed with postcontrast axial images performed through the pulmonary arteries with multiplanar reformatted images obtained. Maximum intensity projection  (MIP)

## 2025-05-26 NOTE — ED PROVIDER NOTES
Emergency Department Attending SUPERVISORY Provider Note  Location: [place] [address] TELE/MED SURG/ONC  5/26/2025     Chief Complaint   Patient presents with    Shortness of Breath     Patient has been having shortness of breath for a couple of days. Patient denies chest pain.    Cough        PATIENT ID:  Na Johnston is a 69 y.o. female    Past Medical History:   Diagnosis Date    Arthritis     Asthma     Cancer (HCC)     lung/ right 1/3 removed    COPD (chronic obstructive pulmonary disease) (HCC)     Emphysema of lung (HCC)        Na Johnston was evaluated in the Emergency Department for concerns of shortness of breath, history of COPD, and here is requiring 2 to 3 L oxygen, does not wear oxygen at home.  I was asked to evaluate this patient, once she was ambulated without oxygen, as she initially was okay with going home, however she dropped the 80s SpO2.          Vitals:    05/28/25 2355   BP:    Pulse:    Resp:    Temp:    SpO2: 96%        BASIC EXAM:    Focused exam revealed   General: Alert, no acute distress, patient resting comfortably   Skin: Warm, intact, no pallor noted   Head: Normocephalic  Eye: Normal conjunctiva   Cardiac: Normal peripheral perfusion  Respiratory: No acute distress says she feels comfortable wearing 2 L nasal cannula  Musculoskeletal: No deformity, full ROM.   Neurological: Alert and oriented  Psychiatric: Cooperative      I independently interpreted the EKG:   EKG Interpretation:    Unusual P axis, possible ectopic atrial rhythmNonspecific T wave abnormalityAbnormal ECGWhen compared with ECG of 17-DEC-2024 00:15,Inverted T waves have replaced nonspecific T wave abnormality in Anterior leadsConfirmed by ALEX CHIN ([id number]) on 5/27/2025 8:43:43 PM         I independently interpreted the Xray: Unremarkable; no obvious infiltrates, no obvious deformities, cardiac silhouette appears normal.      XR CHEST PORTABLE   Final Result      No acute disease      Electronically signed by Antione  capsule 100 mg (100 mg Oral Given 5/28/25 1746)   ipratropium 0.5 mg-albuterol 2.5 mg (DUONEB) nebulizer solution 1 Dose (1 Dose Inhalation Given 5/28/25 2352)   melatonin disintegrating tablet 10 mg (10 mg Oral Given 5/28/25 2325)   ipratropium 0.5 mg-albuterol 2.5 mg (DUONEB) nebulizer solution 2 Dose (2 Doses Inhalation Given 5/26/25 1055)   methylPREDNISolone sodium succ (SOLU-MEDROL) 125 mg in sterile water 2 mL injection (125 mg IntraVENous Given 5/26/25 1041)   iopamidol (ISOVUE-370) 76 % injection 75 mL (75 mLs IntraVENous Given 5/26/25 1309)   azithromycin (ZITHROMAX) 500 mg in sodium chloride 0.9 % 250 mL IVPB ([id number]) (0 mg IntraVENous Stopped 5/28/25 1926)         IMPRESSION:    ICD-10-CM    1. COPD exacerbation (HCC)  J44.1       2. Hypoxia  R09.02               Although initial history and physical exam information was obtained by midlevel provider (who also dictated a record of this visit), I personally saw the patient and performed a substantive portion of the visit including all aspects of the medical decision making. I made and approved the management plan and take responsibility for the patient management.     I, Kelsey Young DO am the primary clinician of record.     I personally saw the patient and I independently provided 00 minutes of non-concurrent critical care out of the total shared critical care time provided.    This chart was generated in part by using Dragon Dictation system and may contain errors related to that system including errors in grammar, punctuation, and spelling, as well as words and phrases that may be inappropriate. If there are any questions or concerns please feel free to contact the dictating provider for clarification.     KELSEY YOUNG DO   Acute Care [facility]        Kelsey Young DO  05/29/25 0148

## 2025-05-26 NOTE — ED NOTES
Patient ambulated to the restroom without any assisted devices independently without any difficulties.

## 2025-05-26 NOTE — ED PROVIDER NOTES
[facility] EMERGENCY DEPARTMENT  EMERGENCY DEPARTMENT ENCOUNTER        Pt Name: Na Johnston  MRN: [id number]  Birthdate 1955  Date of evaluation: 5/26/2025  Provider: BASIM Spain Jr  PCP: Burt Goyal MD  Note Started: 10:20 AM EDT 5/26/25       I have seen and evaluated this patient with my supervising physician Kelsey Bright DO.      CHIEF COMPLAINT       Chief Complaint   Patient presents with    Shortness of Breath     Patient has been having shortness of breath for a couple of days. Patient denies chest pain.    Cough       HISTORY OF PRESENT ILLNESS: 1 or more Elements     History from : Patient    Limitations to history : None    Na Johnston is a 69 y.o. female who presents with complaints of shortness of breath that has been going on for a couple of days.  She also has had a Meuchel productive cough that started around the same time.  She does have a history of COPD and is a current everyday cigarette smoker.  She is denying any chest pain nausea or vomiting.  No other complaints at this time peer review of systems otherwise negative.    Of note she did try using her Trelegy and albuterol inhaler as well as a nebulizer yesterday however, was not getting much improvement.    Nursing Notes were all reviewed and agreed with or any disagreements were addressed in the HPI.      SURGICAL HISTORY     Past Surgical History:   Procedure Laterality Date    BREAST BIOPSY      Right, was benign    COLONOSCOPY N/A 10/9/2018    COLONOSCOPY WITH BIOPSY performed by Guillermo Amor MD at [facility] ENDOSCOPY    COLONOSCOPY N/A 1/22/2024    COLONOSCOPY POLYPECTOMY BIOPSY performed by Chris Monique MD at [facility] ENDOSCOPY    COLONOSCOPY N/A 3/14/2025    COLONOSCOPY POLYPECTOMY SNARE/BIOPSY performed by Chris Monique MD at [facility] ENDOSCOPY    COLONOSCOPY N/A 3/14/2025    COLONOSCOPY CONTROL HEMORRHAGE performed by Chris Monique MD at [facility] ENDOSCOPY    CT NEEDLE

## 2025-05-26 NOTE — ED PROVIDER NOTES
Patient seen evaluated by TIM:      EKG: Normal sinus rhythm with a rate of 97.  Normal axis.  Normal intervals and durations.  Nonspecific ST and T wave changes noted.  These appear to be more notable than previous EKG on 12/17/2024     Manfred Ahuja II, DO  05/26/25 1126

## 2025-05-27 LAB
ANION GAP SERPL CALCULATED.3IONS-SCNC: 8 MMOL/L (ref 3–16)
BASOPHILS # BLD: 0 K/UL (ref 0–0.2)
BASOPHILS NFR BLD: 0.1 %
BUN SERPL-MCNC: 19 MG/DL (ref 7–20)
CALCIUM SERPL-MCNC: 9.2 MG/DL (ref 8.3–10.6)
CHLORIDE SERPL-SCNC: 102 MMOL/L (ref 99–110)
CO2 SERPL-SCNC: 28 MMOL/L (ref 21–32)
CREAT SERPL-MCNC: 0.5 MG/DL (ref 0.6–1.2)
DEPRECATED RDW RBC AUTO: 15 % (ref 12.4–15.4)
EKG ATRIAL RATE: 97 BPM
EKG DIAGNOSIS: NORMAL
EKG P-R INTERVAL: 124 MS
EKG Q-T INTERVAL: 360 MS
EKG QRS DURATION: 82 MS
EKG QTC CALCULATION (BAZETT): 457 MS
EKG R AXIS: 78 DEGREES
EKG T AXIS: 94 DEGREES
EKG VENTRICULAR RATE: 97 BPM
EOSINOPHIL # BLD: 0 K/UL (ref 0–0.6)
EOSINOPHIL NFR BLD: 0 %
GFR SERPLBLD CREATININE-BSD FMLA CKD-EPI: >90 ML/MIN/{1.73_M2}
GLUCOSE SERPL-MCNC: 128 MG/DL (ref 70–99)
HCT VFR BLD AUTO: 37.7 % (ref 36–48)
HGB BLD-MCNC: 12.6 G/DL (ref 12–16)
LYMPHOCYTES # BLD: 0.4 K/UL (ref 1–5.1)
LYMPHOCYTES NFR BLD: 5.7 %
MCH RBC QN AUTO: 32.9 PG (ref 26–34)
MCHC RBC AUTO-ENTMCNC: 33.4 G/DL (ref 31–36)
MCV RBC AUTO: 98.4 FL (ref 80–100)
MONOCYTES # BLD: 0.2 K/UL (ref 0–1.3)
MONOCYTES NFR BLD: 2.8 %
NEUTROPHILS # BLD: 6.3 K/UL (ref 1.7–7.7)
NEUTROPHILS NFR BLD: 91.4 %
PLATELET # BLD AUTO: 211 K/UL (ref 135–450)
PMV BLD AUTO: 7.3 FL (ref 5–10.5)
POTASSIUM SERPL-SCNC: 4.3 MMOL/L (ref 3.5–5.1)
RBC # BLD AUTO: 3.83 M/UL (ref 4–5.2)
SODIUM SERPL-SCNC: 138 MMOL/L (ref 136–145)
WBC # BLD AUTO: 6.9 K/UL (ref 4–11)

## 2025-05-27 PROCEDURE — 93010 ELECTROCARDIOGRAM REPORT: CPT | Performed by: INTERNAL MEDICINE

## 2025-05-27 PROCEDURE — 85025 COMPLETE CBC W/AUTO DIFF WBC: CPT

## 2025-05-27 PROCEDURE — 6360000002 HC RX W HCPCS

## 2025-05-27 PROCEDURE — 6370000000 HC RX 637 (ALT 250 FOR IP): Performed by: INTERNAL MEDICINE

## 2025-05-27 PROCEDURE — 36415 COLL VENOUS BLD VENIPUNCTURE: CPT

## 2025-05-27 PROCEDURE — 94640 AIRWAY INHALATION TREATMENT: CPT

## 2025-05-27 PROCEDURE — 94669 MECHANICAL CHEST WALL OSCILL: CPT

## 2025-05-27 PROCEDURE — 6370000000 HC RX 637 (ALT 250 FOR IP)

## 2025-05-27 PROCEDURE — 1200000000 HC SEMI PRIVATE

## 2025-05-27 PROCEDURE — 6370000000 HC RX 637 (ALT 250 FOR IP): Performed by: NURSE PRACTITIONER

## 2025-05-27 PROCEDURE — 2500000003 HC RX 250 WO HCPCS

## 2025-05-27 PROCEDURE — 2580000003 HC RX 258

## 2025-05-27 PROCEDURE — 2700000000 HC OXYGEN THERAPY PER DAY

## 2025-05-27 PROCEDURE — 80048 BASIC METABOLIC PNL TOTAL CA: CPT

## 2025-05-27 PROCEDURE — 94761 N-INVAS EAR/PLS OXIMETRY MLT: CPT

## 2025-05-27 RX ORDER — BENZONATATE 100 MG/1
100 CAPSULE ORAL 3 TIMES DAILY PRN
Status: DISCONTINUED | OUTPATIENT
Start: 2025-05-27 | End: 2025-05-31 | Stop reason: HOSPADM

## 2025-05-27 RX ORDER — IPRATROPIUM BROMIDE AND ALBUTEROL SULFATE 2.5; .5 MG/3ML; MG/3ML
1 SOLUTION RESPIRATORY (INHALATION)
Status: DISCONTINUED | OUTPATIENT
Start: 2025-05-28 | End: 2025-05-31 | Stop reason: HOSPADM

## 2025-05-27 RX ADMIN — Medication 10 ML: at 20:35

## 2025-05-27 RX ADMIN — WATER 1000 MG: 1 INJECTION INTRAMUSCULAR; INTRAVENOUS; SUBCUTANEOUS at 16:51

## 2025-05-27 RX ADMIN — IPRATROPIUM BROMIDE AND ALBUTEROL SULFATE 1 DOSE: 2.5; .5 SOLUTION RESPIRATORY (INHALATION) at 15:21

## 2025-05-27 RX ADMIN — WATER 40 MG: 1 INJECTION INTRAMUSCULAR; INTRAVENOUS; SUBCUTANEOUS at 13:25

## 2025-05-27 RX ADMIN — IPRATROPIUM BROMIDE AND ALBUTEROL SULFATE 1 DOSE: 2.5; .5 SOLUTION RESPIRATORY (INHALATION) at 08:49

## 2025-05-27 RX ADMIN — IPRATROPIUM BROMIDE AND ALBUTEROL SULFATE 1 DOSE: 2.5; .5 SOLUTION RESPIRATORY (INHALATION) at 11:30

## 2025-05-27 RX ADMIN — Medication 10 ML: at 09:03

## 2025-05-27 RX ADMIN — IPRATROPIUM BROMIDE AND ALBUTEROL SULFATE 1 DOSE: 2.5; .5 SOLUTION RESPIRATORY (INHALATION) at 23:11

## 2025-05-27 RX ADMIN — ENOXAPARIN SODIUM 30 MG: 100 INJECTION SUBCUTANEOUS at 09:03

## 2025-05-27 RX ADMIN — WATER 40 MG: 1 INJECTION INTRAMUSCULAR; INTRAVENOUS; SUBCUTANEOUS at 16:53

## 2025-05-27 RX ADMIN — BENZONATATE 100 MG: 100 CAPSULE ORAL at 20:35

## 2025-05-27 RX ADMIN — GUAIFENESIN 600 MG: 600 TABLET, MULTILAYER, EXTENDED RELEASE ORAL at 09:03

## 2025-05-27 RX ADMIN — IPRATROPIUM BROMIDE AND ALBUTEROL SULFATE 1 DOSE: 2.5; .5 SOLUTION RESPIRATORY (INHALATION) at 20:20

## 2025-05-27 RX ADMIN — GUAIFENESIN 600 MG: 600 TABLET, MULTILAYER, EXTENDED RELEASE ORAL at 20:35

## 2025-05-27 RX ADMIN — WATER 40 MG: 1 INJECTION INTRAMUSCULAR; INTRAVENOUS; SUBCUTANEOUS at 05:46

## 2025-05-27 RX ADMIN — AZITHROMYCIN MONOHYDRATE 500 MG: 500 INJECTION, POWDER, LYOPHILIZED, FOR SOLUTION INTRAVENOUS at 16:59

## 2025-05-27 NOTE — PROGRESS NOTES
Pt assessment completed and charted. VSS, pt on 1L. Pt requests to stay on o2 for comfort. Patient is a/o. IV site patent/flushed, saline locked. Medication given per MAR.     Safety Measures in place:   Bed in lowest position and wheels locked.   Call light within reach.   Bedside table within reach.   Non-skid socks in place.   Pt denies any other needs at this time.    Pt calls out appropriately.  Patient in stable condition when RN left room.

## 2025-05-27 NOTE — PROGRESS NOTES
RT Inhaler-Nebulizer Bronchodilator Protocol Note    There is a bronchodilator order in the chart from a provider indicating to follow the RT Bronchodilator Protocol and there is an “Initiate RT Inhaler-Nebulizer Bronchodilator Protocol” order as well (see protocol at bottom of note).    CXR Findings:  XR CHEST PORTABLE  Result Date: 5/26/2025  1. No evidence for focal pneumonia 2. Chronic hyperinflation related to emphysema Electronically signed by Skip Reyes MD      The findings from the last RT Protocol Assessment were as follows:   History Pulmonary Disease: Chronic pulmonary disease  Respiratory Pattern: Mild dyspnea at rest, irregular pattern, or RR 21-25 bpm  Breath Sounds: Inspiratory and expiratory or bilateral wheezing and/or rhonchi  Cough: Strong, productive  Indication for Bronchodilator Therapy: On home bronchodilators, Wheezing associated with pulm disorder  Bronchodilator Assessment Score: 13    Aerosolized bronchodilator medication orders have been revised according to the RT Inhaler-Nebulizer Bronchodilator Protocol below.    Respiratory Therapist to perform RT Therapy Protocol Assessment initially then follow the protocol.  Repeat RT Therapy Protocol Assessment PRN for score 0-3 or on second treatment, BID, and PRN for scores above 3.    No Indications - adjust the frequency to every 6 hours PRN wheezing or bronchospasm, if no treatments needed after 48 hours then discontinue using Per Protocol order mode.     If indication present, adjust the RT bronchodilator orders based on the Bronchodilator Assessment Score as indicated below.  Use Inhaler orders unless patient has one or more of the following: on home nebulizer, not able to hold breath for 10 seconds, is not alert and oriented, cannot activate and use MDI correctly, or respiratory rate 25 breaths per minute or more, then use the equivalent nebulizer order(s) with same Frequency and PRN reasons based on the score.  If a patient is on this

## 2025-05-27 NOTE — PLAN OF CARE
Problem: Respiratory - Adult  Goal: Achieves optimal ventilation and oxygenation  5/27/2025 1028 by Ramila Kelly RN  Outcome: Progressing  Flowsheets (Taken 5/27/2025 1028)  Achieves optimal ventilation and oxygenation:   Assess for changes in respiratory status   Assess for changes in mentation and behavior   Position to facilitate oxygenation and minimize respiratory effort   Oxygen supplementation based on oxygen saturation or arterial blood gases   Assess and instruct to report shortness of breath or any respiratory difficulty   Respiratory therapy support as indicated  5/26/2025 2213 by Kelli Goodrich RN  Outcome: Progressing     Problem: Skin/Tissue Integrity - Adult  Goal: Skin integrity remains intact  5/26/2025 2213 by Kelli Goodrich RN  Outcome: Progressing  Goal: Oral mucous membranes remain intact  5/26/2025 2213 by Kelli Goodrich RN  Outcome: Progressing     Problem: Musculoskeletal - Adult  Goal: Return mobility to safest level of function  5/26/2025 2213 by Kelli Goodrich RN  Outcome: Progressing  Goal: Maintain proper alignment of affected body part  5/26/2025 2213 by Kelli Goodrich RN  Outcome: Progressing  Goal: Return ADL status to a safe level of function  5/26/2025 2213 by Kelli Goodrich RN  Outcome: Progressing     Problem: Genitourinary - Adult  Goal: Absence of urinary retention  Outcome: Progressing  Flowsheets (Taken 5/27/2025 1028)  Absence of urinary retention:   Assess patient’s ability to void and empty bladder   Monitor intake/output and perform bladder scan as needed   Place urinary catheter per Licensed Independent Practitioner order if needed  Goal: Urinary catheter remains patent  Outcome: Progressing  Flowsheets (Taken 5/27/2025 1028)  Urinary catheter remains patent: Assess patency of urinary catheter     Problem: Hematologic - Adult  Goal: Maintains hematologic stability  Outcome: Progressing  Flowsheets (Taken 5/27/2025 1028)  Maintains hematologic stability:   Assess for  signs and symptoms of bleeding or hemorrhage   Monitor labs for bleeding or clotting disorders   Administer blood products/factors as ordered     Problem: Pain  Goal: Verbalizes/displays adequate comfort level or baseline comfort level  5/27/2025 1028 by Ramila Kelly RN  Outcome: Progressing  Flowsheets (Taken 5/27/2025 1028)  Verbalizes/displays adequate comfort level or baseline comfort level:   Assess pain using appropriate pain scale   Encourage patient to monitor pain and request assistance   Administer analgesics based on type and severity of pain and evaluate response   Implement non-pharmacological measures as appropriate and evaluate response  5/26/2025 2213 by Kelli Goodrich RN  Outcome: Progressing     Problem: Safety - Adult  Goal: Free from fall injury  5/27/2025 1028 by Ramila Kelly RN  Outcome: Progressing  Flowsheets (Taken 5/27/2025 1028)  Free From Fall Injury:   Instruct family/caregiver on patient safety   Based on caregiver fall risk screen, instruct family/caregiver to ask for assistance with transferring infant if caregiver noted to have fall risk factors  5/26/2025 2213 by Kelli Goodrich RN  Outcome: Progressing     Problem: ABCDS Injury Assessment  Goal: Absence of physical injury  Outcome: Progressing  Flowsheets (Taken 5/27/2025 1028)  Absence of Physical Injury: Implement safety measures based on patient assessment

## 2025-05-27 NOTE — PROGRESS NOTES
Comprehensive Nutrition Assessment    Type and Reason for Visit:  Initial, Positive nutrition screen (BMI)    Nutrition Recommendations/Plan:   Continue regular diet.  Encourage PO intakes.  Discontinue Ensure ONS per pt request.  Magic Cup ONS BID - chocolate.   Monitor pertinent labs, bowel habits, weight, N/V, supplement acceptance, clinical progression.       Malnutrition Assessment:  Malnutrition Status:  At risk for malnutrition (05/27/25 1247)    Context:  Chronic Illness     Findings of the 6 clinical characteristics of malnutrition:  Energy Intake:  No decrease in energy intake  Weight Loss:  No weight loss     Body Fat Loss:  Mild body fat loss Orbital   Muscle Mass Loss:  Mild muscle mass loss Temples (temporalis), Clavicles (pectoralis & deltoids)  Fluid Accumulation:  Unable to assess     Strength:  Not Performed    Nutrition Assessment:    Patient seen for positive nutrition screen (weight loss and decreased appetite) and BMI <18.5. Admitted for Acute COPD Exacerbation. PMHx of COPD, lung cancer status post lobectomy in 2022. Currently on regular diet. Patient seen resting in bed. States her appetite is \"too good.\" States she eats 4-5 meals daily at baseline. Consuming 51-75% of meals per flowsheets. Denies any trouble chewing or swallowing. Patient requesting to discontinue Ensure ONS, states \"I eat well enough without these.\" Pt agreeable to trial Magic Cup ONS. Reports her weight has been stable at 105-110lbs over the past several months. Confirmed per EMR. Pt with no diet questions at this time. Encouraged PO intakes. Will continue to monitor.    Nutrition Related Findings:    Glucose 128. Wound Type: None       Current Nutrition Intake & Therapies:    Average Meal Intake: 51-75%  Average Supplements Intake: Unable to assess  ADULT DIET; Regular  ADULT ORAL NUTRITION SUPPLEMENT; Breakfast, Lunch, Dinner; Frozen Oral Supplement    Anthropometric Measures:  Height: 162.6 cm (5' 4\")  Ideal Body

## 2025-05-27 NOTE — PROGRESS NOTES
Hospital Medicine Progress Note  V 5.17      Date of Admission: 5/26/2025    Hospital Day: 2      Chief Admission Complaint:  SOB    Subjective:  Reports some improvement in symptoms since presentation. Productive cough.     Presenting Admission History:       Patient is a 69-year-old female with past medical history of COPD and lung cancer status post lobectomy in 2022 who presents today for cough and shortness of breath.  She states it has been ongoing for the last couple of days.  She is endorsing increased cough.  She states that she is having increased sputum production.  On arrival to the ED she was found to be tachycardic and tachypneic.  ED workup was unremarkable and we were consulted for admission.    Assessment/Plan:      Acute COPD Exacerbation  - with Acute respiratory failure   - not on baseline home O2  - Normally controlled on home medication regimen   - Continue DuoNebs every 4 hours while awake  - Continue Solu-Medrol  - Given increased sputum production and cough started on Rocephin and azithromycin    Acute Respiratory Failure - POA  - W/ hypoxia  - Presence of clinical respiratory distress w/ tachypnea/dyspnea/SOB and wheezing w/ use of accessory muscles to breath  - On supplemental O2 as ordered and wean as tolerated    Outpatient MRI Abd and CT Chest ordered prior to admission; deferred    Consults:      IP CONSULT TO HOSPITALIST        --------------------------------------------------      Medications:        Infusion Medications    sodium chloride       Scheduled Medications    sodium chloride flush  5-40 mL IntraVENous 2 times per day    enoxaparin  30 mg SubCUTAneous Daily    cefTRIAXone (ROCEPHIN) IV  1,000 mg IntraVENous Q24H    azithromycin  500 mg IntraVENous Q24H    ipratropium 0.5 mg-albuterol 2.5 mg  1 Dose Inhalation Q4H WA RT    guaiFENesin  600 mg Oral BID    methylPREDNISolone  40 mg IntraVENous Q6H    Followed by    [START ON 5/29/2025] predniSONE  40 mg Oral Daily     PRN

## 2025-05-27 NOTE — CARE COORDINATION
Case Management Assessment  Initial Evaluation    Date/Time of Evaluation: 5/27/2025 4:11 PM  Assessment Completed by: Panchito Tirado RN    If patient is discharged prior to next notation, then this note serves as note for discharge by case management.    Patient Name: Na Johnston                   YOB: 1955  Diagnosis: Hypoxia [R09.02]  COPD exacerbation (HCC) [J44.1]  COPD with acute exacerbation (HCC) [J44.1]                   Date / Time: 5/26/2025  9:51 AM    Patient Admission Status: Inpatient   Readmission Risk (Low < 19, Mod (19-27), High > 27): Readmission Risk Score: 8.5    Current PCP: Burt Goyal MD  PCP verified by CM?      Chart Reviewed: Yes      History Provided by:    Patient Orientation:      Patient Cognition:      Hospitalization in the last 30 days (Readmission):  No    If yes, Readmission Assessment in CM Navigator will be completed.    Advance Directives:      Code Status: Full Code   Patient's Primary Decision Maker is:      Primary Decision Maker: marisolaviva - Child - [phone]    Discharge Planning:    Patient lives with: Family Members Type of Home: Apartment  Primary Care Giver:    Patient Support Systems include: Family Members   Current Financial resources:    Current community resources:    Current services prior to admission: None            Current DME:              Type of Home Care services:  None    ADLS  Prior functional level:    Current functional level:      PT AM-PAC:   /24  OT AM-PAC:   /24    Family can provide assistance at DC:    Would you like Case Management to discuss the discharge plan with any other family members/significant others, and if so, who?    Plans to Return to Present Housing:    Other Identified Issues/Barriers to RETURNING to current housing: none  Potential Assistance needed at discharge: Home Care            Potential DME:    Patient expects to discharge to: Apartment  Plan for transportation at discharge:

## 2025-05-27 NOTE — PLAN OF CARE
Problem: Skin/Tissue Integrity - Adult  Goal: Skin integrity remains intact  Outcome: Progressing     Problem: Respiratory - Adult  Goal: Achieves optimal ventilation and oxygenation  Outcome: Progressing     Problem: Musculoskeletal - Adult  Goal: Maintain proper alignment of affected body part  Outcome: Progressing     Problem: Musculoskeletal - Adult  Goal: Return ADL status to a safe level of function  Outcome: Progressing     Problem: Musculoskeletal - Adult  Goal: Return mobility to safest level of function  Outcome: Progressing     Problem: Safety - Adult  Goal: Free from fall injury  Outcome: Progressing     Problem: Pain  Goal: Verbalizes/displays adequate comfort level or baseline comfort level  Outcome: Progressing

## 2025-05-28 ENCOUNTER — APPOINTMENT (OUTPATIENT)
Dept: GENERAL RADIOLOGY | Age: 70
End: 2025-05-28
Payer: MEDICARE

## 2025-05-28 PROCEDURE — 6370000000 HC RX 637 (ALT 250 FOR IP): Performed by: NURSE PRACTITIONER

## 2025-05-28 PROCEDURE — 2580000003 HC RX 258

## 2025-05-28 PROCEDURE — 6360000002 HC RX W HCPCS

## 2025-05-28 PROCEDURE — 6370000000 HC RX 637 (ALT 250 FOR IP)

## 2025-05-28 PROCEDURE — 2700000000 HC OXYGEN THERAPY PER DAY

## 2025-05-28 PROCEDURE — 6370000000 HC RX 637 (ALT 250 FOR IP): Performed by: INTERNAL MEDICINE

## 2025-05-28 PROCEDURE — 1200000000 HC SEMI PRIVATE

## 2025-05-28 PROCEDURE — 2500000003 HC RX 250 WO HCPCS

## 2025-05-28 PROCEDURE — 94669 MECHANICAL CHEST WALL OSCILL: CPT

## 2025-05-28 PROCEDURE — 94761 N-INVAS EAR/PLS OXIMETRY MLT: CPT

## 2025-05-28 PROCEDURE — 94640 AIRWAY INHALATION TREATMENT: CPT

## 2025-05-28 PROCEDURE — 71045 X-RAY EXAM CHEST 1 VIEW: CPT

## 2025-05-28 RX ORDER — MECOBALAMIN 5000 MCG
10 TABLET,DISINTEGRATING ORAL NIGHTLY
Status: DISCONTINUED | OUTPATIENT
Start: 2025-05-28 | End: 2025-05-31 | Stop reason: HOSPADM

## 2025-05-28 RX ADMIN — Medication 10 ML: at 19:20

## 2025-05-28 RX ADMIN — WATER 1000 MG: 1 INJECTION INTRAMUSCULAR; INTRAVENOUS; SUBCUTANEOUS at 17:50

## 2025-05-28 RX ADMIN — GUAIFENESIN 600 MG: 600 TABLET, MULTILAYER, EXTENDED RELEASE ORAL at 08:44

## 2025-05-28 RX ADMIN — Medication 10 ML: at 08:45

## 2025-05-28 RX ADMIN — ENOXAPARIN SODIUM 30 MG: 100 INJECTION SUBCUTANEOUS at 08:45

## 2025-05-28 RX ADMIN — IPRATROPIUM BROMIDE AND ALBUTEROL SULFATE 1 DOSE: 2.5; .5 SOLUTION RESPIRATORY (INHALATION) at 11:29

## 2025-05-28 RX ADMIN — Medication 10 MG: at 01:13

## 2025-05-28 RX ADMIN — BENZONATATE 100 MG: 100 CAPSULE ORAL at 08:44

## 2025-05-28 RX ADMIN — BENZONATATE 100 MG: 100 CAPSULE ORAL at 17:46

## 2025-05-28 RX ADMIN — WATER 40 MG: 1 INJECTION INTRAMUSCULAR; INTRAVENOUS; SUBCUTANEOUS at 12:44

## 2025-05-28 RX ADMIN — GUAIFENESIN 600 MG: 600 TABLET, MULTILAYER, EXTENDED RELEASE ORAL at 23:25

## 2025-05-28 RX ADMIN — ALBUTEROL SULFATE 2.5 MG: 2.5 SOLUTION RESPIRATORY (INHALATION) at 22:17

## 2025-05-28 RX ADMIN — IPRATROPIUM BROMIDE AND ALBUTEROL SULFATE 1 DOSE: 2.5; .5 SOLUTION RESPIRATORY (INHALATION) at 23:52

## 2025-05-28 RX ADMIN — IPRATROPIUM BROMIDE AND ALBUTEROL SULFATE 1 DOSE: 2.5; .5 SOLUTION RESPIRATORY (INHALATION) at 03:05

## 2025-05-28 RX ADMIN — IPRATROPIUM BROMIDE AND ALBUTEROL SULFATE 1 DOSE: 2.5; .5 SOLUTION RESPIRATORY (INHALATION) at 15:23

## 2025-05-28 RX ADMIN — IPRATROPIUM BROMIDE AND ALBUTEROL SULFATE 1 DOSE: 2.5; .5 SOLUTION RESPIRATORY (INHALATION) at 07:42

## 2025-05-28 RX ADMIN — WATER 40 MG: 1 INJECTION INTRAMUSCULAR; INTRAVENOUS; SUBCUTANEOUS at 06:29

## 2025-05-28 RX ADMIN — WATER 40 MG: 1 INJECTION INTRAMUSCULAR; INTRAVENOUS; SUBCUTANEOUS at 00:36

## 2025-05-28 RX ADMIN — AZITHROMYCIN MONOHYDRATE 500 MG: 500 INJECTION, POWDER, LYOPHILIZED, FOR SOLUTION INTRAVENOUS at 17:58

## 2025-05-28 RX ADMIN — Medication 10 MG: at 23:25

## 2025-05-28 NOTE — PROGRESS NOTES
Pt ambulated to BR w/out oxygen, returned to bed, O-2 pulse ox 95%, dyspneic, at rest after 10 minutes O-2 sat RA 94%, still SOB, O-2 placed 1 LNC for comfort.HOB elevated.

## 2025-05-28 NOTE — PROGRESS NOTES
Hospital Medicine Progress Note  V 5.17      Date of Admission: 5/26/2025    Hospital Day: 3      Chief Admission Complaint:  SOB    Subjective:  Reports some worsening of SOB this morning. Cough is still productive. CXR was stable.     Presenting Admission History:       Patient is a 69-year-old female with past medical history of COPD and lung cancer status post lobectomy in 2022 who presents today for cough and shortness of breath.  She states it has been ongoing for the last couple of days.  She is endorsing increased cough.  She states that she is having increased sputum production.  On arrival to the ED she was found to be tachycardic and tachypneic.  ED workup was unremarkable and we were consulted for admission.    Assessment/Plan:      Acute COPD Exacerbation  - with Acute respiratory failure   - not on baseline home O2  - Normally controlled on home medication regimen   - Continue DuoNebs every 4 hours while awake  - Continue Steroids  - Given increased sputum production and cough started on Rocephin and azithromycin  - Slight worsening of symptoms 5/28; repeat CXR was stable. Continue above management for now.     Acute Respiratory Failure - POA  - W/ hypoxia  - Presence of clinical respiratory distress w/ tachypnea/dyspnea/SOB and wheezing w/ use of accessory muscles to breath  - On supplemental O2 as ordered and wean as tolerated    Lung Cancer: Followed by Dr. Michaud.    Outpatient MRI Abd and CT Chest ordered prior to admission by GI Dr. Monique and Oncology Dr. Cardoso, respectively; CTPA was completed here already. MRI will be deferred to outpatient.      Consults:      IP CONSULT TO HOSPITALIST        --------------------------------------------------      Medications:        Infusion Medications    sodium chloride       Scheduled Medications    melatonin  10 mg Oral Nightly    ipratropium 0.5 mg-albuterol 2.5 mg  1 Dose Inhalation Q4H RT    sodium chloride flush  5-40 mL IntraVENous 2 times per

## 2025-05-28 NOTE — CARE COORDINATION
Chart reviewed. Care managed by IM.  Continues with SOB. O2 currently at 1LNC with sat of 94%. Continued with IVATBX, nebulizer treatments/ po atbx and steroids.

## 2025-05-28 NOTE — PROGRESS NOTES
A&Ox4. Denied chest pain or heaviness, with occasional SOB. On O2 at 1 lpm via nasal cannula.   SR x 2.   Ambulatory.

## 2025-05-29 PROCEDURE — 6370000000 HC RX 637 (ALT 250 FOR IP)

## 2025-05-29 PROCEDURE — 1200000000 HC SEMI PRIVATE

## 2025-05-29 PROCEDURE — 6370000000 HC RX 637 (ALT 250 FOR IP): Performed by: NURSE PRACTITIONER

## 2025-05-29 PROCEDURE — 2500000003 HC RX 250 WO HCPCS

## 2025-05-29 PROCEDURE — 94761 N-INVAS EAR/PLS OXIMETRY MLT: CPT

## 2025-05-29 PROCEDURE — 2700000000 HC OXYGEN THERAPY PER DAY

## 2025-05-29 PROCEDURE — 6360000002 HC RX W HCPCS

## 2025-05-29 PROCEDURE — 94640 AIRWAY INHALATION TREATMENT: CPT

## 2025-05-29 PROCEDURE — 94669 MECHANICAL CHEST WALL OSCILL: CPT

## 2025-05-29 PROCEDURE — 6370000000 HC RX 637 (ALT 250 FOR IP): Performed by: INTERNAL MEDICINE

## 2025-05-29 RX ADMIN — PREDNISONE 40 MG: 20 TABLET ORAL at 08:06

## 2025-05-29 RX ADMIN — IPRATROPIUM BROMIDE AND ALBUTEROL SULFATE 1 DOSE: 2.5; .5 SOLUTION RESPIRATORY (INHALATION) at 08:41

## 2025-05-29 RX ADMIN — IPRATROPIUM BROMIDE AND ALBUTEROL SULFATE 1 DOSE: 2.5; .5 SOLUTION RESPIRATORY (INHALATION) at 15:47

## 2025-05-29 RX ADMIN — IPRATROPIUM BROMIDE AND ALBUTEROL SULFATE 1 DOSE: 2.5; .5 SOLUTION RESPIRATORY (INHALATION) at 03:57

## 2025-05-29 RX ADMIN — IPRATROPIUM BROMIDE AND ALBUTEROL SULFATE 1 DOSE: 2.5; .5 SOLUTION RESPIRATORY (INHALATION) at 21:01

## 2025-05-29 RX ADMIN — Medication 10 ML: at 08:05

## 2025-05-29 RX ADMIN — IPRATROPIUM BROMIDE AND ALBUTEROL SULFATE 1 DOSE: 2.5; .5 SOLUTION RESPIRATORY (INHALATION) at 11:50

## 2025-05-29 RX ADMIN — IPRATROPIUM BROMIDE AND ALBUTEROL SULFATE 1 DOSE: 2.5; .5 SOLUTION RESPIRATORY (INHALATION) at 23:58

## 2025-05-29 RX ADMIN — GUAIFENESIN 600 MG: 600 TABLET, MULTILAYER, EXTENDED RELEASE ORAL at 19:59

## 2025-05-29 RX ADMIN — ENOXAPARIN SODIUM 30 MG: 100 INJECTION SUBCUTANEOUS at 08:07

## 2025-05-29 RX ADMIN — Medication 10 MG: at 19:59

## 2025-05-29 RX ADMIN — Medication 10 ML: at 19:59

## 2025-05-29 RX ADMIN — GUAIFENESIN 600 MG: 600 TABLET, MULTILAYER, EXTENDED RELEASE ORAL at 08:06

## 2025-05-29 RX ADMIN — WATER 1000 MG: 1 INJECTION INTRAMUSCULAR; INTRAVENOUS; SUBCUTANEOUS at 18:25

## 2025-05-29 ASSESSMENT — PAIN SCALES - GENERAL: PAINLEVEL_OUTOF10: 0

## 2025-05-29 NOTE — PROGRESS NOTES
Hospital Medicine Progress Note  V 5.17      Date of Admission: 5/26/2025    Hospital Day: 4      Chief Admission Complaint:  SOB    Subjective:  SOB is better today with normal work of breathing. Cough is less productive. Remains on O2.     Presenting Admission History:       Patient is a 69-year-old female with past medical history of COPD and lung cancer status post lobectomy in 2022 who presents today for cough and shortness of breath.  She states it has been ongoing for the last couple of days.  She is endorsing increased cough.  She states that she is having increased sputum production.  On arrival to the ED she was found to be tachycardic and tachypneic.  ED workup was unremarkable and we were consulted for admission.    Assessment/Plan:      Acute COPD Exacerbation  - with Acute respiratory failure   - not on baseline home O2  - Normally controlled on home medication regimen   - Continue DuoNebs every 4 hours while awake  - Continue Steroids  - Given increased sputum production and cough was given empiric Rocephin and azithromycin  - Slight worsening of symptoms 5/28; repeat CXR was stable.   - Now improving again with above management  - Wean of O2 otherwise will need O2 at discharge.     Acute Respiratory Failure - POA  - W/ hypoxia  - Presence of clinical respiratory distress w/ tachypnea/dyspnea/SOB and wheezing w/ use of accessory muscles to breath  - On supplemental O2 as ordered and wean as tolerated    Lung Cancer: Followed by Dr. Michaud.    Outpatient MRI Abd and CT Chest ordered prior to admission by GI Dr. Monique and Oncology Dr. Cardoso, respectively; CTPA was completed here already. MRI will be deferred to outpatient.      Consults:      IP CONSULT TO HOSPITALIST        --------------------------------------------------      Medications:        Infusion Medications    sodium chloride       Scheduled Medications    melatonin  10 mg Oral Nightly    ipratropium 0.5 mg-albuterol 2.5 mg  1 Dose

## 2025-05-30 PROCEDURE — 94669 MECHANICAL CHEST WALL OSCILL: CPT

## 2025-05-30 PROCEDURE — 6370000000 HC RX 637 (ALT 250 FOR IP)

## 2025-05-30 PROCEDURE — 2700000000 HC OXYGEN THERAPY PER DAY

## 2025-05-30 PROCEDURE — 94761 N-INVAS EAR/PLS OXIMETRY MLT: CPT

## 2025-05-30 PROCEDURE — 1200000000 HC SEMI PRIVATE

## 2025-05-30 PROCEDURE — 6370000000 HC RX 637 (ALT 250 FOR IP): Performed by: INTERNAL MEDICINE

## 2025-05-30 PROCEDURE — 6360000002 HC RX W HCPCS

## 2025-05-30 PROCEDURE — 94640 AIRWAY INHALATION TREATMENT: CPT

## 2025-05-30 PROCEDURE — 6370000000 HC RX 637 (ALT 250 FOR IP): Performed by: NURSE PRACTITIONER

## 2025-05-30 PROCEDURE — 6360000002 HC RX W HCPCS: Performed by: INTERNAL MEDICINE

## 2025-05-30 PROCEDURE — 2500000003 HC RX 250 WO HCPCS

## 2025-05-30 RX ORDER — ACETYLCYSTEINE 200 MG/ML
600 SOLUTION ORAL; RESPIRATORY (INHALATION)
Status: DISCONTINUED | OUTPATIENT
Start: 2025-05-30 | End: 2025-05-31 | Stop reason: HOSPADM

## 2025-05-30 RX ADMIN — IPRATROPIUM BROMIDE AND ALBUTEROL SULFATE 1 DOSE: 2.5; .5 SOLUTION RESPIRATORY (INHALATION) at 03:26

## 2025-05-30 RX ADMIN — ACETYLCYSTEINE 600 MG: 200 INHALANT RESPIRATORY (INHALATION) at 20:39

## 2025-05-30 RX ADMIN — GUAIFENESIN 600 MG: 600 TABLET, MULTILAYER, EXTENDED RELEASE ORAL at 07:39

## 2025-05-30 RX ADMIN — PREDNISONE 40 MG: 20 TABLET ORAL at 07:39

## 2025-05-30 RX ADMIN — Medication 10 ML: at 20:29

## 2025-05-30 RX ADMIN — IPRATROPIUM BROMIDE AND ALBUTEROL SULFATE 1 DOSE: 2.5; .5 SOLUTION RESPIRATORY (INHALATION) at 20:39

## 2025-05-30 RX ADMIN — IPRATROPIUM BROMIDE AND ALBUTEROL SULFATE 1 DOSE: 2.5; .5 SOLUTION RESPIRATORY (INHALATION) at 23:53

## 2025-05-30 RX ADMIN — Medication 10 MG: at 20:28

## 2025-05-30 RX ADMIN — IPRATROPIUM BROMIDE AND ALBUTEROL SULFATE 1 DOSE: 2.5; .5 SOLUTION RESPIRATORY (INHALATION) at 11:10

## 2025-05-30 RX ADMIN — WATER 1000 MG: 1 INJECTION INTRAMUSCULAR; INTRAVENOUS; SUBCUTANEOUS at 17:11

## 2025-05-30 RX ADMIN — IPRATROPIUM BROMIDE AND ALBUTEROL SULFATE 1 DOSE: 2.5; .5 SOLUTION RESPIRATORY (INHALATION) at 07:18

## 2025-05-30 RX ADMIN — GUAIFENESIN 600 MG: 600 TABLET, MULTILAYER, EXTENDED RELEASE ORAL at 20:28

## 2025-05-30 RX ADMIN — Medication 10 ML: at 07:39

## 2025-05-30 RX ADMIN — ENOXAPARIN SODIUM 30 MG: 100 INJECTION SUBCUTANEOUS at 07:39

## 2025-05-30 RX ADMIN — IPRATROPIUM BROMIDE AND ALBUTEROL SULFATE 1 DOSE: 2.5; .5 SOLUTION RESPIRATORY (INHALATION) at 15:59

## 2025-05-30 RX ADMIN — ACETYLCYSTEINE 600 MG: 200 INHALANT RESPIRATORY (INHALATION) at 11:12

## 2025-05-30 NOTE — CARE COORDINATION
Writer spoke with Dr Escalante, pt will stay overnight, plan on discharge tomorrow with or without oxygen.  Estela/Blanca has initial referral.  CAL Colindres-JACQUELINE

## 2025-05-30 NOTE — PLAN OF CARE
Problem: Neurosensory - Adult  Goal: Achieves stable or improved neurological status  Outcome: Progressing  Goal: Absence of seizures  Outcome: Progressing  Goal: Remains free of injury related to seizures activity  Outcome: Progressing  Goal: Achieves maximal functionality and self care  Outcome: Progressing     Problem: Respiratory - Adult  Goal: Achieves optimal ventilation and oxygenation  Outcome: Progressing     Problem: Cardiovascular - Adult  Goal: Maintains optimal cardiac output and hemodynamic stability  Outcome: Progressing  Goal: Absence of cardiac dysrhythmias or at baseline  Outcome: Progressing     Problem: Skin/Tissue Integrity - Adult  Goal: Skin integrity remains intact  Outcome: Progressing  Goal: Incisions, wounds, or drain sites healing without S/S of infection  Outcome: Progressing  Goal: Oral mucous membranes remain intact  Outcome: Progressing     Problem: Musculoskeletal - Adult  Goal: Return mobility to safest level of function  Outcome: Progressing  Goal: Maintain proper alignment of affected body part  Outcome: Progressing  Goal: Return ADL status to a safe level of function  Outcome: Progressing     Problem: Gastrointestinal - Adult  Goal: Minimal or absence of nausea and vomiting  Outcome: Progressing  Goal: Maintains or returns to baseline bowel function  Outcome: Progressing  Goal: Maintains adequate nutritional intake  Outcome: Progressing     Problem: Genitourinary - Adult  Goal: Absence of urinary retention  Outcome: Progressing  Goal: Urinary catheter remains patent  Outcome: Progressing     Problem: Infection - Adult  Goal: Absence of infection at discharge  Outcome: Progressing  Goal: Absence of infection during hospitalization  Outcome: Progressing  Goal: Absence of fever/infection during anticipated neutropenic period  Outcome: Progressing     Problem: Metabolic/Fluid and Electrolytes - Adult  Goal: Electrolytes maintained within normal limits  Outcome: Progressing  Goal:

## 2025-05-30 NOTE — PROGRESS NOTES
Oxygen documentation:     O2 saturation at REST on ROOM AIR = ____88__%   O2 saturation at REST on _____1__lpm = __91_____%     If saturation is 89% or above please proceed with steps 3 and 4..........     O2 saturation with AMBULATION of __100___ feet on ROOM AIR = __87___%   O2 saturation with AMBULATION on current liter flow = ___90___%     [facility] notified: ______     Signature: _________________________ Date: __________   Time: ______

## 2025-05-30 NOTE — PROGRESS NOTES
Hospital Medicine Progress Note  V 5.17      Date of Admission: 5/26/2025    Hospital Day: 5      Chief Admission Complaint:  SOB    Subjective: Normal work of breathing. Cough is more productive after Mucomyst. Still on 1L O2.     Presenting Admission History:       Patient is a 69-year-old female with past medical history of COPD and lung cancer status post lobectomy in 2022 who presents today for cough and shortness of breath.  She states it has been ongoing for the last couple of days.  She is endorsing increased cough.  She states that she is having increased sputum production.  On arrival to the ED she was found to be tachycardic and tachypneic.  ED workup was unremarkable and we were consulted for admission.    Assessment/Plan:      Acute COPD Exacerbation  - with Acute respiratory failure   - not on baseline home O2  - Normally controlled on home medication regimen   - Continue DuoNebs every 4 hours while awake  - Continue Steroid course  - Given increased sputum production and cough was given empiric Rocephin and azithromycin  - Slight worsening of symptoms 5/28; repeat CXR was stable.   - Improving, but cough is not very productive now; add Mucomyst nebs  - Wean of O2 otherwise will need O2 at discharge.     Acute Respiratory Failure - POA  - W/ hypoxia  - Presence of clinical respiratory distress w/ tachypnea/dyspnea/SOB and wheezing w/ use of accessory muscles to breath  - On supplemental O2 as ordered and wean as tolerated    Lung Cancer: Followed by Dr. Michaud.    Outpatient MRI Abd and CT Chest ordered prior to admission by GI Dr. Monique and Oncology Dr. Cardoso, respectively; CTPA was completed here already. MRI will be deferred to outpatient.      Consults:      IP CONSULT TO HOSPITALIST        --------------------------------------------------      Medications:        Infusion Medications    sodium chloride       Scheduled Medications    acetylcysteine  600 mg Inhalation BID RT    melatonin  10 mg

## 2025-05-30 NOTE — PROGRESS NOTES
Physician Progress Note      PATIENT:               NKECHI RODRIGES  CSN #:                  [id number]  :                       1955  ADMIT DATE:       2025 9:51 AM  DISCH DATE:  RESPONDING  PROVIDER #:        Manfred Escalante MD          QUERY TEXT:    Acute hypoxic respiratory failure is documented in the medical record IM   Progress Notes by Manfred Escalante MD at 2025 with SpO2 above 90.   Please provide additional clinical indicators supportive of your   documentation. Or please document if the diagnosis of Acute hypoxic   respiratory failure has been ruled out after study.    The clinical indicators include:  This is a 69-year-old female with past medical history of COPD and lung cancer   status post lobectomy in  who presents today for cough and shortness of   breath.    - \" Acute COPD Exacerbation- with Acute respiratory failure. not on baseline   home O2.  Acute Respiratory Failure - POA - W/ hypoxia. Presence of clinical respiratory   distress w/ tachypnea/dyspnea/SOB and wheezing w/ use of accessory muscles to   breath. On supplemental O2 as ordered and wean as tolerated. \" (from H&P by   Divine Mas DO at 2025)    - \" Acute Respiratory Failure - POA - W/ hypoxia. Presence of clinical   respiratory distress w/ tachypnea/dyspnea/SOB and wheezing w/ use of accessory   muscles to breath. On supplemental O2 as ordered and wean as tolerated. \"   (from IM Progress Notes by Manfred Escalante MD at 2025)    SpO2-   RR- 18-37  CO2- 29,28  VBG: pH- 7.470, pCO2- 38.5, pO2- 65.4    NC @1-2 L, Pulse oximetry monitoring    Thank you,  Thanku Guido OZUNA CDS  Options provided:  -- Acute hypoxic Respiratory Failure as evidenced by, Please document   evidence.  -- Acute hypoxic Respiratory Failure ruled out after study  -- Other - I will add my own diagnosis  -- Disagree - Not applicable / Not valid  -- Disagree - Clinically unable to determine / Unknown  -- Refer to

## 2025-05-30 NOTE — CARE COORDINATION
Chart reviewed, LOS 4days; pt discussed during huddle with primary RN/Annabelle.  Pt on 1L/NC, on IV atbx, HHN Q4, PO steroid.  Pt lives in apt with sister in law, has family support, IPTA.  CM will continue to follow pt's progress and coordinate discharge arrangements if arise, specifically new home oxygen.  CAL Colindres-JACQUELINE

## 2025-05-31 VITALS
OXYGEN SATURATION: 97 % | RESPIRATION RATE: 22 BRPM | SYSTOLIC BLOOD PRESSURE: 153 MMHG | BODY MASS INDEX: 17.77 KG/M2 | DIASTOLIC BLOOD PRESSURE: 105 MMHG | WEIGHT: 104.1 LBS | HEART RATE: 72 BPM | TEMPERATURE: 97.9 F | HEIGHT: 64 IN

## 2025-05-31 PROCEDURE — 94669 MECHANICAL CHEST WALL OSCILL: CPT

## 2025-05-31 PROCEDURE — 2500000003 HC RX 250 WO HCPCS

## 2025-05-31 PROCEDURE — 6360000002 HC RX W HCPCS

## 2025-05-31 PROCEDURE — 6370000000 HC RX 637 (ALT 250 FOR IP)

## 2025-05-31 PROCEDURE — 6370000000 HC RX 637 (ALT 250 FOR IP): Performed by: INTERNAL MEDICINE

## 2025-05-31 PROCEDURE — 94640 AIRWAY INHALATION TREATMENT: CPT

## 2025-05-31 PROCEDURE — 94761 N-INVAS EAR/PLS OXIMETRY MLT: CPT

## 2025-05-31 PROCEDURE — 2700000000 HC OXYGEN THERAPY PER DAY

## 2025-05-31 RX ORDER — DOXYCYCLINE HYCLATE 100 MG
100 TABLET ORAL 2 TIMES DAILY
Qty: 14 TABLET | Refills: 0 | Status: SHIPPED | OUTPATIENT
Start: 2025-05-31 | End: 2025-06-07

## 2025-05-31 RX ORDER — PREDNISONE 20 MG/1
40 TABLET ORAL 2 TIMES DAILY
Qty: 20 TABLET | Refills: 0 | Status: SHIPPED | OUTPATIENT
Start: 2025-05-31 | End: 2025-06-05

## 2025-05-31 RX ADMIN — ENOXAPARIN SODIUM 30 MG: 100 INJECTION SUBCUTANEOUS at 07:49

## 2025-05-31 RX ADMIN — GUAIFENESIN 600 MG: 600 TABLET, MULTILAYER, EXTENDED RELEASE ORAL at 07:49

## 2025-05-31 RX ADMIN — PREDNISONE 40 MG: 20 TABLET ORAL at 07:49

## 2025-05-31 RX ADMIN — ACETYLCYSTEINE 600 MG: 200 INHALANT RESPIRATORY (INHALATION) at 08:22

## 2025-05-31 RX ADMIN — Medication 10 ML: at 07:50

## 2025-05-31 RX ADMIN — IPRATROPIUM BROMIDE AND ALBUTEROL SULFATE 1 DOSE: 2.5; .5 SOLUTION RESPIRATORY (INHALATION) at 03:54

## 2025-05-31 RX ADMIN — IPRATROPIUM BROMIDE AND ALBUTEROL SULFATE 1 DOSE: 2.5; .5 SOLUTION RESPIRATORY (INHALATION) at 08:22

## 2025-05-31 NOTE — DISCHARGE INSTR - DIET

## 2025-05-31 NOTE — CARE COORDINATION
CM noted discharge order. CM spoke with RN and RN states patient declining need for oxygen. Patient sating in 90's on RA.

## 2025-05-31 NOTE — PROGRESS NOTES
Discharge instructions reviewed with patient. Reviewed follow up appts an new meds. To  meds from [facility]. IV removed per protocol. Pt jose well. 02 sat 94% on RA, pt refuses to have 02 in the home.

## 2025-05-31 NOTE — PLAN OF CARE
Problem: Neurosensory - Adult  Goal: Achieves stable or improved neurological status  5/31/2025 0906 by Portia Swift RN  Outcome: Progressing  5/31/2025 0906 by Portia Swift RN  Outcome: Progressing  Goal: Absence of seizures  5/31/2025 0906 by Portia Swift RN  Outcome: Progressing  5/31/2025 0906 by Portia Swift RN  Outcome: Progressing  Goal: Remains free of injury related to seizures activity  5/31/2025 0906 by Portia Swift RN  Outcome: Progressing  5/31/2025 0906 by Portia Swift RN  Outcome: Progressing  Goal: Achieves maximal functionality and self care  5/31/2025 0906 by Portia Swift RN  Outcome: Progressing  5/31/2025 0906 by Portia Swift RN  Outcome: Progressing     Problem: Respiratory - Adult  Goal: Achieves optimal ventilation and oxygenation  5/31/2025 0906 by Portia Swift RN  Outcome: Progressing  5/31/2025 0906 by Portia Swift RN  Outcome: Progressing     Problem: Cardiovascular - Adult  Goal: Maintains optimal cardiac output and hemodynamic stability  5/31/2025 0906 by Portia Swift RN  Outcome: Progressing  5/31/2025 0906 by Portia Swift RN  Outcome: Progressing  Goal: Absence of cardiac dysrhythmias or at baseline  5/31/2025 0906 by Portia Swift RN  Outcome: Progressing  5/31/2025 0906 by Portia Swift RN  Outcome: Progressing     Problem: Skin/Tissue Integrity - Adult  Goal: Skin integrity remains intact  5/31/2025 0906 by Portia Swift RN  Outcome: Progressing  5/31/2025 0906 by Portia Swift RN  Outcome: Progressing  Goal: Incisions, wounds, or drain sites healing without S/S of infection  5/31/2025 0906 by Portia Swift RN  Outcome: Progressing  5/31/2025 0906 by Portia Swift RN  Outcome: Progressing  Goal: Oral mucous membranes remain intact  5/31/2025 0906 by Portia Swift RN  Outcome: Progressing  5/31/2025 0906 by Portia Swift RN  Outcome: Progressing     Problem: Musculoskeletal - Adult  Goal: Return mobility to  safest level of function  5/31/2025 0906 by Portia Swift RN  Outcome: Progressing  5/31/2025 0906 by Portia Swift RN  Outcome: Progressing  Goal: Maintain proper alignment of affected body part  5/31/2025 0906 by Portia Swift RN  Outcome: Progressing  5/31/2025 0906 by Portia Swift RN  Outcome: Progressing  Goal: Return ADL status to a safe level of function  5/31/2025 0906 by Portia Swift RN  Outcome: Progressing  5/31/2025 0906 by Portia Swift RN  Outcome: Progressing     Problem: Gastrointestinal - Adult  Goal: Minimal or absence of nausea and vomiting  5/31/2025 0906 by Portia Swift RN  Outcome: Progressing  5/31/2025 0906 by Portia Swift RN  Outcome: Progressing  Goal: Maintains or returns to baseline bowel function  5/31/2025 0906 by Portia Swift RN  Outcome: Progressing  5/31/2025 0906 by Portia Swift RN  Outcome: Progressing  Goal: Maintains adequate nutritional intake  5/31/2025 0906 by Portia Swift RN  Outcome: Progressing  5/31/2025 0906 by Portia Swift RN  Outcome: Progressing     Problem: Genitourinary - Adult  Goal: Absence of urinary retention  5/31/2025 0906 by Portia Swift RN  Outcome: Progressing  5/31/2025 0906 by Portia Swift RN  Outcome: Progressing  Goal: Urinary catheter remains patent  5/31/2025 0906 by Portia Swift RN  Outcome: Progressing  5/31/2025 0906 by Portia Swift RN  Outcome: Progressing     Problem: Infection - Adult  Goal: Absence of infection at discharge  5/31/2025 0906 by Portia Swift RN  Outcome: Progressing  5/31/2025 0906 by Portia Swift RN  Outcome: Progressing  Goal: Absence of infection during hospitalization  5/31/2025 0906 by Portia Swift RN  Outcome: Progressing  5/31/2025 0906 by Portia Swift RN  Outcome: Progressing  Goal: Absence of fever/infection during anticipated neutropenic period  5/31/2025 0906 by Swift, Portia, RN  Outcome: Progressing  5/31/2025 0906 by Portia Swift,

## 2025-05-31 NOTE — PLAN OF CARE
Problem: Neurosensory - Adult  Goal: Achieves stable or improved neurological status  5/31/2025 1140 by Portia Swift RN  Outcome: Adequate for Discharge  5/31/2025 0906 by Portia Swift RN  Outcome: Progressing  Goal: Absence of seizures  5/31/2025 1140 by Portia Swift RN  Outcome: Adequate for Discharge  5/31/2025 0906 by Portia Swift RN  Outcome: Progressing  Goal: Remains free of injury related to seizures activity  5/31/2025 1140 by Portia Swift RN  Outcome: Adequate for Discharge  5/31/2025 0906 by Portia Swift RN  Outcome: Progressing  Goal: Achieves maximal functionality and self care  5/31/2025 1140 by Portia Swift RN  Outcome: Adequate for Discharge  5/31/2025 0906 by Portia Swift RN  Outcome: Progressing     Problem: Respiratory - Adult  Goal: Achieves optimal ventilation and oxygenation  5/31/2025 1140 by Portia Swift RN  Outcome: Adequate for Discharge  5/31/2025 0906 by Portia Swift RN  Outcome: Progressing     Problem: Cardiovascular - Adult  Goal: Maintains optimal cardiac output and hemodynamic stability  5/31/2025 1140 by Portia Swift RN  Outcome: Adequate for Discharge  5/31/2025 0906 by Portia Swift RN  Outcome: Progressing  Goal: Absence of cardiac dysrhythmias or at baseline  5/31/2025 1140 by Portia Swift RN  Outcome: Adequate for Discharge  5/31/2025 0906 by Portia Swift RN  Outcome: Progressing     Problem: Skin/Tissue Integrity - Adult  Goal: Skin integrity remains intact  5/31/2025 1140 by Portia Swift RN  Outcome: Adequate for Discharge  5/31/2025 0906 by Portia Swift RN  Outcome: Progressing  Goal: Incisions, wounds, or drain sites healing without S/S of infection  5/31/2025 1140 by Portia Swift RN  Outcome: Adequate for Discharge  5/31/2025 0906 by Portia Swift RN  Outcome: Progressing  Goal: Oral mucous membranes remain intact  5/31/2025 1140 by Portia Swift RN  Outcome: Adequate for Discharge  5/31/2025 0906

## 2025-05-31 NOTE — DISCHARGE SUMMARY
V2.0  Discharge Summary    Name:  Na Johnston /Age/Sex: 1955 (69 y.o. female)   Admit Date: 2025  Discharge Date: 25    MRN & CSN:  [id number] & [id number] Encounter Date and Time 25 11:23 AM EDT    Attending:  Samreen Robbins MD Discharging Provider: Samreen Robbins MD       Hospital Course:     Brief HPI: Na Johnston is a 69 y.o. female who presented with shortness of breath    Brief Hospital course summary:   69-year-old female with past medical history of COPD with history of lung cancer status post lobectomy in  presented to the hospital because of acute hypoxic respiratory failure in the setting of COPD exacerbation.  Patient is not on oxygen at baseline patient was requiring 3 to 4 L oxygen ultimately came back to room air.  On home oxygen evaluation patient does qualify for home oxygen but is refusing to go home with oxygen as every time she ambulates now she is above 90%.  Patient is recommended to follow-up with pulmonology outpatient patient will be discharged with home inhalers, prednisone 40 mg daily for 5 days and doxycycline 100 mg twice daily for 7 days to complete the course medically stable to discharge    Assessment and short plans of problems assessed and managed during hospital stay are as under:  Acute hypoxic respiratory failure in the setting of COPD exacerbation  History of lung cancer follows up with oncology outpatient    Medical Decision Making:  The following items were considered in medical decision making:  Discussion of patient care with other providers  Reviewed clinical lab tests if any  Reviewed radiology tests if any  Reviewed other diagnostic tests/interventions  Independent review of radiologic images if any  Microbiology cultures and other micro tests if any    The patient expressed appropriate understanding of, and agreement with the discharge recommendations, medications, and plan.     Consults this admission:  IP CONSULT TO

## 2025-06-02 ENCOUNTER — CARE COORDINATION (OUTPATIENT)
Dept: CASE MANAGEMENT | Age: 70
End: 2025-06-02

## 2025-06-02 DIAGNOSIS — J44.1 COPD EXACERBATION (HCC): Primary | ICD-10-CM

## 2025-06-02 PROCEDURE — 1111F DSCHRG MED/CURRENT MED MERGE: CPT | Performed by: FAMILY MEDICINE

## 2025-06-02 NOTE — CARE COORDINATION
Care Transitions Note    Initial Call - Call within 2 business days of discharge: Yes    Patient Current Location:  [place]    Care Transition Nurse contacted the patient by telephone to perform post hospital discharge assessment, verified name and  as identifiers.  Provided introduction to self, and explanation of the Care Transition Nurse role.    Patient: Na Johnston    Patient : 1955   MRN: [id number]    Reason for Admission: SOB COPD   Discharge Date: 25  RURS: Readmission Risk Score: 6.8      Last Discharge Facility       Date Complaint Diagnosis Description Type Department Provider    25 Shortness of Breath; Cough COPD exacerbation (HCC) ... ED to Hosp-Admission (Discharged) (ADMITTED) Samreen Taylor MD; Kelsey Bright ...            Was this an external facility discharge? No    Additional needs identified to be addressed with provider   No needs identified             Method of communication with provider: none.    Patients top risk factors for readmission: medical condition-.    Interventions to address risk factors:   Education: .  Review of patient management of conditions/medications: .    Care Summary Note: CTN spoke with patient who reported she is doing better with each day. Patient still has some sob with exertion but overall feels she is improving. CTN reviewed all medications with patient who reported she is taking all as prescribed. Patient denied any issues or concerns at this time. CTN advised patient of use of urgent care or physician’s 24 hr access line if assistance is needed after hours.        Care Transition Nurse reviewed discharge instructions, medical action plan, and red flags with patient. The patient was given an opportunity to ask questions; all questions answered at this time.. The patient verbalized understanding.   Were discharge instructions available to patient? Yes.   Reviewed appropriate site of care based on symptoms and resources available to

## 2025-06-04 ENCOUNTER — CARE COORDINATION (OUTPATIENT)
Dept: CASE MANAGEMENT | Age: 70
End: 2025-06-04

## 2025-06-04 NOTE — CARE COORDINATION
Per chart review patient is scheduled for HFU apt with PCP on 6/5.     Fabiana Johnson, MSN, RN, [facility]  Care Transition Nurse  [phone] mobile

## 2025-06-05 ENCOUNTER — TELEMEDICINE (OUTPATIENT)
Dept: FAMILY MEDICINE CLINIC | Age: 70
End: 2025-06-05

## 2025-06-05 DIAGNOSIS — F17.200 TOBACCO DEPENDENCE: ICD-10-CM

## 2025-06-05 DIAGNOSIS — Z09 HOSPITAL DISCHARGE FOLLOW-UP: Primary | ICD-10-CM

## 2025-06-05 DIAGNOSIS — J44.1 COPD EXACERBATION (HCC): ICD-10-CM

## 2025-06-05 RX ORDER — PREDNISONE 20 MG/1
TABLET ORAL
Qty: 18 TABLET | Refills: 0 | Status: SHIPPED | OUTPATIENT
Start: 2025-06-05 | End: 2025-06-11

## 2025-06-05 NOTE — PROGRESS NOTES
Post-Discharge Transitional Care Follow Up      Na Johnston   YOB: 1955    Date of Office Visit:  6/5/2025  Date of Hospital Admission: 5/26/25  Date of Hospital Discharge: 5/31/25  Readmission Risk Score(high >=14%. Medium >=10%):Readmission Risk Score: 6.8      Care management risk score Rising risk (score 2-5) and Complex Care (Scores >=6): No Risk Score On File     Non face to face  following discharge, date last encounter closed (first attempt may have been earlier): 06/02/2025     Call initiated 2 business days of discharge: Yes     Below is the assessment and plan developed based on review of pertinent history, physical exam, labs, studies, and medications.  Hospital discharge follow-up  -     MT DISCHARGE MEDS RECONCILED W/ CURRENT OUTPATIENT MED LIST  COPD exacerbation (HCC)  Tobacco dependence    Assessment & Plan  1. Chronic Obstructive Pulmonary Disease (COPD).  - Hospitalized from 05/26/2025 to 05/31/2025 for a COPD exacerbation. Reports no significant shortness of breath but continues to experience coughing.  - Using nebulizer regularly but not taking inhalers consistently.  - Advised to continue current treatment regimen and avoid smoking as much as possible.  - Prescription for prednisone provided, with a tapering schedule of 40 mg for 3 days, followed by 20 mg for 3 days.    Medical Decision Making: high complexity  No follow-ups on file.    On this date 6/5/2025 I have spent 45 minutes reviewing previous notes, test results and face to face with the patient discussing the diagnosis and importance of compliance with the treatment plan as well as documenting on the day of the visit.     Subjective:   HPI  69-year-old female with past medical history of COPD with history of lung cancer status post lobectomy in 2022 presented to the hospital because of acute hypoxic respiratory failure in the setting of COPD exacerbation.  Patient is not on oxygen at baseline patient was requiring 3

## 2025-06-09 ENCOUNTER — CARE COORDINATION (OUTPATIENT)
Dept: CASE MANAGEMENT | Age: 70
End: 2025-06-09

## 2025-06-09 NOTE — CARE COORDINATION
Care Transitions Note    Follow Up Call     COPD pt declined O2     Attempted to reach patient for transitions of care follow up.  Unable to reach patient.      Outreach Attempts:   HIPAA compliant voicemail left for patient.     Follow Up Appointment:   Future Appointments         Provider Specialty Dept Phone    6/10/2025 10:20 AM Derrick Lugo MD Pulmonology [phone]            Plan for follow-up call in 2-5 days based on severity of symptoms and risk factors. Plan for next call: symptom management-.  self management-.    Shani Dutta LPN

## 2025-06-10 ENCOUNTER — OFFICE VISIT (OUTPATIENT)
Dept: PULMONOLOGY | Age: 70
End: 2025-06-10
Payer: MEDICARE

## 2025-06-10 VITALS
WEIGHT: 110 LBS | TEMPERATURE: 97.6 F | DIASTOLIC BLOOD PRESSURE: 77 MMHG | BODY MASS INDEX: 18.78 KG/M2 | HEIGHT: 64 IN | SYSTOLIC BLOOD PRESSURE: 143 MMHG | HEART RATE: 105 BPM | OXYGEN SATURATION: 91 % | RESPIRATION RATE: 16 BRPM

## 2025-06-10 DIAGNOSIS — C34.90 ADENOCARCINOMA OF LUNG, UNSPECIFIED LATERALITY (HCC): ICD-10-CM

## 2025-06-10 DIAGNOSIS — F17.200 TOBACCO DEPENDENCE: ICD-10-CM

## 2025-06-10 DIAGNOSIS — J44.9 COPD, MODERATE (HCC): Primary | ICD-10-CM

## 2025-06-10 PROCEDURE — 3023F SPIROM DOC REV: CPT | Performed by: STUDENT IN AN ORGANIZED HEALTH CARE EDUCATION/TRAINING PROGRAM

## 2025-06-10 PROCEDURE — G8399 PT W/DXA RESULTS DOCUMENT: HCPCS | Performed by: STUDENT IN AN ORGANIZED HEALTH CARE EDUCATION/TRAINING PROGRAM

## 2025-06-10 PROCEDURE — G8420 CALC BMI NORM PARAMETERS: HCPCS | Performed by: STUDENT IN AN ORGANIZED HEALTH CARE EDUCATION/TRAINING PROGRAM

## 2025-06-10 PROCEDURE — G8427 DOCREV CUR MEDS BY ELIG CLIN: HCPCS | Performed by: STUDENT IN AN ORGANIZED HEALTH CARE EDUCATION/TRAINING PROGRAM

## 2025-06-10 PROCEDURE — 1090F PRES/ABSN URINE INCON ASSESS: CPT | Performed by: STUDENT IN AN ORGANIZED HEALTH CARE EDUCATION/TRAINING PROGRAM

## 2025-06-10 PROCEDURE — 1111F DSCHRG MED/CURRENT MED MERGE: CPT | Performed by: STUDENT IN AN ORGANIZED HEALTH CARE EDUCATION/TRAINING PROGRAM

## 2025-06-10 PROCEDURE — 1159F MED LIST DOCD IN RCRD: CPT | Performed by: STUDENT IN AN ORGANIZED HEALTH CARE EDUCATION/TRAINING PROGRAM

## 2025-06-10 PROCEDURE — 3017F COLORECTAL CA SCREEN DOC REV: CPT | Performed by: STUDENT IN AN ORGANIZED HEALTH CARE EDUCATION/TRAINING PROGRAM

## 2025-06-10 PROCEDURE — G2211 COMPLEX E/M VISIT ADD ON: HCPCS | Performed by: STUDENT IN AN ORGANIZED HEALTH CARE EDUCATION/TRAINING PROGRAM

## 2025-06-10 PROCEDURE — 99213 OFFICE O/P EST LOW 20 MIN: CPT | Performed by: STUDENT IN AN ORGANIZED HEALTH CARE EDUCATION/TRAINING PROGRAM

## 2025-06-10 PROCEDURE — 1123F ACP DISCUSS/DSCN MKR DOCD: CPT | Performed by: STUDENT IN AN ORGANIZED HEALTH CARE EDUCATION/TRAINING PROGRAM

## 2025-06-10 PROCEDURE — 4004F PT TOBACCO SCREEN RCVD TLK: CPT | Performed by: STUDENT IN AN ORGANIZED HEALTH CARE EDUCATION/TRAINING PROGRAM

## 2025-06-10 RX ORDER — FLUTICASONE FUROATE, UMECLIDINIUM BROMIDE AND VILANTEROL TRIFENATATE 100; 62.5; 25 UG/1; UG/1; UG/1
1 POWDER RESPIRATORY (INHALATION) DAILY
Qty: 1 EACH | Refills: 3 | Status: SHIPPED | OUTPATIENT
Start: 2025-06-10

## 2025-06-10 RX ORDER — VARENICLINE TARTRATE 1 MG/1
1 TABLET, FILM COATED ORAL 2 TIMES DAILY
Qty: 60 TABLET | Refills: 5 | Status: SHIPPED | OUTPATIENT
Start: 2025-06-10

## 2025-06-10 RX ORDER — PREDNISONE 5 MG/1
5 TABLET ORAL DAILY
Qty: 30 TABLET | Refills: 2 | Status: SHIPPED | OUTPATIENT
Start: 2025-06-10 | End: 2025-09-08

## 2025-06-10 RX ORDER — VARENICLINE TARTRATE 0.5 MG/1
.5-1 TABLET, FILM COATED ORAL SEE ADMIN INSTRUCTIONS
Qty: 57 TABLET | Refills: 0 | Status: SHIPPED | OUTPATIENT
Start: 2025-06-10

## 2025-06-10 RX ORDER — FLUTICASONE FUROATE, UMECLIDINIUM BROMIDE AND VILANTEROL TRIFENATATE 100; 62.5; 25 UG/1; UG/1; UG/1
1 POWDER RESPIRATORY (INHALATION) DAILY
Qty: 2 EACH | Refills: 0 | Status: SHIPPED | COMMUNITY
Start: 2025-06-10

## 2025-06-10 ASSESSMENT — ENCOUNTER SYMPTOMS
STRIDOR: 0
EYE ITCHING: 0
COUGH: 0
ABDOMINAL PAIN: 0
SORE THROAT: 0
CONSTIPATION: 0
EYE PAIN: 0
WHEEZING: 0
EYE DISCHARGE: 0
NAUSEA: 0
TROUBLE SWALLOWING: 0
COLOR CHANGE: 0
DIARRHEA: 0
VOMITING: 0
EYE REDNESS: 0
SHORTNESS OF BREATH: 1
ABDOMINAL DISTENTION: 0
BACK PAIN: 0

## 2025-06-10 NOTE — PROGRESS NOTES
MA Communication:  The following orders are received by verbal communication from   Derrick Lugo MD    Orders include:  fu 4 mo        Trelegy samples

## 2025-06-10 NOTE — PATIENT INSTRUCTIONS
Remember to bring a list of pulmonary medications and any CPAP or BiPAP machines to your next appointment with the office.     Please keep all of your future appointments scheduled by [facility] [place] [facility] office. Out of respect for other patients and providers, you may be asked to reschedule your appointment if you arrive later than your scheduled appointment time. Appointments cancelled less than 24hrs in advance will be considered a no show. Patients with three missed appointments within 1 year or four missed appointments within 2 years can be dismissed from the practice.     Please be aware that our physicians are required to work in the Intensive Care Unit at [facility].  Your appointment may need to be rescheduled if they are designated to work during your appointment time.      You may receive a survey regarding the care you received during your visit.  Your input is valuable to us.  We encourage you to complete and return your survey.  We hope you will choose us in the future for your healthcare needs.     Pt instructed of all future appointment dates & times, including radiology, labs, procedures & referrals. If procedures were scheduled preparation instructions provided. Instructions on future appointments with [facility] were given.     In the next few weeks, you will be receiving a survey from [facility] regarding your visit today.  We would greatly appreciate it if you would take just a few minutes to fill that out.  It is very important to us that our patients receive top notch care and our surveys help keep us accountable. However, if your experience was not a good one, we want to hear about that as well. This is a key way we can keep track of problems and strive to correct any for future visits.    Again, we appreciate your time and thank you for choosing [facility]!    Shruthi ANGLIN

## 2025-06-10 NOTE — PROGRESS NOTES
[facility] Follow-up  [address] [place] [address]  [phone]        Na Johnston (: 1955 ) is a 69 y.o. female here for an evaluation of   Chief Complaint   Patient presents with    Follow-up    COPD    Follow-Up from Hospital         SUBJECTIVE/OBJECTIVE:  Patient is a 69-year-old female with significant past medical history of prior lung cancer status post lobectomy, tobacco dependence, moderate COPD that presents to [facility] pulmonary clinic for follow-up visit.  Patient was recently in the hospital for COPD exacerbation.  She has been on and off steroid therapy since her discharge.  She is also on Trelegy with albuterol as needed for shortness of breath.  I am unsure whether she was taking Trelegy when she ran out of samples.  She still smoking a couple cigarettes per day.      Review of Systems   Constitutional:  Negative for activity change, appetite change, chills, diaphoresis and fatigue.   HENT:  Negative for congestion, sore throat and trouble swallowing.    Eyes:  Negative for pain, discharge, redness and itching.   Respiratory:  Positive for shortness of breath. Negative for cough, wheezing and stridor.    Cardiovascular:  Negative for chest pain, palpitations and leg swelling.   Gastrointestinal:  Negative for abdominal distention, abdominal pain, constipation, diarrhea, nausea and vomiting.   Endocrine: Negative for polydipsia, polyphagia and polyuria.   Genitourinary:  Negative for difficulty urinating.   Musculoskeletal:  Negative for back pain, myalgias and neck pain.   Skin:  Negative for color change.   Neurological:  Negative for dizziness, weakness and light-headedness.   Psychiatric/Behavioral:  Negative for agitation and behavioral problems.          Vitals:    06/10/25 1017   BP: (!) 143/77   Pulse: (!) 105   Resp: 16   Temp: 97.6 °F (36.4 °C)   TempSrc: Temporal   SpO2: 91%   Weight: 49.9 kg (110 lb)   Height: 1.626 m (5' 4\")        Physical

## 2025-06-11 ENCOUNTER — CARE COORDINATION (OUTPATIENT)
Dept: CASE MANAGEMENT | Age: 70
End: 2025-06-11

## 2025-06-11 NOTE — CARE COORDINATION
Care Transitions Note    Final Call     COPD pt declined O2     Attempted to reach patient for transitions of care follow up.  Unable to reach patient.      Outreach Attempts:   Multiple attempts to contact patient at phone numbers on file.   HIPAA compliant voicemail left for patient.     Patient closed (unable to reach patient) from the Care Transitions program on 6/11.  Patient/family  unable to reach patient .      Handoff:   Patient was not referred to the ACM team due to unable to contact patient.      Care Summary Note: [facility] 2nd attempted outreach. Left message and contact information for call back. No further outreach will be attempted.     Shani Dutta LPN  Care Coordinator     Upcoming Appointments:    Future Appointments         Provider Specialty Dept Phone    10/13/2025 10:20 AM Derrick Lugo MD Pulmonology [phone]            Shani Dutta LPN

## 2025-06-15 RX ORDER — ALBUTEROL SULFATE 90 UG/1
AEROSOL, METERED RESPIRATORY (INHALATION)
Qty: 18 G | Refills: 3 | Status: SHIPPED | OUTPATIENT
Start: 2025-06-15

## 2025-09-03 DIAGNOSIS — J44.9 COPD, MODERATE (HCC): ICD-10-CM

## 2025-09-03 RX ORDER — PREDNISONE 5 MG/1
5 TABLET ORAL DAILY
Qty: 30 TABLET | Refills: 2 | Status: SHIPPED | OUTPATIENT
Start: 2025-09-03 | End: 2025-12-02

## (undated) DEVICE — ENDOSCOPIC KIT 2 12 FT OP4 DE2 GWN SYR

## (undated) DEVICE — SUTURE NONABSORBABLE MONOFILAMENT 4-0 RB-1 36 IN BLU PROLENE 8557H

## (undated) DEVICE — Device

## (undated) DEVICE — TROCAR: Brand: KII SLEEVE

## (undated) DEVICE — ELECTRODE,ECG,STRESS,FOAM,3PK: Brand: MEDLINE

## (undated) DEVICE — SUTURE VCRL SZ 0 L36IN ABSRB UD CT-1 L36MM 1/2 CIR TAPR PNT VCP946H

## (undated) DEVICE — SPONGE DRN W4XL4IN RAYON/POLYESTER 6 PLY NONWOVEN PRECUT

## (undated) DEVICE — ELECTRODE ECG MONITR FOAM TEAR DROP ADLT RED

## (undated) DEVICE — SOLUTION IV IRRIG POUR BRL 0.9% SODIUM CHL 2F7124

## (undated) DEVICE — FORCEP BX STD CAP 240CM RAD JAW 4

## (undated) DEVICE — [HIGH FLOW INSUFFLATOR,  DO NOT USE IF PACKAGE IS DAMAGED,  KEEP DRY,  KEEP AWAY FROM SUNLIGHT,  PROTECT FROM HEAT AND RADIOACTIVE SOURCES.]: Brand: PNEUMOSURE

## (undated) DEVICE — SUTURE VCRL SZ 3-0 L27IN ABSRB UD L36MM CT-1 1/2 CIR J258H

## (undated) DEVICE — GAUZE,SPONGE,4"X4",8PLY,STRL,LF,10/TRAY: Brand: MEDLINE

## (undated) DEVICE — YANKAUER,BULB TIP,W/O VENT,RIGID,STERILE: Brand: MEDLINE

## (undated) DEVICE — Device: Brand: DISPOSABLE ELECTROSURGICAL SNARE

## (undated) DEVICE — TRAP POLYP ETRAP

## (undated) DEVICE — SUTURE MCRYL + SZ 4-0 L18IN ABSRB UD L19MM PS-2 3/8 CIR MCP496G

## (undated) DEVICE — BAG RETRIEVAL SPECIMEN SUPERBAG 15 2XL NYLON ITRODUCER

## (undated) DEVICE — TROCAR: Brand: KII FIOS FIRST ENTRY

## (undated) DEVICE — CHLORAPREP 26ML ORANGE

## (undated) DEVICE — ANTI-FOG SOLUTION WITH FOAM PAD: Brand: DEVON

## (undated) DEVICE — ENDOSCOPIC ULTRASOUND FINE NEEDLE BIOPSY (FNB) DEVICE: Brand: ACQUIRE

## (undated) DEVICE — TRAP SPEC POLYPR SGL CHMBR FN MESH SCRN

## (undated) DEVICE — CATHETER THOR 28FR SIL 6 EYE STR HI TEAR STRENGTH

## (undated) DEVICE — STERILE LATEX POWDER-FREE SURGICAL GLOVESWITH NITRILE COATING: Brand: PROTEXIS

## (undated) DEVICE — CANNULA NSL AD TBNG L7FT PVC STR NONFLARED PRNG O2 DEL W STD

## (undated) DEVICE — SNARE ENDOSCP L240CM SHTH DIA24MM LOOP W10MM POLYP RND REINF

## (undated) DEVICE — DRAIN SURG SGL COLL PT TB FOR ATS BG OASIS

## (undated) DEVICE — STAPLER INT L16CM STD UNIV RELD DISP TRI-STAPLE ENDO GIA

## (undated) DEVICE — FORCEPS BX L240CM JAW DIA2.8MM L CAP W/ NDL MIC MESH TOOTH

## (undated) DEVICE — CANNULA,OXY,ADULT,SUPERSOFT,W/7'TUB,SC: Brand: MEDLINE INDUSTRIES, INC.

## (undated) DEVICE — RELOAD STPL 2.5MM L60MM 0DEG VASC TISS TAN TI 6 ROW LIN

## (undated) DEVICE — SUTURE PERMA-HAND SZ 2-0 L30IN NONABSORBABLE BLK L26MM SH K833H

## (undated) DEVICE — ENDO CARRY-ON PROCEDURE KIT INCLUDES SUCTION TUBING, LUBRICANT, GAUZE, BIOHAZARD STICKER, TRANSPORT PAD AND INTERCEPT BEDSIDE KIT.: Brand: ENDO CARRY-ON PROCEDURE KIT

## (undated) DEVICE — AIRLIFE™ NASAL OXYGEN CANNULA CURVED, FLARED TIP, WITH 7 FEET (2.1 M) CRUSH RESISTANT TUBING, OVER-THE-EAR STYLE: Brand: AIRLIFE™

## (undated) DEVICE — SYRINGE MED 50ML LUERLOCK TIP

## (undated) DEVICE — RELOAD STPL L45MM VASCULAR/MEDIUM TISS TAN CRV TIP ARTC

## (undated) DEVICE — DRAIN SURG 24FR L5/16IN DIA8MM SIL RND HUBLESS FULL FLUT

## (undated) DEVICE — ELECTRODE LAP L36CM PTFE WIRE J HK CLEANCOAT

## (undated) DEVICE — SUTURE PERMA-HAND SZ 0 L18IN NONABSORBABLE BLK L30MM FSL 678G

## (undated) DEVICE — CONMED SCOPE SAVER BITE BLOCK, 20X27 MM: Brand: SCOPE SAVER

## (undated) DEVICE — CONNECTOR PERF W0.25XH3/8IN BASE Y SHP REDUC W/O LUERLOCK

## (undated) DEVICE — ELECTRODE PT RET AD L9FT HI MOIST COND ADH HYDRGEL CORDED